# Patient Record
Sex: MALE | Race: WHITE | Employment: OTHER | URBAN - NONMETROPOLITAN AREA
[De-identification: names, ages, dates, MRNs, and addresses within clinical notes are randomized per-mention and may not be internally consistent; named-entity substitution may affect disease eponyms.]

---

## 2017-01-12 RX ORDER — TADALAFIL 20 MG
20 TABLET ORAL PRN
Qty: 6 TABLET | Refills: 5 | Status: SHIPPED | OUTPATIENT
Start: 2017-01-12 | End: 2018-04-27

## 2017-01-12 RX ORDER — TADALAFIL 20 MG
20 TABLET ORAL PRN
Qty: 6 TABLET | Refills: 5 | OUTPATIENT
Start: 2017-01-12

## 2017-03-09 ENCOUNTER — OFFICE VISIT (OUTPATIENT)
Dept: FAMILY MEDICINE CLINIC | Age: 77
End: 2017-03-09

## 2017-03-09 VITALS
DIASTOLIC BLOOD PRESSURE: 60 MMHG | HEART RATE: 58 BPM | BODY MASS INDEX: 26.88 KG/M2 | HEIGHT: 71 IN | WEIGHT: 192 LBS | TEMPERATURE: 98.3 F | SYSTOLIC BLOOD PRESSURE: 138 MMHG | OXYGEN SATURATION: 96 %

## 2017-03-09 DIAGNOSIS — J20.9 ACUTE BRONCHITIS, UNSPECIFIED ORGANISM: Primary | ICD-10-CM

## 2017-03-09 DIAGNOSIS — Z72.0 TOBACCO ABUSE: ICD-10-CM

## 2017-03-09 PROCEDURE — G8484 FLU IMMUNIZE NO ADMIN: HCPCS | Performed by: FAMILY MEDICINE

## 2017-03-09 PROCEDURE — 1123F ACP DISCUSS/DSCN MKR DOCD: CPT | Performed by: FAMILY MEDICINE

## 2017-03-09 PROCEDURE — 4040F PNEUMOC VAC/ADMIN/RCVD: CPT | Performed by: FAMILY MEDICINE

## 2017-03-09 PROCEDURE — G8427 DOCREV CUR MEDS BY ELIG CLIN: HCPCS | Performed by: FAMILY MEDICINE

## 2017-03-09 PROCEDURE — 99214 OFFICE O/P EST MOD 30 MIN: CPT | Performed by: FAMILY MEDICINE

## 2017-03-09 PROCEDURE — G8420 CALC BMI NORM PARAMETERS: HCPCS | Performed by: FAMILY MEDICINE

## 2017-03-09 PROCEDURE — 4004F PT TOBACCO SCREEN RCVD TLK: CPT | Performed by: FAMILY MEDICINE

## 2017-03-09 RX ORDER — DOXYCYCLINE HYCLATE 100 MG
100 TABLET ORAL 2 TIMES DAILY
Qty: 20 TABLET | Refills: 0 | Status: SHIPPED | OUTPATIENT
Start: 2017-03-09 | End: 2017-03-19

## 2017-03-09 RX ORDER — BENZONATATE 100 MG/1
100 CAPSULE ORAL 3 TIMES DAILY PRN
Qty: 20 CAPSULE | Refills: 0 | Status: SHIPPED | OUTPATIENT
Start: 2017-03-09 | End: 2017-03-16

## 2017-03-09 ASSESSMENT — ENCOUNTER SYMPTOMS
RHINORRHEA: 1
SHORTNESS OF BREATH: 0
SORE THROAT: 0
COUGH: 1
HEMOPTYSIS: 0

## 2017-04-13 ENCOUNTER — OFFICE VISIT (OUTPATIENT)
Dept: FAMILY MEDICINE CLINIC | Age: 77
End: 2017-04-13

## 2017-04-13 VITALS
WEIGHT: 190.6 LBS | DIASTOLIC BLOOD PRESSURE: 76 MMHG | BODY MASS INDEX: 26.68 KG/M2 | HEIGHT: 71 IN | OXYGEN SATURATION: 93 % | SYSTOLIC BLOOD PRESSURE: 136 MMHG | HEART RATE: 58 BPM

## 2017-04-13 DIAGNOSIS — N52.9 ERECTILE DYSFUNCTION, UNSPECIFIED ERECTILE DYSFUNCTION TYPE: ICD-10-CM

## 2017-04-13 DIAGNOSIS — Z86.79 S/P AAA REPAIR: ICD-10-CM

## 2017-04-13 DIAGNOSIS — Z85.51 HISTORY OF BLADDER CANCER: ICD-10-CM

## 2017-04-13 DIAGNOSIS — I10 ESSENTIAL HYPERTENSION: ICD-10-CM

## 2017-04-13 DIAGNOSIS — Z98.890 S/P AAA REPAIR: ICD-10-CM

## 2017-04-13 DIAGNOSIS — E78.5 HYPERLIPIDEMIA, UNSPECIFIED HYPERLIPIDEMIA TYPE: ICD-10-CM

## 2017-04-13 DIAGNOSIS — I48.91 ATRIAL FIBRILLATION, UNSPECIFIED TYPE (HCC): Primary | ICD-10-CM

## 2017-04-13 PROCEDURE — G8420 CALC BMI NORM PARAMETERS: HCPCS | Performed by: FAMILY MEDICINE

## 2017-04-13 PROCEDURE — 4004F PT TOBACCO SCREEN RCVD TLK: CPT | Performed by: FAMILY MEDICINE

## 2017-04-13 PROCEDURE — 4040F PNEUMOC VAC/ADMIN/RCVD: CPT | Performed by: FAMILY MEDICINE

## 2017-04-13 PROCEDURE — 1123F ACP DISCUSS/DSCN MKR DOCD: CPT | Performed by: FAMILY MEDICINE

## 2017-04-13 PROCEDURE — 99213 OFFICE O/P EST LOW 20 MIN: CPT | Performed by: FAMILY MEDICINE

## 2017-04-13 PROCEDURE — G8427 DOCREV CUR MEDS BY ELIG CLIN: HCPCS | Performed by: FAMILY MEDICINE

## 2017-04-13 RX ORDER — PNEUMOCOCCAL 13-VALENT CONJUGATE VACCINE 2.2; 2.2; 2.2; 2.2; 2.2; 4.4; 2.2; 2.2; 2.2; 2.2; 2.2; 2.2; 2.2 UG/.5ML; UG/.5ML; UG/.5ML; UG/.5ML; UG/.5ML; UG/.5ML; UG/.5ML; UG/.5ML; UG/.5ML; UG/.5ML; UG/.5ML; UG/.5ML; UG/.5ML
INJECTION, SUSPENSION INTRAMUSCULAR
Refills: 0 | COMMUNITY
Start: 2017-01-17 | End: 2017-04-13

## 2017-04-17 DIAGNOSIS — I10 ESSENTIAL HYPERTENSION: ICD-10-CM

## 2017-04-17 DIAGNOSIS — E78.5 HYPERLIPIDEMIA, UNSPECIFIED HYPERLIPIDEMIA TYPE: ICD-10-CM

## 2017-04-17 DIAGNOSIS — D64.9 ANEMIA, UNSPECIFIED TYPE: Primary | ICD-10-CM

## 2017-04-17 DIAGNOSIS — I48.91 ATRIAL FIBRILLATION, UNSPECIFIED TYPE (HCC): ICD-10-CM

## 2017-04-17 LAB
ALBUMIN SERPL-MCNC: 4.1 G/DL (ref 3.9–4.9)
ALP BLD-CCNC: 55 U/L (ref 35–104)
ALT SERPL-CCNC: 17 U/L (ref 0–41)
ANION GAP SERPL CALCULATED.3IONS-SCNC: 11 MEQ/L (ref 7–13)
AST SERPL-CCNC: 23 U/L (ref 0–40)
BILIRUB SERPL-MCNC: 0.6 MG/DL (ref 0–1.2)
BUN BLDV-MCNC: 12 MG/DL (ref 8–23)
CALCIUM SERPL-MCNC: 8.7 MG/DL (ref 8.6–10.2)
CHLORIDE BLD-SCNC: 105 MEQ/L (ref 98–107)
CHOLESTEROL, TOTAL: 182 MG/DL (ref 0–199)
CO2: 26 MEQ/L (ref 22–29)
CREAT SERPL-MCNC: 0.93 MG/DL (ref 0.7–1.2)
FOLATE: 15.4 NG/ML (ref 7.3–26.1)
GFR AFRICAN AMERICAN: >60
GFR NON-AFRICAN AMERICAN: >60
GLOBULIN: 3.1 G/DL (ref 2.3–3.5)
GLUCOSE BLD-MCNC: 87 MG/DL (ref 74–109)
HCT VFR BLD CALC: 39.2 % (ref 42–52)
HDLC SERPL-MCNC: 46 MG/DL (ref 40–59)
HEMOGLOBIN: 12.9 G/DL (ref 14–18)
IRON SATURATION: 8 % (ref 11–46)
IRON: 29 UG/DL (ref 59–158)
LDL CHOLESTEROL CALCULATED: 114 MG/DL (ref 0–129)
MCH RBC QN AUTO: 27.3 PG (ref 27–31.3)
MCHC RBC AUTO-ENTMCNC: 32.9 % (ref 33–37)
MCV RBC AUTO: 82.8 FL (ref 80–100)
PDW BLD-RTO: 16.5 % (ref 11.5–14.5)
PLATELET # BLD: 178 K/UL (ref 130–400)
POTASSIUM SERPL-SCNC: 3.8 MEQ/L (ref 3.5–5.1)
RBC # BLD: 4.74 M/UL (ref 4.7–6.1)
SODIUM BLD-SCNC: 142 MEQ/L (ref 132–144)
TOTAL IRON BINDING CAPACITY: 361 UG/DL (ref 178–450)
TOTAL PROTEIN: 7.2 G/DL (ref 6.4–8.1)
TRIGL SERPL-MCNC: 111 MG/DL (ref 0–200)
TSH SERPL DL<=0.05 MIU/L-ACNC: 1.75 UIU/ML (ref 0.27–4.2)
VITAMIN B-12: 330 PG/ML (ref 211–946)
WBC # BLD: 6.9 K/UL (ref 4.8–10.8)

## 2017-04-18 DIAGNOSIS — D50.9 IRON DEFICIENCY ANEMIA, UNSPECIFIED IRON DEFICIENCY ANEMIA TYPE: Primary | ICD-10-CM

## 2017-04-19 ENCOUNTER — OFFICE VISIT (OUTPATIENT)
Dept: GASTROENTEROLOGY | Age: 77
End: 2017-04-19

## 2017-04-19 DIAGNOSIS — D50.9 IRON DEFICIENCY ANEMIA, UNSPECIFIED: Primary | ICD-10-CM

## 2017-04-19 PROCEDURE — 1123F ACP DISCUSS/DSCN MKR DOCD: CPT | Performed by: INTERNAL MEDICINE

## 2017-04-19 PROCEDURE — 99203 OFFICE O/P NEW LOW 30 MIN: CPT | Performed by: INTERNAL MEDICINE

## 2017-04-19 PROCEDURE — G8427 DOCREV CUR MEDS BY ELIG CLIN: HCPCS | Performed by: INTERNAL MEDICINE

## 2017-04-19 PROCEDURE — 4040F PNEUMOC VAC/ADMIN/RCVD: CPT | Performed by: INTERNAL MEDICINE

## 2017-04-19 PROCEDURE — 4004F PT TOBACCO SCREEN RCVD TLK: CPT | Performed by: INTERNAL MEDICINE

## 2017-04-19 PROCEDURE — G8420 CALC BMI NORM PARAMETERS: HCPCS | Performed by: INTERNAL MEDICINE

## 2017-04-21 VITALS
BODY MASS INDEX: 26.36 KG/M2 | SYSTOLIC BLOOD PRESSURE: 186 MMHG | DIASTOLIC BLOOD PRESSURE: 86 MMHG | WEIGHT: 189 LBS | HEART RATE: 50 BPM

## 2017-04-23 ENCOUNTER — ANESTHESIA EVENT (OUTPATIENT)
Dept: ENDOSCOPY | Age: 77
End: 2017-04-23
Payer: MEDICARE

## 2017-04-24 ENCOUNTER — HOSPITAL ENCOUNTER (OUTPATIENT)
Age: 77
Setting detail: OUTPATIENT SURGERY
Discharge: HOME | End: 2017-04-24
Attending: INTERNAL MEDICINE | Admitting: INTERNAL MEDICINE
Payer: MEDICARE

## 2017-04-24 ENCOUNTER — ANESTHESIA (OUTPATIENT)
Dept: ENDOSCOPY | Age: 77
End: 2017-04-24
Payer: MEDICARE

## 2017-04-24 VITALS
OXYGEN SATURATION: 95 % | WEIGHT: 189 LBS | RESPIRATION RATE: 18 BRPM | HEIGHT: 70 IN | SYSTOLIC BLOOD PRESSURE: 170 MMHG | TEMPERATURE: 97.9 F | DIASTOLIC BLOOD PRESSURE: 93 MMHG | BODY MASS INDEX: 27.06 KG/M2 | HEART RATE: 62 BPM

## 2017-04-24 VITALS
SYSTOLIC BLOOD PRESSURE: 102 MMHG | DIASTOLIC BLOOD PRESSURE: 61 MMHG | OXYGEN SATURATION: 99 % | RESPIRATION RATE: 13 BRPM

## 2017-04-24 PROCEDURE — 88342 IMHCHEM/IMCYTCHM 1ST ANTB: CPT

## 2017-04-24 PROCEDURE — 2580000003 HC RX 258: Performed by: STUDENT IN AN ORGANIZED HEALTH CARE EDUCATION/TRAINING PROGRAM

## 2017-04-24 PROCEDURE — 3609027000 HC COLONOSCOPY: Performed by: INTERNAL MEDICINE

## 2017-04-24 PROCEDURE — 3700000000 HC ANESTHESIA ATTENDED CARE: Performed by: INTERNAL MEDICINE

## 2017-04-24 PROCEDURE — 2500000003 HC RX 250 WO HCPCS: Performed by: NURSE ANESTHETIST, CERTIFIED REGISTERED

## 2017-04-24 PROCEDURE — 2580000003 HC RX 258: Performed by: NURSE ANESTHETIST, CERTIFIED REGISTERED

## 2017-04-24 PROCEDURE — 6360000002 HC RX W HCPCS: Performed by: NURSE ANESTHETIST, CERTIFIED REGISTERED

## 2017-04-24 PROCEDURE — 7100000011 HC PHASE II RECOVERY - ADDTL 15 MIN: Performed by: INTERNAL MEDICINE

## 2017-04-24 PROCEDURE — 3609017100 HC EGD: Performed by: INTERNAL MEDICINE

## 2017-04-24 PROCEDURE — 3700000001 HC ADD 15 MINUTES (ANESTHESIA): Performed by: INTERNAL MEDICINE

## 2017-04-24 PROCEDURE — 88305 TISSUE EXAM BY PATHOLOGIST: CPT

## 2017-04-24 PROCEDURE — 7100000010 HC PHASE II RECOVERY - FIRST 15 MIN: Performed by: INTERNAL MEDICINE

## 2017-04-24 RX ORDER — SODIUM CHLORIDE 9 MG/ML
INJECTION, SOLUTION INTRAVENOUS CONTINUOUS
Status: DISCONTINUED | OUTPATIENT
Start: 2017-04-24 | End: 2017-04-24 | Stop reason: HOSPADM

## 2017-04-24 RX ORDER — SODIUM CHLORIDE 9 MG/ML
INJECTION, SOLUTION INTRAVENOUS CONTINUOUS PRN
Status: DISCONTINUED | OUTPATIENT
Start: 2017-04-24 | End: 2017-04-24 | Stop reason: SDUPTHER

## 2017-04-24 RX ORDER — ONDANSETRON 2 MG/ML
4 INJECTION INTRAMUSCULAR; INTRAVENOUS
Status: DISCONTINUED | OUTPATIENT
Start: 2017-04-24 | End: 2017-04-24 | Stop reason: HOSPADM

## 2017-04-24 RX ORDER — LIDOCAINE HYDROCHLORIDE 20 MG/ML
INJECTION, SOLUTION EPIDURAL; INFILTRATION; INTRACAUDAL; PERINEURAL PRN
Status: DISCONTINUED | OUTPATIENT
Start: 2017-04-24 | End: 2017-04-24 | Stop reason: SDUPTHER

## 2017-04-24 RX ORDER — GLYCOPYRROLATE 0.2 MG/ML
INJECTION INTRAMUSCULAR; INTRAVENOUS PRN
Status: DISCONTINUED | OUTPATIENT
Start: 2017-04-24 | End: 2017-04-24 | Stop reason: SDUPTHER

## 2017-04-24 RX ORDER — PROPOFOL 10 MG/ML
INJECTION, EMULSION INTRAVENOUS PRN
Status: DISCONTINUED | OUTPATIENT
Start: 2017-04-24 | End: 2017-04-24 | Stop reason: SDUPTHER

## 2017-04-24 RX ADMIN — PROPOFOL 10 MG: 10 INJECTION, EMULSION INTRAVENOUS at 08:32

## 2017-04-24 RX ADMIN — PROPOFOL 10 MG: 10 INJECTION, EMULSION INTRAVENOUS at 08:21

## 2017-04-24 RX ADMIN — PROPOFOL 10 MG: 10 INJECTION, EMULSION INTRAVENOUS at 08:07

## 2017-04-24 RX ADMIN — PROPOFOL 10 MG: 10 INJECTION, EMULSION INTRAVENOUS at 08:25

## 2017-04-24 RX ADMIN — SODIUM CHLORIDE: 900 INJECTION, SOLUTION INTRAVENOUS at 07:58

## 2017-04-24 RX ADMIN — GLYCOPYRROLATE 0.4 MG: 0.2 INJECTION INTRAMUSCULAR; INTRAVENOUS at 08:03

## 2017-04-24 RX ADMIN — PROPOFOL 50 MG: 10 INJECTION, EMULSION INTRAVENOUS at 08:03

## 2017-04-24 RX ADMIN — PROPOFOL 10 MG: 10 INJECTION, EMULSION INTRAVENOUS at 08:15

## 2017-04-24 RX ADMIN — SODIUM CHLORIDE: 9 INJECTION, SOLUTION INTRAVENOUS at 07:39

## 2017-04-24 RX ADMIN — PROPOFOL 50 MG: 10 INJECTION, EMULSION INTRAVENOUS at 08:02

## 2017-04-24 RX ADMIN — PROPOFOL 10 MG: 10 INJECTION, EMULSION INTRAVENOUS at 08:09

## 2017-04-24 RX ADMIN — LIDOCAINE HYDROCHLORIDE 20 MG: 20 INJECTION, SOLUTION EPIDURAL; INFILTRATION; INTRACAUDAL; PERINEURAL at 08:02

## 2017-04-24 RX ADMIN — PROPOFOL 10 MG: 10 INJECTION, EMULSION INTRAVENOUS at 08:18

## 2017-04-24 RX ADMIN — PROPOFOL 10 MG: 10 INJECTION, EMULSION INTRAVENOUS at 08:11

## 2017-04-24 RX ADMIN — PROPOFOL 10 MG: 10 INJECTION, EMULSION INTRAVENOUS at 08:26

## 2017-04-24 RX ADMIN — PROPOFOL 30 MG: 10 INJECTION, EMULSION INTRAVENOUS at 08:05

## 2017-04-24 ASSESSMENT — PAIN - FUNCTIONAL ASSESSMENT: PAIN_FUNCTIONAL_ASSESSMENT: 0-10

## 2017-05-19 LAB
HCT VFR BLD CALC: 40.7 % (ref 42–52)
HEMOGLOBIN: 13 G/DL (ref 14–18)
MCH RBC QN AUTO: 26.9 PG (ref 27–31.3)
MCHC RBC AUTO-ENTMCNC: 31.8 % (ref 33–37)
MCV RBC AUTO: 84.6 FL (ref 80–100)
PDW BLD-RTO: 16.8 % (ref 11.5–14.5)
PLATELET # BLD: 148 K/UL (ref 130–400)
RBC # BLD: 4.81 M/UL (ref 4.7–6.1)
WBC # BLD: 7.4 K/UL (ref 4.8–10.8)

## 2017-05-23 ENCOUNTER — OFFICE VISIT (OUTPATIENT)
Dept: GASTROENTEROLOGY | Age: 77
End: 2017-05-23

## 2017-05-23 VITALS
WEIGHT: 187 LBS | DIASTOLIC BLOOD PRESSURE: 80 MMHG | SYSTOLIC BLOOD PRESSURE: 174 MMHG | HEART RATE: 55 BPM | BODY MASS INDEX: 26.83 KG/M2

## 2017-05-23 DIAGNOSIS — D50.9 IRON DEFICIENCY ANEMIA, UNSPECIFIED: Primary | ICD-10-CM

## 2017-05-23 PROCEDURE — G8420 CALC BMI NORM PARAMETERS: HCPCS | Performed by: INTERNAL MEDICINE

## 2017-05-23 PROCEDURE — 99212 OFFICE O/P EST SF 10 MIN: CPT | Performed by: INTERNAL MEDICINE

## 2017-05-23 PROCEDURE — 4040F PNEUMOC VAC/ADMIN/RCVD: CPT | Performed by: INTERNAL MEDICINE

## 2017-05-23 PROCEDURE — 4004F PT TOBACCO SCREEN RCVD TLK: CPT | Performed by: INTERNAL MEDICINE

## 2017-05-23 PROCEDURE — G8427 DOCREV CUR MEDS BY ELIG CLIN: HCPCS | Performed by: INTERNAL MEDICINE

## 2017-05-23 PROCEDURE — 1123F ACP DISCUSS/DSCN MKR DOCD: CPT | Performed by: INTERNAL MEDICINE

## 2017-05-24 RX ORDER — FERROUS SULFATE 325(65) MG
325 TABLET ORAL
Qty: 30 TABLET | Refills: 3
Start: 2017-05-24 | End: 2017-09-22 | Stop reason: ALTCHOICE

## 2017-07-21 DIAGNOSIS — D50.9 IRON DEFICIENCY ANEMIA, UNSPECIFIED: Primary | ICD-10-CM

## 2017-07-21 LAB
BASOPHILS ABSOLUTE: 0.1 K/UL (ref 0–0.2)
BASOPHILS RELATIVE PERCENT: 0.9 %
EOSINOPHILS ABSOLUTE: 0.2 K/UL (ref 0–0.7)
EOSINOPHILS RELATIVE PERCENT: 3 %
HCT VFR BLD CALC: 46.4 % (ref 42–52)
HEMOGLOBIN: 15.1 G/DL (ref 14–18)
LYMPHOCYTES ABSOLUTE: 1 K/UL (ref 1–4.8)
LYMPHOCYTES RELATIVE PERCENT: 14.1 %
MCH RBC QN AUTO: 29.5 PG (ref 27–31.3)
MCHC RBC AUTO-ENTMCNC: 32.6 % (ref 33–37)
MCV RBC AUTO: 90.4 FL (ref 80–100)
MONOCYTES ABSOLUTE: 0.8 K/UL (ref 0.2–0.8)
MONOCYTES RELATIVE PERCENT: 11.4 %
NEUTROPHILS ABSOLUTE: 5 K/UL (ref 1.4–6.5)
NEUTROPHILS RELATIVE PERCENT: 70.6 %
PDW BLD-RTO: 19.3 % (ref 11.5–14.5)
PLATELET # BLD: 164 K/UL (ref 130–400)
RBC # BLD: 5.13 M/UL (ref 4.7–6.1)
WBC # BLD: 7 K/UL (ref 4.8–10.8)

## 2017-07-25 ENCOUNTER — OFFICE VISIT (OUTPATIENT)
Dept: GASTROENTEROLOGY | Age: 77
End: 2017-07-25

## 2017-07-25 VITALS
BODY MASS INDEX: 26.98 KG/M2 | HEART RATE: 56 BPM | DIASTOLIC BLOOD PRESSURE: 80 MMHG | WEIGHT: 188 LBS | RESPIRATION RATE: 18 BRPM | SYSTOLIC BLOOD PRESSURE: 172 MMHG

## 2017-07-25 DIAGNOSIS — D50.9 IRON DEFICIENCY ANEMIA, UNSPECIFIED: Primary | ICD-10-CM

## 2017-07-25 PROCEDURE — 4040F PNEUMOC VAC/ADMIN/RCVD: CPT | Performed by: INTERNAL MEDICINE

## 2017-07-25 PROCEDURE — 4004F PT TOBACCO SCREEN RCVD TLK: CPT | Performed by: INTERNAL MEDICINE

## 2017-07-25 PROCEDURE — G8427 DOCREV CUR MEDS BY ELIG CLIN: HCPCS | Performed by: INTERNAL MEDICINE

## 2017-07-25 PROCEDURE — 99212 OFFICE O/P EST SF 10 MIN: CPT | Performed by: INTERNAL MEDICINE

## 2017-07-25 PROCEDURE — 1123F ACP DISCUSS/DSCN MKR DOCD: CPT | Performed by: INTERNAL MEDICINE

## 2017-07-25 PROCEDURE — G8419 CALC BMI OUT NRM PARAM NOF/U: HCPCS | Performed by: INTERNAL MEDICINE

## 2017-08-21 ENCOUNTER — NURSE ONLY (OUTPATIENT)
Dept: UROLOGY | Age: 77
End: 2017-08-21

## 2017-08-21 ENCOUNTER — TELEPHONE (OUTPATIENT)
Dept: UROLOGY | Age: 77
End: 2017-08-21

## 2017-08-21 DIAGNOSIS — Z85.51 HISTORY OF BLADDER CANCER: Primary | ICD-10-CM

## 2017-08-21 RX ORDER — ESCITALOPRAM OXALATE 10 MG/1
10 TABLET ORAL DAILY
Qty: 90 TABLET | Refills: 1 | Status: SHIPPED | OUTPATIENT
Start: 2017-08-21 | End: 2018-06-22 | Stop reason: SDUPTHER

## 2017-08-25 ENCOUNTER — PROCEDURE VISIT (OUTPATIENT)
Dept: UROLOGY | Age: 77
End: 2017-08-25

## 2017-08-25 VITALS
DIASTOLIC BLOOD PRESSURE: 86 MMHG | SYSTOLIC BLOOD PRESSURE: 146 MMHG | WEIGHT: 184 LBS | HEIGHT: 71 IN | HEART RATE: 56 BPM | BODY MASS INDEX: 25.76 KG/M2

## 2017-08-25 DIAGNOSIS — N40.0 BENIGN NON-NODULAR PROSTATIC HYPERPLASIA WITHOUT LOWER URINARY TRACT SYMPTOMS: ICD-10-CM

## 2017-08-25 DIAGNOSIS — Z85.51 HISTORY OF BLADDER CANCER: Primary | ICD-10-CM

## 2017-08-25 LAB
BILIRUBIN, POC: NORMAL
BLOOD URINE, POC: NORMAL
CLARITY, POC: CLEAR
COLOR, POC: YELLOW
GLUCOSE URINE, POC: NORMAL
KETONES, POC: NORMAL
LEUKOCYTE EST, POC: NORMAL
NITRITE, POC: NORMAL
PH, POC: 6
PROTEIN, POC: 100
SPECIFIC GRAVITY, POC: 1.02
UROBILINOGEN, POC: 0.2

## 2017-08-25 PROCEDURE — 99212 OFFICE O/P EST SF 10 MIN: CPT | Performed by: UROLOGY

## 2017-08-25 PROCEDURE — 81003 URINALYSIS AUTO W/O SCOPE: CPT | Performed by: UROLOGY

## 2017-08-25 PROCEDURE — 4040F PNEUMOC VAC/ADMIN/RCVD: CPT | Performed by: UROLOGY

## 2017-08-25 PROCEDURE — G8419 CALC BMI OUT NRM PARAM NOF/U: HCPCS | Performed by: UROLOGY

## 2017-08-25 PROCEDURE — 1123F ACP DISCUSS/DSCN MKR DOCD: CPT | Performed by: UROLOGY

## 2017-08-25 PROCEDURE — 52000 CYSTOURETHROSCOPY: CPT | Performed by: UROLOGY

## 2017-08-25 PROCEDURE — 4004F PT TOBACCO SCREEN RCVD TLK: CPT | Performed by: UROLOGY

## 2017-08-25 PROCEDURE — G8427 DOCREV CUR MEDS BY ELIG CLIN: HCPCS | Performed by: UROLOGY

## 2017-08-25 RX ORDER — DOXYCYCLINE HYCLATE 100 MG/1
100 CAPSULE ORAL ONCE
Qty: 1 CAPSULE | Refills: 0 | COMMUNITY
Start: 2017-08-25 | End: 2017-08-25

## 2017-08-25 ASSESSMENT — ENCOUNTER SYMPTOMS: SHORTNESS OF BREATH: 0

## 2017-09-22 ENCOUNTER — OFFICE VISIT (OUTPATIENT)
Dept: FAMILY MEDICINE CLINIC | Age: 77
End: 2017-09-22

## 2017-09-22 VITALS
HEART RATE: 58 BPM | HEIGHT: 70 IN | SYSTOLIC BLOOD PRESSURE: 132 MMHG | WEIGHT: 186 LBS | TEMPERATURE: 97.3 F | BODY MASS INDEX: 26.63 KG/M2 | DIASTOLIC BLOOD PRESSURE: 72 MMHG | OXYGEN SATURATION: 96 %

## 2017-09-22 DIAGNOSIS — M54.50 ACUTE RIGHT-SIDED LOW BACK PAIN WITHOUT SCIATICA: Primary | ICD-10-CM

## 2017-09-22 PROCEDURE — G8417 CALC BMI ABV UP PARAM F/U: HCPCS | Performed by: NURSE PRACTITIONER

## 2017-09-22 PROCEDURE — 99213 OFFICE O/P EST LOW 20 MIN: CPT | Performed by: NURSE PRACTITIONER

## 2017-09-22 PROCEDURE — 4004F PT TOBACCO SCREEN RCVD TLK: CPT | Performed by: NURSE PRACTITIONER

## 2017-09-22 PROCEDURE — 1123F ACP DISCUSS/DSCN MKR DOCD: CPT | Performed by: NURSE PRACTITIONER

## 2017-09-22 PROCEDURE — G8427 DOCREV CUR MEDS BY ELIG CLIN: HCPCS | Performed by: NURSE PRACTITIONER

## 2017-09-22 PROCEDURE — 4040F PNEUMOC VAC/ADMIN/RCVD: CPT | Performed by: NURSE PRACTITIONER

## 2017-09-22 RX ORDER — METHYLPREDNISOLONE 4 MG/1
TABLET ORAL
Qty: 21 TABLET | Refills: 0 | Status: SHIPPED | OUTPATIENT
Start: 2017-09-22 | End: 2017-09-28

## 2017-09-22 RX ORDER — TIZANIDINE HYDROCHLORIDE 2 MG/1
2 CAPSULE, GELATIN COATED ORAL 3 TIMES DAILY PRN
Qty: 15 CAPSULE | Refills: 0 | Status: SHIPPED | OUTPATIENT
Start: 2017-09-22 | End: 2017-10-19 | Stop reason: SDUPTHER

## 2017-09-22 ASSESSMENT — ENCOUNTER SYMPTOMS: BACK PAIN: 1

## 2017-10-02 ENCOUNTER — TELEPHONE (OUTPATIENT)
Dept: FAMILY MEDICINE CLINIC | Age: 77
End: 2017-10-02

## 2017-10-02 DIAGNOSIS — M54.41 RIGHT-SIDED LOW BACK PAIN WITH RIGHT-SIDED SCIATICA, UNSPECIFIED CHRONICITY: Primary | ICD-10-CM

## 2017-10-04 PROBLEM — M47.816 SPONDYLOSIS OF LUMBAR REGION WITHOUT MYELOPATHY OR RADICULOPATHY: Status: ACTIVE | Noted: 2017-10-04

## 2017-10-19 ENCOUNTER — OFFICE VISIT (OUTPATIENT)
Dept: FAMILY MEDICINE CLINIC | Age: 77
End: 2017-10-19

## 2017-10-19 VITALS
DIASTOLIC BLOOD PRESSURE: 80 MMHG | WEIGHT: 185 LBS | OXYGEN SATURATION: 96 % | HEIGHT: 70 IN | SYSTOLIC BLOOD PRESSURE: 130 MMHG | HEART RATE: 53 BPM | BODY MASS INDEX: 26.48 KG/M2

## 2017-10-19 DIAGNOSIS — Z98.890 S/P AAA REPAIR: ICD-10-CM

## 2017-10-19 DIAGNOSIS — D50.8 IRON DEFICIENCY ANEMIA SECONDARY TO INADEQUATE DIETARY IRON INTAKE: ICD-10-CM

## 2017-10-19 DIAGNOSIS — E78.5 HYPERLIPIDEMIA, UNSPECIFIED HYPERLIPIDEMIA TYPE: ICD-10-CM

## 2017-10-19 DIAGNOSIS — I10 ESSENTIAL HYPERTENSION: Primary | ICD-10-CM

## 2017-10-19 DIAGNOSIS — I48.91 ATRIAL FIBRILLATION, UNSPECIFIED TYPE (HCC): ICD-10-CM

## 2017-10-19 DIAGNOSIS — Z85.51 HISTORY OF BLADDER CANCER: ICD-10-CM

## 2017-10-19 DIAGNOSIS — Z86.79 S/P AAA REPAIR: ICD-10-CM

## 2017-10-19 PROCEDURE — 4004F PT TOBACCO SCREEN RCVD TLK: CPT | Performed by: FAMILY MEDICINE

## 2017-10-19 PROCEDURE — G8427 DOCREV CUR MEDS BY ELIG CLIN: HCPCS | Performed by: FAMILY MEDICINE

## 2017-10-19 PROCEDURE — 1123F ACP DISCUSS/DSCN MKR DOCD: CPT | Performed by: FAMILY MEDICINE

## 2017-10-19 PROCEDURE — G8484 FLU IMMUNIZE NO ADMIN: HCPCS | Performed by: FAMILY MEDICINE

## 2017-10-19 PROCEDURE — G8417 CALC BMI ABV UP PARAM F/U: HCPCS | Performed by: FAMILY MEDICINE

## 2017-10-19 PROCEDURE — 4040F PNEUMOC VAC/ADMIN/RCVD: CPT | Performed by: FAMILY MEDICINE

## 2017-10-19 PROCEDURE — 99213 OFFICE O/P EST LOW 20 MIN: CPT | Performed by: FAMILY MEDICINE

## 2017-10-19 ASSESSMENT — PATIENT HEALTH QUESTIONNAIRE - PHQ9
SUM OF ALL RESPONSES TO PHQ QUESTIONS 1-9: 0
2. FEELING DOWN, DEPRESSED OR HOPELESS: 0
SUM OF ALL RESPONSES TO PHQ9 QUESTIONS 1 & 2: 0
1. LITTLE INTEREST OR PLEASURE IN DOING THINGS: 0

## 2017-10-19 NOTE — PROGRESS NOTES
Chief Complaint   Patient presents with    Atrial Fibrillation     6 month check up, pt asking for cialis samples, none in office       HPI:  Luís Harmon. is a 65 y.o. male    11 month checkup    Follows with [facility] for A-fib for quite some time    He is on anti-arrhythmics   Dr. Lynne Ingram and Dr. Chris Rolon    He believes he is in NSR    No acute complaints today  He is on eliquis    Gets flu shot at [facility]    His HR always runs on the low side he states    Had colonoscopy earlier this year for anemia  Started on iron, Hgb better    Past Medical History:   Diagnosis Date    Atrial fibrillation ([address]     CAD (coronary artery disease)     Hematuria     High cholesterol     History of bladder cancer     Hyperlipidemia     Hypertension     S/P AAA repair      Past Surgical History:   Procedure Laterality Date    ABDOMINAL AORTIC ANEURYSM REPAIR      HERNIA REPAIR  1998    [place] COLON CA SCRN NOT  [address] IND N/A 4/24/2017    COLONOSCOPY performed by Mar Crump MD at . [facility] 61 ESOPHAGOGASTRODUODENOSCOPY TRANSORAL DIAGNOSTIC N/A 4/24/2017    EGD ESOPHAGOGASTRODUODENOSCOPY performed by Mar Crump MD at [facility]     History reviewed. No pertinent family history.   Social History     Social History    Marital status:      Spouse name: N/A    Number of children: N/A    Years of education: N/A     Social History Main Topics    Smoking status: Light Tobacco Smoker     Packs/day: 0.33     Types: Cigarettes    Smokeless tobacco: Never Used    Alcohol use Yes      Comment: occasionally    Drug use: No    Sexual activity: Not Asked     Other Topics Concern    None     Social History Narrative    None     Current Outpatient Prescriptions   Medication Sig Dispense Refill    tiZANidine (ZANAFLEX) 2 MG capsule Take 1 capsule by mouth 3 times daily 90 capsule 0    escitalopram (LEXAPRO) 10 MG tablet Take 1 tablet by mouth daily 90 tablet 1    CIALIS 20 MG tablet Take 1

## 2017-12-12 ENCOUNTER — HOSPITAL ENCOUNTER (OUTPATIENT)
Dept: ORTHOPEDIC SURGERY | Age: 77
Discharge: HOME | End: 2017-12-12
Payer: MEDICARE

## 2017-12-12 DIAGNOSIS — M25.561 ARTHRALGIA OF RIGHT LOWER LEG: ICD-10-CM

## 2017-12-12 PROCEDURE — 73564 X-RAY EXAM KNEE 4 OR MORE: CPT

## 2018-01-18 LAB
ANION GAP SERPL CALCULATED.3IONS-SCNC: 11 MEQ/L (ref 7–13)
BUN BLDV-MCNC: 19 MG/DL (ref 8–23)
CALCIUM SERPL-MCNC: 9 MG/DL (ref 8.6–10.2)
CHLORIDE BLD-SCNC: 102 MEQ/L (ref 98–107)
CO2: 30 MEQ/L (ref 22–29)
CREAT SERPL-MCNC: 0.92 MG/DL (ref 0.7–1.2)
GFR AFRICAN AMERICAN: >60
GFR NON-AFRICAN AMERICAN: >60
GLUCOSE BLD-MCNC: 111 MG/DL (ref 74–109)
HCT VFR BLD CALC: 46.8 % (ref 42–52)
HEMOGLOBIN: 15.9 G/DL (ref 14–18)
MCH RBC QN AUTO: 32.8 PG (ref 27–31.3)
MCHC RBC AUTO-ENTMCNC: 34 % (ref 33–37)
MCV RBC AUTO: 96.5 FL (ref 80–100)
PDW BLD-RTO: 13.6 % (ref 11.5–14.5)
PLATELET # BLD: 168 K/UL (ref 130–400)
POTASSIUM SERPL-SCNC: 3.6 MEQ/L (ref 3.5–5.1)
RBC # BLD: 4.85 M/UL (ref 4.7–6.1)
SODIUM BLD-SCNC: 143 MEQ/L (ref 132–144)
WBC # BLD: 8.3 K/UL (ref 4.8–10.8)

## 2018-01-22 ENCOUNTER — TELEPHONE (OUTPATIENT)
Dept: FAMILY MEDICINE CLINIC | Age: 78
End: 2018-01-22

## 2018-02-05 LAB — POTASSIUM SERPL-SCNC: 4.6 MEQ/L (ref 3.5–5.1)

## 2018-03-19 LAB
ANION GAP SERPL CALCULATED.3IONS-SCNC: 14 MEQ/L (ref 7–13)
BUN BLDV-MCNC: 16 MG/DL (ref 8–23)
CALCIUM SERPL-MCNC: 8.5 MG/DL (ref 8.6–10.2)
CHLORIDE BLD-SCNC: 102 MEQ/L (ref 98–107)
CO2: 28 MEQ/L (ref 22–29)
CREAT SERPL-MCNC: 0.92 MG/DL (ref 0.7–1.2)
GFR AFRICAN AMERICAN: >60
GFR NON-AFRICAN AMERICAN: >60
GLUCOSE BLD-MCNC: 78 MG/DL (ref 74–109)
POTASSIUM SERPL-SCNC: 4.3 MEQ/L (ref 3.5–5.1)
SODIUM BLD-SCNC: 144 MEQ/L (ref 132–144)

## 2018-04-27 ENCOUNTER — OFFICE VISIT (OUTPATIENT)
Dept: FAMILY MEDICINE CLINIC | Age: 78
End: 2018-04-27
Payer: MEDICARE

## 2018-04-27 VITALS
DIASTOLIC BLOOD PRESSURE: 78 MMHG | BODY MASS INDEX: 27.11 KG/M2 | OXYGEN SATURATION: 98 % | HEIGHT: 70 IN | HEART RATE: 53 BPM | WEIGHT: 189.4 LBS | SYSTOLIC BLOOD PRESSURE: 110 MMHG

## 2018-04-27 DIAGNOSIS — Z85.51 HISTORY OF BLADDER CANCER: ICD-10-CM

## 2018-04-27 DIAGNOSIS — I48.91 ATRIAL FIBRILLATION, UNSPECIFIED TYPE (HCC): ICD-10-CM

## 2018-04-27 DIAGNOSIS — E78.5 HYPERLIPIDEMIA, UNSPECIFIED HYPERLIPIDEMIA TYPE: ICD-10-CM

## 2018-04-27 DIAGNOSIS — D50.8 IRON DEFICIENCY ANEMIA SECONDARY TO INADEQUATE DIETARY IRON INTAKE: Primary | ICD-10-CM

## 2018-04-27 DIAGNOSIS — Z98.890 S/P AAA REPAIR: ICD-10-CM

## 2018-04-27 DIAGNOSIS — I10 ESSENTIAL HYPERTENSION: ICD-10-CM

## 2018-04-27 DIAGNOSIS — Z86.79 S/P AAA REPAIR: ICD-10-CM

## 2018-04-27 DIAGNOSIS — M47.816 SPONDYLOSIS OF LUMBAR REGION WITHOUT MYELOPATHY OR RADICULOPATHY: ICD-10-CM

## 2018-04-27 PROCEDURE — 1123F ACP DISCUSS/DSCN MKR DOCD: CPT | Performed by: FAMILY MEDICINE

## 2018-04-27 PROCEDURE — 99213 OFFICE O/P EST LOW 20 MIN: CPT | Performed by: FAMILY MEDICINE

## 2018-04-27 PROCEDURE — G8417 CALC BMI ABV UP PARAM F/U: HCPCS | Performed by: FAMILY MEDICINE

## 2018-04-27 PROCEDURE — 4040F PNEUMOC VAC/ADMIN/RCVD: CPT | Performed by: FAMILY MEDICINE

## 2018-04-27 PROCEDURE — G8427 DOCREV CUR MEDS BY ELIG CLIN: HCPCS | Performed by: FAMILY MEDICINE

## 2018-04-27 PROCEDURE — 4004F PT TOBACCO SCREEN RCVD TLK: CPT | Performed by: FAMILY MEDICINE

## 2018-04-27 RX ORDER — CALCIUM CARBONATE 500(1250)
500 TABLET ORAL DAILY
COMMUNITY
End: 2019-07-25

## 2018-06-22 RX ORDER — ESCITALOPRAM OXALATE 10 MG/1
TABLET ORAL
Qty: 90 TABLET | Refills: 0 | Status: SHIPPED | OUTPATIENT
Start: 2018-06-22 | End: 2018-11-21 | Stop reason: SDUPTHER

## 2018-08-21 DIAGNOSIS — N40.0 BENIGN NON-NODULAR PROSTATIC HYPERPLASIA WITHOUT LOWER URINARY TRACT SYMPTOMS: ICD-10-CM

## 2018-08-21 DIAGNOSIS — Z85.51 HISTORY OF BLADDER CANCER: ICD-10-CM

## 2018-08-21 LAB — PROSTATE SPECIFIC ANTIGEN: 1.58 NG/ML (ref 0–6.22)

## 2018-08-24 ENCOUNTER — OFFICE VISIT (OUTPATIENT)
Dept: FAMILY MEDICINE CLINIC | Age: 78
End: 2018-08-24
Payer: MEDICARE

## 2018-08-24 VITALS
SYSTOLIC BLOOD PRESSURE: 120 MMHG | OXYGEN SATURATION: 97 % | DIASTOLIC BLOOD PRESSURE: 72 MMHG | WEIGHT: 188.2 LBS | TEMPERATURE: 98.2 F | HEIGHT: 70 IN | HEART RATE: 55 BPM | BODY MASS INDEX: 26.94 KG/M2

## 2018-08-24 DIAGNOSIS — R05.9 COUGH: ICD-10-CM

## 2018-08-24 DIAGNOSIS — J02.9 ACUTE PHARYNGITIS, UNSPECIFIED ETIOLOGY: Primary | ICD-10-CM

## 2018-08-24 DIAGNOSIS — F17.200 SMOKER: ICD-10-CM

## 2018-08-24 PROCEDURE — 99213 OFFICE O/P EST LOW 20 MIN: CPT | Performed by: FAMILY MEDICINE

## 2018-08-24 PROCEDURE — G8417 CALC BMI ABV UP PARAM F/U: HCPCS | Performed by: FAMILY MEDICINE

## 2018-08-24 PROCEDURE — 1123F ACP DISCUSS/DSCN MKR DOCD: CPT | Performed by: FAMILY MEDICINE

## 2018-08-24 PROCEDURE — 1101F PT FALLS ASSESS-DOCD LE1/YR: CPT | Performed by: FAMILY MEDICINE

## 2018-08-24 PROCEDURE — 4004F PT TOBACCO SCREEN RCVD TLK: CPT | Performed by: FAMILY MEDICINE

## 2018-08-24 PROCEDURE — G8427 DOCREV CUR MEDS BY ELIG CLIN: HCPCS | Performed by: FAMILY MEDICINE

## 2018-08-24 PROCEDURE — 4040F PNEUMOC VAC/ADMIN/RCVD: CPT | Performed by: FAMILY MEDICINE

## 2018-08-24 RX ORDER — BENAZEPRIL HYDROCHLORIDE 40 MG/1
TABLET, FILM COATED ORAL
Refills: 11 | COMMUNITY
Start: 2018-08-09 | End: 2019-01-28 | Stop reason: SDUPTHER

## 2018-08-24 RX ORDER — DOXYCYCLINE HYCLATE 100 MG
100 TABLET ORAL 2 TIMES DAILY
Qty: 20 TABLET | Refills: 0 | Status: SHIPPED | OUTPATIENT
Start: 2018-08-24 | End: 2018-09-03

## 2018-08-28 ENCOUNTER — PROCEDURE VISIT (OUTPATIENT)
Dept: UROLOGY | Age: 78
End: 2018-08-28
Payer: MEDICARE

## 2018-08-28 VITALS
HEIGHT: 70 IN | SYSTOLIC BLOOD PRESSURE: 140 MMHG | HEART RATE: 57 BPM | BODY MASS INDEX: 26.48 KG/M2 | WEIGHT: 185 LBS | DIASTOLIC BLOOD PRESSURE: 80 MMHG

## 2018-08-28 DIAGNOSIS — Z85.51 HISTORY OF BLADDER CANCER: ICD-10-CM

## 2018-08-28 DIAGNOSIS — R31.29 MICROSCOPIC HEMATURIA: Primary | ICD-10-CM

## 2018-08-28 LAB
BILIRUBIN, POC: ABNORMAL
BLOOD URINE, POC: ABNORMAL
CLARITY, POC: CLEAR
COLOR, POC: YELLOW
GLUCOSE URINE, POC: ABNORMAL
KETONES, POC: ABNORMAL
LEUKOCYTE EST, POC: ABNORMAL
NITRITE, POC: ABNORMAL
PH, POC: 6
PROTEIN, POC: ABNORMAL
SPECIFIC GRAVITY, POC: 1.01
UROBILINOGEN, POC: 0.2

## 2018-08-28 PROCEDURE — 99212 OFFICE O/P EST SF 10 MIN: CPT | Performed by: UROLOGY

## 2018-08-28 PROCEDURE — 81003 URINALYSIS AUTO W/O SCOPE: CPT | Performed by: UROLOGY

## 2018-08-28 PROCEDURE — 1101F PT FALLS ASSESS-DOCD LE1/YR: CPT | Performed by: UROLOGY

## 2018-08-28 PROCEDURE — G8427 DOCREV CUR MEDS BY ELIG CLIN: HCPCS | Performed by: UROLOGY

## 2018-08-28 PROCEDURE — 52000 CYSTOURETHROSCOPY: CPT | Performed by: UROLOGY

## 2018-08-28 PROCEDURE — 1123F ACP DISCUSS/DSCN MKR DOCD: CPT | Performed by: UROLOGY

## 2018-08-28 PROCEDURE — 4040F PNEUMOC VAC/ADMIN/RCVD: CPT | Performed by: UROLOGY

## 2018-08-28 PROCEDURE — G8417 CALC BMI ABV UP PARAM F/U: HCPCS | Performed by: UROLOGY

## 2018-08-28 PROCEDURE — 4004F PT TOBACCO SCREEN RCVD TLK: CPT | Performed by: UROLOGY

## 2018-08-28 RX ORDER — DOXYCYCLINE HYCLATE 100 MG/1
100 CAPSULE ORAL ONCE
Qty: 1 CAPSULE | Refills: 0 | COMMUNITY
Start: 2018-08-28 | End: 2018-08-28

## 2018-08-28 ASSESSMENT — ENCOUNTER SYMPTOMS: SHORTNESS OF BREATH: 0

## 2018-08-28 NOTE — PROGRESS NOTES
Subjective:      Patient ID: Ki Power Sr. is a 77 y.o. male. HPI  This is a 78 y.o. 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 Since last being seen for a negative cystoscopy on 8/25/17, he has no hematuria or dysuria or pain. He has no interval medical or surgical problems. He wants to proceed with cystoscopy today. I reviewed the interval PSA . Past Medical History:   Diagnosis Date    [address] [address]     CAD (coronary artery disease)     Hematuria     High cholesterol     History of bladder cancer     Hyperlipidemia     Hypertension     S/P AAA repair      Past Surgical History:   Procedure Laterality Date    ABDOMINAL AORTIC ANEURYSM REPAIR      HERNIA REPAIR  1998    [place] COLON CA SCRN NOT  [address] IND N/A 4/24/2017    COLONOSCOPY performed by Catalina River MD at [facility] 61 ESOPHAGOGASTRODUODENOSCOPY TRANSORAL DIAGNOSTIC N/A 4/24/2017    EGD ESOPHAGOGASTRODUODENOSCOPY performed by Catalina River MD at [facility] 32 Marital status:      Spouse name: N/A    Number of children: N/A    Years of education: N/A     Social History Main Topics    Smoking status: Light Tobacco Smoker     Packs/day: 0.33     Types: Cigarettes    Smokeless tobacco: Never Used    Alcohol use Yes      Comment: occasionally    Drug use: No    Sexual activity: Not Asked     Other Topics Concern    None     Social History Narrative    None     History reviewed. No pertinent family history.   Current Outpatient Prescriptions   Medication Sig Dispense Refill    doxycycline hyclate (VIBRAMYCIN) 100 MG capsule Take 1 capsule by mouth once for 1 dose 1 capsule 0    benazepril (LOTENSIN) 40 MG tablet TAKE ONE TABLET BY MOUTH EVERY DAY  11    doxycycline hyclate (VIBRA-TABS) 100 MG tablet Take 1 tablet by mouth 2 times daily for 10 days 20 tablet 0    lidocaine viscous (XYLOCAINE) 2 % solution Take 15 mLs by mouth every 3 hours as needed for Irritation 100 mL 1    escitalopram (LEXAPRO) 10 MG tablet TAKE ONE TABLET BY MOUTH DAILY 90 tablet 0    POTASSIUM CHLORIDE PO Take by mouth      calcium carbonate (OSCAL) 500 MG TABS tablet Take 500 mg by mouth daily      metoprolol succinate ER (TOPROL-XL) 25 MG XL tablet       amLODIPine (NORVASC) 10 MG tablet       ELIQUIS 5 MG TABS tablet       flecainide (TAMBOCOR) 100 MG tablet Take 2 pills daily      rosuvastatin (CRESTOR) 10 MG tablet Take 10 mg by mouth daily. No current facility-administered medications for this visit. Avelox [moxifloxacin hcl in nacl]; Mobic [meloxicam]; and Z-eugene [azithromycin]  reviewed      Review of Systems   Constitutional: Negative for fever and unexpected weight change. Respiratory: Negative for shortness of breath. Cardiovascular: Negative for chest pain. Genitourinary: Negative for difficulty urinating, dysuria, enuresis and flank pain. Objective:   Physical Exam   Constitutional: He appears well-developed and well-nourished. Abdominal: Soft. He exhibits no distension. Genitourinary: Penis normal.   Neurological: He is alert. Psychiatric: He has a normal mood and affect.      8/28/2018 10:10 AM - Eugenie Sharma CMA ([facility])     Component Results     Component Collected Lab   Color, UA 08/28/2018 10:09 AM Unknown   yellow    Clarity, UA 08/28/2018 10:09 AM Unknown   clear    Glucose, UA POC 08/28/2018 10:09 AM Unknown   neg    Bilirubin, UA 08/28/2018 10:09 AM Unknown   neg    Ketones, UA 08/28/2018 10:09 AM Unknown   neg    Spec Grav, UA 08/28/2018 10:09 AM Unknown   1.015    Blood, UA POC 08/28/2018 10:09 AM Unknown   neg    pH, UA 08/28/2018 10:09 AM Unknown   6.0    Protein, UA POC 08/28/2018 10:09 AM Unknown   small    Urobilinogen, UA 08/28/2018 10:09 AM Unknown   0.2    Leukocytes, UA 08/28/2018 10:09 AM Unknown   neg Nitrite, UA 08/28/2018 10:09 AM Unknown   neg        Cystoscopy Procedure Note    Pre-operative Diagnosis: H/O TaG1 TCC of bladder     Post-operative Diagnosis: Same plus benign findings     Surgeon: Ole Evans      Assistants: Rajani Recio     Anesthesia: Local anesthesia topical 2% lidocaine gel     Procedure Details   The risks, benefits, complications, treatment options, and expected outcomes were discussed with the patient. The patient concurred with the proposed plan, giving informed consent.     Cystoscopy was performed today under local anesthesia, using sterile technique. The patient was placed in the supine position, prepped and draped in the usual sterile fashion. A flexible cystoscope was used to inspect the entire bladder including retroflexion.      Findings:  Anterior urethra: normal  Prostatic Urethra:normal mucosa with bi-lobar hypertrophy of prostate  Bladder: Normal mucosa without tumors, stones, clots or FB  Ureteral orifice(s) were normal . Ureteral orifice(s) were in the normal location. Specimens: None                 Complications:  None; patient tolerated the procedure well. Disposition: To home. Condition: stable    PSA   Date Value Ref Range Status   08/21/2018 1.58 0.00 - 6.22 ng/mL Final   08/21/2017 2.07 0.00 - 6.22 ng/mL Final   02/11/2016 1.54 0.00 - 6.22 ng/mL Final   08/13/2015 2.43 0.00 - 6.22 ng/mL Final   10/11/2012 1.30 0.00 - 4.00 ng/mL Final     Comment:     When the Total PSA is between 3.00 and 10.00 ng/mL, consider requesting a  Free PSA to aid in diagnosis. Free PSA is measured, as necessary, when Directed PSA is requested. Diagnostic Psa   Date Value Ref Range Status   08/06/2014 1.65 0.00 - 6.22 ng/mL Final   04/22/2013 1.3 0.0 - 4.0 ng/mL Final       Assessment:       This is a 77 y.o. 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

## 2018-10-26 RX ORDER — FLECAINIDE ACETATE 100 MG/1
100 TABLET ORAL 2 TIMES DAILY
Qty: 180 TABLET | Refills: 1 | Status: SHIPPED | OUTPATIENT
Start: 2018-10-26 | End: 2020-02-13

## 2018-10-30 ENCOUNTER — TELEPHONE (OUTPATIENT)
Dept: FAMILY MEDICINE CLINIC | Age: 78
End: 2018-10-30

## 2018-10-30 RX ORDER — SULFAMETHOXAZOLE AND TRIMETHOPRIM 800; 160 MG/1; MG/1
1 TABLET ORAL 2 TIMES DAILY
Qty: 20 TABLET | Refills: 0 | Status: SHIPPED | OUTPATIENT
Start: 2018-10-30 | End: 2018-11-09

## 2018-10-30 NOTE — TELEPHONE ENCOUNTER
Orders Placed This Encounter   Medications    sulfamethoxazole-trimethoprim (BACTRIM DS) 800-160 MG per tablet     Sig: Take 1 tablet by mouth 2 times daily for 10 days     Dispense:  20 tablet     Refill:  0       The above med(s) were e-scripted to the patient's pharmacy.    Please advise patient  Harman Jaffe MD

## 2018-10-30 NOTE — TELEPHONE ENCOUNTER
[phone]    Pt has appt tomorrow morning and would like to know if there is anything he can do tonight for what pt believes is a uti.

## 2018-10-31 ENCOUNTER — OFFICE VISIT (OUTPATIENT)
Dept: FAMILY MEDICINE CLINIC | Age: 78
End: 2018-10-31
Payer: MEDICARE

## 2018-10-31 VITALS
TEMPERATURE: 98.2 F | WEIGHT: 181 LBS | DIASTOLIC BLOOD PRESSURE: 58 MMHG | HEART RATE: 58 BPM | SYSTOLIC BLOOD PRESSURE: 100 MMHG | HEIGHT: 70 IN | OXYGEN SATURATION: 98 % | BODY MASS INDEX: 25.91 KG/M2

## 2018-10-31 DIAGNOSIS — R31.29 OTHER MICROSCOPIC HEMATURIA: ICD-10-CM

## 2018-10-31 DIAGNOSIS — I95.89 OTHER SPECIFIED HYPOTENSION: ICD-10-CM

## 2018-10-31 DIAGNOSIS — R30.0 DYSURIA: ICD-10-CM

## 2018-10-31 DIAGNOSIS — R30.0 DYSURIA: Primary | ICD-10-CM

## 2018-10-31 LAB
BILIRUBIN, POC: NORMAL
BLOOD URINE, POC: NORMAL
CLARITY, POC: NORMAL
COLOR, POC: YELLOW
GLUCOSE URINE, POC: NORMAL
KETONES, POC: NORMAL
LEUKOCYTE EST, POC: 15
NITRITE, POC: NORMAL
PH, POC: 6
PROTEIN, POC: NORMAL
SPECIFIC GRAVITY, POC: 1.03
UROBILINOGEN, POC: NORMAL

## 2018-10-31 PROCEDURE — 1101F PT FALLS ASSESS-DOCD LE1/YR: CPT | Performed by: FAMILY MEDICINE

## 2018-10-31 PROCEDURE — 81002 URINALYSIS NONAUTO W/O SCOPE: CPT | Performed by: FAMILY MEDICINE

## 2018-10-31 PROCEDURE — 99214 OFFICE O/P EST MOD 30 MIN: CPT | Performed by: FAMILY MEDICINE

## 2018-10-31 PROCEDURE — G8417 CALC BMI ABV UP PARAM F/U: HCPCS | Performed by: FAMILY MEDICINE

## 2018-10-31 PROCEDURE — 4040F PNEUMOC VAC/ADMIN/RCVD: CPT | Performed by: FAMILY MEDICINE

## 2018-10-31 PROCEDURE — 4004F PT TOBACCO SCREEN RCVD TLK: CPT | Performed by: FAMILY MEDICINE

## 2018-10-31 PROCEDURE — 1123F ACP DISCUSS/DSCN MKR DOCD: CPT | Performed by: FAMILY MEDICINE

## 2018-10-31 PROCEDURE — G8427 DOCREV CUR MEDS BY ELIG CLIN: HCPCS | Performed by: FAMILY MEDICINE

## 2018-10-31 PROCEDURE — G8482 FLU IMMUNIZE ORDER/ADMIN: HCPCS | Performed by: FAMILY MEDICINE

## 2018-10-31 ASSESSMENT — ENCOUNTER SYMPTOMS
EYE DISCHARGE: 0
ABDOMINAL DISTENTION: 0
SHORTNESS OF BREATH: 0
NAUSEA: 0
ABDOMINAL PAIN: 0
COLOR CHANGE: 0
TROUBLE SWALLOWING: 0
COUGH: 0
CONSTIPATION: 0
DIARRHEA: 0
VOICE CHANGE: 0

## 2018-10-31 ASSESSMENT — PATIENT HEALTH QUESTIONNAIRE - PHQ9
SUM OF ALL RESPONSES TO PHQ QUESTIONS 1-9: 0
SUM OF ALL RESPONSES TO PHQ QUESTIONS 1-9: 0
SUM OF ALL RESPONSES TO PHQ9 QUESTIONS 1 & 2: 0
2. FEELING DOWN, DEPRESSED OR HOPELESS: 0
1. LITTLE INTEREST OR PLEASURE IN DOING THINGS: 0

## 2018-10-31 NOTE — PROGRESS NOTES
Instructions    Blood pressure is a measurement of the force of the blood against the walls of the blood vessels during and after each beat of the heart. Low blood pressure is also called hypotension. It means that your blood pressure is much lower than normal. Some people, especially young, slim women, may have slightly low blood pressure without symptoms. But in many people, low blood pressure can cause symptoms such as feeling dizzy or lightheaded. When your blood pressure is too low, your heart, brain, and other organs do not get enough blood. Low blood pressure can be caused by many things, including heart problems and some medicines. Diabetes that is not under control can cause your blood pressure to drop. And so can a severe allergic reaction or infection. Another cause is dehydration, which is when your body loses too much fluid. Treatment for low blood pressure depends on the cause. Follow-up care is a key part of your treatment and safety. Be sure to make and go to all appointments, and call your doctor if you are having problems. It's also a good idea to know your test results and keep a list of the medicines you take. How can you care for yourself at home? · Drink plenty of fluids, enough so that your urine is light yellow or clear like water. If you have kidney, heart, or liver disease and have to limit fluids, talk with your doctor before you increase the amount of fluids you drink. · Be safe with medicines. Call your doctor if you think you are having a problem with your medicine. You will get more details on the specific medicines your doctor prescribes. · Stand up or get out of bed very slowly to allow your body to adjust.  · Get plenty of rest.  · Do not smoke. Smoking increases your risk of heart attack. If you need help quitting, talk to your doctor about stop-smoking programs and medicines. These can increase your chances of quitting for good.   · Limit alcohol to 2 drinks a day for men

## 2018-10-31 NOTE — PATIENT INSTRUCTIONS
He is aware to come back in 10 days for repeat urinalysis appointment to review for the resolution of microscopic hematuria. If not resolved may need to be referred to urology in order to evaluate for the possibility of recurrent polyps of the bladder. Patient Education        Low Blood Pressure: Care Instructions  Your Care Instructions    Blood pressure is a measurement of the force of the blood against the walls of the blood vessels during and after each beat of the heart. Low blood pressure is also called hypotension. It means that your blood pressure is much lower than normal. Some people, especially young, slim women, may have slightly low blood pressure without symptoms. But in many people, low blood pressure can cause symptoms such as feeling dizzy or lightheaded. When your blood pressure is too low, your heart, brain, and other organs do not get enough blood. Low blood pressure can be caused by many things, including heart problems and some medicines. Diabetes that is not under control can cause your blood pressure to drop. And so can a severe allergic reaction or infection. Another cause is dehydration, which is when your body loses too much fluid. Treatment for low blood pressure depends on the cause. Follow-up care is a key part of your treatment and safety. Be sure to make and go to all appointments, and call your doctor if you are having problems. It's also a good idea to know your test results and keep a list of the medicines you take. How can you care for yourself at home? · Drink plenty of fluids, enough so that your urine is light yellow or clear like water. If you have kidney, heart, or liver disease and have to limit fluids, talk with your doctor before you increase the amount of fluids you drink. · Be safe with medicines. Call your doctor if you think you are having a problem with your medicine. You will get more details on the specific medicines your doctor prescribes.   · Stand up or

## 2018-11-02 LAB — URINE CULTURE, ROUTINE: NORMAL

## 2018-11-07 ENCOUNTER — OFFICE VISIT (OUTPATIENT)
Dept: FAMILY MEDICINE CLINIC | Age: 78
End: 2018-11-07
Payer: MEDICARE

## 2018-11-07 VITALS
SYSTOLIC BLOOD PRESSURE: 110 MMHG | TEMPERATURE: 97.7 F | HEIGHT: 70 IN | DIASTOLIC BLOOD PRESSURE: 58 MMHG | HEART RATE: 59 BPM | OXYGEN SATURATION: 98 % | BODY MASS INDEX: 27.2 KG/M2 | WEIGHT: 190 LBS

## 2018-11-07 DIAGNOSIS — R31.29 OTHER MICROSCOPIC HEMATURIA: ICD-10-CM

## 2018-11-07 DIAGNOSIS — R30.0 DYSURIA: Primary | ICD-10-CM

## 2018-11-07 LAB
BASOPHILS ABSOLUTE: 0.1 K/UL (ref 0–0.2)
BASOPHILS RELATIVE PERCENT: 0.8 %
BILIRUBIN, POC: NORMAL
BLOOD URINE, POC: NORMAL
CLARITY, POC: CLEAR
COLOR, POC: NORMAL
EOSINOPHILS ABSOLUTE: 0.2 K/UL (ref 0–0.7)
EOSINOPHILS RELATIVE PERCENT: 3.4 %
GLUCOSE URINE, POC: NORMAL
HCT VFR BLD CALC: 46.1 % (ref 42–52)
HEMOGLOBIN: 15.6 G/DL (ref 14–18)
INR BLD: 1.1
KETONES, POC: NORMAL
LEUKOCYTE EST, POC: NORMAL
LYMPHOCYTES ABSOLUTE: 0.9 K/UL (ref 1–4.8)
LYMPHOCYTES RELATIVE PERCENT: 12.5 %
MCH RBC QN AUTO: 32.4 PG (ref 27–31.3)
MCHC RBC AUTO-ENTMCNC: 33.8 % (ref 33–37)
MCV RBC AUTO: 95.8 FL (ref 80–100)
MONOCYTES ABSOLUTE: 0.8 K/UL (ref 0.2–0.8)
MONOCYTES RELATIVE PERCENT: 12 %
NEUTROPHILS ABSOLUTE: 4.9 K/UL (ref 1.4–6.5)
NEUTROPHILS RELATIVE PERCENT: 71.3 %
NITRITE, POC: NORMAL
PDW BLD-RTO: 13.8 % (ref 11.5–14.5)
PH, POC: 6.5
PLATELET # BLD: 193 K/UL (ref 130–400)
PROTEIN, POC: NORMAL
PROTHROMBIN TIME: 11.3 SEC (ref 9.6–12.3)
RBC # BLD: 4.81 M/UL (ref 4.7–6.1)
SPECIFIC GRAVITY, POC: 1.03
UROBILINOGEN, POC: 0.2
WBC # BLD: 6.9 K/UL (ref 4.8–10.8)

## 2018-11-07 PROCEDURE — 1101F PT FALLS ASSESS-DOCD LE1/YR: CPT | Performed by: FAMILY MEDICINE

## 2018-11-07 PROCEDURE — 1123F ACP DISCUSS/DSCN MKR DOCD: CPT | Performed by: FAMILY MEDICINE

## 2018-11-07 PROCEDURE — G8482 FLU IMMUNIZE ORDER/ADMIN: HCPCS | Performed by: FAMILY MEDICINE

## 2018-11-07 PROCEDURE — 4004F PT TOBACCO SCREEN RCVD TLK: CPT | Performed by: FAMILY MEDICINE

## 2018-11-07 PROCEDURE — G8417 CALC BMI ABV UP PARAM F/U: HCPCS | Performed by: FAMILY MEDICINE

## 2018-11-07 PROCEDURE — 4040F PNEUMOC VAC/ADMIN/RCVD: CPT | Performed by: FAMILY MEDICINE

## 2018-11-07 PROCEDURE — G8427 DOCREV CUR MEDS BY ELIG CLIN: HCPCS | Performed by: FAMILY MEDICINE

## 2018-11-07 PROCEDURE — 99213 OFFICE O/P EST LOW 20 MIN: CPT | Performed by: FAMILY MEDICINE

## 2018-11-07 PROCEDURE — 81002 URINALYSIS NONAUTO W/O SCOPE: CPT | Performed by: FAMILY MEDICINE

## 2018-11-07 ASSESSMENT — ENCOUNTER SYMPTOMS
NAUSEA: 0
VOICE CHANGE: 0
TROUBLE SWALLOWING: 0
CONSTIPATION: 0
DIARRHEA: 0
COLOR CHANGE: 0
COUGH: 0
ABDOMINAL PAIN: 0
EYE DISCHARGE: 0
ABDOMINAL DISTENTION: 0
SHORTNESS OF BREATH: 0

## 2018-11-14 ENCOUNTER — OFFICE VISIT (OUTPATIENT)
Dept: UROLOGY | Age: 78
End: 2018-11-14
Payer: MEDICARE

## 2018-11-14 VITALS
SYSTOLIC BLOOD PRESSURE: 134 MMHG | WEIGHT: 185 LBS | HEART RATE: 58 BPM | HEIGHT: 71 IN | DIASTOLIC BLOOD PRESSURE: 72 MMHG | BODY MASS INDEX: 25.9 KG/M2

## 2018-11-14 DIAGNOSIS — R31.29 MICROSCOPIC HEMATURIA: Primary | ICD-10-CM

## 2018-11-14 LAB
BILIRUBIN, POC: NORMAL
BLOOD URINE, POC: NORMAL
CLARITY, POC: CLEAR
COLOR, POC: YELLOW
GLUCOSE URINE, POC: NORMAL
KETONES, POC: NORMAL
LEUKOCYTE EST, POC: NORMAL
NITRITE, POC: NORMAL
PH, POC: 5
PROTEIN, POC: 100
SPECIFIC GRAVITY, POC: >1.03
UROBILINOGEN, POC: 0.2

## 2018-11-14 PROCEDURE — 4040F PNEUMOC VAC/ADMIN/RCVD: CPT | Performed by: UROLOGY

## 2018-11-14 PROCEDURE — 99213 OFFICE O/P EST LOW 20 MIN: CPT | Performed by: UROLOGY

## 2018-11-14 PROCEDURE — 1123F ACP DISCUSS/DSCN MKR DOCD: CPT | Performed by: UROLOGY

## 2018-11-14 PROCEDURE — G8482 FLU IMMUNIZE ORDER/ADMIN: HCPCS | Performed by: UROLOGY

## 2018-11-14 PROCEDURE — 81003 URINALYSIS AUTO W/O SCOPE: CPT | Performed by: UROLOGY

## 2018-11-14 PROCEDURE — G8417 CALC BMI ABV UP PARAM F/U: HCPCS | Performed by: UROLOGY

## 2018-11-14 PROCEDURE — 1101F PT FALLS ASSESS-DOCD LE1/YR: CPT | Performed by: UROLOGY

## 2018-11-14 PROCEDURE — G8427 DOCREV CUR MEDS BY ELIG CLIN: HCPCS | Performed by: UROLOGY

## 2018-11-14 PROCEDURE — 4004F PT TOBACCO SCREEN RCVD TLK: CPT | Performed by: UROLOGY

## 2018-11-14 ASSESSMENT — ENCOUNTER SYMPTOMS
ABDOMINAL DISTENTION: 0
ABDOMINAL PAIN: 0

## 2018-11-14 NOTE — PROGRESS NOTES
Subjective:      Patient ID: Abby Hatch Sr. is a 77 y.o. male. HPI  This is a 78 y.o. 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 He was seen for symptoms of a possible UTI with some dysuria and frequency. He was treated with Bactrim and the symptoms resolved. He was then seen and a repeat U/A showed microhematuria. He has no abd or flank pain. He has no F/C or current bothersome voiding complaints. Past Medical History:   Diagnosis Date    Atrial fibrillation ([address]     CAD (coronary artery disease)     Hematuria     High cholesterol     History of bladder cancer     Hyperlipidemia     Hypertension     S/P AAA repair      Past Surgical History:   Procedure Laterality Date    ABDOMINAL AORTIC ANEURYSM REPAIR      HERNIA REPAIR  1998    [place] COLON CA SCRN NOT  [address]  IND N/A 4/24/2017    COLONOSCOPY performed by Juan M Lockett MD at [facility] 61 ESOPHAGOGASTRODUODENOSCOPY TRANSORAL DIAGNOSTIC N/A 4/24/2017    EGD ESOPHAGOGASTRODUODENOSCOPY performed by Juan M Lockett MD at [facility] 32 Marital status:      Spouse name: N/A    Number of children: N/A    Years of education: N/A     Social History Main Topics    Smoking status: Light Tobacco Smoker     Packs/day: 0.33     Types: Cigarettes    Smokeless tobacco: Never Used    Alcohol use Yes      Comment: occasionally    Drug use: No    Sexual activity: Not Asked     Other Topics Concern    None     Social History Narrative    None     History reviewed. No pertinent family history.   Current Outpatient Prescriptions   Medication Sig Dispense Refill    apixaban (ELIQUIS) 5 MG TABS tablet Take 1 tablet by mouth 2 times daily 28 tablet 0    flecainide (TAMBOCOR) 100 MG tablet Take 1 tablet by mouth 2 times daily Take 2 pills daily 180 tablet 1    benazepril (LOTENSIN) 40 MG tablet TAKE ONE TABLET BY MOUTH EVERY DAY  11    escitalopram (LEXAPRO) 10 MG tablet TAKE ONE TABLET BY MOUTH DAILY 90 tablet 0    POTASSIUM CHLORIDE PO Take by mouth      calcium carbonate (OSCAL) 500 MG TABS tablet Take 500 mg by mouth daily      metoprolol succinate ER (TOPROL-XL) 25 MG XL tablet       amLODIPine (NORVASC) 10 MG tablet       rosuvastatin (CRESTOR) 10 MG tablet Take 10 mg by mouth daily. No current facility-administered medications for this visit. Avelox [moxifloxacin hcl in nacl]; Mobic [meloxicam]; and Z-eugene [azithromycin]  reviewed      Review of Systems   Constitutional: Negative for fever. Gastrointestinal: Negative for abdominal distention and abdominal pain. Genitourinary: Negative for dysuria and flank pain. Objective:   Physical Exam   Constitutional: He appears well-developed and well-nourished. Abdominal: Soft. He exhibits no distension. There is no tenderness. Neurological: He is alert.      11/14/2018  3:11 PM - Merced Gibson CMA ([facility])     Component Results     Component Collected Lab   Color, UA 11/14/2018  3:10 PM Unknown   yellow    Clarity, UA 11/14/2018  3:10 PM Unknown   clear    Glucose, UA POC 11/14/2018  3:10 PM Unknown   neg    Bilirubin, UA 11/14/2018  3:10 PM Unknown   neg    Ketones, UA 11/14/2018  3:10 PM Unknown   trace    Spec Grav, UA 11/14/2018  3:10 PM Unknown   >1.030    Blood, UA POC 11/14/2018  3:10 PM Unknown   trace    pH, UA 11/14/2018  3:10 PM Unknown   5.0    Protein, UA POC 11/14/2018  3:10 PM Unknown   100    Urobilinogen, UA 11/14/2018  3:10 PM Unknown   0.2    Leukocytes, UA 11/14/2018  3:10 PM Unknown   neg    Nitrite, UA 11/14/2018  3:10 PM Unknown   neg      11/2/2018  7:55 AM - JeremyEzekiel noel Incoming Lab Results From Soft     Component Results     Component Collected Lab   Urine Culture, Routine 10/31/2018 12:53 PM [address] [place] [facility]   No

## 2018-11-19 ENCOUNTER — HOSPITAL ENCOUNTER (OUTPATIENT)
Dept: CT IMAGING | Age: 78
Discharge: HOME | End: 2018-11-21
Payer: MEDICARE

## 2018-11-19 VITALS — SYSTOLIC BLOOD PRESSURE: 141 MMHG | DIASTOLIC BLOOD PRESSURE: 81 MMHG | RESPIRATION RATE: 16 BRPM | HEART RATE: 52 BPM

## 2018-11-19 DIAGNOSIS — R31.29 MICROSCOPIC HEMATURIA: ICD-10-CM

## 2018-11-19 PROCEDURE — 2580000003 HC RX 258: Performed by: UROLOGY

## 2018-11-19 PROCEDURE — 6360000004 HC RX CONTRAST MEDICATION: Performed by: UROLOGY

## 2018-11-19 PROCEDURE — 74178 CT ABD&PLV WO CNTR FLWD CNTR: CPT

## 2018-11-19 RX ORDER — SODIUM CHLORIDE 0.9 % (FLUSH) 0.9 %
10 SYRINGE (ML) INJECTION
Status: COMPLETED | OUTPATIENT
Start: 2018-11-19 | End: 2018-11-19

## 2018-11-19 RX ADMIN — Medication 10 ML: at 14:27

## 2018-11-19 RX ADMIN — IOPAMIDOL 100 ML: 755 INJECTION, SOLUTION INTRAVENOUS at 14:27

## 2018-11-21 RX ORDER — ESCITALOPRAM OXALATE 10 MG/1
TABLET ORAL
Qty: 90 TABLET | Refills: 0 | Status: SHIPPED | OUTPATIENT
Start: 2018-11-21 | End: 2019-04-30 | Stop reason: SDUPTHER

## 2018-12-03 ENCOUNTER — OFFICE VISIT (OUTPATIENT)
Dept: UROLOGY | Age: 78
End: 2018-12-03
Payer: MEDICARE

## 2018-12-03 VITALS
HEIGHT: 70 IN | WEIGHT: 185 LBS | HEART RATE: 57 BPM | SYSTOLIC BLOOD PRESSURE: 136 MMHG | DIASTOLIC BLOOD PRESSURE: 70 MMHG | BODY MASS INDEX: 26.48 KG/M2

## 2018-12-03 DIAGNOSIS — R31.29 MICROHEMATURIA: ICD-10-CM

## 2018-12-03 DIAGNOSIS — N40.1 BPH WITH OBSTRUCTION/LOWER URINARY TRACT SYMPTOMS: Primary | ICD-10-CM

## 2018-12-03 DIAGNOSIS — N13.8 BPH WITH OBSTRUCTION/LOWER URINARY TRACT SYMPTOMS: Primary | ICD-10-CM

## 2018-12-03 PROCEDURE — 51741 ELECTRO-UROFLOWMETRY FIRST: CPT | Performed by: UROLOGY

## 2018-12-03 PROCEDURE — G8482 FLU IMMUNIZE ORDER/ADMIN: HCPCS | Performed by: UROLOGY

## 2018-12-03 PROCEDURE — G8427 DOCREV CUR MEDS BY ELIG CLIN: HCPCS | Performed by: UROLOGY

## 2018-12-03 PROCEDURE — 1123F ACP DISCUSS/DSCN MKR DOCD: CPT | Performed by: UROLOGY

## 2018-12-03 PROCEDURE — 1101F PT FALLS ASSESS-DOCD LE1/YR: CPT | Performed by: UROLOGY

## 2018-12-03 PROCEDURE — 51798 US URINE CAPACITY MEASURE: CPT | Performed by: UROLOGY

## 2018-12-03 PROCEDURE — G8417 CALC BMI ABV UP PARAM F/U: HCPCS | Performed by: UROLOGY

## 2018-12-03 PROCEDURE — 4004F PT TOBACCO SCREEN RCVD TLK: CPT | Performed by: UROLOGY

## 2018-12-03 PROCEDURE — 99213 OFFICE O/P EST LOW 20 MIN: CPT | Performed by: UROLOGY

## 2018-12-03 PROCEDURE — 4040F PNEUMOC VAC/ADMIN/RCVD: CPT | Performed by: UROLOGY

## 2018-12-03 ASSESSMENT — ENCOUNTER SYMPTOMS
ABDOMINAL DISTENTION: 0
ABDOMINAL PAIN: 0

## 2018-12-03 NOTE — PROGRESS NOTES
Subjective:      Patient ID: Abby Hatch Sr. is a 77 y.o. male. HPI  This is a 78 y.o. 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 He was seen recently by his PCP for symptoms of a possible UTI with some dysuria and frequency. He was treated with Bactrim and the symptoms resolved. Since last seen on 11/14/18, he has no further symptoms. He has no pain or gross hematuria or dysuria. I reviewed the interval CT report and he will discuss with Dr Diana Thompson the Rt common Iliac art finding. He has no current bothersome voiding complaints. Past Medical History:   Diagnosis Date    Atrial fibrillation ([address]     CAD (coronary artery disease)     Hematuria     High cholesterol     History of bladder cancer     Hyperlipidemia     Hypertension     S/P AAA repair     Spondylosis of lumbar region without myelopathy or radiculopathy      Past Surgical History:   Procedure Laterality Date    ABDOMINAL AORTIC ANEURYSM REPAIR      HERNIA REPAIR  1998    [place] COLON CA SCRN NOT  [address]  IND N/A 4/24/2017    COLONOSCOPY performed by Juan M Lockett MD at . [facility] 61 ESOPHAGOGASTRODUODENOSCOPY TRANSORAL DIAGNOSTIC N/A 4/24/2017    EGD ESOPHAGOGASTRODUODENOSCOPY performed by Juan M Lockett MD at [facility] 32 Marital status:      Spouse name: N/A    Number of children: N/A    Years of education: N/A     Social History Main Topics    Smoking status: Light Tobacco Smoker     Packs/day: 0.33     Types: Cigarettes    Smokeless tobacco: Never Used    Alcohol use Yes      Comment: occasionally    Drug use: No    Sexual activity: Not Asked     Other Topics Concern    None     Social History Narrative    None     History reviewed. No pertinent family history.   Current Outpatient Prescriptions   Medication Sig Dispense Refill    escitalopram (LEXAPRO) 10 MG tablet TAKE ONE TABLET BY MOUTH EVERY DAY 90 tablet 0    apixaban (ELIQUIS) 5 MG TABS tablet Take 1 tablet by mouth 2 times daily 28 tablet 0    flecainide (TAMBOCOR) 100 MG tablet Take 1 tablet by mouth 2 times daily Take 2 pills daily 180 tablet 1    benazepril (LOTENSIN) 40 MG tablet TAKE ONE TABLET BY MOUTH EVERY DAY  11    POTASSIUM CHLORIDE PO Take by mouth      calcium carbonate (OSCAL) 500 MG TABS tablet Take 500 mg by mouth daily      metoprolol succinate ER (TOPROL-XL) 25 MG XL tablet       amLODIPine (NORVASC) 10 MG tablet       rosuvastatin (CRESTOR) 10 MG tablet Take 10 mg by mouth daily. No current facility-administered medications for this visit. Avelox [moxifloxacin hcl in nacl]; Mobic [meloxicam]; and Z-eugene [azithromycin]  reviewed      Review of Systems   Constitutional: Negative for fever. Gastrointestinal: Negative for abdominal distention and abdominal pain. Genitourinary: Negative for dysuria, enuresis and flank pain. Objective:   Physical Exam   Constitutional: He appears well-developed and well-nourished. Abdominal: Soft. He exhibits no distension. There is no tenderness. Neurological: He is alert. Psychiatric: He has a normal mood and affect. Priority Sent On From To Message Type    11/19/2018  5:56 PM Jeremy, po Incoming Radiant Results From Steve Elise MD Results   Radiation Dose Estimates     No radiation information found for this patient   Narrative   EXAMINATION: CT ABDOMEN/PELVIS WITH IV CONTRAST/CT UROGRAM STUDY       CLINICAL HISTORY: MICROSCOPIC HEMATURIA AND DYSURIA X2 WEEKS, HISTORY OF ENDOVASCULAR STENT REPAIR OF AN INFRARENAL ABDOMINAL AORTA ANEURYSM       COMPARISONS: 6/4/2015       TECHNIQUE: CT urogram is obtained following IV injection of 100 mL of Isovue-370.  Axial and MPR CT images of the abdomen and pelvis were obtained.       FINDINGS: There are normal size kidneys without nephrolithiasis or hydronephrosis. There is satisfactory bilateral renal excretion of the IV contrast agent. There is cortical scarring involving the lower pole of each kidney and this is a new findings    since the 6/4/2015 CT abdomen. No definite evidence of a mass or cyst in either kidney. There is no ureteral stone and there is no hydroureteronephrosis. The entire right ureter is visualized and appears unremarkable. The distal pelvic portion of the    left ureter is not well visualized. There is an enlarged prostate gland with prostatic calcifications and this is causing extrinsic \"mass effect\" on the floor of the urinary bladder. However, the urinary bladder is not distended and there is no bladder    stone or mass visualized. Remaining visualized portions of the urinary tract unremarkable.       The patient has undergone an endovascular stent repair of an infrarenal abdominal aorta aneurysm since the 6/4/2015 CT abdomen. The infrarenal abdominal aorta with the endovascular stent in place has a maximum diameter of 5 cm. There is no evidence of an    \"endoleak\" or retroperitoneal hematoma. 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       There are small hepatic cysts which appear unchanged since the prior CT abdomen and there is no hepatomegaly. The gallbladder, spleen, pancreas and adrenal glands appear normal. No retroperitoneal adenopathy. Nondilated large and small bowel    unremarkable. No CT evidence of appendicitis, diverticulitis or colitis. No bowel obstruction. There is no abdominal mass, \"free fluid\", abscess or pneumoperitoneum in the abdomen.           Impression   1. NORMAL SIZE KIDNEYS WITHOUT NEPHROLITHIASIS OR HYDRONEPHROSIS. 2. CORTICAL SCARRING IN LOWER POLE OF [facility] KIDNEY IS NEW FINDINGS SINCE 6/4/2015 CT ABDOMEN. 3. NO CT EVIDENCE OF A RENAL MASS OR CYSTS.  NO EVIDENCE OF

## 2018-12-31 ENCOUNTER — OFFICE VISIT (OUTPATIENT)
Dept: FAMILY MEDICINE CLINIC | Age: 78
End: 2018-12-31
Payer: MEDICARE

## 2018-12-31 VITALS
OXYGEN SATURATION: 97 % | WEIGHT: 192.4 LBS | TEMPERATURE: 98.4 F | HEIGHT: 70 IN | HEART RATE: 72 BPM | DIASTOLIC BLOOD PRESSURE: 72 MMHG | SYSTOLIC BLOOD PRESSURE: 122 MMHG | BODY MASS INDEX: 27.54 KG/M2

## 2018-12-31 DIAGNOSIS — F17.200 CURRENT EVERY DAY SMOKER: ICD-10-CM

## 2018-12-31 DIAGNOSIS — J40 BRONCHITIS: Primary | ICD-10-CM

## 2018-12-31 PROCEDURE — 1101F PT FALLS ASSESS-DOCD LE1/YR: CPT | Performed by: NURSE PRACTITIONER

## 2018-12-31 PROCEDURE — 4004F PT TOBACCO SCREEN RCVD TLK: CPT | Performed by: NURSE PRACTITIONER

## 2018-12-31 PROCEDURE — G8482 FLU IMMUNIZE ORDER/ADMIN: HCPCS | Performed by: NURSE PRACTITIONER

## 2018-12-31 PROCEDURE — 99213 OFFICE O/P EST LOW 20 MIN: CPT | Performed by: NURSE PRACTITIONER

## 2018-12-31 PROCEDURE — G8417 CALC BMI ABV UP PARAM F/U: HCPCS | Performed by: NURSE PRACTITIONER

## 2018-12-31 PROCEDURE — G8427 DOCREV CUR MEDS BY ELIG CLIN: HCPCS | Performed by: NURSE PRACTITIONER

## 2018-12-31 PROCEDURE — 4040F PNEUMOC VAC/ADMIN/RCVD: CPT | Performed by: NURSE PRACTITIONER

## 2018-12-31 PROCEDURE — 1123F ACP DISCUSS/DSCN MKR DOCD: CPT | Performed by: NURSE PRACTITIONER

## 2018-12-31 RX ORDER — METHYLPREDNISOLONE 4 MG/1
TABLET ORAL
Qty: 1 KIT | Refills: 0 | Status: SHIPPED | OUTPATIENT
Start: 2018-12-31 | End: 2019-05-19 | Stop reason: SDUPTHER

## 2018-12-31 RX ORDER — DOXYCYCLINE HYCLATE 100 MG
100 TABLET ORAL 2 TIMES DAILY
Qty: 14 TABLET | Refills: 0 | Status: SHIPPED | OUTPATIENT
Start: 2018-12-31 | End: 2019-05-19 | Stop reason: SDUPTHER

## 2018-12-31 NOTE — PROGRESS NOTES
Subjective  Bharti Kwok Sr., 77 y.o. male presents today with:  Chief Complaint   Patient presents with    Congestion     Pt presents today c/o nasal and chest congestion x a few weeks.  Cough     Pt also c/o cough x a few weeks. Has tried OTC Mucinex with no relief. Cough   This is a new problem. Episode onset: a few weeks ago. The problem has been unchanged. The problem occurs hourly (worse at night). The cough is productive of sputum. Associated symptoms include nasal congestion, postnasal drip, rhinorrhea and a sore throat. Pertinent negatives include no chills, ear congestion, ear pain, fever, headaches, hemoptysis, rash, shortness of breath, sweats or wheezing. Risk factors for lung disease include smoking/tobacco exposure. He has tried cool air (Mucinex Dm) for the symptoms. The treatment provided mild relief. His past medical history is significant for bronchitis and pneumonia. There is no history of asthma, COPD or emphysema. Review of Systems   Constitutional: Negative for chills, diaphoresis and fever. HENT: Positive for congestion, postnasal drip, rhinorrhea and sore throat. Negative for ear pain, sinus pain and sinus pressure. Respiratory: Positive for cough and chest tightness. Negative for hemoptysis, shortness of breath and wheezing. Skin: Negative for rash. Neurological: Negative for headaches. Objective    Vitals:    12/31/18 1126   BP: 122/72   Pulse: 72   Temp: 98.4 °F (36.9 °C)   TempSrc: Temporal   SpO2: 97%   Weight: 192 lb 6.4 oz (87.3 kg)   Height: 5' 10\" (1.778 m)       Physical Exam   Constitutional: He appears well-developed and well-nourished. No distress. HENT:   Head: Normocephalic and atraumatic. Right Ear: Tympanic membrane and external ear normal.   Left Ear: Tympanic membrane and external ear normal.   Mouth/Throat: Oropharynx is clear and moist. No oropharyngeal exudate. Eyes: Pupils are equal, round, and reactive to light.  Right eye

## 2019-01-11 ENCOUNTER — TELEPHONE (OUTPATIENT)
Dept: FAMILY MEDICINE CLINIC | Age: 79
End: 2019-01-11

## 2019-01-11 DIAGNOSIS — I10 ESSENTIAL HYPERTENSION: Primary | ICD-10-CM

## 2019-01-11 DIAGNOSIS — I48.91 ATRIAL FIBRILLATION, UNSPECIFIED TYPE (HCC): ICD-10-CM

## 2019-01-11 DIAGNOSIS — E78.5 HYPERLIPIDEMIA, UNSPECIFIED HYPERLIPIDEMIA TYPE: ICD-10-CM

## 2019-01-14 DIAGNOSIS — I10 ESSENTIAL HYPERTENSION: ICD-10-CM

## 2019-01-14 DIAGNOSIS — I48.91 ATRIAL FIBRILLATION, UNSPECIFIED TYPE (HCC): ICD-10-CM

## 2019-01-14 DIAGNOSIS — E78.5 HYPERLIPIDEMIA, UNSPECIFIED HYPERLIPIDEMIA TYPE: ICD-10-CM

## 2019-01-14 LAB
ALBUMIN SERPL-MCNC: 3.7 G/DL (ref 3.9–4.9)
ALP BLD-CCNC: 56 U/L (ref 35–104)
ALT SERPL-CCNC: 11 U/L (ref 0–41)
ANION GAP SERPL CALCULATED.3IONS-SCNC: 12 MEQ/L (ref 7–13)
AST SERPL-CCNC: 12 U/L (ref 0–40)
BILIRUB SERPL-MCNC: 1 MG/DL (ref 0–1.2)
BUN BLDV-MCNC: 15 MG/DL (ref 8–23)
CALCIUM SERPL-MCNC: 9.2 MG/DL (ref 8.6–10.2)
CHLORIDE BLD-SCNC: 100 MEQ/L (ref 98–107)
CHOLESTEROL, TOTAL: 206 MG/DL (ref 0–199)
CO2: 27 MEQ/L (ref 22–29)
CREAT SERPL-MCNC: 1.07 MG/DL (ref 0.7–1.2)
GFR AFRICAN AMERICAN: >60
GFR NON-AFRICAN AMERICAN: >60
GLOBULIN: 4 G/DL (ref 2.3–3.5)
GLUCOSE BLD-MCNC: 84 MG/DL (ref 74–109)
HCT VFR BLD CALC: 44.2 % (ref 42–52)
HDLC SERPL-MCNC: 40 MG/DL (ref 40–59)
HEMOGLOBIN: 15.3 G/DL (ref 14–18)
LDL CHOLESTEROL CALCULATED: 144 MG/DL (ref 0–129)
MCH RBC QN AUTO: 33.1 PG (ref 27–31.3)
MCHC RBC AUTO-ENTMCNC: 34.6 % (ref 33–37)
MCV RBC AUTO: 95.7 FL (ref 80–100)
PDW BLD-RTO: 13.9 % (ref 11.5–14.5)
PLATELET # BLD: 193 K/UL (ref 130–400)
POTASSIUM SERPL-SCNC: 4.8 MEQ/L (ref 3.5–5.1)
RBC # BLD: 4.62 M/UL (ref 4.7–6.1)
SODIUM BLD-SCNC: 139 MEQ/L (ref 132–144)
TOTAL PROTEIN: 7.7 G/DL (ref 6.4–8.1)
TRIGL SERPL-MCNC: 112 MG/DL (ref 0–200)
WBC # BLD: 9 K/UL (ref 4.8–10.8)

## 2019-01-24 ENCOUNTER — OFFICE VISIT (OUTPATIENT)
Dept: FAMILY MEDICINE CLINIC | Age: 79
End: 2019-01-24
Payer: MEDICARE

## 2019-01-24 VITALS
HEIGHT: 70 IN | WEIGHT: 181 LBS | HEART RATE: 55 BPM | DIASTOLIC BLOOD PRESSURE: 60 MMHG | OXYGEN SATURATION: 97 % | SYSTOLIC BLOOD PRESSURE: 110 MMHG | BODY MASS INDEX: 25.91 KG/M2

## 2019-01-24 DIAGNOSIS — Z85.51 HISTORY OF BLADDER CANCER: ICD-10-CM

## 2019-01-24 DIAGNOSIS — D50.8 IRON DEFICIENCY ANEMIA SECONDARY TO INADEQUATE DIETARY IRON INTAKE: ICD-10-CM

## 2019-01-24 DIAGNOSIS — I48.91 ATRIAL FIBRILLATION, UNSPECIFIED TYPE (HCC): Primary | ICD-10-CM

## 2019-01-24 DIAGNOSIS — E78.00 HIGH CHOLESTEROL: ICD-10-CM

## 2019-01-24 DIAGNOSIS — Z98.890 S/P AAA REPAIR: ICD-10-CM

## 2019-01-24 DIAGNOSIS — I10 ESSENTIAL HYPERTENSION: ICD-10-CM

## 2019-01-24 DIAGNOSIS — E78.5 HYPERLIPIDEMIA, UNSPECIFIED HYPERLIPIDEMIA TYPE: ICD-10-CM

## 2019-01-24 DIAGNOSIS — M47.816 SPONDYLOSIS OF LUMBAR REGION WITHOUT MYELOPATHY OR RADICULOPATHY: ICD-10-CM

## 2019-01-24 DIAGNOSIS — Z86.79 S/P AAA REPAIR: ICD-10-CM

## 2019-01-24 PROCEDURE — 4040F PNEUMOC VAC/ADMIN/RCVD: CPT | Performed by: FAMILY MEDICINE

## 2019-01-24 PROCEDURE — G8482 FLU IMMUNIZE ORDER/ADMIN: HCPCS | Performed by: FAMILY MEDICINE

## 2019-01-24 PROCEDURE — 1123F ACP DISCUSS/DSCN MKR DOCD: CPT | Performed by: FAMILY MEDICINE

## 2019-01-24 PROCEDURE — 99213 OFFICE O/P EST LOW 20 MIN: CPT | Performed by: FAMILY MEDICINE

## 2019-01-24 PROCEDURE — G8417 CALC BMI ABV UP PARAM F/U: HCPCS | Performed by: FAMILY MEDICINE

## 2019-01-24 PROCEDURE — 4004F PT TOBACCO SCREEN RCVD TLK: CPT | Performed by: FAMILY MEDICINE

## 2019-01-24 PROCEDURE — 1101F PT FALLS ASSESS-DOCD LE1/YR: CPT | Performed by: FAMILY MEDICINE

## 2019-01-24 PROCEDURE — G8427 DOCREV CUR MEDS BY ELIG CLIN: HCPCS | Performed by: FAMILY MEDICINE

## 2019-01-28 RX ORDER — METOPROLOL SUCCINATE 25 MG/1
25 TABLET, EXTENDED RELEASE ORAL DAILY
Qty: 30 TABLET | Refills: 5 | Status: SHIPPED | OUTPATIENT
Start: 2019-01-28 | End: 2022-08-03

## 2019-01-28 RX ORDER — BENAZEPRIL HYDROCHLORIDE 40 MG/1
TABLET, FILM COATED ORAL
Qty: 30 TABLET | Refills: 11 | Status: SHIPPED | OUTPATIENT
Start: 2019-01-28 | End: 2019-11-01 | Stop reason: CLARIF

## 2019-01-28 RX ORDER — ROSUVASTATIN CALCIUM 10 MG/1
10 TABLET, COATED ORAL DAILY
Qty: 30 TABLET | Refills: 5 | Status: SHIPPED | OUTPATIENT
Start: 2019-01-28 | End: 2019-11-25 | Stop reason: SDUPTHER

## 2019-04-30 RX ORDER — ESCITALOPRAM OXALATE 10 MG/1
TABLET ORAL
Qty: 90 TABLET | Refills: 0 | Status: SHIPPED | OUTPATIENT
Start: 2019-04-30 | End: 2019-09-09 | Stop reason: SDUPTHER

## 2019-05-19 ENCOUNTER — OFFICE VISIT (OUTPATIENT)
Dept: FAMILY MEDICINE CLINIC | Age: 79
End: 2019-05-19
Payer: MEDICARE

## 2019-05-19 VITALS
WEIGHT: 187 LBS | OXYGEN SATURATION: 95 % | DIASTOLIC BLOOD PRESSURE: 62 MMHG | SYSTOLIC BLOOD PRESSURE: 138 MMHG | HEART RATE: 58 BPM | TEMPERATURE: 98.6 F | BODY MASS INDEX: 26.83 KG/M2

## 2019-05-19 DIAGNOSIS — J40 BRONCHITIS: Primary | ICD-10-CM

## 2019-05-19 DIAGNOSIS — R06.2 DIFFUSE WHEEZING: ICD-10-CM

## 2019-05-19 PROCEDURE — G8427 DOCREV CUR MEDS BY ELIG CLIN: HCPCS | Performed by: NURSE PRACTITIONER

## 2019-05-19 PROCEDURE — G8417 CALC BMI ABV UP PARAM F/U: HCPCS | Performed by: NURSE PRACTITIONER

## 2019-05-19 PROCEDURE — 1123F ACP DISCUSS/DSCN MKR DOCD: CPT | Performed by: NURSE PRACTITIONER

## 2019-05-19 PROCEDURE — 4004F PT TOBACCO SCREEN RCVD TLK: CPT | Performed by: NURSE PRACTITIONER

## 2019-05-19 PROCEDURE — 99213 OFFICE O/P EST LOW 20 MIN: CPT | Performed by: NURSE PRACTITIONER

## 2019-05-19 PROCEDURE — 4040F PNEUMOC VAC/ADMIN/RCVD: CPT | Performed by: NURSE PRACTITIONER

## 2019-05-19 RX ORDER — DOXYCYCLINE HYCLATE 100 MG
100 TABLET ORAL 2 TIMES DAILY
Qty: 14 TABLET | Refills: 0 | Status: SHIPPED | OUTPATIENT
Start: 2019-05-19 | End: 2019-05-26

## 2019-05-19 RX ORDER — METHYLPREDNISOLONE 4 MG/1
TABLET ORAL
Qty: 1 KIT | Refills: 0 | Status: SHIPPED | OUTPATIENT
Start: 2019-05-19 | End: 2019-05-25

## 2019-05-19 ASSESSMENT — PATIENT HEALTH QUESTIONNAIRE - PHQ9
SUM OF ALL RESPONSES TO PHQ QUESTIONS 1-9: 0
1. LITTLE INTEREST OR PLEASURE IN DOING THINGS: 0
SUM OF ALL RESPONSES TO PHQ9 QUESTIONS 1 & 2: 0
SUM OF ALL RESPONSES TO PHQ QUESTIONS 1-9: 0
2. FEELING DOWN, DEPRESSED OR HOPELESS: 0

## 2019-05-19 ASSESSMENT — ENCOUNTER SYMPTOMS
RHINORRHEA: 1
COUGH: 1
WHEEZING: 1
SHORTNESS OF BREATH: 1
SINUS PRESSURE: 1
SINUS PAIN: 0
CHEST TIGHTNESS: 1
VOMITING: 0
ABDOMINAL PAIN: 0
NAUSEA: 0

## 2019-05-19 NOTE — PATIENT INSTRUCTIONS
Antibiotic Instructions:   Complete the full course of antibiotics as ordered. To prevent antibiotic resistances please take medication as ordered and for the full duration even if you start to feel better. Take each dose with a small snack or meal to lessen potential GI upset. Consider intake of yogurt or probiotic during antibiotic use and for a few days after to help reduce the risk of developing a secondary infection. Take the yogurt or probiotic at least 2 hours after taking the antibiotic. Avoid alcohol while taking antibiotics. Oral Steroid Instructions: Take each dose with a small snack or meal to lessen potential GI upset. Follow dosing instructions provided with prescription. Common side effects include difficulty sleeping and irritability. Take full course as ordered.

## 2019-05-19 NOTE — PROGRESS NOTES
Subjective  Orie Laughter ., 77 y.o. male presents today with:  Chief Complaint   Patient presents with    Cough     x a couple weeks, mucinex and nyquil with no relief       HPI   Patient is here for a cough. It's been going on for a few weeks. It is occasionally productive of milky, thick sputum. When he breathes he feels rumbling. Taking Mucinex DM Max Strength and Nyquil, which helps alleviate symptoms. Wife has the same symptoms. Patient is a current smoker (1/3-1/2 ppd, for at least 60 years)     Review of Systems   Constitutional: Positive for diaphoresis and fatigue. Negative for chills and fever. HENT: Positive for congestion, rhinorrhea and sinus pressure. Negative for sinus pain and sneezing. Respiratory: Positive for cough, chest tightness, shortness of breath and wheezing. Gastrointestinal: Negative for abdominal pain, nausea and vomiting. Objective    Vitals:    05/19/19 1352   BP: 138/62   Pulse: 58   Temp: 98.6 °F (37 °C)   SpO2: 95%   Weight: 187 lb (84.8 kg)       Physical Exam   Constitutional: He appears well-developed and well-nourished. No distress. HENT:   Head: Normocephalic and atraumatic. Right Ear: Tympanic membrane and external ear normal.   Left Ear: Tympanic membrane and external ear normal.   Mouth/Throat: Oropharynx is clear and moist. No oropharyngeal exudate. Eyes: Pupils are equal, round, and reactive to light. Right eye exhibits no discharge. Left eye exhibits no discharge. Neck: No tracheal deviation present. Cardiovascular: Normal rate and regular rhythm. Exam reveals no gallop and no friction rub. No murmur heard. Pulmonary/Chest: Effort normal. No stridor. No respiratory distress. He has wheezes (diffuse expiratory adventitious breath sounds). He has rhonchi. He has rales. He exhibits no tenderness. Lymphadenopathy:     He has no cervical adenopathy. Neurological: He is alert. Skin: Skin is warm. No rash noted. He is not diaphoretic. No erythema. No pallor. Vitals reviewed. POC Testing Today:No results found for this visit on 05/19/19. Assessment & Plan    Diagnosis Orders   1. Bronchitis  doxycycline hyclate (VIBRA-TABS) 100 MG tablet    methylPREDNISolone (MEDROL DOSEPACK) 4 MG tablet    XR CHEST STANDARD (2 VW)   2. Diffuse wheezing  XR CHEST STANDARD (2 VW)       Orders Placed This Encounter   Procedures    XR CHEST STANDARD (2 VW)     Standing Status:   Future     Standing Expiration Date:   5/19/2020     Order Specific Question:   Reason for exam:     Answer:   wheezing, current smoker, multiple episodes bronchitis (at least 4) in the past few years     Will follow up with CXR results. Patient has been having breathing issues/wheezing/SOB every 4-5 months for the past few years with similar symptoms. Evaluating for chronic lung changes, and once follow up with results and determine next course of action. Patient verbalized understanding. Antibiotic Instructions:   Complete the full course of antibiotics as ordered. To prevent antibiotic resistances please take medication as ordered and for the full duration even if you start to feel better. Take each dose with a small snack or meal to lessen potential GI upset. Consider intake of yogurt or probiotic during antibiotic use and for a few days after to help reduce the risk of developing a secondary infection. Take the yogurt or probiotic at least 2 hours after taking the antibiotic. Avoid alcohol while taking antibiotics. Oral Steroid Instructions: Take each dose with a small snack or meal to lessen potential GI upset. Follow dosing instructions provided with prescription. Common side effects include difficulty sleeping and irritability. Take full course as ordered. Return if symptoms worsen or fail to improve, for follow up with your PCP.     Side effects and adverse effects of any medication prescribed today, as well as treatment plan/rationale, follow-up

## 2019-05-21 ENCOUNTER — TELEPHONE (OUTPATIENT)
Dept: FAMILY MEDICINE CLINIC | Age: 79
End: 2019-05-21

## 2019-05-21 NOTE — TELEPHONE ENCOUNTER
Pt's spouse called to say that pt's pneumonia was not improving/getting worse and was asking if she should take the pt to ER. After speaking with Dr. Adrián Forbes, pt's spouse was advised that if symptoms were worsening, than yes, go to ER.

## 2019-05-28 ENCOUNTER — OFFICE VISIT (OUTPATIENT)
Dept: FAMILY MEDICINE CLINIC | Age: 79
End: 2019-05-28
Payer: MEDICARE

## 2019-05-28 VITALS
SYSTOLIC BLOOD PRESSURE: 120 MMHG | DIASTOLIC BLOOD PRESSURE: 76 MMHG | BODY MASS INDEX: 26.28 KG/M2 | HEART RATE: 60 BPM | HEIGHT: 70 IN | OXYGEN SATURATION: 95 % | WEIGHT: 183.6 LBS

## 2019-05-28 DIAGNOSIS — J18.9 PNEUMONIA OF RIGHT MIDDLE LOBE DUE TO INFECTIOUS ORGANISM: Primary | ICD-10-CM

## 2019-05-28 DIAGNOSIS — R93.89 ABNORMAL CHEST X-RAY: Primary | ICD-10-CM

## 2019-05-28 PROCEDURE — 1123F ACP DISCUSS/DSCN MKR DOCD: CPT | Performed by: FAMILY MEDICINE

## 2019-05-28 PROCEDURE — 4040F PNEUMOC VAC/ADMIN/RCVD: CPT | Performed by: FAMILY MEDICINE

## 2019-05-28 PROCEDURE — 99213 OFFICE O/P EST LOW 20 MIN: CPT | Performed by: FAMILY MEDICINE

## 2019-05-28 PROCEDURE — 4004F PT TOBACCO SCREEN RCVD TLK: CPT | Performed by: FAMILY MEDICINE

## 2019-05-28 PROCEDURE — G8427 DOCREV CUR MEDS BY ELIG CLIN: HCPCS | Performed by: FAMILY MEDICINE

## 2019-05-28 PROCEDURE — G8417 CALC BMI ABV UP PARAM F/U: HCPCS | Performed by: FAMILY MEDICINE

## 2019-06-04 ENCOUNTER — HOSPITAL ENCOUNTER (OUTPATIENT)
Dept: CT IMAGING | Age: 79
Discharge: HOME | End: 2019-06-06
Payer: MEDICARE

## 2019-06-04 VITALS
HEIGHT: 70 IN | SYSTOLIC BLOOD PRESSURE: 158 MMHG | DIASTOLIC BLOOD PRESSURE: 77 MMHG | BODY MASS INDEX: 26.2 KG/M2 | RESPIRATION RATE: 16 BRPM | HEART RATE: 54 BPM | WEIGHT: 183 LBS

## 2019-06-04 DIAGNOSIS — R93.89 ABNORMAL CHEST X-RAY: ICD-10-CM

## 2019-06-04 DIAGNOSIS — J18.9 PERSISTENT PNEUMONIA: Primary | ICD-10-CM

## 2019-06-04 PROCEDURE — 6360000004 HC RX CONTRAST MEDICATION: Performed by: FAMILY MEDICINE

## 2019-06-04 PROCEDURE — 71260 CT THORAX DX C+: CPT

## 2019-06-04 RX ADMIN — IOPAMIDOL 75 ML: 755 INJECTION, SOLUTION INTRAVENOUS at 10:40

## 2019-06-05 DIAGNOSIS — J18.9 PERSISTENT PNEUMONIA: Primary | ICD-10-CM

## 2019-06-05 LAB
GFR AFRICAN AMERICAN: >60
GFR NON-AFRICAN AMERICAN: >60
PERFORMED ON: NORMAL
POC CREATININE: 1 MG/DL (ref 0.8–1.3)
POC SAMPLE TYPE: NORMAL

## 2019-06-05 RX ORDER — CEFDINIR 300 MG/1
300 CAPSULE ORAL 2 TIMES DAILY
Qty: 20 CAPSULE | Refills: 0 | Status: SHIPPED | OUTPATIENT
Start: 2019-06-05 | End: 2019-06-15

## 2019-07-25 ENCOUNTER — OFFICE VISIT (OUTPATIENT)
Dept: FAMILY MEDICINE CLINIC | Age: 79
End: 2019-07-25
Payer: MEDICARE

## 2019-07-25 VITALS
WEIGHT: 185 LBS | HEART RATE: 53 BPM | BODY MASS INDEX: 26.48 KG/M2 | OXYGEN SATURATION: 96 % | SYSTOLIC BLOOD PRESSURE: 130 MMHG | DIASTOLIC BLOOD PRESSURE: 70 MMHG | HEIGHT: 70 IN

## 2019-07-25 DIAGNOSIS — Z85.51 HISTORY OF BLADDER CANCER: ICD-10-CM

## 2019-07-25 DIAGNOSIS — M47.816 SPONDYLOSIS OF LUMBAR REGION WITHOUT MYELOPATHY OR RADICULOPATHY: ICD-10-CM

## 2019-07-25 DIAGNOSIS — E78.5 HYPERLIPIDEMIA, UNSPECIFIED HYPERLIPIDEMIA TYPE: ICD-10-CM

## 2019-07-25 DIAGNOSIS — D50.8 IRON DEFICIENCY ANEMIA SECONDARY TO INADEQUATE DIETARY IRON INTAKE: ICD-10-CM

## 2019-07-25 DIAGNOSIS — I48.91 ATRIAL FIBRILLATION, UNSPECIFIED TYPE (HCC): Primary | ICD-10-CM

## 2019-07-25 DIAGNOSIS — I10 ESSENTIAL HYPERTENSION: ICD-10-CM

## 2019-07-25 DIAGNOSIS — Z98.890 S/P AAA REPAIR: ICD-10-CM

## 2019-07-25 DIAGNOSIS — Z86.79 S/P AAA REPAIR: ICD-10-CM

## 2019-07-25 PROCEDURE — 1123F ACP DISCUSS/DSCN MKR DOCD: CPT | Performed by: FAMILY MEDICINE

## 2019-07-25 PROCEDURE — G8417 CALC BMI ABV UP PARAM F/U: HCPCS | Performed by: FAMILY MEDICINE

## 2019-07-25 PROCEDURE — 99213 OFFICE O/P EST LOW 20 MIN: CPT | Performed by: FAMILY MEDICINE

## 2019-07-25 PROCEDURE — G8427 DOCREV CUR MEDS BY ELIG CLIN: HCPCS | Performed by: FAMILY MEDICINE

## 2019-07-25 PROCEDURE — 4004F PT TOBACCO SCREEN RCVD TLK: CPT | Performed by: FAMILY MEDICINE

## 2019-07-25 PROCEDURE — 4040F PNEUMOC VAC/ADMIN/RCVD: CPT | Performed by: FAMILY MEDICINE

## 2019-07-25 NOTE — PROGRESS NOTES
Chief Complaint   Patient presents with    6 Month Follow-Up    Check-Up       HPI:  Evelina Biggs Sr. is a [de-identified] y.o. male    11 month checkup    Follows with [facility] for A-fib for quite some time    Generally feeling ok    Had bout of pneumonia  Persistent atelectasis vs post-obstructive pneumonia on CT  Was referred to pulm    He is on anti-arrhythmics   Dr. Jaydon Sánchez and Dr. Naif Amado    He believes he is in NSR    No acute complaints today  He is on eliquis      His HR always runs on the low side he states    Had colonoscopy last year for anemia  Started on iron, Hgb better    Past Medical History:   Diagnosis Date    Atrial fibrillation ([address]     CAD (coronary artery disease)     Hematuria     High cholesterol     History of bladder cancer     Hyperlipidemia     Hypertension     S/P AAA repair     Spondylosis of lumbar region without myelopathy or radiculopathy      Past Surgical History:   Procedure Laterality Date    ABDOMINAL AORTIC ANEURYSM REPAIR      HERNIA REPAIR  1998    WA COLON CA SCRN NOT  [address] IND N/A 4/24/2017    COLONOSCOPY performed by Adam Rios MD at [facility] 61 ESOPHAGOGASTRODUODENOSCOPY TRANSORAL DIAGNOSTIC N/A 4/24/2017    EGD ESOPHAGOGASTRODUODENOSCOPY performed by Adam Rios MD at [facility]     No family history on file.   Social History     Socioeconomic History    Marital status:      Spouse name: None    Number of children: None    Years of education: None    Highest education level: None   Occupational History    None   Social Needs    Financial resource strain: None    Food insecurity:     Worry: None     Inability: None    Transportation needs:     Medical: None     Non-medical: None   Tobacco Use    Smoking status: Light Tobacco Smoker     Packs/day: 0.33     Years: 30.00     Pack years: 9.90     Types: Cigarettes    Smokeless tobacco: Never Used   Substance and Sexual Activity    Alcohol use: Yes     Comment: occasionally samples when we have them    He will see pulm    He will go through the AWV visit with the NP    Luis Watkins MD

## 2019-08-15 ENCOUNTER — OFFICE VISIT (OUTPATIENT)
Dept: PULMONOLOGY | Age: 79
End: 2019-08-15
Payer: MEDICARE

## 2019-08-15 VITALS
SYSTOLIC BLOOD PRESSURE: 148 MMHG | BODY MASS INDEX: 26.17 KG/M2 | DIASTOLIC BLOOD PRESSURE: 72 MMHG | WEIGHT: 182.8 LBS | TEMPERATURE: 97.2 F | OXYGEN SATURATION: 97 % | HEART RATE: 62 BPM | RESPIRATION RATE: 16 BRPM | HEIGHT: 70 IN

## 2019-08-15 DIAGNOSIS — J41.0 SIMPLE CHRONIC BRONCHITIS (HCC): Primary | ICD-10-CM

## 2019-08-15 DIAGNOSIS — J98.19 RIGHT MIDDLE LOBE SYNDROME: ICD-10-CM

## 2019-08-15 PROCEDURE — 1123F ACP DISCUSS/DSCN MKR DOCD: CPT | Performed by: INTERNAL MEDICINE

## 2019-08-15 PROCEDURE — 99204 OFFICE O/P NEW MOD 45 MIN: CPT | Performed by: INTERNAL MEDICINE

## 2019-08-15 PROCEDURE — G8427 DOCREV CUR MEDS BY ELIG CLIN: HCPCS | Performed by: INTERNAL MEDICINE

## 2019-08-15 PROCEDURE — G8417 CALC BMI ABV UP PARAM F/U: HCPCS | Performed by: INTERNAL MEDICINE

## 2019-08-15 PROCEDURE — G8926 SPIRO NO PERF OR DOC: HCPCS | Performed by: INTERNAL MEDICINE

## 2019-08-15 PROCEDURE — 4040F PNEUMOC VAC/ADMIN/RCVD: CPT | Performed by: INTERNAL MEDICINE

## 2019-08-15 PROCEDURE — 3023F SPIROM DOC REV: CPT | Performed by: INTERNAL MEDICINE

## 2019-08-15 PROCEDURE — 4004F PT TOBACCO SCREEN RCVD TLK: CPT | Performed by: INTERNAL MEDICINE

## 2019-08-15 ASSESSMENT — ENCOUNTER SYMPTOMS
NAUSEA: 0
WHEEZING: 1
SHORTNESS OF BREATH: 1
SORE THROAT: 0
ABDOMINAL PAIN: 0
SINUS PRESSURE: 0
DIARRHEA: 0
CHEST TIGHTNESS: 0
VOMITING: 0
RHINORRHEA: 0
COUGH: 1

## 2019-08-26 ENCOUNTER — HOSPITAL ENCOUNTER (OUTPATIENT)
Dept: PULMONOLOGY | Age: 79
Discharge: HOME | End: 2019-08-26
Payer: MEDICARE

## 2019-08-26 ENCOUNTER — HOSPITAL ENCOUNTER (OUTPATIENT)
Dept: GENERAL RADIOLOGY | Age: 79
Discharge: HOME | End: 2019-08-28
Payer: MEDICARE

## 2019-08-26 DIAGNOSIS — J41.0 SIMPLE CHRONIC BRONCHITIS (HCC): ICD-10-CM

## 2019-08-26 DIAGNOSIS — Z85.51 HISTORY OF BLADDER CANCER: ICD-10-CM

## 2019-08-26 DIAGNOSIS — J98.19 RIGHT MIDDLE LOBE SYNDROME: ICD-10-CM

## 2019-08-26 LAB — PROSTATE SPECIFIC ANTIGEN: 1.59 NG/ML (ref 0–6.22)

## 2019-08-26 PROCEDURE — 94729 DIFFUSING CAPACITY: CPT | Performed by: INTERNAL MEDICINE

## 2019-08-26 PROCEDURE — 71046 X-RAY EXAM CHEST 2 VIEWS: CPT

## 2019-08-26 PROCEDURE — 6360000002 HC RX W HCPCS: Performed by: INTERNAL MEDICINE

## 2019-08-26 PROCEDURE — 94060 EVALUATION OF WHEEZING: CPT | Performed by: INTERNAL MEDICINE

## 2019-08-26 PROCEDURE — 94729 DIFFUSING CAPACITY: CPT

## 2019-08-26 PROCEDURE — 94726 PLETHYSMOGRAPHY LUNG VOLUMES: CPT | Performed by: INTERNAL MEDICINE

## 2019-08-26 PROCEDURE — 94726 PLETHYSMOGRAPHY LUNG VOLUMES: CPT

## 2019-08-26 PROCEDURE — 94060 EVALUATION OF WHEEZING: CPT

## 2019-08-26 RX ORDER — ALBUTEROL SULFATE 2.5 MG/3ML
2.5 SOLUTION RESPIRATORY (INHALATION) ONCE
Status: COMPLETED | OUTPATIENT
Start: 2019-08-26 | End: 2019-08-26

## 2019-08-26 RX ADMIN — ALBUTEROL SULFATE 2.5 MG: 2.5 SOLUTION RESPIRATORY (INHALATION) at 14:39

## 2019-08-28 ENCOUNTER — PROCEDURE VISIT (OUTPATIENT)
Dept: UROLOGY | Age: 79
End: 2019-08-28
Payer: MEDICARE

## 2019-08-28 VITALS
WEIGHT: 184 LBS | HEART RATE: 56 BPM | HEIGHT: 71 IN | SYSTOLIC BLOOD PRESSURE: 138 MMHG | DIASTOLIC BLOOD PRESSURE: 84 MMHG | BODY MASS INDEX: 25.76 KG/M2

## 2019-08-28 DIAGNOSIS — N13.8 BPH WITH OBSTRUCTION/LOWER URINARY TRACT SYMPTOMS: ICD-10-CM

## 2019-08-28 DIAGNOSIS — N40.1 BPH WITH OBSTRUCTION/LOWER URINARY TRACT SYMPTOMS: ICD-10-CM

## 2019-08-28 DIAGNOSIS — C67.9 MALIGNANT NEOPLASM OF URINARY BLADDER, UNSPECIFIED SITE (HCC): Primary | ICD-10-CM

## 2019-08-28 LAB
BILIRUBIN, POC: ABNORMAL
BLOOD URINE, POC: ABNORMAL
CLARITY, POC: CLEAR
COLOR, POC: YELLOW
GLUCOSE URINE, POC: ABNORMAL
KETONES, POC: ABNORMAL
LEUKOCYTE EST, POC: ABNORMAL
NITRITE, POC: ABNORMAL
PH, POC: 7
PROTEIN, POC: 100
SPECIFIC GRAVITY, POC: 1.02
UROBILINOGEN, POC: 0.2

## 2019-08-28 PROCEDURE — 1123F ACP DISCUSS/DSCN MKR DOCD: CPT | Performed by: UROLOGY

## 2019-08-28 PROCEDURE — 4004F PT TOBACCO SCREEN RCVD TLK: CPT | Performed by: UROLOGY

## 2019-08-28 PROCEDURE — G8427 DOCREV CUR MEDS BY ELIG CLIN: HCPCS | Performed by: UROLOGY

## 2019-08-28 PROCEDURE — 4040F PNEUMOC VAC/ADMIN/RCVD: CPT | Performed by: UROLOGY

## 2019-08-28 PROCEDURE — 52000 CYSTOURETHROSCOPY: CPT | Performed by: UROLOGY

## 2019-08-28 PROCEDURE — G8417 CALC BMI ABV UP PARAM F/U: HCPCS | Performed by: UROLOGY

## 2019-08-28 PROCEDURE — 81003 URINALYSIS AUTO W/O SCOPE: CPT | Performed by: UROLOGY

## 2019-08-28 PROCEDURE — 99213 OFFICE O/P EST LOW 20 MIN: CPT | Performed by: UROLOGY

## 2019-08-28 RX ORDER — TAMSULOSIN HYDROCHLORIDE 0.4 MG/1
0.4 CAPSULE ORAL DAILY
Qty: 90 CAPSULE | Refills: 3 | Status: SHIPPED | OUTPATIENT
Start: 2019-08-28 | End: 2020-02-13

## 2019-08-28 RX ORDER — DOXYCYCLINE HYCLATE 100 MG/1
100 CAPSULE ORAL ONCE
Qty: 1 CAPSULE | Refills: 0 | COMMUNITY
Start: 2019-08-28 | End: 2019-08-28

## 2019-08-28 ASSESSMENT — ENCOUNTER SYMPTOMS: ABDOMINAL PAIN: 0

## 2019-09-04 LAB
ALBUMIN SERPL-MCNC: 4 G/DL (ref 3.5–4.6)
ALP BLD-CCNC: 57 U/L (ref 35–104)
ALT SERPL-CCNC: 10 U/L (ref 0–41)
ANION GAP SERPL CALCULATED.3IONS-SCNC: 9 MEQ/L (ref 9–15)
AST SERPL-CCNC: 15 U/L (ref 0–40)
BILIRUB SERPL-MCNC: 0.8 MG/DL (ref 0.2–0.7)
BILIRUBIN DIRECT: <0.2 MG/DL (ref 0–0.4)
BILIRUBIN, INDIRECT: ABNORMAL MG/DL (ref 0–0.6)
BUN BLDV-MCNC: 15 MG/DL (ref 8–23)
CALCIUM SERPL-MCNC: 8.6 MG/DL (ref 8.5–9.9)
CHLORIDE BLD-SCNC: 101 MEQ/L (ref 95–107)
CHOLESTEROL, TOTAL: 149 MG/DL (ref 0–199)
CO2: 29 MEQ/L (ref 20–31)
CREAT SERPL-MCNC: 1.07 MG/DL (ref 0.7–1.2)
GFR AFRICAN AMERICAN: >60
GFR NON-AFRICAN AMERICAN: >60
GLUCOSE BLD-MCNC: 94 MG/DL (ref 70–99)
HDLC SERPL-MCNC: 45 MG/DL (ref 40–59)
LDL CHOLESTEROL CALCULATED: 87 MG/DL (ref 0–129)
POTASSIUM SERPL-SCNC: 4.2 MEQ/L (ref 3.4–4.9)
SODIUM BLD-SCNC: 139 MEQ/L (ref 135–144)
TOTAL PROTEIN: 7.6 G/DL (ref 6.3–8)
TRIGL SERPL-MCNC: 85 MG/DL (ref 0–150)

## 2019-09-10 RX ORDER — ESCITALOPRAM OXALATE 10 MG/1
TABLET ORAL
Qty: 90 TABLET | Refills: 0 | Status: SHIPPED | OUTPATIENT
Start: 2019-09-10 | End: 2020-01-24

## 2019-09-24 RX ORDER — BENAZEPRIL HYDROCHLORIDE 20 MG/1
40 TABLET ORAL DAILY
Qty: 60 TABLET | Refills: 3 | Status: SHIPPED | OUTPATIENT
Start: 2019-09-24 | End: 2020-03-09

## 2019-10-01 ENCOUNTER — OFFICE VISIT (OUTPATIENT)
Dept: PULMONOLOGY | Age: 79
End: 2019-10-01
Payer: MEDICARE

## 2019-10-01 VITALS
RESPIRATION RATE: 16 BRPM | OXYGEN SATURATION: 96 % | TEMPERATURE: 97.5 F | BODY MASS INDEX: 26.97 KG/M2 | WEIGHT: 188.4 LBS | SYSTOLIC BLOOD PRESSURE: 138 MMHG | HEART RATE: 56 BPM | DIASTOLIC BLOOD PRESSURE: 72 MMHG | HEIGHT: 70 IN

## 2019-10-01 DIAGNOSIS — J98.19 RIGHT MIDDLE LOBE SYNDROME: Primary | ICD-10-CM

## 2019-10-01 DIAGNOSIS — J41.0 SIMPLE CHRONIC BRONCHITIS (HCC): ICD-10-CM

## 2019-10-01 PROCEDURE — G8417 CALC BMI ABV UP PARAM F/U: HCPCS | Performed by: INTERNAL MEDICINE

## 2019-10-01 PROCEDURE — 1123F ACP DISCUSS/DSCN MKR DOCD: CPT | Performed by: INTERNAL MEDICINE

## 2019-10-01 PROCEDURE — 3023F SPIROM DOC REV: CPT | Performed by: INTERNAL MEDICINE

## 2019-10-01 PROCEDURE — 99214 OFFICE O/P EST MOD 30 MIN: CPT | Performed by: INTERNAL MEDICINE

## 2019-10-01 PROCEDURE — G8427 DOCREV CUR MEDS BY ELIG CLIN: HCPCS | Performed by: INTERNAL MEDICINE

## 2019-10-01 PROCEDURE — 4004F PT TOBACCO SCREEN RCVD TLK: CPT | Performed by: INTERNAL MEDICINE

## 2019-10-01 PROCEDURE — G8484 FLU IMMUNIZE NO ADMIN: HCPCS | Performed by: INTERNAL MEDICINE

## 2019-10-01 PROCEDURE — G8926 SPIRO NO PERF OR DOC: HCPCS | Performed by: INTERNAL MEDICINE

## 2019-10-01 PROCEDURE — 4040F PNEUMOC VAC/ADMIN/RCVD: CPT | Performed by: INTERNAL MEDICINE

## 2019-10-01 RX ORDER — SILDENAFIL 100 MG/1
TABLET, FILM COATED ORAL
Refills: 1 | COMMUNITY
Start: 2019-09-03 | End: 2021-08-12

## 2019-10-01 RX ORDER — PHENOL 1.4 %
1 AEROSOL, SPRAY (ML) MUCOUS MEMBRANE DAILY
COMMUNITY
End: 2020-02-13

## 2019-10-01 RX ORDER — RIVAROXABAN 20 MG/1
TABLET, FILM COATED ORAL
Refills: 3 | COMMUNITY
Start: 2019-09-03 | End: 2020-08-08 | Stop reason: SDUPTHER

## 2019-10-01 RX ORDER — FLUTICASONE FUROATE AND VILANTEROL 100; 25 UG/1; UG/1
1 POWDER RESPIRATORY (INHALATION) DAILY
Qty: 1 EACH | Refills: 5 | Status: SHIPPED | OUTPATIENT
Start: 2019-10-01 | End: 2021-08-12

## 2019-10-01 RX ORDER — ALBUTEROL SULFATE 90 UG/1
2 AEROSOL, METERED RESPIRATORY (INHALATION) EVERY 6 HOURS PRN
Qty: 1 INHALER | Refills: 5 | Status: SHIPPED | OUTPATIENT
Start: 2019-10-01 | End: 2019-11-01 | Stop reason: ALTCHOICE

## 2019-10-01 ASSESSMENT — ENCOUNTER SYMPTOMS
COUGH: 1
SINUS PRESSURE: 0
SORE THROAT: 0
CHEST TIGHTNESS: 0
DIARRHEA: 0
NAUSEA: 0
SHORTNESS OF BREATH: 1
VOMITING: 0
ABDOMINAL PAIN: 0
RHINORRHEA: 0
WHEEZING: 0

## 2019-11-01 ENCOUNTER — OFFICE VISIT (OUTPATIENT)
Dept: FAMILY MEDICINE CLINIC | Age: 79
End: 2019-11-01
Payer: MEDICARE

## 2019-11-01 VITALS
BODY MASS INDEX: 27.06 KG/M2 | SYSTOLIC BLOOD PRESSURE: 118 MMHG | OXYGEN SATURATION: 96 % | HEART RATE: 58 BPM | HEIGHT: 70 IN | DIASTOLIC BLOOD PRESSURE: 56 MMHG | WEIGHT: 189 LBS | TEMPERATURE: 98.4 F

## 2019-11-01 DIAGNOSIS — J06.9 URI WITH COUGH AND CONGESTION: Primary | ICD-10-CM

## 2019-11-01 PROCEDURE — 4040F PNEUMOC VAC/ADMIN/RCVD: CPT | Performed by: NURSE PRACTITIONER

## 2019-11-01 PROCEDURE — 99213 OFFICE O/P EST LOW 20 MIN: CPT | Performed by: NURSE PRACTITIONER

## 2019-11-01 PROCEDURE — G8484 FLU IMMUNIZE NO ADMIN: HCPCS | Performed by: NURSE PRACTITIONER

## 2019-11-01 PROCEDURE — G8427 DOCREV CUR MEDS BY ELIG CLIN: HCPCS | Performed by: NURSE PRACTITIONER

## 2019-11-01 PROCEDURE — 1123F ACP DISCUSS/DSCN MKR DOCD: CPT | Performed by: NURSE PRACTITIONER

## 2019-11-01 PROCEDURE — G8417 CALC BMI ABV UP PARAM F/U: HCPCS | Performed by: NURSE PRACTITIONER

## 2019-11-01 PROCEDURE — 4004F PT TOBACCO SCREEN RCVD TLK: CPT | Performed by: NURSE PRACTITIONER

## 2019-11-01 RX ORDER — PREDNISONE 20 MG/1
40 TABLET ORAL DAILY
Qty: 10 TABLET | Refills: 0 | Status: SHIPPED | OUTPATIENT
Start: 2019-11-01 | End: 2019-11-06

## 2019-11-01 RX ORDER — CEFDINIR 300 MG/1
300 CAPSULE ORAL 2 TIMES DAILY
Qty: 20 CAPSULE | Refills: 0 | Status: SHIPPED | OUTPATIENT
Start: 2019-11-01 | End: 2019-11-11

## 2019-11-01 ASSESSMENT — ENCOUNTER SYMPTOMS
EYE REDNESS: 0
EYE DISCHARGE: 0
SORE THROAT: 0
ABDOMINAL PAIN: 0
SINUS PRESSURE: 0
NAUSEA: 0
SINUS PAIN: 0
COUGH: 1
EYE ITCHING: 0
SHORTNESS OF BREATH: 1
DIARRHEA: 0

## 2019-11-25 RX ORDER — ROSUVASTATIN CALCIUM 10 MG/1
TABLET, COATED ORAL
Qty: 30 TABLET | Refills: 0 | Status: SHIPPED | OUTPATIENT
Start: 2019-11-25 | End: 2020-01-07

## 2019-11-27 ENCOUNTER — OFFICE VISIT (OUTPATIENT)
Dept: FAMILY MEDICINE CLINIC | Age: 79
End: 2019-11-27
Payer: MEDICARE

## 2019-11-27 VITALS
HEIGHT: 70 IN | WEIGHT: 191.2 LBS | SYSTOLIC BLOOD PRESSURE: 122 MMHG | DIASTOLIC BLOOD PRESSURE: 60 MMHG | OXYGEN SATURATION: 97 % | TEMPERATURE: 98 F | RESPIRATION RATE: 14 BRPM | HEART RATE: 62 BPM | BODY MASS INDEX: 27.37 KG/M2

## 2019-11-27 DIAGNOSIS — R25.2 LEG CRAMPS: Primary | ICD-10-CM

## 2019-11-27 DIAGNOSIS — R25.2 LEG CRAMPS: ICD-10-CM

## 2019-11-27 LAB
ALBUMIN SERPL-MCNC: 4 G/DL (ref 3.5–4.6)
ALP BLD-CCNC: 56 U/L (ref 35–104)
ALT SERPL-CCNC: 15 U/L (ref 0–41)
ANION GAP SERPL CALCULATED.3IONS-SCNC: 11 MEQ/L (ref 9–15)
AST SERPL-CCNC: 16 U/L (ref 0–40)
BILIRUB SERPL-MCNC: 0.3 MG/DL (ref 0.2–0.7)
BUN BLDV-MCNC: 20 MG/DL (ref 8–23)
C-REACTIVE PROTEIN: 4.5 MG/L (ref 0–5)
CALCIUM SERPL-MCNC: 8.9 MG/DL (ref 8.5–9.9)
CHLORIDE BLD-SCNC: 107 MEQ/L (ref 95–107)
CO2: 25 MEQ/L (ref 20–31)
CREAT SERPL-MCNC: 1.11 MG/DL (ref 0.7–1.2)
GFR AFRICAN AMERICAN: >60
GFR NON-AFRICAN AMERICAN: >60
GLOBULIN: 3.5 G/DL (ref 2.3–3.5)
GLUCOSE BLD-MCNC: 97 MG/DL (ref 70–99)
LACTIC ACID: 1.4 MMOL/L (ref 0.5–2.2)
MAGNESIUM: 2.2 MG/DL (ref 1.7–2.4)
POTASSIUM SERPL-SCNC: 4.1 MEQ/L (ref 3.4–4.9)
SODIUM BLD-SCNC: 143 MEQ/L (ref 135–144)
TOTAL PROTEIN: 7.5 G/DL (ref 6.3–8)

## 2019-11-27 PROCEDURE — G8484 FLU IMMUNIZE NO ADMIN: HCPCS | Performed by: NURSE PRACTITIONER

## 2019-11-27 PROCEDURE — G8417 CALC BMI ABV UP PARAM F/U: HCPCS | Performed by: NURSE PRACTITIONER

## 2019-11-27 PROCEDURE — G8427 DOCREV CUR MEDS BY ELIG CLIN: HCPCS | Performed by: NURSE PRACTITIONER

## 2019-11-27 PROCEDURE — 99213 OFFICE O/P EST LOW 20 MIN: CPT | Performed by: NURSE PRACTITIONER

## 2019-11-27 PROCEDURE — 4004F PT TOBACCO SCREEN RCVD TLK: CPT | Performed by: NURSE PRACTITIONER

## 2019-11-27 PROCEDURE — 1123F ACP DISCUSS/DSCN MKR DOCD: CPT | Performed by: NURSE PRACTITIONER

## 2019-11-27 PROCEDURE — 4040F PNEUMOC VAC/ADMIN/RCVD: CPT | Performed by: NURSE PRACTITIONER

## 2019-11-27 RX ORDER — FLUTICASONE FUROATE AND VILANTEROL TRIFENATATE 100; 25 UG/1; UG/1
POWDER RESPIRATORY (INHALATION)
Refills: 5 | COMMUNITY
Start: 2019-11-18 | End: 2020-02-18 | Stop reason: SDUPTHER

## 2019-11-27 ASSESSMENT — ENCOUNTER SYMPTOMS
CONSTIPATION: 0
COUGH: 0
SHORTNESS OF BREATH: 0
DIARRHEA: 0

## 2019-12-24 ENCOUNTER — HOSPITAL ENCOUNTER (EMERGENCY)
Age: 79
Discharge: HOME | End: 2019-12-24
Payer: MEDICARE

## 2019-12-24 ENCOUNTER — APPOINTMENT (OUTPATIENT)
Dept: CT IMAGING | Age: 79
End: 2019-12-24
Payer: MEDICARE

## 2019-12-24 ENCOUNTER — APPOINTMENT (OUTPATIENT)
Dept: GENERAL RADIOLOGY | Age: 79
End: 2019-12-24
Payer: MEDICARE

## 2019-12-24 VITALS
SYSTOLIC BLOOD PRESSURE: 146 MMHG | TEMPERATURE: 98.8 F | OXYGEN SATURATION: 96 % | DIASTOLIC BLOOD PRESSURE: 70 MMHG | WEIGHT: 185 LBS | HEART RATE: 60 BPM | HEIGHT: 71 IN | RESPIRATION RATE: 18 BRPM | BODY MASS INDEX: 25.9 KG/M2

## 2019-12-24 DIAGNOSIS — J18.9 PNEUMONIA DUE TO ORGANISM: Primary | ICD-10-CM

## 2019-12-24 LAB
ALBUMIN SERPL-MCNC: 4.2 G/DL (ref 3.5–4.6)
ALP BLD-CCNC: 50 U/L (ref 35–104)
ALT SERPL-CCNC: 18 U/L (ref 0–41)
ANION GAP SERPL CALCULATED.3IONS-SCNC: 15 MEQ/L (ref 9–15)
AST SERPL-CCNC: 19 U/L (ref 0–40)
BACTERIA: NEGATIVE /HPF
BASOPHILS ABSOLUTE: 0 K/UL (ref 0–0.2)
BASOPHILS RELATIVE PERCENT: 0.3 %
BILIRUB SERPL-MCNC: 0.7 MG/DL (ref 0.2–0.7)
BILIRUBIN URINE: NEGATIVE
BLOOD, URINE: ABNORMAL
BUN BLDV-MCNC: 18 MG/DL (ref 8–23)
CALCIUM SERPL-MCNC: 8.6 MG/DL (ref 8.5–9.9)
CHLORIDE BLD-SCNC: 98 MEQ/L (ref 95–107)
CLARITY: CLEAR
CO2: 24 MEQ/L (ref 20–31)
COLOR: YELLOW
CREAT SERPL-MCNC: 0.91 MG/DL (ref 0.7–1.2)
EOSINOPHILS ABSOLUTE: 0 K/UL (ref 0–0.7)
EOSINOPHILS RELATIVE PERCENT: 0.1 %
EPITHELIAL CELLS, UA: ABNORMAL /HPF (ref 0–5)
GFR AFRICAN AMERICAN: >60
GFR NON-AFRICAN AMERICAN: >60
GLOBULIN: 3.5 G/DL (ref 2.3–3.5)
GLUCOSE BLD-MCNC: 106 MG/DL (ref 70–99)
GLUCOSE URINE: NEGATIVE MG/DL
HCT VFR BLD CALC: 32.3 % (ref 42–52)
HEMOGLOBIN: 10.4 G/DL (ref 14–18)
HYALINE CASTS: ABNORMAL /HPF (ref 0–5)
INFLUENZA A BY PCR: NEGATIVE
INFLUENZA B BY PCR: NEGATIVE
KETONES, URINE: NEGATIVE MG/DL
LACTIC ACID: 1.8 MMOL/L (ref 0.5–2.2)
LEUKOCYTE ESTERASE, URINE: NEGATIVE
LYMPHOCYTES ABSOLUTE: 0.5 K/UL (ref 1–4.8)
LYMPHOCYTES RELATIVE PERCENT: 6.5 %
MCH RBC QN AUTO: 25.4 PG (ref 27–31.3)
MCHC RBC AUTO-ENTMCNC: 32.3 % (ref 33–37)
MCV RBC AUTO: 78.5 FL (ref 80–100)
MONOCYTES ABSOLUTE: 0.8 K/UL (ref 0.2–0.8)
MONOCYTES RELATIVE PERCENT: 10 %
NEUTROPHILS ABSOLUTE: 7 K/UL (ref 1.4–6.5)
NEUTROPHILS RELATIVE PERCENT: 83.1 %
NITRITE, URINE: NEGATIVE
PDW BLD-RTO: 16.8 % (ref 11.5–14.5)
PH UA: 7 (ref 5–9)
PLATELET # BLD: 183 K/UL (ref 130–400)
POTASSIUM SERPL-SCNC: 3.5 MEQ/L (ref 3.4–4.9)
PRO-BNP: 2074 PG/ML
PROTEIN UA: 100 MG/DL
RBC # BLD: 4.11 M/UL (ref 4.7–6.1)
RBC UA: ABNORMAL /HPF (ref 0–5)
SODIUM BLD-SCNC: 137 MEQ/L (ref 135–144)
SPECIFIC GRAVITY UA: 1.01 (ref 1–1.03)
TOTAL CK: 28 U/L (ref 0–190)
TOTAL PROTEIN: 7.7 G/DL (ref 6.3–8)
TROPONIN: <0.01 NG/ML (ref 0–0.01)
URINE REFLEX TO CULTURE: YES
UROBILINOGEN, URINE: 0.2 E.U./DL
WBC # BLD: 8.4 K/UL (ref 4.8–10.8)
WBC UA: ABNORMAL /HPF (ref 0–5)

## 2019-12-24 PROCEDURE — 87149 DNA/RNA DIRECT PROBE: CPT

## 2019-12-24 PROCEDURE — 6370000000 HC RX 637 (ALT 250 FOR IP): Performed by: PHYSICIAN ASSISTANT

## 2019-12-24 PROCEDURE — 84484 ASSAY OF TROPONIN QUANT: CPT

## 2019-12-24 PROCEDURE — 36415 COLL VENOUS BLD VENIPUNCTURE: CPT

## 2019-12-24 PROCEDURE — 87502 INFLUENZA DNA AMP PROBE: CPT

## 2019-12-24 PROCEDURE — 2580000003 HC RX 258: Performed by: PHYSICIAN ASSISTANT

## 2019-12-24 PROCEDURE — 99284 EMERGENCY DEPT VISIT MOD MDM: CPT

## 2019-12-24 PROCEDURE — 93010 ELECTROCARDIOGRAM REPORT: CPT | Performed by: INTERNAL MEDICINE

## 2019-12-24 PROCEDURE — 87077 CULTURE AEROBIC IDENTIFY: CPT

## 2019-12-24 PROCEDURE — 83880 ASSAY OF NATRIURETIC PEPTIDE: CPT

## 2019-12-24 PROCEDURE — 71045 X-RAY EXAM CHEST 1 VIEW: CPT

## 2019-12-24 PROCEDURE — 87040 BLOOD CULTURE FOR BACTERIA: CPT

## 2019-12-24 PROCEDURE — 71250 CT THORAX DX C-: CPT

## 2019-12-24 PROCEDURE — 81001 URINALYSIS AUTO W/SCOPE: CPT

## 2019-12-24 PROCEDURE — 87086 URINE CULTURE/COLONY COUNT: CPT

## 2019-12-24 PROCEDURE — 93005 ELECTROCARDIOGRAM TRACING: CPT | Performed by: PHYSICIAN ASSISTANT

## 2019-12-24 PROCEDURE — 85025 COMPLETE CBC W/AUTO DIFF WBC: CPT

## 2019-12-24 PROCEDURE — 83605 ASSAY OF LACTIC ACID: CPT

## 2019-12-24 PROCEDURE — 80053 COMPREHEN METABOLIC PANEL: CPT

## 2019-12-24 PROCEDURE — 82550 ASSAY OF CK (CPK): CPT

## 2019-12-24 RX ORDER — ACETAMINOPHEN 500 MG
1000 TABLET ORAL ONCE
Status: COMPLETED | OUTPATIENT
Start: 2019-12-24 | End: 2019-12-24

## 2019-12-24 RX ORDER — SULFAMETHOXAZOLE AND TRIMETHOPRIM 800; 160 MG/1; MG/1
1 TABLET ORAL ONCE
Status: COMPLETED | OUTPATIENT
Start: 2019-12-24 | End: 2019-12-24

## 2019-12-24 RX ORDER — SODIUM CHLORIDE 9 MG/ML
INJECTION, SOLUTION INTRAVENOUS CONTINUOUS
Status: DISCONTINUED | OUTPATIENT
Start: 2019-12-24 | End: 2019-12-24 | Stop reason: HOSPADM

## 2019-12-24 RX ORDER — BENZONATATE 100 MG/1
100 CAPSULE ORAL 3 TIMES DAILY PRN
Qty: 20 CAPSULE | Refills: 0 | Status: SHIPPED | OUTPATIENT
Start: 2019-12-24 | End: 2019-12-26

## 2019-12-24 RX ORDER — SULFAMETHOXAZOLE AND TRIMETHOPRIM 800; 160 MG/1; MG/1
1 TABLET ORAL 2 TIMES DAILY
Qty: 14 TABLET | Refills: 0 | Status: SHIPPED | OUTPATIENT
Start: 2019-12-24 | End: 2019-12-31

## 2019-12-24 RX ADMIN — SODIUM CHLORIDE: 9 INJECTION, SOLUTION INTRAVENOUS at 09:25

## 2019-12-24 RX ADMIN — SULFAMETHOXAZOLE AND TRIMETHOPRIM 1 TABLET: 800; 160 TABLET ORAL at 13:59

## 2019-12-24 RX ADMIN — ACETAMINOPHEN 1000 MG: 500 TABLET ORAL at 09:25

## 2019-12-24 ASSESSMENT — ENCOUNTER SYMPTOMS
NAUSEA: 0
RHINORRHEA: 0
CONSTIPATION: 0
ABDOMINAL PAIN: 0
COLOR CHANGE: 0
SHORTNESS OF BREATH: 1
ABDOMINAL DISTENTION: 0
COUGH: 1
VOMITING: 0
WHEEZING: 1
DIARRHEA: 0
SORE THROAT: 0
EYE DISCHARGE: 0

## 2019-12-24 ASSESSMENT — PAIN SCALES - GENERAL: PAINLEVEL_OUTOF10: 0

## 2019-12-25 LAB — URINE CULTURE, ROUTINE: NORMAL

## 2019-12-26 ENCOUNTER — OFFICE VISIT (OUTPATIENT)
Dept: FAMILY MEDICINE CLINIC | Age: 79
End: 2019-12-26
Payer: MEDICARE

## 2019-12-26 VITALS
WEIGHT: 181 LBS | HEIGHT: 70 IN | HEART RATE: 59 BPM | BODY MASS INDEX: 25.91 KG/M2 | SYSTOLIC BLOOD PRESSURE: 118 MMHG | DIASTOLIC BLOOD PRESSURE: 56 MMHG | OXYGEN SATURATION: 96 %

## 2019-12-26 DIAGNOSIS — J41.0 SIMPLE CHRONIC BRONCHITIS (HCC): ICD-10-CM

## 2019-12-26 DIAGNOSIS — J98.11 ATELECTASIS: ICD-10-CM

## 2019-12-26 DIAGNOSIS — J06.9 URI WITH COUGH AND CONGESTION: Primary | ICD-10-CM

## 2019-12-26 LAB
CULTURE, BLOOD 2: ABNORMAL
ORGANISM: ABNORMAL
ORGANISM: ABNORMAL

## 2019-12-26 PROCEDURE — 4004F PT TOBACCO SCREEN RCVD TLK: CPT | Performed by: FAMILY MEDICINE

## 2019-12-26 PROCEDURE — G8417 CALC BMI ABV UP PARAM F/U: HCPCS | Performed by: FAMILY MEDICINE

## 2019-12-26 PROCEDURE — 99213 OFFICE O/P EST LOW 20 MIN: CPT | Performed by: FAMILY MEDICINE

## 2019-12-26 PROCEDURE — G8926 SPIRO NO PERF OR DOC: HCPCS | Performed by: FAMILY MEDICINE

## 2019-12-26 PROCEDURE — G8484 FLU IMMUNIZE NO ADMIN: HCPCS | Performed by: FAMILY MEDICINE

## 2019-12-26 PROCEDURE — G8427 DOCREV CUR MEDS BY ELIG CLIN: HCPCS | Performed by: FAMILY MEDICINE

## 2019-12-26 PROCEDURE — 1123F ACP DISCUSS/DSCN MKR DOCD: CPT | Performed by: FAMILY MEDICINE

## 2019-12-26 PROCEDURE — 4040F PNEUMOC VAC/ADMIN/RCVD: CPT | Performed by: FAMILY MEDICINE

## 2019-12-26 PROCEDURE — 3023F SPIROM DOC REV: CPT | Performed by: FAMILY MEDICINE

## 2019-12-27 LAB
EKG ATRIAL RATE: 76 BPM
EKG P AXIS: 31 DEGREES
EKG P-R INTERVAL: 166 MS
EKG Q-T INTERVAL: 378 MS
EKG QRS DURATION: 114 MS
EKG QTC CALCULATION (BAZETT): 425 MS
EKG R AXIS: 44 DEGREES
EKG T AXIS: -58 DEGREES
EKG VENTRICULAR RATE: 76 BPM

## 2019-12-29 LAB — BLOOD CULTURE, ROUTINE: NORMAL

## 2020-01-07 RX ORDER — ROSUVASTATIN CALCIUM 10 MG/1
TABLET, COATED ORAL
Qty: 30 TABLET | Refills: 0 | Status: SHIPPED | OUTPATIENT
Start: 2020-01-07

## 2020-01-22 ENCOUNTER — OFFICE VISIT (OUTPATIENT)
Dept: PULMONOLOGY | Age: 80
End: 2020-01-22
Payer: MEDICARE

## 2020-01-22 VITALS
HEART RATE: 52 BPM | HEIGHT: 70 IN | SYSTOLIC BLOOD PRESSURE: 120 MMHG | RESPIRATION RATE: 16 BRPM | WEIGHT: 183 LBS | OXYGEN SATURATION: 98 % | BODY MASS INDEX: 26.2 KG/M2 | TEMPERATURE: 97.1 F | DIASTOLIC BLOOD PRESSURE: 60 MMHG

## 2020-01-22 PROCEDURE — G8926 SPIRO NO PERF OR DOC: HCPCS | Performed by: INTERNAL MEDICINE

## 2020-01-22 PROCEDURE — 1123F ACP DISCUSS/DSCN MKR DOCD: CPT | Performed by: INTERNAL MEDICINE

## 2020-01-22 PROCEDURE — 4004F PT TOBACCO SCREEN RCVD TLK: CPT | Performed by: INTERNAL MEDICINE

## 2020-01-22 PROCEDURE — G8427 DOCREV CUR MEDS BY ELIG CLIN: HCPCS | Performed by: INTERNAL MEDICINE

## 2020-01-22 PROCEDURE — 99214 OFFICE O/P EST MOD 30 MIN: CPT | Performed by: INTERNAL MEDICINE

## 2020-01-22 PROCEDURE — G8484 FLU IMMUNIZE NO ADMIN: HCPCS | Performed by: INTERNAL MEDICINE

## 2020-01-22 PROCEDURE — 3023F SPIROM DOC REV: CPT | Performed by: INTERNAL MEDICINE

## 2020-01-22 PROCEDURE — 4040F PNEUMOC VAC/ADMIN/RCVD: CPT | Performed by: INTERNAL MEDICINE

## 2020-01-22 PROCEDURE — G8417 CALC BMI ABV UP PARAM F/U: HCPCS | Performed by: INTERNAL MEDICINE

## 2020-01-22 ASSESSMENT — ENCOUNTER SYMPTOMS
WHEEZING: 0
SHORTNESS OF BREATH: 0
ABDOMINAL PAIN: 0
DIARRHEA: 0
CHEST TIGHTNESS: 0
RHINORRHEA: 0
VOMITING: 0
SORE THROAT: 0
SINUS PRESSURE: 0
NAUSEA: 0
COUGH: 0

## 2020-01-22 NOTE — PROGRESS NOTES
Occupational History    Not on file   Social Needs    Financial resource strain: Not on file    Food insecurity:     Worry: Not on file     Inability: Not on file    Transportation needs:     Medical: Not on file     Non-medical: Not on file   Tobacco Use    Smoking status: Light Tobacco Smoker     Packs/day: 0.33     Years: 30.00     Pack years: 9.90     Types: Cigarettes     Start date: 10/1/1979    Smokeless tobacco: Never Used   Substance and Sexual Activity    Alcohol use: Yes     Comment: occasionally    Drug use: No    Sexual activity: Not on file   Lifestyle    Physical activity:     Days per week: Not on file     Minutes per session: Not on file    Stress: Not on file   Relationships    Social connections:     Talks on phone: Not on file     Gets together: Not on file     Attends Sikhism service: Not on file     Active member of club or organization: Not on file     Attends meetings of clubs or organizations: Not on file     Relationship status: Not on file    Intimate partner violence:     Fear of current or ex partner: Not on file     Emotionally abused: Not on file     Physically abused: Not on file     Forced sexual activity: Not on file   Other Topics Concern    Not on file   Social History Narrative    Not on file         Review of Systems   Constitutional: Negative for chills, diaphoresis, fatigue and fever. HENT: Negative for congestion, postnasal drip, rhinorrhea, sinus pressure, sneezing and sore throat. Eyes: Negative for visual disturbance. Respiratory: Negative for cough, chest tightness, shortness of breath and wheezing. Cardiovascular: Negative for chest pain, palpitations and leg swelling. Gastrointestinal: Negative for abdominal pain, diarrhea, nausea and vomiting. Genitourinary: Negative for difficulty urinating and hematuria. Musculoskeletal: Negative for arthralgias, joint swelling and myalgias. Skin: Negative for rash.    Allergic/Immunologic: Negative for environmental allergies. Neurological: Negative for dizziness, tremors, weakness and headaches. Psychiatric/Behavioral: Negative for behavioral problems and sleep disturbance. Objective:     Vitals:    01/22/20 1416   BP: 120/60   Site: Right Upper Arm   Position: Sitting   Cuff Size: Medium Adult   Pulse: 52   Resp: 16   Temp: 97.1 °F (36.2 °C)   TempSrc: Tympanic   SpO2: 98%   Weight: 183 lb (83 kg)   Height: 5' 10\" (1.778 m)         Physical Exam  Constitutional:       Appearance: He is well-developed. HENT:      Head: Normocephalic and atraumatic. Eyes:      Pupils: Pupils are equal, round, and reactive to light. Neck:      Musculoskeletal: Normal range of motion and neck supple. Thyroid: No thyromegaly. Vascular: No JVD. Trachea: No tracheal deviation. Cardiovascular:      Rate and Rhythm: Normal rate and regular rhythm. Heart sounds: No murmur. No gallop. Pulmonary:      Effort: Pulmonary effort is normal.      Breath sounds: Normal breath sounds. No wheezing or rales. Chest:      Chest wall: No tenderness. Abdominal:      General: There is no distension. Musculoskeletal: Normal range of motion. Skin:     General: Skin is warm and dry. Findings: No rash. Neurological:      Mental Status: He is alert and oriented to person, place, and time. Deep Tendon Reflexes: Reflexes are normal and symmetric. Assessment:      Diagnosis Orders   1. Chronic obstructive pulmonary disease, unspecified COPD type ([facility] [address]       Patient was again advised to continue regular use of maintenance treatment for COPD, exercise regularly, report any worsening or exacerbations to me otherwise I will see him for follow-up in 4 months      Plan:     No orders of the defined types were placed in this encounter. No orders of the defined types were placed in this encounter. Return in about 4 months (around 5/22/2020) for re-evaluation.        Marleni Davidson

## 2020-01-24 RX ORDER — ESCITALOPRAM OXALATE 10 MG/1
TABLET ORAL
Qty: 90 TABLET | Refills: 0 | Status: SHIPPED | OUTPATIENT
Start: 2020-01-24 | End: 2020-01-27 | Stop reason: SDUPTHER

## 2020-01-27 ENCOUNTER — OFFICE VISIT (OUTPATIENT)
Dept: FAMILY MEDICINE CLINIC | Age: 80
End: 2020-01-27
Payer: MEDICARE

## 2020-01-27 VITALS
SYSTOLIC BLOOD PRESSURE: 110 MMHG | BODY MASS INDEX: 26.94 KG/M2 | HEIGHT: 70 IN | DIASTOLIC BLOOD PRESSURE: 58 MMHG | WEIGHT: 188.2 LBS | HEART RATE: 62 BPM | OXYGEN SATURATION: 93 %

## 2020-01-27 PROBLEM — C67.9 MALIGNANT NEOPLASM OF URINARY BLADDER (HCC): Status: ACTIVE | Noted: 2020-01-27

## 2020-01-27 PROCEDURE — 4004F PT TOBACCO SCREEN RCVD TLK: CPT | Performed by: FAMILY MEDICINE

## 2020-01-27 PROCEDURE — 3023F SPIROM DOC REV: CPT | Performed by: FAMILY MEDICINE

## 2020-01-27 PROCEDURE — 1123F ACP DISCUSS/DSCN MKR DOCD: CPT | Performed by: FAMILY MEDICINE

## 2020-01-27 PROCEDURE — 99214 OFFICE O/P EST MOD 30 MIN: CPT | Performed by: FAMILY MEDICINE

## 2020-01-27 PROCEDURE — 4040F PNEUMOC VAC/ADMIN/RCVD: CPT | Performed by: FAMILY MEDICINE

## 2020-01-27 PROCEDURE — G8484 FLU IMMUNIZE NO ADMIN: HCPCS | Performed by: FAMILY MEDICINE

## 2020-01-27 PROCEDURE — G8427 DOCREV CUR MEDS BY ELIG CLIN: HCPCS | Performed by: FAMILY MEDICINE

## 2020-01-27 PROCEDURE — G8926 SPIRO NO PERF OR DOC: HCPCS | Performed by: FAMILY MEDICINE

## 2020-01-27 PROCEDURE — G8417 CALC BMI ABV UP PARAM F/U: HCPCS | Performed by: FAMILY MEDICINE

## 2020-01-27 RX ORDER — ESCITALOPRAM OXALATE 10 MG/1
TABLET ORAL
Qty: 90 TABLET | Refills: 3 | Status: SHIPPED | OUTPATIENT
Start: 2020-01-27 | End: 2021-03-01

## 2020-01-27 ASSESSMENT — PATIENT HEALTH QUESTIONNAIRE - PHQ9
2. FEELING DOWN, DEPRESSED OR HOPELESS: 1
SUM OF ALL RESPONSES TO PHQ9 QUESTIONS 1 & 2: 1
1. LITTLE INTEREST OR PLEASURE IN DOING THINGS: 0
SUM OF ALL RESPONSES TO PHQ QUESTIONS 1-9: 1
SUM OF ALL RESPONSES TO PHQ QUESTIONS 1-9: 1

## 2020-01-27 NOTE — PROGRESS NOTES
None   Tobacco Use    Smoking status: Light Tobacco Smoker     Packs/day: 0.33     Years: 30.00     Pack years: 9.90     Types: Cigarettes     Start date: 10/1/1979    Smokeless tobacco: Never Used   Substance and Sexual Activity    Alcohol use: Yes     Comment: occasionally    Drug use: No    Sexual activity: None   Lifestyle    Physical activity:     Days per week: None     Minutes per session: None    Stress: None   Relationships    Social connections:     Talks on phone: None     Gets together: None     Attends Yarsani service: None     Active member of club or organization: None     Attends meetings of clubs or organizations: None     Relationship status: None    Intimate partner violence:     Fear of current or ex partner: None     Emotionally abused: None     Physically abused: None     Forced sexual activity: None   Other Topics Concern    None   Social History Narrative    None     Current Outpatient Medications   Medication Sig Dispense Refill    escitalopram (LEXAPRO) 10 MG tablet TAKE ONE TABLET BY MOUTH EVERY DAY 90 tablet 3    rosuvastatin (CRESTOR) 10 MG tablet TAKE ONE TABLET BY MOUTH DAILY  30 tablet 0    BREO ELLIPTA 100-25 MCG/INH AEPB inhaler INHALE 1 PUFF INTO THE LUNGS DAILY  5    XARELTO 20 MG TABS tablet TAKE ONE TABLET BY MOUTH DAILY  3    benazepril (LOTENSIN) 20 MG tablet Take 2 tablets by mouth daily 60 tablet 3    metoprolol succinate (TOPROL XL) 25 MG extended release tablet Take 1 tablet by mouth daily 30 tablet 5    flecainide (TAMBOCOR) 100 MG tablet Take 1 tablet by mouth 2 times daily Take 2 pills daily 180 tablet 1    amLODIPine (NORVASC) 10 MG tablet       sildenafil (VIAGRA) 100 MG tablet TAKE 1 TABLET BY MOUTH DAILY  1 HOUR BEFORE  NEEDED  1    calcium carbonate 600 MG TABS tablet Take 1 tablet by mouth daily      fluticasone-vilanterol (BREO ELLIPTA) 100-25 MCG/INH AEPB inhaler Inhale 1 puff into the lungs daily 1 each 5    tamsulosin (FLOMAX) 0.4 MG capsule Take 1 capsule by mouth daily (Patient not taking: Reported on 1/27/2020) 90 capsule 3    POTASSIUM CHLORIDE PO Take by mouth       No current facility-administered medications for this visit. Allergies   Allergen Reactions    Avelox [Moxifloxacin Hcl In Nacl]      thrush    Mobic [Meloxicam] Other (See Comments)    Moxifloxacin     Z-Sean [Azithromycin] Other (See Comments)     Thrush       Review of Systems:   General ROS: negative for - chills, fatigue, fever, malaise, weight gain or weight loss  Respiratory ROS: no cough, shortness of breath, or wheezing  Cardiovascular ROS: no chest pain or dyspnea on exertion  Gastrointestinal ROS: no abdominal pain, change in bowel habits, or black or bloody stools  Genito-Urinary ROS:history bladder cancer  Musculoskeletal ROS: negative for - gait disturbance, joint pain or joint stiffness  Neurological ROS: negative for - behavioral changes, memory loss, numbness/tingling, tremors or weakness    In general patient otherwise reports feeling well. Physical Exam:  BP (!) 110/58 (Site: Left Upper Arm)   Pulse 62   Ht 5' 10\" (1.778 m)   Wt 188 lb 3.2 oz (85.4 kg)   SpO2 93%   BMI 27.00 kg/m²     Gen: Well, NAD, Alert, Oriented x 3   HEENT: EOMI, eyes clear, MMM  Skin: without rash or jaundice  Neck: no significant lymphadenopathy or thyromegaly  Lungs: CTA B w/out Rales/Wheezes/Rhonchi, Good respiratory effort   Heart: RRR, S1S2, w/out M/R/G, non-displaced PMI   Ext: No C/C/E Bilaterally. Neuro: Neurovascularly intact w/ Sensory/Motor intact UE/LE Bilaterally.     Lab Results   Component Value Date    WBC 8.4 12/24/2019    HGB 10.4 (L) 12/24/2019    HCT 32.3 (L) 12/24/2019     12/24/2019    CHOL 149 09/04/2019    TRIG 85 09/04/2019    HDL 45 09/04/2019    ALT 18 12/24/2019    AST 19 12/24/2019     12/24/2019    K 3.5 12/24/2019    CL 98 12/24/2019    CREATININE 0.91 12/24/2019    BUN 18 12/24/2019    CO2 24 12/24/2019    TSH 1.750

## 2020-01-27 NOTE — PROGRESS NOTES
10/1/1979    Smokeless tobacco: Never Used   Substance and Sexual Activity    Alcohol use: Yes     Comment: occasionally    Drug use: No    Sexual activity: None   Lifestyle    Physical activity:     Days per week: None     Minutes per session: None    Stress: None   Relationships    Social connections:     Talks on phone: None     Gets together: None     Attends Synagogue service: None     Active member of club or organization: None     Attends meetings of clubs or organizations: None     Relationship status: None    Intimate partner violence:     Fear of current or ex partner: None     Emotionally abused: None     Physically abused: None     Forced sexual activity: None   Other Topics Concern    None   Social History Narrative    None     Current Outpatient Medications   Medication Sig Dispense Refill    escitalopram (LEXAPRO) 10 MG tablet TAKE ONE TABLET BY MOUTH EVERY DAY 90 tablet 3    rosuvastatin (CRESTOR) 10 MG tablet TAKE ONE TABLET BY MOUTH DAILY  30 tablet 0    BREO ELLIPTA 100-25 MCG/INH AEPB inhaler INHALE 1 PUFF INTO THE LUNGS DAILY  5    XARELTO 20 MG TABS tablet TAKE ONE TABLET BY MOUTH DAILY  3    benazepril (LOTENSIN) 20 MG tablet Take 2 tablets by mouth daily 60 tablet 3    metoprolol succinate (TOPROL XL) 25 MG extended release tablet Take 1 tablet by mouth daily 30 tablet 5    flecainide (TAMBOCOR) 100 MG tablet Take 1 tablet by mouth 2 times daily Take 2 pills daily 180 tablet 1    amLODIPine (NORVASC) 10 MG tablet       sildenafil (VIAGRA) 100 MG tablet TAKE 1 TABLET BY MOUTH DAILY  1 HOUR BEFORE  NEEDED  1    calcium carbonate 600 MG TABS tablet Take 1 tablet by mouth daily      fluticasone-vilanterol (BREO ELLIPTA) 100-25 MCG/INH AEPB inhaler Inhale 1 puff into the lungs daily 1 each 5    tamsulosin (FLOMAX) 0.4 MG capsule Take 1 capsule by mouth daily (Patient not taking: Reported on 1/27/2020) 90 capsule 3    POTASSIUM CHLORIDE PO Take by mouth       No current facility-administered medications for this visit. Allergies   Allergen Reactions    Avelox [Moxifloxacin Hcl In Nacl]      thrush    Mobic [Meloxicam] Other (See Comments)    Moxifloxacin     Z-Sean [Azithromycin] Other (See Comments)     Thrush       Review of Systems:   General ROS: negative for - chills, fatigue, fever, malaise, weight gain or weight loss  Respiratory ROS: no cough, shortness of breath, or wheezing  Cardiovascular ROS: no chest pain or dyspnea on exertion  Gastrointestinal ROS: no abdominal pain, change in bowel habits, or black or bloody stools  Genito-Urinary ROS:history bladder cancer  Musculoskeletal ROS: negative for - gait disturbance, joint pain or joint stiffness  Neurological ROS: negative for - behavioral changes, memory loss, numbness/tingling, tremors or weakness    In general patient otherwise reports feeling well. Physical Exam:  BP (!) 110/58 (Site: Left Upper Arm)   Pulse 62   Ht 5' 10\" (1.778 m)   Wt 188 lb 3.2 oz (85.4 kg)   SpO2 93%   BMI 27.00 kg/m²     Gen: Well, NAD, Alert, Oriented x 3   HEENT: EOMI, eyes clear, MMM  Skin: without rash or jaundice  Neck: no significant lymphadenopathy or thyromegaly  Lungs: CTA B w/out Rales/Wheezes/Rhonchi, Good respiratory effort   Heart: RRR, S1S2, w/out M/R/G, non-displaced PMI   Ext: No C/C/E Bilaterally. Neuro: Neurovascularly intact w/ Sensory/Motor intact UE/LE Bilaterally. Lab Results   Component Value Date    WBC 8.4 12/24/2019    HGB 10.4 (L) 12/24/2019    HCT 32.3 (L) 12/24/2019     12/24/2019    CHOL 149 09/04/2019    TRIG 85 09/04/2019    HDL 45 09/04/2019    ALT 18 12/24/2019    AST 19 12/24/2019     12/24/2019    K 3.5 12/24/2019    CL 98 12/24/2019    CREATININE 0.91 12/24/2019    BUN 18 12/24/2019    CO2 24 12/24/2019    TSH 1.750 04/17/2017    PSA 1.59 08/26/2019    INR 1.1 11/07/2018    GLUF 89 10/11/2012    LABA1C 5.7 01/04/2014         A&P   Diagnosis Orders   1. Leg cramps     2. Claudication ([facility] [address]  US JULIANNE ANGEL REST AND POST TREAD COMPLETE   3. Simple chronic bronchitis ([address]     4. Atrial fibrillation, unspecified type ([address]     5. Essential hypertension     6. S/P AAA repair     7. Spondylosis of lumbar region without myelopathy or radiculopathy     8. Iron deficiency anemia secondary to inadequate dietary iron intake     9. History of bladder cancer     10. High cholesterol     11. Malignant neoplasm of urinary bladder, unspecified site ([facility] [address]     12. Current every day smoker     13.  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

## 2020-01-29 ENCOUNTER — HOSPITAL ENCOUNTER (OUTPATIENT)
Dept: ULTRASOUND IMAGING | Age: 80
Discharge: HOME | End: 2020-01-31
Payer: MEDICARE

## 2020-01-29 PROCEDURE — 93924 LWR XTR VASC STDY BILAT: CPT

## 2020-01-30 NOTE — PROCEDURES
[address]                      [address], [address] [facility]                                VASCULAR REPORT    PATIENT NAME: Joaquin Cleaning                    :        1940  MED REC NO:   [id number]                            ROOM:  ACCOUNT NO:   [de-identified]                           ADMIT DATE: 2020  PROVIDER:     Elba Alvares MD      DATE OF PROCEDURE:  2020    INDICATION:  Intermittent claudication. PVRs at rest demonstrate triphasic pulsation bilaterally except for  distal right dorsalis pedis region where it is biphasic. This is a  borderline finding. Resting JULIANNE right lower extremity 0.97, posterior  tibia 0.69, posterior tibia suggesting at least mild disease. Left ankle brachial index at the level of posterior tibia 0.86, 0.83 at  dorsalis pedis considered mild disease at rest.    POST EXERCISE:  Post exercise shows a significant fall in JULIANNE on the  left lower extremity from resting 0.86 to 0.69 suggesting moderate  disease of the left lower extremity. Level of obstruction is difficult  to assess, would seem likely popliteal or below. Right lower extremity shows slight increase in JULIANNE with exercise, arguing against significant stenotic  disease of right lower extremity. FINAL IMPRESSION:  1. Minimal vascular disease, right lower extremity. 2.  Moderate disease of left lower extremity probably popliteal or below  based on significant decrease in JULIANNE with exercise. Clinical  correlation is advised.         Gagan Rosado MD    D: 2020 17:33:30       T: 2020 22:31:14     [email or web address]  Job#: [id number]     Doc#: [id number]    CC:

## 2020-02-04 ENCOUNTER — TELEPHONE (OUTPATIENT)
Dept: FAMILY MEDICINE CLINIC | Age: 80
End: 2020-02-04

## 2020-02-10 ENCOUNTER — ANESTHESIA EVENT (OUTPATIENT)
Dept: OPERATING ROOM | Age: 80
DRG: 253 | End: 2020-02-10
Payer: MEDICARE

## 2020-02-10 NOTE — ANESTHESIA PRE PROCEDURE
Department of Anesthesiology  Preprocedure Note       Name:  Khushbu Bryant.   Age:  [de-identified] y.o.  :  1940                                          MRN:  [id number]         Date:  2/10/2020      Surgeon: Roly Perez):  Sheree Muñoz MD    Procedure: LEFT LOWER EXTREMITY REVASCULARIZATION  *LMA*  (PAT STAT ON ADMIT) (Left Leg Upper)    Medications prior to admission:   Prior to Admission medications    Medication Sig Start Date End Date Taking? Authorizing Provider   escitalopram (LEXAPRO) 10 MG tablet TAKE ONE TABLET BY MOUTH EVERY DAY 20   Tc Cardoso MD   rosuvastatin (CRESTOR) 10 MG tablet TAKE ONE TABLET BY MOUTH DAILY  20   Tc Cardoso MD   BREO ELLIPTA 100-25 MCG/INH AEPB inhaler INHALE 1 PUFF INTO THE LUNGS DAILY 19   Historical Provider, MD   XARELTO 20 MG TABS tablet TAKE ONE TABLET BY MOUTH DAILY 9/3/19   Historical Provider, MD   sildenafil (VIAGRA) 100 MG tablet TAKE 1 TABLET BY MOUTH DAILY  1 HOUR BEFORE  NEEDED 9/3/19   Historical Provider, MD   calcium carbonate 600 MG TABS tablet Take 1 tablet by mouth daily    Historical Provider, MD   fluticasone-vilanterol (BREO ELLIPTA) 100-25 MCG/INH AEPB inhaler Inhale 1 puff into the lungs daily 10/1/19 11/1/19  Judah Murphy MD   benazepril (LOTENSIN) 20 MG tablet Take 2 tablets by mouth daily 19   Tc Cardoso MD   tamsulosin [facility]) 0.4 MG capsule Take 1 capsule by mouth daily  Patient not taking: Reported on 2020   Dashawn Burgess MD   metoprolol succinate (TOPROL XL) 25 MG extended release tablet Take 1 tablet by mouth daily 19   Tc Cardoso MD   flecainide (TAMBOCOR) 100 MG tablet Take 1 tablet by mouth 2 times daily Take 2 pills daily 10/26/18   Tc Cardoso MD   POTASSIUM CHLORIDE PO Take by mouth    Historical Provider, MD   amLODIPine (NORVASC) 10 MG tablet  16   Historical Provider, MD       Current medications:    No current facility-administered medications for this encounter.       Current C67.9       Past Medical History:        Diagnosis Date    Atrial fibrillation ([facility] [address]     CAD (coronary artery disease)     Hematuria     High cholesterol     History of bladder cancer     Hyperlipidemia     Hypertension     Malignant neoplasm of urinary bladder ([facility] [address] 1/27/2020    S/P AAA repair     Spondylosis of lumbar region without myelopathy or radiculopathy        Past Surgical History:        Procedure Laterality Date    ABDOMINAL AORTIC ANEURYSM REPAIR      HERNIA REPAIR  1998    AR COLON CA SCRN NOT  [address] IND N/A 4/24/2017    COLONOSCOPY performed by Kala Alaniz MD at [facility] ESOPHAGOGASTRODUODENOSCOPY TRANSORAL DIAGNOSTIC N/A 4/24/2017    EGD ESOPHAGOGASTRODUODENOSCOPY performed by Kala Alaniz MD at [facility]       Social History:    Social History     Tobacco Use    Smoking status: Light Tobacco Smoker     Packs/day: 0.33     Years: 30.00     Pack years: 9.90     Types: Cigarettes     Start date: 10/1/1979    Smokeless tobacco: Never Used   Substance Use Topics    Alcohol use: Yes     Comment: occasionally                                Ready to quit: Not Answered  Counseling given: Not Answered      Vital Signs (Current): There were no vitals filed for this visit.                                            BP Readings from Last 3 Encounters:   01/27/20 (!) 110/58   01/22/20 120/60   12/26/19 (!) 118/56       NPO Status:                                                                                 BMI:   Wt Readings from Last 3 Encounters:   01/27/20 188 lb 3.2 oz (85.4 kg)   01/22/20 183 lb (83 kg)   12/26/19 181 lb (82.1 kg)     There is no height or weight on file to calculate BMI.    CBC:   Lab Results   Component Value Date    WBC 8.4 12/24/2019    RBC 4.11 12/24/2019    RBC 4.78 11/01/2011    HGB 10.4 12/24/2019    HCT 32.3 12/24/2019    MCV 78.5 12/24/2019    RDW 16.8 12/24/2019     12/24/2019       CMP:   Lab Results   Component

## 2020-02-11 ENCOUNTER — ANESTHESIA (OUTPATIENT)
Dept: OPERATING ROOM | Age: 80
DRG: 253 | End: 2020-02-11
Payer: MEDICARE

## 2020-02-11 ENCOUNTER — HOSPITAL ENCOUNTER (INPATIENT)
Age: 80
LOS: 1 days | Discharge: HOME | DRG: 253 | End: 2020-02-12
Attending: THORACIC SURGERY (CARDIOTHORACIC VASCULAR SURGERY) | Admitting: THORACIC SURGERY (CARDIOTHORACIC VASCULAR SURGERY)
Payer: MEDICARE

## 2020-02-11 ENCOUNTER — APPOINTMENT (OUTPATIENT)
Dept: GENERAL RADIOLOGY | Age: 80
DRG: 253 | End: 2020-02-11
Attending: THORACIC SURGERY (CARDIOTHORACIC VASCULAR SURGERY)
Payer: MEDICARE

## 2020-02-11 VITALS — TEMPERATURE: 96.3 F | OXYGEN SATURATION: 97 %

## 2020-02-11 PROBLEM — I73.9 PAD (PERIPHERAL ARTERY DISEASE) (HCC): Status: ACTIVE | Noted: 2020-02-11

## 2020-02-11 LAB
ABO/RH: NORMAL
ALBUMIN SERPL-MCNC: 4.2 G/DL (ref 3.5–4.6)
ALP BLD-CCNC: 48 U/L (ref 35–104)
ALT SERPL-CCNC: 12 U/L (ref 0–41)
ANION GAP SERPL CALCULATED.3IONS-SCNC: 11 MEQ/L (ref 9–15)
ANTIBODY SCREEN: NORMAL
AST SERPL-CCNC: 15 U/L (ref 0–40)
BILIRUB SERPL-MCNC: 0.7 MG/DL (ref 0.2–0.7)
BLOOD BANK DISPENSE STATUS: NORMAL
BLOOD BANK DISPENSE STATUS: NORMAL
BLOOD BANK PRODUCT CODE: NORMAL
BLOOD BANK PRODUCT CODE: NORMAL
BPU ID: NORMAL
BPU ID: NORMAL
BUN BLDV-MCNC: 20 MG/DL (ref 8–23)
CALCIUM SERPL-MCNC: 8.9 MG/DL (ref 8.5–9.9)
CHLORIDE BLD-SCNC: 100 MEQ/L (ref 95–107)
CO2: 25 MEQ/L (ref 20–31)
CREAT SERPL-MCNC: 1.1 MG/DL (ref 0.7–1.2)
DESCRIPTION BLOOD BANK: NORMAL
DESCRIPTION BLOOD BANK: NORMAL
GFR AFRICAN AMERICAN: >60
GFR NON-AFRICAN AMERICAN: >60
GLOBULIN: 3.1 G/DL (ref 2.3–3.5)
GLUCOSE BLD-MCNC: 95 MG/DL (ref 70–99)
HCT VFR BLD CALC: 24.7 % (ref 42–52)
HCT VFR BLD CALC: 26.4 % (ref 42–52)
HEMOGLOBIN: 7.3 G/DL (ref 14–18)
HEMOGLOBIN: 8 G/DL (ref 14–18)
MCH RBC QN AUTO: 20.9 PG (ref 27–31.3)
MCHC RBC AUTO-ENTMCNC: 29.7 % (ref 33–37)
MCV RBC AUTO: 70.2 FL (ref 80–100)
PDW BLD-RTO: 19.3 % (ref 11.5–14.5)
PLATELET # BLD: 203 K/UL (ref 130–400)
POTASSIUM SERPL-SCNC: 3.7 MEQ/L (ref 3.4–4.9)
RBC # BLD: 3.52 M/UL (ref 4.7–6.1)
SODIUM BLD-SCNC: 136 MEQ/L (ref 135–144)
TOTAL PROTEIN: 7.3 G/DL (ref 6.3–8)
WBC # BLD: 7.1 K/UL (ref 4.8–10.8)

## 2020-02-11 PROCEDURE — 76000 FLUOROSCOPY <1 HR PHYS/QHP: CPT

## 2020-02-11 PROCEDURE — 80053 COMPREHEN METABOLIC PANEL: CPT

## 2020-02-11 PROCEDURE — 6360000002 HC RX W HCPCS: Performed by: THORACIC SURGERY (CARDIOTHORACIC VASCULAR SURGERY)

## 2020-02-11 PROCEDURE — C1876 STENT, NON-COA/NON-COV W/DEL: HCPCS | Performed by: THORACIC SURGERY (CARDIOTHORACIC VASCULAR SURGERY)

## 2020-02-11 PROCEDURE — 6370000000 HC RX 637 (ALT 250 FOR IP): Performed by: PHYSICIAN ASSISTANT

## 2020-02-11 PROCEDURE — 2709999900 HC NON-CHARGEABLE SUPPLY: Performed by: THORACIC SURGERY (CARDIOTHORACIC VASCULAR SURGERY)

## 2020-02-11 PROCEDURE — 047N3ZZ DILATION OF LEFT POPLITEAL ARTERY, PERCUTANEOUS APPROACH: ICD-10-PCS | Performed by: THORACIC SURGERY (CARDIOTHORACIC VASCULAR SURGERY)

## 2020-02-11 PROCEDURE — 3700000001 HC ADD 15 MINUTES (ANESTHESIA): Performed by: THORACIC SURGERY (CARDIOTHORACIC VASCULAR SURGERY)

## 2020-02-11 PROCEDURE — 3700000000 HC ANESTHESIA ATTENDED CARE: Performed by: THORACIC SURGERY (CARDIOTHORACIC VASCULAR SURGERY)

## 2020-02-11 PROCEDURE — 6360000004 HC RX CONTRAST MEDICATION: Performed by: THORACIC SURGERY (CARDIOTHORACIC VASCULAR SURGERY)

## 2020-02-11 PROCEDURE — 6360000002 HC RX W HCPCS: Performed by: NURSE ANESTHETIST, CERTIFIED REGISTERED

## 2020-02-11 PROCEDURE — C1887 CATHETER, GUIDING: HCPCS | Performed by: THORACIC SURGERY (CARDIOTHORACIC VASCULAR SURGERY)

## 2020-02-11 PROCEDURE — C1725 CATH, TRANSLUMIN NON-LASER: HCPCS | Performed by: THORACIC SURGERY (CARDIOTHORACIC VASCULAR SURGERY)

## 2020-02-11 PROCEDURE — 2580000003 HC RX 258: Performed by: ANESTHESIOLOGY

## 2020-02-11 PROCEDURE — 6370000000 HC RX 637 (ALT 250 FOR IP): Performed by: INTERNAL MEDICINE

## 2020-02-11 PROCEDURE — 047U3DZ DILATION OF LEFT PERONEAL ARTERY WITH INTRALUMINAL DEVICE, PERCUTANEOUS APPROACH: ICD-10-PCS | Performed by: THORACIC SURGERY (CARDIOTHORACIC VASCULAR SURGERY)

## 2020-02-11 PROCEDURE — 85014 HEMATOCRIT: CPT

## 2020-02-11 PROCEDURE — 2580000003 HC RX 258: Performed by: INTERNAL MEDICINE

## 2020-02-11 PROCEDURE — 2000000000 HC ICU R&B

## 2020-02-11 PROCEDURE — 3600000005 HC SURGERY LEVEL 5 BASE: Performed by: THORACIC SURGERY (CARDIOTHORACIC VASCULAR SURGERY)

## 2020-02-11 PROCEDURE — 86850 RBC ANTIBODY SCREEN: CPT

## 2020-02-11 PROCEDURE — 86901 BLOOD TYPING SEROLOGIC RH(D): CPT

## 2020-02-11 PROCEDURE — B41G1ZZ FLUOROSCOPY OF LEFT LOWER EXTREMITY ARTERIES USING LOW OSMOLAR CONTRAST: ICD-10-PCS | Performed by: THORACIC SURGERY (CARDIOTHORACIC VASCULAR SURGERY)

## 2020-02-11 PROCEDURE — 85027 COMPLETE CBC AUTOMATED: CPT

## 2020-02-11 PROCEDURE — 047L3DZ DILATION OF LEFT FEMORAL ARTERY WITH INTRALUMINAL DEVICE, PERCUTANEOUS APPROACH: ICD-10-PCS | Performed by: THORACIC SURGERY (CARDIOTHORACIC VASCULAR SURGERY)

## 2020-02-11 PROCEDURE — 2580000003 HC RX 258: Performed by: THORACIC SURGERY (CARDIOTHORACIC VASCULAR SURGERY)

## 2020-02-11 PROCEDURE — 86923 COMPATIBILITY TEST ELECTRIC: CPT

## 2020-02-11 PROCEDURE — 6370000000 HC RX 637 (ALT 250 FOR IP): Performed by: THORACIC SURGERY (CARDIOTHORACIC VASCULAR SURGERY)

## 2020-02-11 PROCEDURE — 2500000003 HC RX 250 WO HCPCS: Performed by: NURSE ANESTHETIST, CERTIFIED REGISTERED

## 2020-02-11 PROCEDURE — 86900 BLOOD TYPING SEROLOGIC ABO: CPT

## 2020-02-11 PROCEDURE — 85018 HEMOGLOBIN: CPT

## 2020-02-11 PROCEDURE — 94640 AIRWAY INHALATION TREATMENT: CPT

## 2020-02-11 PROCEDURE — 6370000000 HC RX 637 (ALT 250 FOR IP)

## 2020-02-11 PROCEDURE — 2580000003 HC RX 258: Performed by: NURSE PRACTITIONER

## 2020-02-11 PROCEDURE — 3600000015 HC SURGERY LEVEL 5 ADDTL 15MIN: Performed by: THORACIC SURGERY (CARDIOTHORACIC VASCULAR SURGERY)

## 2020-02-11 PROCEDURE — C1894 INTRO/SHEATH, NON-LASER: HCPCS | Performed by: THORACIC SURGERY (CARDIOTHORACIC VASCULAR SURGERY)

## 2020-02-11 PROCEDURE — 6360000002 HC RX W HCPCS: Performed by: INTERNAL MEDICINE

## 2020-02-11 PROCEDURE — C1769 GUIDE WIRE: HCPCS | Performed by: THORACIC SURGERY (CARDIOTHORACIC VASCULAR SURGERY)

## 2020-02-11 PROCEDURE — P9016 RBC LEUKOCYTES REDUCED: HCPCS

## 2020-02-11 PROCEDURE — 2580000003 HC RX 258: Performed by: NURSE ANESTHETIST, CERTIFIED REGISTERED

## 2020-02-11 DEVICE — SELF-EXPANDING STENT SYSTEM
Type: IMPLANTABLE DEVICE | Site: LEG | Status: FUNCTIONAL
Brand: INNOVA™ VASCULAR

## 2020-02-11 DEVICE — IMPLANTABLE DEVICE: Type: IMPLANTABLE DEVICE | Site: LEG | Status: FUNCTIONAL

## 2020-02-11 RX ORDER — MEPERIDINE HYDROCHLORIDE 25 MG/ML
12.5 INJECTION INTRAMUSCULAR; INTRAVENOUS; SUBCUTANEOUS EVERY 5 MIN PRN
Status: DISCONTINUED | OUTPATIENT
Start: 2020-02-11 | End: 2020-02-11 | Stop reason: HOSPADM

## 2020-02-11 RX ORDER — HYDROCODONE BITARTRATE AND ACETAMINOPHEN 5; 325 MG/1; MG/1
2 TABLET ORAL PRN
Status: DISCONTINUED | OUTPATIENT
Start: 2020-02-11 | End: 2020-02-11 | Stop reason: HOSPADM

## 2020-02-11 RX ORDER — TRAMADOL HYDROCHLORIDE 50 MG/1
50 TABLET ORAL EVERY 6 HOURS PRN
Status: DISCONTINUED | OUTPATIENT
Start: 2020-02-11 | End: 2020-02-12 | Stop reason: HOSPADM

## 2020-02-11 RX ORDER — ONDANSETRON 2 MG/ML
4 INJECTION INTRAMUSCULAR; INTRAVENOUS
Status: DISCONTINUED | OUTPATIENT
Start: 2020-02-11 | End: 2020-02-11 | Stop reason: HOSPADM

## 2020-02-11 RX ORDER — CALCIUM CARBONATE 500(1250)
1 TABLET ORAL DAILY
Status: DISCONTINUED | OUTPATIENT
Start: 2020-02-11 | End: 2020-02-12 | Stop reason: HOSPADM

## 2020-02-11 RX ORDER — TAMSULOSIN HYDROCHLORIDE 0.4 MG/1
0.4 CAPSULE ORAL DAILY
Status: DISCONTINUED | OUTPATIENT
Start: 2020-02-11 | End: 2020-02-12 | Stop reason: HOSPADM

## 2020-02-11 RX ORDER — ACETAMINOPHEN 325 MG/1
650 TABLET ORAL EVERY 4 HOURS PRN
Status: DISCONTINUED | OUTPATIENT
Start: 2020-02-11 | End: 2020-02-12 | Stop reason: HOSPADM

## 2020-02-11 RX ORDER — METOPROLOL SUCCINATE 25 MG/1
25 TABLET, EXTENDED RELEASE ORAL DAILY
Status: DISCONTINUED | OUTPATIENT
Start: 2020-02-11 | End: 2020-02-12 | Stop reason: HOSPADM

## 2020-02-11 RX ORDER — FLECAINIDE ACETATE 50 MG/1
100 TABLET ORAL 2 TIMES DAILY
Status: DISCONTINUED | OUTPATIENT
Start: 2020-02-11 | End: 2020-02-12 | Stop reason: HOSPADM

## 2020-02-11 RX ORDER — SODIUM CHLORIDE 9 MG/ML
INJECTION, SOLUTION INTRAVENOUS CONTINUOUS PRN
Status: DISCONTINUED | OUTPATIENT
Start: 2020-02-11 | End: 2020-02-11 | Stop reason: SDUPTHER

## 2020-02-11 RX ORDER — HYDROCODONE BITARTRATE AND ACETAMINOPHEN 5; 325 MG/1; MG/1
1 TABLET ORAL PRN
Status: DISCONTINUED | OUTPATIENT
Start: 2020-02-11 | End: 2020-02-11 | Stop reason: HOSPADM

## 2020-02-11 RX ORDER — CEFAZOLIN SODIUM 2 G/50ML
2 SOLUTION INTRAVENOUS
Status: COMPLETED | OUTPATIENT
Start: 2020-02-11 | End: 2020-02-11

## 2020-02-11 RX ORDER — SODIUM CHLORIDE, SODIUM LACTATE, POTASSIUM CHLORIDE, CALCIUM CHLORIDE 600; 310; 30; 20 MG/100ML; MG/100ML; MG/100ML; MG/100ML
INJECTION, SOLUTION INTRAVENOUS CONTINUOUS
Status: DISCONTINUED | OUTPATIENT
Start: 2020-02-11 | End: 2020-02-11

## 2020-02-11 RX ORDER — POTASSIUM CHLORIDE 20 MEQ/1
20 TABLET, EXTENDED RELEASE ORAL ONCE
Status: COMPLETED | OUTPATIENT
Start: 2020-02-11 | End: 2020-02-11

## 2020-02-11 RX ORDER — SODIUM CHLORIDE 0.9 % (FLUSH) 0.9 %
10 SYRINGE (ML) INJECTION PRN
Status: DISCONTINUED | OUTPATIENT
Start: 2020-02-11 | End: 2020-02-11 | Stop reason: HOSPADM

## 2020-02-11 RX ORDER — ESCITALOPRAM OXALATE 10 MG/1
10 TABLET ORAL DAILY
Status: DISCONTINUED | OUTPATIENT
Start: 2020-02-11 | End: 2020-02-12 | Stop reason: HOSPADM

## 2020-02-11 RX ORDER — ROSUVASTATIN CALCIUM 5 MG/1
10 TABLET, COATED ORAL NIGHTLY
Status: DISCONTINUED | OUTPATIENT
Start: 2020-02-11 | End: 2020-02-12 | Stop reason: HOSPADM

## 2020-02-11 RX ORDER — LIDOCAINE HYDROCHLORIDE 10 MG/ML
1 INJECTION, SOLUTION EPIDURAL; INFILTRATION; INTRACAUDAL; PERINEURAL
Status: DISCONTINUED | OUTPATIENT
Start: 2020-02-11 | End: 2020-02-11 | Stop reason: HOSPADM

## 2020-02-11 RX ORDER — PROPOFOL 10 MG/ML
INJECTION, EMULSION INTRAVENOUS PRN
Status: DISCONTINUED | OUTPATIENT
Start: 2020-02-11 | End: 2020-02-11 | Stop reason: SDUPTHER

## 2020-02-11 RX ORDER — 0.9 % SODIUM CHLORIDE 0.9 %
500 INTRAVENOUS SOLUTION INTRAVENOUS ONCE
Status: DISCONTINUED | OUTPATIENT
Start: 2020-02-11 | End: 2020-02-12 | Stop reason: HOSPADM

## 2020-02-11 RX ORDER — FENTANYL CITRATE 50 UG/ML
50 INJECTION, SOLUTION INTRAMUSCULAR; INTRAVENOUS EVERY 10 MIN PRN
Status: DISCONTINUED | OUTPATIENT
Start: 2020-02-11 | End: 2020-02-11 | Stop reason: HOSPADM

## 2020-02-11 RX ORDER — 0.9 % SODIUM CHLORIDE 0.9 %
20 INTRAVENOUS SOLUTION INTRAVENOUS ONCE
Status: COMPLETED | OUTPATIENT
Start: 2020-02-11 | End: 2020-02-11

## 2020-02-11 RX ORDER — ONDANSETRON 2 MG/ML
INJECTION INTRAMUSCULAR; INTRAVENOUS PRN
Status: DISCONTINUED | OUTPATIENT
Start: 2020-02-11 | End: 2020-02-11 | Stop reason: SDUPTHER

## 2020-02-11 RX ORDER — METOCLOPRAMIDE HYDROCHLORIDE 5 MG/ML
10 INJECTION INTRAMUSCULAR; INTRAVENOUS
Status: DISCONTINUED | OUTPATIENT
Start: 2020-02-11 | End: 2020-02-11 | Stop reason: HOSPADM

## 2020-02-11 RX ORDER — LISINOPRIL 5 MG/1
5 TABLET ORAL DAILY
Status: DISCONTINUED | OUTPATIENT
Start: 2020-02-11 | End: 2020-02-12 | Stop reason: HOSPADM

## 2020-02-11 RX ORDER — BUDESONIDE AND FORMOTEROL FUMARATE DIHYDRATE 80; 4.5 UG/1; UG/1
2 AEROSOL RESPIRATORY (INHALATION) 2 TIMES DAILY
Status: DISCONTINUED | OUTPATIENT
Start: 2020-02-11 | End: 2020-02-11 | Stop reason: SDUPTHER

## 2020-02-11 RX ORDER — DIPHENHYDRAMINE HYDROCHLORIDE 50 MG/ML
12.5 INJECTION INTRAMUSCULAR; INTRAVENOUS
Status: DISCONTINUED | OUTPATIENT
Start: 2020-02-11 | End: 2020-02-11 | Stop reason: HOSPADM

## 2020-02-11 RX ORDER — SODIUM CHLORIDE 9 MG/ML
INJECTION, SOLUTION INTRAVENOUS
Status: DISPENSED
Start: 2020-02-11 | End: 2020-02-11

## 2020-02-11 RX ORDER — FENTANYL CITRATE 50 UG/ML
INJECTION, SOLUTION INTRAMUSCULAR; INTRAVENOUS PRN
Status: DISCONTINUED | OUTPATIENT
Start: 2020-02-11 | End: 2020-02-11 | Stop reason: SDUPTHER

## 2020-02-11 RX ORDER — SODIUM CHLORIDE, SODIUM LACTATE, POTASSIUM CHLORIDE, CALCIUM CHLORIDE 600; 310; 30; 20 MG/100ML; MG/100ML; MG/100ML; MG/100ML
INJECTION, SOLUTION INTRAVENOUS CONTINUOUS
Status: DISPENSED | OUTPATIENT
Start: 2020-02-11 | End: 2020-02-11

## 2020-02-11 RX ORDER — LIDOCAINE HYDROCHLORIDE 20 MG/ML
INJECTION, SOLUTION INTRAVENOUS PRN
Status: DISCONTINUED | OUTPATIENT
Start: 2020-02-11 | End: 2020-02-11 | Stop reason: SDUPTHER

## 2020-02-11 RX ORDER — SODIUM CHLORIDE 0.9 % (FLUSH) 0.9 %
10 SYRINGE (ML) INJECTION EVERY 12 HOURS SCHEDULED
Status: DISCONTINUED | OUTPATIENT
Start: 2020-02-11 | End: 2020-02-11 | Stop reason: HOSPADM

## 2020-02-11 RX ORDER — GLYCOPYRROLATE 1 MG/5 ML
SYRINGE (ML) INTRAVENOUS PRN
Status: DISCONTINUED | OUTPATIENT
Start: 2020-02-11 | End: 2020-02-11 | Stop reason: SDUPTHER

## 2020-02-11 RX ORDER — SODIUM CHLORIDE 0.9 % (FLUSH) 0.9 %
10 SYRINGE (ML) INJECTION EVERY 12 HOURS SCHEDULED
Status: DISCONTINUED | OUTPATIENT
Start: 2020-02-11 | End: 2020-02-12 | Stop reason: HOSPADM

## 2020-02-11 RX ORDER — SODIUM CHLORIDE 0.9 % (FLUSH) 0.9 %
10 SYRINGE (ML) INJECTION PRN
Status: DISCONTINUED | OUTPATIENT
Start: 2020-02-11 | End: 2020-02-12 | Stop reason: HOSPADM

## 2020-02-11 RX ORDER — MAGNESIUM HYDROXIDE 1200 MG/15ML
LIQUID ORAL CONTINUOUS PRN
Status: COMPLETED | OUTPATIENT
Start: 2020-02-11 | End: 2020-02-11

## 2020-02-11 RX ORDER — AMLODIPINE BESYLATE 10 MG/1
10 TABLET ORAL DAILY
Status: DISCONTINUED | OUTPATIENT
Start: 2020-02-11 | End: 2020-02-12 | Stop reason: HOSPADM

## 2020-02-11 RX ORDER — BUDESONIDE AND FORMOTEROL FUMARATE DIHYDRATE 80; 4.5 UG/1; UG/1
2 AEROSOL RESPIRATORY (INHALATION) 2 TIMES DAILY
Status: DISCONTINUED | OUTPATIENT
Start: 2020-02-11 | End: 2020-02-12 | Stop reason: HOSPADM

## 2020-02-11 RX ORDER — ONDANSETRON 2 MG/ML
4 INJECTION INTRAMUSCULAR; INTRAVENOUS EVERY 6 HOURS PRN
Status: DISCONTINUED | OUTPATIENT
Start: 2020-02-11 | End: 2020-02-12 | Stop reason: HOSPADM

## 2020-02-11 RX ADMIN — LIDOCAINE HYDROCHLORIDE 60 MG: 20 INJECTION, SOLUTION INTRAVENOUS at 08:22

## 2020-02-11 RX ADMIN — ESCITALOPRAM OXALATE 10 MG: 10 TABLET ORAL at 16:36

## 2020-02-11 RX ADMIN — POTASSIUM CHLORIDE 20 MEQ: 20 TABLET, EXTENDED RELEASE ORAL at 16:35

## 2020-02-11 RX ADMIN — Medication 0.2 MG: at 08:28

## 2020-02-11 RX ADMIN — SODIUM CHLORIDE, POTASSIUM CHLORIDE, SODIUM LACTATE AND CALCIUM CHLORIDE: 600; 310; 30; 20 INJECTION, SOLUTION INTRAVENOUS at 06:40

## 2020-02-11 RX ADMIN — ROSUVASTATIN CALCIUM 10 MG: 5 TABLET, COATED ORAL at 22:20

## 2020-02-11 RX ADMIN — CALCIUM 500 MG: 500 TABLET ORAL at 16:35

## 2020-02-11 RX ADMIN — FLECAINIDE ACETATE 100 MG: 50 TABLET ORAL at 22:20

## 2020-02-11 RX ADMIN — HYDROMORPHONE HYDROCHLORIDE 1 MG: 1 INJECTION, SOLUTION INTRAMUSCULAR; INTRAVENOUS; SUBCUTANEOUS at 13:54

## 2020-02-11 RX ADMIN — PHENYLEPHRINE HYDROCHLORIDE 25 MCG/MIN: 10 INJECTION INTRAVENOUS at 08:28

## 2020-02-11 RX ADMIN — Medication 10 ML: at 22:26

## 2020-02-11 RX ADMIN — PROPOFOL 150 MG: 10 INJECTION, EMULSION INTRAVENOUS at 08:22

## 2020-02-11 RX ADMIN — AMLODIPINE BESYLATE 10 MG: 10 TABLET ORAL at 16:35

## 2020-02-11 RX ADMIN — Medication 0.2 MG: at 10:39

## 2020-02-11 RX ADMIN — Medication 10 ML: at 12:01

## 2020-02-11 RX ADMIN — SODIUM CHLORIDE, POTASSIUM CHLORIDE, SODIUM LACTATE AND CALCIUM CHLORIDE: 600; 310; 30; 20 INJECTION, SOLUTION INTRAVENOUS at 12:01

## 2020-02-11 RX ADMIN — CEFAZOLIN SODIUM 2 G: 2 SOLUTION INTRAVENOUS at 08:26

## 2020-02-11 RX ADMIN — SODIUM CHLORIDE, POTASSIUM CHLORIDE, SODIUM LACTATE AND CALCIUM CHLORIDE: 600; 310; 30; 20 INJECTION, SOLUTION INTRAVENOUS at 06:38

## 2020-02-11 RX ADMIN — ONDANSETRON 4 MG: 2 INJECTION INTRAMUSCULAR; INTRAVENOUS at 10:56

## 2020-02-11 RX ADMIN — IRON SUCROSE 200 MG: 20 INJECTION, SOLUTION INTRAVENOUS at 16:25

## 2020-02-11 RX ADMIN — Medication 10 ML: at 09:00

## 2020-02-11 RX ADMIN — TAMSULOSIN HYDROCHLORIDE 0.4 MG: 0.4 CAPSULE ORAL at 16:35

## 2020-02-11 RX ADMIN — BUDESONIDE AND FORMOTEROL FUMARATE DIHYDRATE 2 PUFF: 80; 4.5 AEROSOL RESPIRATORY (INHALATION) at 21:07

## 2020-02-11 RX ADMIN — SODIUM CHLORIDE 20 ML: 9 INJECTION, SOLUTION INTRAVENOUS at 08:00

## 2020-02-11 RX ADMIN — FENTANYL CITRATE 25 MCG: 50 INJECTION, SOLUTION INTRAMUSCULAR; INTRAVENOUS at 09:00

## 2020-02-11 RX ADMIN — SODIUM CHLORIDE: 9 INJECTION, SOLUTION INTRAVENOUS at 08:34

## 2020-02-11 RX ADMIN — SODIUM CHLORIDE, POTASSIUM CHLORIDE, SODIUM LACTATE AND CALCIUM CHLORIDE: 600; 310; 30; 20 INJECTION, SOLUTION INTRAVENOUS at 11:12

## 2020-02-11 ASSESSMENT — PAIN - FUNCTIONAL ASSESSMENT
PAIN_FUNCTIONAL_ASSESSMENT: PREVENTS OR INTERFERES SOME ACTIVE ACTIVITIES AND ADLS

## 2020-02-11 ASSESSMENT — PAIN DESCRIPTION - PROGRESSION
CLINICAL_PROGRESSION: RESOLVED
CLINICAL_PROGRESSION: NOT CHANGED
CLINICAL_PROGRESSION: RESOLVED
CLINICAL_PROGRESSION: RESOLVED
CLINICAL_PROGRESSION: NOT CHANGED
CLINICAL_PROGRESSION: NOT CHANGED
CLINICAL_PROGRESSION: RESOLVED

## 2020-02-11 ASSESSMENT — PULMONARY FUNCTION TESTS
PIF_VALUE: 15
PIF_VALUE: 14
PIF_VALUE: 14
PIF_VALUE: 3
PIF_VALUE: 14
PIF_VALUE: 3
PIF_VALUE: 14
PIF_VALUE: 14
PIF_VALUE: 15
PIF_VALUE: 14
PIF_VALUE: 15
PIF_VALUE: 14
PIF_VALUE: 3
PIF_VALUE: 6
PIF_VALUE: 14
PIF_VALUE: 15
PIF_VALUE: 3
PIF_VALUE: 15
PIF_VALUE: 12
PIF_VALUE: 15
PIF_VALUE: 3
PIF_VALUE: 15
PIF_VALUE: 15
PIF_VALUE: 14
PIF_VALUE: 3
PIF_VALUE: 3
PIF_VALUE: 14
PIF_VALUE: 14
PIF_VALUE: 15
PIF_VALUE: 13
PIF_VALUE: 14
PIF_VALUE: 15
PIF_VALUE: 14
PIF_VALUE: 15
PIF_VALUE: 14
PIF_VALUE: 14
PIF_VALUE: 15
PIF_VALUE: 2
PIF_VALUE: 3
PIF_VALUE: 14
PIF_VALUE: 14
PIF_VALUE: 15
PIF_VALUE: 15
PIF_VALUE: 14
PIF_VALUE: 3
PIF_VALUE: 3
PIF_VALUE: 14
PIF_VALUE: 15
PIF_VALUE: 14
PIF_VALUE: 15
PIF_VALUE: 14
PIF_VALUE: 3
PIF_VALUE: 14
PIF_VALUE: 14
PIF_VALUE: 3
PIF_VALUE: 15
PIF_VALUE: 14
PIF_VALUE: 12
PIF_VALUE: 3
PIF_VALUE: 14
PIF_VALUE: 1
PIF_VALUE: 15
PIF_VALUE: 15
PIF_VALUE: 2
PIF_VALUE: 15
PIF_VALUE: 14
PIF_VALUE: 1
PIF_VALUE: 15
PIF_VALUE: 3
PIF_VALUE: 1
PIF_VALUE: 15
PIF_VALUE: 7
PIF_VALUE: 14
PIF_VALUE: 15
PIF_VALUE: 15
PIF_VALUE: 14
PIF_VALUE: 2
PIF_VALUE: 3
PIF_VALUE: 15
PIF_VALUE: 3
PIF_VALUE: 15
PIF_VALUE: 14
PIF_VALUE: 15
PIF_VALUE: 3
PIF_VALUE: 14
PIF_VALUE: 15
PIF_VALUE: 3
PIF_VALUE: 14
PIF_VALUE: 3
PIF_VALUE: 3
PIF_VALUE: 15
PIF_VALUE: 2
PIF_VALUE: 15
PIF_VALUE: 15
PIF_VALUE: 3
PIF_VALUE: 14
PIF_VALUE: 2
PIF_VALUE: 14
PIF_VALUE: 15
PIF_VALUE: 14
PIF_VALUE: 14
PIF_VALUE: 2
PIF_VALUE: 3
PIF_VALUE: 3
PIF_VALUE: 22
PIF_VALUE: 1
PIF_VALUE: 15
PIF_VALUE: 14
PIF_VALUE: 14
PIF_VALUE: 3
PIF_VALUE: 15
PIF_VALUE: 14
PIF_VALUE: 15
PIF_VALUE: 2
PIF_VALUE: 4
PIF_VALUE: 15
PIF_VALUE: 5
PIF_VALUE: 15
PIF_VALUE: 14
PIF_VALUE: 15
PIF_VALUE: 14
PIF_VALUE: 15
PIF_VALUE: 2
PIF_VALUE: 14
PIF_VALUE: 1
PIF_VALUE: 14
PIF_VALUE: 1
PIF_VALUE: 3
PIF_VALUE: 14
PIF_VALUE: 3
PIF_VALUE: 14
PIF_VALUE: 14
PIF_VALUE: 15
PIF_VALUE: 15
PIF_VALUE: 1
PIF_VALUE: 14
PIF_VALUE: 6
PIF_VALUE: 14
PIF_VALUE: 3
PIF_VALUE: 15
PIF_VALUE: 3
PIF_VALUE: 0
PIF_VALUE: 14
PIF_VALUE: 14
PIF_VALUE: 15
PIF_VALUE: 14
PIF_VALUE: 3
PIF_VALUE: 3
PIF_VALUE: 15
PIF_VALUE: 15
PIF_VALUE: 3
PIF_VALUE: 14
PIF_VALUE: 15
PIF_VALUE: 15
PIF_VALUE: 14
PIF_VALUE: 15
PIF_VALUE: 3
PIF_VALUE: 14
PIF_VALUE: 15
PIF_VALUE: 14
PIF_VALUE: 14
PIF_VALUE: 15

## 2020-02-11 ASSESSMENT — PAIN DESCRIPTION - LOCATION
LOCATION: BACK;SHOULDER

## 2020-02-11 ASSESSMENT — PAIN DESCRIPTION - PAIN TYPE
TYPE: ACUTE PAIN

## 2020-02-11 ASSESSMENT — PAIN SCALES - GENERAL
PAINLEVEL_OUTOF10: 6
PAINLEVEL_OUTOF10: 0
PAINLEVEL_OUTOF10: 0
PAINLEVEL_OUTOF10: 7
PAINLEVEL_OUTOF10: 0
PAINLEVEL_OUTOF10: 7
PAINLEVEL_OUTOF10: 0
PAINLEVEL_OUTOF10: 0
PAINLEVEL_OUTOF10: 7

## 2020-02-11 ASSESSMENT — PAIN DESCRIPTION - ONSET
ONSET: ON-GOING

## 2020-02-11 ASSESSMENT — PAIN DESCRIPTION - ORIENTATION
ORIENTATION: RIGHT;LOWER
ORIENTATION: RIGHT;LOWER
ORIENTATION: RIGHT

## 2020-02-11 ASSESSMENT — PAIN DESCRIPTION - DESCRIPTORS
DESCRIPTORS: SORE;DISCOMFORT

## 2020-02-11 ASSESSMENT — PAIN DESCRIPTION - FREQUENCY
FREQUENCY: INTERMITTENT

## 2020-02-11 NOTE — PROGRESS NOTES
I explained to the patient's wife that 2 units PCs were given to the patient before surgery and I was unaware of this. I explained that 2 units were ordered by the anesthesiologist but he did not give the order to transfuse. I apologized for the confusion and I still will request a hematology consult since there is an unexplained drop from 12/19 to 02/20.

## 2020-02-11 NOTE — ONCOLOGY
Hematology/Oncology Consult  Encounter Date: 2020 2:24 PM    Mr. Rachel Reid. is a [de-identified] y.o. male  : 1940  MRN: [id number]  Acct Number: [de-identified]  Requesting Provider: Davion Car MD    Reason for request: anemia      CONSULTANT: Annamarie Hamman    HPI: The patient was admitted for left leg claudication symptoms and required re-vascularization per Dr Tatianna Menendez. Hgb 15 to 7 since 2019. Received blood transfusion. Patient Active Problem List   Diagnosis    Hypertension    Hyperlipidemia    History of bladder cancer    High cholesterol    Hematuria    Atrial fibrillation (HCC)    S/P AAA repair    Spondylosis of lumbar region without myelopathy or radiculopathy    Iron deficiency anemia secondary to inadequate dietary iron intake    Fitting and adjustment of orthopedic device    Simple chronic bronchitis ([address]    Malignant neoplasm of urinary bladder ([address]    PAD (peripheral artery disease) ([address]     Past Medical History:   Diagnosis Date    Atrial fibrillation ([address]     CAD (coronary artery disease)     Hematuria     High cholesterol     History of bladder cancer     Hyperlipidemia     Hypertension     Malignant neoplasm of urinary bladder ([address] 2020    S/P AAA repair     Spondylosis of lumbar region without myelopathy or radiculopathy      @[facility]@  No family history on file.   Social History     Socioeconomic History    Marital status:      Spouse name: Not on file    Number of children: Not on file    Years of education: Not on file    Highest education level: Not on file   Occupational History    Not on file   Social Needs    Financial resource strain: Not on file    Food insecurity:     Worry: Not on file     Inability: Not on file    Transportation needs:     Medical: Not on file     Non-medical: Not on file   Tobacco Use    Smoking status: Light Tobacco Smoker     Packs/day: 0.33     Years: 30.00     Pack years: 9.90     Types: Cigarettes - 9.9 mg/dL    Total Protein 7.3 6.3 - 8.0 g/dL    Alb 4.2 3.5 - 4.6 g/dL    Total Bilirubin 0.7 0.2 - 0.7 mg/dL    Alkaline Phosphatase 48 35 - 104 U/L    ALT 12 0 - 41 U/L    AST 15 0 - 40 U/L    Globulin 3.1 2.3 - 3.5 g/dL   TYPE AND SCREEN    Collection Time: 02/11/20  6:44 AM   Result Value Ref Range    ABO/Rh B POS     Antibody Screen NEG    PREPARE RBC (CROSSMATCH), 2 Units    Collection Time: 02/11/20  6:44 AM   Result Value Ref Range    Product Code Blood Bank H1552N52     Description Blood Bank Red Blood Cells, Leuko-reduced     Unit Number [id number]     Dispense Status Blood Bank issued     Product Code Blood Bank [id number]     Description Blood Bank Red Blood Cells, Leuko-reduced     Unit Number [id number]     Dispense Status Blood Bank issued    CBC    Collection Time: 02/11/20  6:52 AM   Result Value Ref Range    WBC 7.1 4.8 - 10.8 K/uL    RBC 3.52 (L) 4.70 - 6.10 M/uL    Hemoglobin 7.3 (L) 14.0 - 18.0 g/dL    Hematocrit 24.7 (L) 42.0 - 52.0 %    MCV 70.2 (L) 80.0 - 100.0 fL    MCH 20.9 (L) 27.0 - 31.3 pg    MCHC 29.7 (L) 33.0 - 37.0 %    RDW 19.3 (H) 11.5 - 14.5 %    Platelets 739 234 - 822 K/uL     Recent Labs     02/11/20  0643   GLUCOSE 95      RADIOLOGY RESULTS:  No results found. ASSESSMENT AND PLAN:  Patient has moderate microcytic anemia new since early 2019. Iron studies previously suggested iron deficiency anemia. I suspect his anemia contributed to claudication symptoms. Anemia probably progressed from DOAC use and he states occasional bright red blood in stool. I agree with blood transfusion and I added Venofer x 2 doses. Ok to continue Xarelto if indicated. Follow in my office for more IV iron. Eventually refer to GI.     Electronically signed by Eliazar Arriaza DO on 2/11/2020 at 2:24 PM

## 2020-02-11 NOTE — ANESTHESIA POSTPROCEDURE EVALUATION
Department of Anesthesiology  Postprocedure Note    Patient: Dipti Sanchez MRN: [id number]  YOB: 1940  Date of evaluation: 2/11/2020  Time:  11:42 AM     Procedure Summary     Date:  02/11/20 Room / Location:  [address] [place] [address]    Anesthesia Start:  6097 Anesthesia Stop:      Procedure:  LEFT LOWER EXTREMITY REVASCULARIZATION (Left Leg Upper) Diagnosis:  (OCCLUSIVE DISEASE)    Surgeon:  Evangeline Klinefelter, MD Responsible Provider:  Ani Magallon MD    Anesthesia Type:  general ASA Status:  3          Anesthesia Type: general    Shay Phase I:      Shay Phase II:      Last vitals: Reviewed and per EMR flowsheets.        Anesthesia Post Evaluation    Patient location during evaluation: ICU  Patient participation: complete - patient participated  Level of consciousness: awake and awake and alert  Pain score: 0  Airway patency: patent  Nausea & Vomiting: no nausea and no vomiting  Complications: no  Cardiovascular status: blood pressure returned to baseline and hemodynamically stable  Respiratory status: acceptable, face mask and spontaneous ventilation  Hydration status: euvolemic

## 2020-02-11 NOTE — H&P
[address]                      [address], [address] [facility]                              HISTORY AND PHYSICAL    PATIENT NAME: Karly Jorge                 :        1940  MED REC NO:   [id number]                            ROOM:  ACCOUNT NO:   [de-identified]                           ADMIT DATE: 2020  PROVIDER:     Fran Putnam MD    DATE OF SURGERY:  2020    REASON FOR SURGERY:  Left lower extremity incapacitating claudication in  an [de-identified]year-old gentleman. HISTORY OF PRESENT ILLNESS:  He was seen in the office just yesterday  complaining of not more than 50 yards of ambulatory difficulty plus  significant issues with stair climbing. The patient has said that this  has been going on for a couple of months or more, and ultimately saw Dr. Olena Villalobos who referred the patient to me and now will undergo this  procedure. In the office yesterday, we performed a left lower extremity  arterial duplex, and there were significant disease findings showing a  drop off of flow from the left common femoral artery into the monophasic  waveforms seen in the proximal SFA; and then more distally in the  junction between the left mid to distal SFA, there is a 1-cm segment of  soft plaque and a peak systolic velocity signaling more than 75%  stenosis. Distal to this, there is monophasic waveform seen in a  continuous fashion to the popliteal trunk, and then at best one-vessel  runoff in the left calf through the proximal posterior tibial artery. There is no flow seen in the proximal anterior tibial or left peroneal  artery. This patient is status post endovascular aneurysm surgery  repair performed by me in 2015, and this shows stability with no  evidence of endoleak. PAST MEDICAL HISTORY:  In addition to this, the past medical history  features hypertension, bladder polyps. PAST SURGICAL HISTORY:  Includes hernia repair.     REVIEW OF SYSTEMS:  A

## 2020-02-11 NOTE — CONSULTS
Consult Note    Reason for Consult:  Management of A-fib, HTN, HLD, CAD, and PAD    Requesting Physician:  Yessy Brennan MD    HISTORY OF PRESENT ILLNESS:    The patient is a [de-identified] y.o. male who is s/p left lower extremity revascularization per Dr. Lorri De Oliveira. Past Medical History:   Diagnosis Date    [facility]rial [facility] ([facility] [address]     CAD (coronary artery disease)     Hematuria     High cholesterol     History of bladder cancer     Hyperlipidemia     Hypertension     Malignant neoplasm of urinary bladder ([facility] [address] 1/27/2020    S/P AAA repair     Spondylosis of lumbar region without myelopathy or radiculopathy        Past Surgical History:   Procedure Laterality Date    ABDOMINAL AORTIC ANEURYSM REPAIR      HERNIA REPAIR  1998    NC COLON CA SCRN NOT  [address] IND N/A 4/24/2017    COLONOSCOPY performed by Kin Miller MD at [facility] ESOPHAGOGASTRODUODENOSCOPY TRANSORAL DIAGNOSTIC N/A 4/24/2017    EGD ESOPHAGOGASTRODUODENOSCOPY performed by Kin Miller MD at [facility]       Prior to Admission medications    Medication Sig Start Date End Date Taking?  Authorizing Provider   escitalopram (LEXAPRO) 10 MG tablet TAKE ONE TABLET BY MOUTH EVERY DAY 1/27/20  Yes Shana Nicholas MD   rosuvastatin (CRESTOR) 10 MG tablet TAKE ONE TABLET BY MOUTH DAILY  1/7/20  Yes Shana Nicholas MD   BREO ELLIPTA 100-25 MCG/INH AEPB inhaler INHALE 1 PUFF INTO THE LUNGS DAILY 11/18/19  Yes Historical Provider, MD   XARELTO 20 MG TABS tablet TAKE ONE TABLET BY MOUTH DAILY 9/3/19  Yes Historical Provider, MD   benazepril (LOTENSIN) 20 MG tablet Take 2 tablets by mouth daily 9/24/19  Yes Shana Nicholas MD   metoprolol succinate (TOPROL XL) 25 MG extended release tablet Take 1 tablet by mouth daily 1/28/19  Yes Shana Nicholas MD   flecainide (TAMBOCOR) 100 MG tablet Take 1 tablet by mouth 2 times daily Take 2 pills daily 10/26/18  Yes Shana Nicholas MD   POTASSIUM CHLORIDE PO Take by mouth   Yes Historical Provider, MD amLODIPine (NORVASC) 10 MG tablet  1/5/16  Yes Historical Provider, MD   sildenafil (VIAGRA) 100 MG tablet TAKE 1 TABLET BY MOUTH DAILY  1 HOUR BEFORE  NEEDED 9/3/19   Historical Provider, MD   calcium carbonate 600 MG TABS tablet Take 1 tablet by mouth daily    Historical Provider, MD   fluticasone-vilanterol (BREO ELLIPTA) 100-25 MCG/INH AEPB inhaler Inhale 1 puff into the lungs daily 10/1/19 11/1/19  Pramod Frausto MD   tamsulosin [facility]) 0.4 MG capsule Take 1 capsule by mouth daily  Patient not taking: Reported on 1/27/2020 8/28/19   Chong Kingston MD       Scheduled Meds:   sodium chloride  500 mL Intravenous Once    sodium chloride flush  10 mL Intravenous 2 times per day     Continuous Infusions:   sodium chloride      lactated ringers 125 mL/hr at 02/11/20 1201     PRN Meds:sodium chloride flush, acetaminophen, traMADol, ondansetron    Allergies   Allergen Reactions    Avelox [Moxifloxacin Hcl In Nacl]      thrush    Mobic [Meloxicam] Other (See Comments)    Moxifloxacin     Z-Sean [Azithromycin] Other (See Comments)     Thrush       Social History     Socioeconomic History    Marital status:      Spouse name: Not on file    Number of children: Not on file    Years of education: Not on file    Highest education level: Not on file   Occupational History    Not on file   Social Needs    Financial resource strain: Not on file    Food insecurity:     Worry: Not on file     Inability: Not on file    Transportation needs:     Medical: Not on file     Non-medical: Not on file   Tobacco Use    Smoking status: Light Tobacco Smoker     Packs/day: 0.33     Years: 30.00     Pack years: 9.90     Types: Cigarettes     Start date: 10/1/1979    Smokeless tobacco: Never Used   Substance and Sexual Activity    Alcohol use: Yes     Comment: occasionally    Drug use: No    Sexual activity: Not on file   Lifestyle    Physical activity:     Days per week: Not on file     Minutes per session: Not on file    Stress: Not on file   Relationships    Social connections:     Talks on phone: Not on file     Gets together: Not on file     Attends Sabianist service: Not on file     Active member of club or organization: Not on file     Attends meetings of clubs or organizations: Not on file     Relationship status: Not on file    Intimate partner violence:     Fear of current or ex partner: Not on file     Emotionally abused: Not on file     Physically abused: Not on file     Forced sexual activity: Not on file   Other Topics Concern    Not on file   Social History Narrative    Not on file       No family history on file.     Review Of Systems:   CONSTITUTIONAL:  negative for  fevers, chills and sweats  HEENT:  negative for  hearing loss, tinnitus and ear drainage  RESPIRATORY:  negative for  dry cough, cough with sputum and dyspnea  CARDIOVASCULAR:  negative for  chest pain, dyspnea, palpitations  GASTROINTESTINAL:  negative for nausea, vomiting and change in bowel habits  MUSCULOSKELETAL:  negative for  myalgias, arthralgias and pain  NEUROLOGICAL:  negative for headaches, dizziness and seizures  BEHAVIOR/PSYCH:  negative for poor appetite, increased appetite and decreased sleep    Physical Exam:  Vitals:    02/11/20 0613 02/11/20 1145   BP: (!) 153/69    Pulse: 64 56   Resp: 16 16   Temp: 98.6 °F (37 °C) 98 °F (36.7 °C)   TempSrc: Temporal Temporal   SpO2: 97% 95%   Weight: 188 lb (85.3 kg)    Height: 5' 10\" (1.778 m)        CONSTITUTIONAL:  awake, alert, cooperative, no apparent distress, and appears stated age  NECK:  Supple, symmetrical, trachea midline, no adenopathy, thyroid symmetric, not enlarged and no tenderness, skin normal  BACK:  Symmetric, no curvature, spinous processes are non-tender on palpation, paraspinous muscles are non-tender on palpation, no costal vertebral tenderness  LUNGS:  No increased work of breathing, good air exchange, clear to auscultation bilaterally, no crackles or

## 2020-02-12 VITALS
RESPIRATION RATE: 16 BRPM | OXYGEN SATURATION: 93 % | BODY MASS INDEX: 26.92 KG/M2 | SYSTOLIC BLOOD PRESSURE: 139 MMHG | HEIGHT: 70 IN | HEART RATE: 64 BPM | DIASTOLIC BLOOD PRESSURE: 56 MMHG | WEIGHT: 188 LBS | TEMPERATURE: 98.3 F

## 2020-02-12 LAB
ANION GAP SERPL CALCULATED.3IONS-SCNC: 9 MEQ/L (ref 9–15)
BUN BLDV-MCNC: 16 MG/DL (ref 8–23)
CALCIUM SERPL-MCNC: 8.3 MG/DL (ref 8.5–9.9)
CHLORIDE BLD-SCNC: 105 MEQ/L (ref 95–107)
CHOLESTEROL, TOTAL: 83 MG/DL (ref 0–199)
CK MB: 1.2 NG/ML (ref 0–6.7)
CO2: 25 MEQ/L (ref 20–31)
CREAT SERPL-MCNC: 1.27 MG/DL (ref 0.7–1.2)
CREATINE KINASE-MB INDEX: 5 % (ref 0–3.5)
EKG ATRIAL RATE: 62 BPM
EKG P AXIS: 84 DEGREES
EKG P-R INTERVAL: 248 MS
EKG Q-T INTERVAL: 430 MS
EKG QRS DURATION: 124 MS
EKG QTC CALCULATION (BAZETT): 436 MS
EKG R AXIS: 17 DEGREES
EKG T AXIS: -54 DEGREES
EKG VENTRICULAR RATE: 62 BPM
FERRITIN: 42.7 NG/ML (ref 30–400)
FOLATE: 14.9 NG/ML (ref 7.3–26.1)
GFR AFRICAN AMERICAN: >60
GFR NON-AFRICAN AMERICAN: 54.5
GLUCOSE BLD-MCNC: 105 MG/DL (ref 70–99)
HDLC SERPL-MCNC: 32 MG/DL (ref 40–59)
IRON SATURATION: 47 % (ref 11–46)
IRON: 160 UG/DL (ref 59–158)
LDL CHOLESTEROL CALCULATED: 33 MG/DL (ref 0–129)
MAGNESIUM: 2 MG/DL (ref 1.7–2.4)
POTASSIUM SERPL-SCNC: 4.1 MEQ/L (ref 3.4–4.9)
SODIUM BLD-SCNC: 139 MEQ/L (ref 135–144)
TOTAL CK: 24 U/L (ref 0–190)
TOTAL IRON BINDING CAPACITY: 337 UG/DL (ref 178–450)
TRIGL SERPL-MCNC: 91 MG/DL (ref 0–150)
TROPONIN: <0.01 NG/ML (ref 0–0.01)
VITAMIN B-12: 264 PG/ML (ref 232–1245)

## 2020-02-12 PROCEDURE — 82746 ASSAY OF FOLIC ACID SERUM: CPT

## 2020-02-12 PROCEDURE — 84484 ASSAY OF TROPONIN QUANT: CPT

## 2020-02-12 PROCEDURE — 2700000000 HC OXYGEN THERAPY PER DAY

## 2020-02-12 PROCEDURE — 83550 IRON BINDING TEST: CPT

## 2020-02-12 PROCEDURE — 80061 LIPID PANEL: CPT

## 2020-02-12 PROCEDURE — 83540 ASSAY OF IRON: CPT

## 2020-02-12 PROCEDURE — 82607 VITAMIN B-12: CPT

## 2020-02-12 PROCEDURE — 94640 AIRWAY INHALATION TREATMENT: CPT

## 2020-02-12 PROCEDURE — 2580000003 HC RX 258: Performed by: THORACIC SURGERY (CARDIOTHORACIC VASCULAR SURGERY)

## 2020-02-12 PROCEDURE — 82728 ASSAY OF FERRITIN: CPT

## 2020-02-12 PROCEDURE — 82550 ASSAY OF CK (CPK): CPT

## 2020-02-12 PROCEDURE — 80048 BASIC METABOLIC PNL TOTAL CA: CPT

## 2020-02-12 PROCEDURE — 6370000000 HC RX 637 (ALT 250 FOR IP): Performed by: INTERNAL MEDICINE

## 2020-02-12 PROCEDURE — 6370000000 HC RX 637 (ALT 250 FOR IP): Performed by: PHYSICIAN ASSISTANT

## 2020-02-12 PROCEDURE — 82553 CREATINE MB FRACTION: CPT

## 2020-02-12 PROCEDURE — 83735 ASSAY OF MAGNESIUM: CPT

## 2020-02-12 RX ADMIN — FLECAINIDE ACETATE 100 MG: 50 TABLET ORAL at 09:25

## 2020-02-12 RX ADMIN — METOPROLOL SUCCINATE 25 MG: 25 TABLET, EXTENDED RELEASE ORAL at 09:26

## 2020-02-12 RX ADMIN — CALCIUM 500 MG: 500 TABLET ORAL at 09:26

## 2020-02-12 RX ADMIN — AMLODIPINE BESYLATE 10 MG: 10 TABLET ORAL at 09:26

## 2020-02-12 RX ADMIN — BUDESONIDE AND FORMOTEROL FUMARATE DIHYDRATE 2 PUFF: 80; 4.5 AEROSOL RESPIRATORY (INHALATION) at 04:24

## 2020-02-12 RX ADMIN — LISINOPRIL 5 MG: 5 TABLET ORAL at 09:26

## 2020-02-12 RX ADMIN — ESCITALOPRAM OXALATE 10 MG: 10 TABLET ORAL at 09:26

## 2020-02-12 RX ADMIN — Medication 10 ML: at 09:29

## 2020-02-12 RX ADMIN — TAMSULOSIN HYDROCHLORIDE 0.4 MG: 0.4 CAPSULE ORAL at 09:27

## 2020-02-12 ASSESSMENT — PAIN SCALES - GENERAL
PAINLEVEL_OUTOF10: 0
PAINLEVEL_OUTOF10: 0

## 2020-02-12 NOTE — CARE COORDINATION
[facility] EMERGENCY [facility] Case Management Initial Discharge Assessment    Met with Patient to discuss discharge plan. PCP: Kyler Pace MD                                Date of Last Visit: 01/27/2020    If no PCP, list provided? N/A    Discharge Planning    Living Arrangements: independently at home    Who do you live with? WIFE    Who helps you with your care:  self    If lives at home:     Do you have any barriers navigating in your home? no    Patient can perform ADL? Yes    Current Services (outpatient and in home) :  None    Dialysis: No    Is transportation available to get to your appointments? Yes    DME Equipment:  yes - Has cane, does not use    Respiratory equipment: None    Respiratory provider:  no     Pharmacy:  yes - [address] with Medication Assistance Program?  No      Patient agreeable to [facility]? No    Patient agreeable to SNF/Rehab? No    Other discharge needs identified? N/A    Freedom of choice list provided with basic dialogue that supports the patient's individualized plan of care/goals and shares the quality data associated with the providers. N/A    Does Patient Have a High-Risk for Readmission Diagnosis (CHF, PN, MI, COPD)? No    The plan for Transition of Care is related to the following treatment goals: Return home    Initial Discharge Plan? (Note: please see concurrent daily documentation for any updates after initial note). Patient had multiple complaints during conversation. Focused on \"being woken up very early\" and asking if he was going to be seen by the doctor soon or wait around all day for him to make a \"determination\".  Denies needs @ discharge    The Patient and/or patient representative: Patient was provided with choice of any post-acute providers for care and equipment and agrees with discharge plan  N/A    Electronically signed by Claude Conner RN on 2/12/2020 at 12:29 PM

## 2020-02-12 NOTE — OP NOTE
the distal  left SFA popliteal area across Bjorn's canal with a Innova stent which  was 6 mm wide, 120 mm in length, and then just proximal to this in  tandem, the second stent was placed again 6 x 120 Innova stent. This  was our completion point of the procedure. We performed for touch-up  purposes an IntraStent angioplasty within the distal stent, 6 x 150  balloon for 1 minute at 7 atmospheres of pressure, and then on our exit  in the very proximal left SFA above the first stent, we angioplastied  with a 6 x 150 balloon for again 60 seconds at 7 atmospheres of  pressure. It was an excellent result radiographically. We removed our  sheath from the femoral artery. We hemostased the puncture site with a  5-0 Prolene suture, and then we irrigated the wounds, secured hemostasis  and then closed in multiple layers. The sponge, instrument, and needle  counts were correct x2. The estimated blood loss was minimal.  The  patient was transferred directly from [address] to bed 8 of CV ICU in a stable  condition and with an excellent palpable pulse to the left posterior  tibial area of the ankle. We met in the consultation area with his wife  and son, and we informed them of the surgery and the results, and we  answered their questions concerning low hemoglobin, which they were  concerned about in the preoperative setting.         Kamini Balbuena MD    D: 02/11/2020 12:36:46       T: 02/11/2020 12:41:46     [id number]  Job#: [id number]     Doc#: [id number]    CC:

## 2020-02-12 NOTE — FLOWSHEET NOTE
0710-Hand off of care at bedside Pt placed up in recliner by this RN and night shift RN  0800-Pt sleeping on and off in recliner. Denies pain when asked  0930-AM medications given and pt still denies pain   1030-Pt walked the entire unit as Dr. Rik Puente was on the unit and no complaints of leg pain  1045-Dr. Rik Puente gave pt his discharge instructions with information written at the bottom and a script for pain medication.  Pt also away that his office will call with time for his appt on 2/24/20  1115-Peripheral IV's discontinued  1120- Discharge instructions given   1125-Pt discharge to main lobby via wheelchair

## 2020-02-12 NOTE — PROGRESS NOTES
VascularProgress Note    POD #  1    Patient is Hammad Maxwell. .    Subjective: \" My leg feels better. \"    Objective: Hgb is 8.0 last pm.    Pulses: Easily palpable Left PT    Incisions: Clean dry and intact. No hematoma or bleeding noted. Assessment: Hematology consult appreciated and will follow recommendations      Plan: Rx for pain med on discharge. Discharge today.     Electronically signed by Elmer Lucas MD on 2/12/2020 at 10:40 AM

## 2020-02-13 ENCOUNTER — CARE COORDINATION (OUTPATIENT)
Dept: CASE MANAGEMENT | Age: 80
End: 2020-02-13

## 2020-02-13 PROCEDURE — 1111F DSCHRG MED/CURRENT MED MERGE: CPT | Performed by: FAMILY MEDICINE

## 2020-02-13 RX ORDER — FLECAINIDE ACETATE 100 MG/1
200 TABLET ORAL DAILY
Status: ON HOLD | COMMUNITY
End: 2020-05-26

## 2020-02-18 ENCOUNTER — OFFICE VISIT (OUTPATIENT)
Dept: FAMILY MEDICINE CLINIC | Age: 80
End: 2020-02-18
Payer: MEDICARE

## 2020-02-18 VITALS
TEMPERATURE: 98.7 F | OXYGEN SATURATION: 98 % | DIASTOLIC BLOOD PRESSURE: 60 MMHG | SYSTOLIC BLOOD PRESSURE: 128 MMHG | BODY MASS INDEX: 28.37 KG/M2 | HEIGHT: 70 IN | WEIGHT: 198.2 LBS | HEART RATE: 65 BPM

## 2020-02-18 PROCEDURE — 1111F DSCHRG MED/CURRENT MED MERGE: CPT | Performed by: NURSE PRACTITIONER

## 2020-02-18 PROCEDURE — 99496 TRANSJ CARE MGMT HIGH F2F 7D: CPT | Performed by: NURSE PRACTITIONER

## 2020-02-18 ASSESSMENT — ENCOUNTER SYMPTOMS
COLOR CHANGE: 0
SHORTNESS OF BREATH: 0
COUGH: 0

## 2020-02-18 NOTE — PROGRESS NOTES
Post-Discharge Transitional Care Management Services or Hospital Follow Up      [address] 1939 YOB: 1940    Date of Office Visit:  2/18/2020  Date of Hospital Admission: 2/11/20  Date of Hospital Discharge: 2/12/20  Readmission Risk Score(high >=14%. Medium >=10%):Readmission Risk Score: 18      Care management risk score Rising risk (score 2-5) and Complex Care (Scores >=6): 2     Non face to face  following discharge, date last encounter closed (first attempt may have been earlier): 2/13/2020  2:51 PM 2/13/2020  2:51 PM    Call initiated 2 business days of discharge: Yes     Patient Active Problem List   Diagnosis    Hypertension    Hyperlipidemia    History of bladder cancer    High cholesterol    Hematuria    Atrial fibrillation ([address]    S/P AAA repair    Spondylosis of lumbar region without myelopathy or radiculopathy    Iron deficiency anemia secondary to inadequate dietary iron intake    Fitting and adjustment of orthopedic device    Simple chronic bronchitis (HCC)    Malignant neoplasm of urinary bladder ([address]    PAD (peripheral artery disease) (HCC)       Allergies   Allergen Reactions    Avelox [Moxifloxacin Hcl In Nacl]      thrush    Mobic [Meloxicam] Other (See Comments)    Moxifloxacin     Z-Sean [Azithromycin] Other (See Comments)     Thrush       Medications listed as ordered at the time of discharge from hospital   [facility], [address] Home Medication Instructions PILLO:    Printed on:02/18/20 7495   Medication Information                      amLODIPine (NORVASC) 10 MG tablet  Take 10 mg by mouth daily              benazepril (LOTENSIN) 20 MG tablet  Take 2 tablets by mouth daily             escitalopram (LEXAPRO) 10 MG tablet  TAKE ONE TABLET BY MOUTH EVERY DAY             flecainide (TAMBOCOR) 100 MG tablet  Take 200 mg by mouth daily Take two tablets (200 mg) daily.              fluticasone-vilanterol (BREO ELLIPTA) 100-25 MCG/INH AEPB inhaler  Inhale 1 Spoke with Dr. Ly Lee. Pt has had some anemia. Has appts with Dr. Jeff Barger and Ly Lee coming up. Review of Systems   Constitutional: Positive for fatigue. Respiratory: Negative for cough and shortness of breath. Cardiovascular: Positive for leg swelling. Negative for chest pain. Skin: Negative for color change. Vitals:    02/18/20 1539   BP: 128/60   Pulse: 65   Temp: 98.7 °F (37.1 °C)   SpO2: 98%   Weight: 198 lb 3.2 oz (89.9 kg)   Height: 5' 10\" (1.778 m)     Body mass index is 28.44 kg/m². Wt Readings from Last 3 Encounters:   02/18/20 198 lb 3.2 oz (89.9 kg)   02/11/20 188 lb (85.3 kg)   01/27/20 188 lb 3.2 oz (85.4 kg)     BP Readings from Last 3 Encounters:   02/18/20 128/60   02/12/20 (!) 139/56   01/27/20 (!) 110/58       Physical Exam  Vitals signs and nursing note reviewed. Constitutional:       Appearance: Normal appearance. He is normal weight. HENT:      Head: Normocephalic. Mouth/Throat:      Mouth: Mucous membranes are moist.   Eyes:      Extraocular Movements: Extraocular movements intact. Conjunctiva/sclera: Conjunctivae normal.      Pupils: Pupils are equal, round, and reactive to light. Cardiovascular:      Rate and Rhythm: Normal rate and regular rhythm. Pulses: Normal pulses. Heart sounds: Normal heart sounds. Pulmonary:      Effort: Pulmonary effort is normal.      Breath sounds: Normal breath sounds. Skin:     General: Skin is warm. Neurological:      General: No focal deficit present. Mental Status: He is alert and oriented to person, place, and time. Mental status is at baseline. Psychiatric:         Mood and Affect: Mood normal.         Behavior: Behavior normal.         Thought Content: Thought content normal.         Judgment: Judgment normal.         Assessment/Plan:  1. Claudication ([address]    - NH DISCHARGE MEDS RECONCILED W/ CURRENT OUTPATIENT MED LIST    2.  PAD (peripheral artery disease) (HCC)      3. Claudication of

## 2020-02-21 ENCOUNTER — CARE COORDINATION (OUTPATIENT)
Dept: CASE MANAGEMENT | Age: 80
End: 2020-02-21

## 2020-02-21 NOTE — CARE COORDINATION
[facility] Transitions Follow Up Call    2020    Patient: Justin Lai  Patient : 1940   MRN: [id number]  Reason for Admission: -2020 [address] IP LLE Revasc  Discharge Date: 20 RARS: Readmission Risk Score: 18    Spoke with: Ramírez Ying. Pt reports no acute pain concerns. Reports LLE swelling/edema and skin is shiny towards foot & toes. Advised to elevate when at rest and to continue to use ice packs for comfort, v/u. Had post-op and reports incision site healing well. No drainage or redness. Wearing 48 hr Holter and to see cardiology . Had carotid US. Shares he felt warmer and less fatigued a few days after getting IV iron. Plans to discuss possibility of future infusions w/ oncology at next wk appt. Pt feels he has slow cap refill and nails seem pale. Denies increased exertional SOB or dizziness. Reports improved appetite and drinking fluids. Reviewed iron deficit anemia. Answered questions. Care Transitions Subsequent and Final Call    Subsequent and Final Calls  Do you have any ongoing symptoms?:  Yes  Patient-reported symptoms:  Other  Have your medications changed?:  No  Do you have any questions related to your medications?:  No  Do you currently have any active services?:  No  Do you have any needs or concerns that I can assist you with?:  No  Identified Barriers:  Lack of Education  Care Transitions Interventions                          Other Interventions:             Follow Up  Future Appointments   Date Time Provider Pasquale Batres   3/19/2020  3:15 PM Kris Tan MD [address]   2020  2:00 PM Marlena Epperson MD [address]   2020 10:00 AM Krista Sahni MD [address] [place] [address]

## 2020-02-21 NOTE — CARE COORDINATION
[facility] Transitions Follow Up Call    2020    Patient: Paul Rucker  Patient : 1940   MRN: [id number]  Reason for Admission: LLE Revasc  Discharge Date: 20 RARS: Readmission Risk Score: 18    CTN left VM for subsequent transitions follow up.     Follow Up  Future Appointments   Date Time Provider Pasquale Batres   3/19/2020  3:15 PM Aldo Roberts MD [facility] EMERGENCY [facility] [place]   2020  2:00 PM Mio Lal MD [address]   2020 10:00 AM Lynda Cervantes MD [address] [place] [address], [address] [place] [address]

## 2020-03-09 RX ORDER — BENAZEPRIL HYDROCHLORIDE 20 MG/1
TABLET ORAL
Qty: 60 TABLET | Refills: 0 | Status: SHIPPED | OUTPATIENT
Start: 2020-03-09 | End: 2020-04-03

## 2020-03-13 LAB
T3 TOTAL: 0.87 NG/ML (ref 0.8–2)
T4 FREE: 1.21 NG/DL (ref 0.84–1.68)
TSH SERPL DL<=0.05 MIU/L-ACNC: 1.2 UIU/ML (ref 0.44–3.86)

## 2020-04-03 RX ORDER — BENAZEPRIL HYDROCHLORIDE 20 MG/1
TABLET ORAL
Qty: 60 TABLET | Refills: 0 | Status: SHIPPED | OUTPATIENT
Start: 2020-04-03 | End: 2020-05-17

## 2020-05-01 ENCOUNTER — VIRTUAL VISIT (OUTPATIENT)
Dept: GASTROENTEROLOGY | Age: 80
End: 2020-05-01
Payer: MEDICARE

## 2020-05-01 DIAGNOSIS — D50.0 IRON DEFICIENCY ANEMIA DUE TO CHRONIC BLOOD LOSS: ICD-10-CM

## 2020-05-01 PROCEDURE — 99443 PR PHYS/QHP TELEPHONE EVALUATION 21-30 MIN: CPT | Performed by: SPECIALIST

## 2020-05-01 ASSESSMENT — ENCOUNTER SYMPTOMS
VOMITING: 0
SHORTNESS OF BREATH: 1
GASTROINTESTINAL NEGATIVE: 1
RECTAL PAIN: 0
ANAL BLEEDING: 0
EYES NEGATIVE: 1
ABDOMINAL PAIN: 0
BLOOD IN STOOL: 0
DIARRHEA: 0
NAUSEA: 0
CONSTIPATION: 0
ABDOMINAL DISTENTION: 0

## 2020-05-01 NOTE — PROGRESS NOTES
Gastroenterology Clinic Visit    Corina Rodriguez  [id number]  Chief Complaint   Patient presents with   [facility]     Referred for colonoscopy due to low hemoglobin. Denies blood in stool or black stools. Has had multiple iron infusions and has had low hemoglobin since december. HPI: [de-identified] y.o. male presents to the clinic with history of anemia. This was a telephone encounter and patient was at home and I was in our office. Patient was informed that this is a billable service. He was diagnosed to have anemia and has been getting iron infusion. Patient reports no abdominal pain, no nausea no vomiting. No change in bowel habits. He has been on Xarelto because of atrial fibrillation. He does not take any aspirin or NSAIDs. Patient has a history of peripheral vascular disease and had recent vascular surgery. He also has a history of bladder cancer and has been getting follow-up cystoscopy which has been negative. Patient also reports no history of any hematuria. No history of any dysphagia no history of any weight loss. Gets short of breath on moderate to severe exertion. No prior history of any ulcer disease. Duration of phone call was 22 minutes    Review of Systems   Constitutional: Negative. HENT: Negative. Eyes: Negative. Respiratory: Positive for shortness of breath. Gastrointestinal: Negative. Negative for abdominal distention, abdominal pain, anal bleeding, blood in stool, constipation, diarrhea, nausea, rectal pain and vomiting. Genitourinary: Negative. Musculoskeletal: Negative. Neurological: Negative. Psychiatric/Behavioral: Negative.          Past Medical History:   Diagnosis Date    Atrial fibrillation ([address]     CAD (coronary artery disease)     Hematuria     High cholesterol     History of bladder cancer     Hyperlipidemia     Hypertension     Malignant neoplasm of urinary bladder ([address] 1/27/2020    S/P AAA repair     Spondylosis of lumbar region

## 2020-05-03 LAB
REASON FOR REJECTION: NORMAL
REJECTED TEST: NORMAL

## 2020-05-04 ENCOUNTER — TELEPHONE (OUTPATIENT)
Dept: GASTROENTEROLOGY | Age: 80
End: 2020-05-04

## 2020-05-05 LAB — HEMOCCULT STL QL: ABNORMAL

## 2020-05-11 RX ORDER — SODIUM, POTASSIUM,MAG SULFATES 17.5-3.13G
SOLUTION, RECONSTITUTED, ORAL ORAL
Qty: 1 BOTTLE | Refills: 0 | Status: SHIPPED | OUTPATIENT
Start: 2020-05-11 | End: 2020-06-15 | Stop reason: ALTCHOICE

## 2020-05-12 ENCOUNTER — TELEPHONE (OUTPATIENT)
Dept: GASTROENTEROLOGY | Age: 80
End: 2020-05-12

## 2020-05-18 RX ORDER — BENAZEPRIL HYDROCHLORIDE 20 MG/1
TABLET ORAL
Qty: 60 TABLET | Refills: 3 | Status: SHIPPED | OUTPATIENT
Start: 2020-05-18 | End: 2020-08-11 | Stop reason: SDUPTHER

## 2020-05-19 ENCOUNTER — NURSE ONLY (OUTPATIENT)
Dept: PRIMARY CARE CLINIC | Age: 80
End: 2020-05-19

## 2020-05-19 DIAGNOSIS — Z01.818 ENCOUNTER FOR PREADMISSION TESTING: ICD-10-CM

## 2020-05-21 LAB
SARS-COV-2: NOT DETECTED
SOURCE: NORMAL

## 2020-05-26 ENCOUNTER — ANESTHESIA EVENT (OUTPATIENT)
Dept: ENDOSCOPY | Age: 80
End: 2020-05-26
Payer: MEDICARE

## 2020-05-26 ENCOUNTER — ANCILLARY PROCEDURE (OUTPATIENT)
Dept: ENDOSCOPY | Age: 80
End: 2020-05-26
Attending: SPECIALIST
Payer: MEDICARE

## 2020-05-26 ENCOUNTER — HOSPITAL ENCOUNTER (OUTPATIENT)
Age: 80
Setting detail: OUTPATIENT SURGERY
Discharge: HOME | End: 2020-05-26
Attending: SPECIALIST | Admitting: SPECIALIST
Payer: MEDICARE

## 2020-05-26 ENCOUNTER — ANESTHESIA (OUTPATIENT)
Dept: ENDOSCOPY | Age: 80
End: 2020-05-26
Payer: MEDICARE

## 2020-05-26 VITALS
RESPIRATION RATE: 16 BRPM | WEIGHT: 190 LBS | OXYGEN SATURATION: 95 % | HEART RATE: 109 BPM | SYSTOLIC BLOOD PRESSURE: 153 MMHG | HEIGHT: 69 IN | TEMPERATURE: 97.9 F | BODY MASS INDEX: 28.14 KG/M2 | DIASTOLIC BLOOD PRESSURE: 90 MMHG

## 2020-05-26 VITALS
OXYGEN SATURATION: 100 % | RESPIRATION RATE: 15 BRPM | DIASTOLIC BLOOD PRESSURE: 57 MMHG | SYSTOLIC BLOOD PRESSURE: 103 MMHG

## 2020-05-26 PROCEDURE — 3700000000 HC ANESTHESIA ATTENDED CARE: Performed by: SPECIALIST

## 2020-05-26 PROCEDURE — 7100000010 HC PHASE II RECOVERY - FIRST 15 MIN: Performed by: SPECIALIST

## 2020-05-26 PROCEDURE — 3700000001 HC ADD 15 MINUTES (ANESTHESIA): Performed by: SPECIALIST

## 2020-05-26 PROCEDURE — 88342 IMHCHEM/IMCYTCHM 1ST ANTB: CPT

## 2020-05-26 PROCEDURE — 2500000003 HC RX 250 WO HCPCS: Performed by: NURSE ANESTHETIST, CERTIFIED REGISTERED

## 2020-05-26 PROCEDURE — 7100000011 HC PHASE II RECOVERY - ADDTL 15 MIN: Performed by: SPECIALIST

## 2020-05-26 PROCEDURE — 3609027000 HC COLONOSCOPY: Performed by: SPECIALIST

## 2020-05-26 PROCEDURE — 2709999900 HC NON-CHARGEABLE SUPPLY: Performed by: SPECIALIST

## 2020-05-26 PROCEDURE — 2580000003 HC RX 258

## 2020-05-26 PROCEDURE — 88305 TISSUE EXAM BY PATHOLOGIST: CPT

## 2020-05-26 PROCEDURE — 2580000003 HC RX 258: Performed by: SPECIALIST

## 2020-05-26 PROCEDURE — 43239 EGD BIOPSY SINGLE/MULTIPLE: CPT | Performed by: SPECIALIST

## 2020-05-26 PROCEDURE — 45385 COLONOSCOPY W/LESION REMOVAL: CPT | Performed by: SPECIALIST

## 2020-05-26 PROCEDURE — 3609017100 HC EGD: Performed by: SPECIALIST

## 2020-05-26 PROCEDURE — 45380 COLONOSCOPY AND BIOPSY: CPT | Performed by: SPECIALIST

## 2020-05-26 PROCEDURE — 6360000002 HC RX W HCPCS: Performed by: NURSE ANESTHETIST, CERTIFIED REGISTERED

## 2020-05-26 RX ORDER — SODIUM CHLORIDE 9 MG/ML
INJECTION, SOLUTION INTRAVENOUS
Status: COMPLETED
Start: 2020-05-26 | End: 2020-05-26

## 2020-05-26 RX ORDER — SODIUM CHLORIDE 9 MG/ML
INJECTION, SOLUTION INTRAVENOUS CONTINUOUS
Status: DISCONTINUED | OUTPATIENT
Start: 2020-05-26 | End: 2020-05-26 | Stop reason: HOSPADM

## 2020-05-26 RX ORDER — PROPOFOL 10 MG/ML
INJECTION, EMULSION INTRAVENOUS PRN
Status: DISCONTINUED | OUTPATIENT
Start: 2020-05-26 | End: 2020-05-26 | Stop reason: SDUPTHER

## 2020-05-26 RX ORDER — LIDOCAINE HYDROCHLORIDE 20 MG/ML
INJECTION, SOLUTION INFILTRATION; PERINEURAL PRN
Status: DISCONTINUED | OUTPATIENT
Start: 2020-05-26 | End: 2020-05-26 | Stop reason: SDUPTHER

## 2020-05-26 RX ORDER — MAGNESIUM HYDROXIDE 1200 MG/15ML
LIQUID ORAL PRN
Status: DISCONTINUED | OUTPATIENT
Start: 2020-05-26 | End: 2020-05-26 | Stop reason: ALTCHOICE

## 2020-05-26 RX ADMIN — PROPOFOL 20 MG: 10 INJECTION, EMULSION INTRAVENOUS at 10:59

## 2020-05-26 RX ADMIN — PROPOFOL 50 MG: 10 INJECTION, EMULSION INTRAVENOUS at 10:48

## 2020-05-26 RX ADMIN — LIDOCAINE HYDROCHLORIDE 40 MG: 20 INJECTION, SOLUTION INFILTRATION; PERINEURAL at 10:44

## 2020-05-26 RX ADMIN — PROPOFOL 20 MG: 10 INJECTION, EMULSION INTRAVENOUS at 11:03

## 2020-05-26 RX ADMIN — PROPOFOL 20 MG: 10 INJECTION, EMULSION INTRAVENOUS at 10:45

## 2020-05-26 RX ADMIN — PROPOFOL 20 MG: 10 INJECTION, EMULSION INTRAVENOUS at 11:01

## 2020-05-26 RX ADMIN — PROPOFOL 80 MG: 10 INJECTION, EMULSION INTRAVENOUS at 10:44

## 2020-05-26 RX ADMIN — PROPOFOL 20 MG: 10 INJECTION, EMULSION INTRAVENOUS at 10:49

## 2020-05-26 RX ADMIN — PROPOFOL 20 MG: 10 INJECTION, EMULSION INTRAVENOUS at 10:54

## 2020-05-26 RX ADMIN — PROPOFOL 20 MG: 10 INJECTION, EMULSION INTRAVENOUS at 10:52

## 2020-05-26 RX ADMIN — PROPOFOL 30 MG: 10 INJECTION, EMULSION INTRAVENOUS at 10:47

## 2020-05-26 RX ADMIN — PROPOFOL 20 MG: 10 INJECTION, EMULSION INTRAVENOUS at 10:46

## 2020-05-26 RX ADMIN — PROPOFOL 20 MG: 10 INJECTION, EMULSION INTRAVENOUS at 10:57

## 2020-05-26 RX ADMIN — PROPOFOL 20 MG: 10 INJECTION, EMULSION INTRAVENOUS at 11:07

## 2020-05-26 RX ADMIN — PROPOFOL 20 MG: 10 INJECTION, EMULSION INTRAVENOUS at 10:50

## 2020-05-26 RX ADMIN — PROPOFOL 20 MG: 10 INJECTION, EMULSION INTRAVENOUS at 11:15

## 2020-05-26 RX ADMIN — SODIUM CHLORIDE: 9 INJECTION, SOLUTION INTRAVENOUS at 10:17

## 2020-05-26 RX ADMIN — PROPOFOL 20 MG: 10 INJECTION, EMULSION INTRAVENOUS at 11:13

## 2020-05-26 RX ADMIN — PROPOFOL 20 MG: 10 INJECTION, EMULSION INTRAVENOUS at 11:09

## 2020-05-26 RX ADMIN — PROPOFOL 20 MG: 10 INJECTION, EMULSION INTRAVENOUS at 11:18

## 2020-05-26 RX ADMIN — PROPOFOL 20 MG: 10 INJECTION, EMULSION INTRAVENOUS at 11:05

## 2020-05-26 RX ADMIN — PROPOFOL 20 MG: 10 INJECTION, EMULSION INTRAVENOUS at 10:55

## 2020-05-26 RX ADMIN — PROPOFOL 20 MG: 10 INJECTION, EMULSION INTRAVENOUS at 11:11

## 2020-05-26 NOTE — ANESTHESIA PRE PROCEDURE
Department of Anesthesiology  Preprocedure Note       Name:  Mahala Duverney.   Age:  [de-identified] y.o.  :  1940                                          MRN:  [id number]         Date:  2020      Surgeon: Michael Mariscal):  Bettina Caputo MD    Procedure: Procedure(s):  EGD DIAGNOSTIC ONLY  COLONOSCOPY DIAGNOSTIC    Medications prior to admission:   Prior to Admission medications    Medication Sig Start Date End Date Taking? Authorizing Provider   Na Sulfate-K Sulfate-Mg Sulf 17.5-3.13-1.6 GM/177ML SOLN As directed 20  Yes Bettina Caputo MD   fluticasone-vilanterol (BREO ELLIPTA) 100-25 MCG/INH AEPB inhaler Inhale 1 puff into the lungs daily 10/1/19  Yes Eliecer Nieves MD   benazepril (LOTENSIN) 20 MG tablet TAKE TWO TABLETS BY MOUTH EVERY DAY 20   Palak Chau MD   escitalopram (LEXAPRO) 10 MG tablet TAKE ONE TABLET BY MOUTH EVERY DAY 20   Palak Chau MD   rosuvastatin (CRESTOR) 10 MG tablet TAKE ONE TABLET BY MOUTH DAILY  20   Palak Chau MD   XARELTO 20 MG TABS tablet TAKE ONE TABLET BY MOUTH DAILY 9/3/19   Historical Provider, MD   sildenafil (VIAGRA) 100 MG tablet TAKE 1 TABLET BY MOUTH DAILY  1 HOUR BEFORE  NEEDED 9/3/19   Historical Provider, MD   metoprolol succinate (TOPROL XL) 25 MG extended release tablet Take 1 tablet by mouth daily 19   Palak Chau MD   amLODIPine (NORVASC) 10 MG tablet Take 10 mg by mouth daily  16   Historical Provider, MD       Current medications:    Current Facility-Administered Medications   Medication Dose Route Frequency Provider Last Rate Last Dose    0.9 % sodium chloride infusion   Intravenous Continuous Bettina Caputo  mL/hr at 20 1017         Allergies:     Allergies   Allergen Reactions    Avelox [Moxifloxacin Hcl In Nacl]      thrush    Mobic [Meloxicam] Other (See Comments)    Moxifloxacin     Z-Sean [Azithromycin] Other (See Comments)     Thrush       Problem List:    Patient Active Problem List   Diagnosis Code    Temp: 36.6 °C (97.9 °F)   TempSrc: Temporal   SpO2: 97%   Weight: 190 lb (86.2 kg)   Height: 5' 9\" (1.753 m)                                              BP Readings from Last 3 Encounters:   05/26/20 (!) 151/100   02/18/20 128/60   02/12/20 (!) 139/56       NPO Status: Time of last liquid consumption: 0500                        Time of last solid consumption: 1600                        Date of last liquid consumption: 05/26/20                        Date of last solid food consumption: 05/23/20    BMI:   Wt Readings from Last 3 Encounters:   05/26/20 190 lb (86.2 kg)   02/18/20 198 lb 3.2 oz (89.9 kg)   02/11/20 188 lb (85.3 kg)     Body mass index is 28.06 kg/m². CBC:   Lab Results   Component Value Date    WBC 7.1 02/11/2020    RBC 3.52 02/11/2020    RBC 4.78 11/01/2011    HGB 8.0 02/11/2020    HCT 26.4 02/11/2020    MCV 70.2 02/11/2020    RDW 19.3 02/11/2020     02/11/2020       CMP:   Lab Results   Component Value Date     02/12/2020    K 4.1 02/12/2020     02/12/2020    CO2 25 02/12/2020    BUN 16 02/12/2020    CREATININE 1.27 02/12/2020    GFRAA >60.0 02/12/2020    LABGLOM 54.5 02/12/2020    GLUCOSE 105 02/12/2020    GLUCOSE 92 08/22/2018    PROT 7.3 02/11/2020    CALCIUM 8.3 02/12/2020    BILITOT 0.7 02/11/2020    ALKPHOS 48 02/11/2020    AST 15 02/11/2020    ALT 12 02/11/2020       POC Tests: No results for input(s): POCGLU, POCNA, POCK, POCCL, POCBUN, POCHEMO, POCHCT in the last 72 hours.     Coags:   Lab Results   Component Value Date    PROTIME 11.3 11/07/2018    INR 1.1 11/07/2018    APTT 32.2 03/02/2015       HCG (If Applicable): No results found for: PREGTESTUR, PREGSERUM, HCG, HCGQUANT     ABGs:   Lab Results   Component Value Date    PHART 7.463 07/16/2015    PO2ART 63 07/16/2015    NNL7SYP 39 07/16/2015    DCQ5MHY 27.8 07/16/2015    BEART 4 07/16/2015    F9BIXRHB 93 07/16/2015        Type & Screen (If Applicable):  No results found for: LABABO, LABRH    Drug/Infectious

## 2020-05-26 NOTE — ANESTHESIA POSTPROCEDURE EVALUATION
Department of Anesthesiology  Postprocedure Note    Patient: Susan Dillon MRN: [id number]  YOB: 1940  Date of evaluation: 5/26/2020  Time:  11:23 AM     Procedure Summary     Date:  05/26/20 Room / Location:  [facility] OR 01 / [facility]    Anesthesia Start:  6965 Anesthesia Stop:      Procedures:       EGD DIAGNOSTIC ONLY (N/A )      COLONOSCOPY DIAGNOSTIC (N/A ) Diagnosis:  (21088/03318 - Gastrointestinal hemorrhage)    Surgeon:  Camila Simpson MD Responsible Provider:  ANNETTA Burgos - STACEY    Anesthesia Type:  MAC ASA Status:  3          Anesthesia Type: MAC    Shay Phase I: Shay Score: 10    Shay Phase II:      Last vitals: Reviewed and per EMR flowsheets.        Anesthesia Post Evaluation    Patient location during evaluation: PACU  Patient participation: complete - patient participated  Level of consciousness: awake and alert  Pain score: 1  Airway patency: patent  Nausea & Vomiting: no nausea and no vomiting  Complications: no  Cardiovascular status: hemodynamically stable  Respiratory status: acceptable and nasal cannula  Hydration status: euvolemic  Comments: Report to RN, normal sinus rhythm

## 2020-06-15 ENCOUNTER — OFFICE VISIT (OUTPATIENT)
Dept: GASTROENTEROLOGY | Age: 80
End: 2020-06-15
Payer: MEDICARE

## 2020-06-15 VITALS
HEIGHT: 70 IN | BODY MASS INDEX: 26.48 KG/M2 | SYSTOLIC BLOOD PRESSURE: 130 MMHG | OXYGEN SATURATION: 97 % | DIASTOLIC BLOOD PRESSURE: 70 MMHG | HEART RATE: 107 BPM | WEIGHT: 185 LBS | RESPIRATION RATE: 22 BRPM

## 2020-06-15 PROCEDURE — 4004F PT TOBACCO SCREEN RCVD TLK: CPT | Performed by: SPECIALIST

## 2020-06-15 PROCEDURE — 1123F ACP DISCUSS/DSCN MKR DOCD: CPT | Performed by: SPECIALIST

## 2020-06-15 PROCEDURE — G8417 CALC BMI ABV UP PARAM F/U: HCPCS | Performed by: SPECIALIST

## 2020-06-15 PROCEDURE — G8427 DOCREV CUR MEDS BY ELIG CLIN: HCPCS | Performed by: SPECIALIST

## 2020-06-15 PROCEDURE — 99213 OFFICE O/P EST LOW 20 MIN: CPT | Performed by: SPECIALIST

## 2020-06-15 PROCEDURE — 4040F PNEUMOC VAC/ADMIN/RCVD: CPT | Performed by: SPECIALIST

## 2020-06-15 RX ORDER — PANTOPRAZOLE SODIUM 40 MG/1
40 TABLET, DELAYED RELEASE ORAL
Qty: 28 TABLET | Refills: 0 | Status: SHIPPED | OUTPATIENT
Start: 2020-06-15 | End: 2020-07-27 | Stop reason: ALTCHOICE

## 2020-06-15 RX ORDER — METRONIDAZOLE 500 MG/1
500 TABLET ORAL 2 TIMES DAILY
Qty: 28 TABLET | Refills: 0 | Status: SHIPPED | OUTPATIENT
Start: 2020-06-15 | End: 2020-06-29

## 2020-06-15 RX ORDER — AMOXICILLIN 500 MG/1
1000 CAPSULE ORAL 2 TIMES DAILY
Qty: 56 CAPSULE | Refills: 0 | Status: SHIPPED | OUTPATIENT
Start: 2020-06-15 | End: 2020-06-29

## 2020-06-15 ASSESSMENT — ENCOUNTER SYMPTOMS
BLOOD IN STOOL: 0
ANAL BLEEDING: 0
ABDOMINAL DISTENTION: 0
VOMITING: 0
NAUSEA: 0
EYES NEGATIVE: 1
DIARRHEA: 0
ABDOMINAL PAIN: 0
SHORTNESS OF BREATH: 1
CONSTIPATION: 0
RECTAL PAIN: 0
GASTROINTESTINAL NEGATIVE: 1

## 2020-06-15 NOTE — PROGRESS NOTES
Comments: normal   Neurological:      Mental Status: He is alert. Laboratory, Pathology, Radiology reviewed in detail with relevantimportant investigations summarized below:    No results for input(s): WBC, HGB, HCT, MCV, PLT in the last 720 hours. Lab Results   Component Value Date    ALT 12 02/11/2020    AST 15 02/11/2020    ALKPHOS 48 02/11/2020    BILITOT 0.7 02/11/2020     No results found. Endoscopic investigations:     Assessment and Plan:  Ligia Virgen Sr. [de-identified] y.o. male for follow up. Iron deficiency anemia and occult GI blood loss. 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 pylori gastritis. Colonoscopy showed benign colon polyps and hemorrhoid, will order capsule endoscopy and start anti-H. pylori therapy, return after 4 weeks. Diagnosis Orders   1. Iron deficiency anemia, unspecified iron deficiency anemia type  Endoscopy, GI with capsule    Endoscopy, GI with capsule   2. Helicobacter pylori gastritis     3. Gastrointestinal hemorrhage, unspecified gastrointestinal hemorrhage type  Endoscopy, GI with capsule       Return in about 8 weeks (around 8/10/2020). Ahmet Avery MD   StaffGastroenterologist  [facility]    Please note this report has been partially produced using speech recognitionsoftware  and may cause contain errors related to that system including grammar, punctuation and spelling as well as words andphrases that may seem inappropriate. If there are questions or concerns please feel free to contact me to clarify.

## 2020-06-18 ENCOUNTER — APPOINTMENT (OUTPATIENT)
Dept: ENDOSCOPY | Age: 80
End: 2020-06-18
Payer: MEDICARE

## 2020-06-18 PROCEDURE — 3609019000 HC EGD CAPSULE ENDOSCOPY

## 2020-06-18 NOTE — PROGRESS NOTES
Pt. Returned. States he had no problems with test today. Live viewed showed capsule in the colon. Instructions explained to patient and told to follow up with Dr. Bhupinder Nicole for results.   Small bowel study complete

## 2020-06-29 ENCOUNTER — TELEPHONE (OUTPATIENT)
Dept: GASTROENTEROLOGY | Age: 80
End: 2020-06-29

## 2020-07-27 ENCOUNTER — OFFICE VISIT (OUTPATIENT)
Dept: FAMILY MEDICINE CLINIC | Age: 80
End: 2020-07-27
Payer: MEDICARE

## 2020-07-27 VITALS
DIASTOLIC BLOOD PRESSURE: 80 MMHG | TEMPERATURE: 97 F | OXYGEN SATURATION: 97 % | BODY MASS INDEX: 26.96 KG/M2 | WEIGHT: 182 LBS | HEIGHT: 69 IN | HEART RATE: 72 BPM | SYSTOLIC BLOOD PRESSURE: 122 MMHG

## 2020-07-27 PROCEDURE — G8926 SPIRO NO PERF OR DOC: HCPCS | Performed by: FAMILY MEDICINE

## 2020-07-27 PROCEDURE — G8427 DOCREV CUR MEDS BY ELIG CLIN: HCPCS | Performed by: FAMILY MEDICINE

## 2020-07-27 PROCEDURE — 3023F SPIROM DOC REV: CPT | Performed by: FAMILY MEDICINE

## 2020-07-27 PROCEDURE — 3288F FALL RISK ASSESSMENT DOCD: CPT | Performed by: FAMILY MEDICINE

## 2020-07-27 PROCEDURE — 1123F ACP DISCUSS/DSCN MKR DOCD: CPT | Performed by: FAMILY MEDICINE

## 2020-07-27 PROCEDURE — 4040F PNEUMOC VAC/ADMIN/RCVD: CPT | Performed by: FAMILY MEDICINE

## 2020-07-27 PROCEDURE — 99213 OFFICE O/P EST LOW 20 MIN: CPT | Performed by: FAMILY MEDICINE

## 2020-07-27 PROCEDURE — 4004F PT TOBACCO SCREEN RCVD TLK: CPT | Performed by: FAMILY MEDICINE

## 2020-07-27 PROCEDURE — G8417 CALC BMI ABV UP PARAM F/U: HCPCS | Performed by: FAMILY MEDICINE

## 2020-07-27 RX ORDER — METOPROLOL SUCCINATE 50 MG/1
50 TABLET, EXTENDED RELEASE ORAL DAILY
Status: ON HOLD | COMMUNITY
End: 2022-07-31

## 2020-07-27 NOTE — PROGRESS NOTES
Chief Complaint   Patient presents with    6 Month Follow-Up     AFIB       HPI:  Shobha Hill Sr. is a 79 y.o. male    11 month checkup    Follows with [facility] for [facility] for quite some time    Follows with GI, pulm, cardio    He is on anti-arrhythmics   Dr. Radha Cheng and Dr. Keturah Robledo  He is on eliquis    Had surgery on left leg by Dr. Manju Byers    Had EGD/colonoscopy and capsule endoscopy - unremarkable    Also had iron infusions with heme  hgb was 12.9 with last check per pt    Past Medical History:   Diagnosis Date    Atrial [facility] ([facility]     CAD (coronary artery disease)     Hematuria     High cholesterol     History of bladder cancer     Hyperlipidemia     Hypertension     Malignant neoplasm of urinary bladder ([facility] 1/27/2020    S/P AAA repair     Spondylosis of lumbar region without myelopathy or radiculopathy      Past Surgical History:   Procedure Laterality Date    ABDOMINAL AORTIC ANEURYSM REPAIR      COLONOSCOPY N/A 5/26/2020    COLONOSCOPY DIAGNOSTIC performed by Samuel Hall [facility] at [facility] 20 Left 2/11/2020    LEFT LOWER EXTREMITY REVASCULARIZATION performed by Eamon Gruber MD at [address] [address] [address] [place] [address] N/A 4/24/2017    COLONOSCOPY performed by Siomara Muse MD at . [facility] 61 ESOPHAGOGASTRODUODENOSCOPY TRANSORAL DIAGNOSTIC N/A 4/24/2017    EGD ESOPHAGOGASTRODUODENOSCOPY performed by Siomara Muse MD at [place] [address] 5/26/2020    EGD DIAGNOSTIC ONLY performed by Samuel Hall MD at [facility]     No family history on file.   Social History     Socioeconomic History    Marital status:      Spouse name: None    Number of children: None    Years of education: None    Highest education level: None   Occupational History    None   Social Needs    Financial resource strain: None    Food insecurity     Worry: None     Inability: None    [Moxifloxacin Hcl In Nacl]      thrush    Mobic [Meloxicam] Other (See Comments)    Moxifloxacin     Z-Sean [Azithromycin] Other (See Comments)     Thrush       Review of Systems:   General ROS: negative for - chills, fatigue, fever, malaise, weight gain or weight loss  Respiratory ROS: no cough, shortness of breath, or wheezing  Cardiovascular ROS: no chest pain or dyspnea on exertion  Gastrointestinal ROS: no abdominal pain, change in bowel habits, or black or bloody stools  Genito-Urinary ROS:history bladder cancer  Musculoskeletal ROS: negative for - gait disturbance, joint pain or joint stiffness  Neurological ROS: negative for - behavioral changes, memory loss, numbness/tingling, tremors or weakness    In general patient otherwise reports feeling well. Physical Exam:  /80 (Site: Left Upper Arm, Position: Sitting, Cuff Size: Medium Adult)   Pulse 72   Temp 97 °F (36.1 °C) (Infrared)   Ht 5' 9\" (1.753 m)   Wt 182 lb (82.6 kg)   SpO2 97%   BMI 26.88 kg/m²     Gen: Well, NAD, Alert, Oriented x 3   HEENT: EOMI, eyes clear, MMM  Skin: without rash or jaundice  Neck: no significant lymphadenopathy or thyromegaly  Lungs: CTA B w/out Rales/Wheezes/Rhonchi, Good respiratory effort   Heart: RRR, S1S2, w/out M/R/G, non-displaced PMI   Ext: No C/C/E Bilaterally. Neuro: Neurovascularly intact w/ Sensory/Motor intact UE/LE Bilaterally. Lab Results   Component Value Date    WBC 7.1 02/11/2020    HGB 8.0 (L) 02/11/2020    HCT 26.4 (L) 02/11/2020     02/11/2020    CHOL 83 02/12/2020    TRIG 91 02/12/2020    HDL 32 (L) 02/12/2020    ALT 12 02/11/2020    AST 15 02/11/2020     02/12/2020    K 4.1 02/12/2020     02/12/2020    CREATININE 1.27 (H) 02/12/2020    BUN 16 02/12/2020    CO2 25 02/12/2020    TSH 1.200 03/13/2020    PSA 1.59 08/26/2019    INR 1.1 11/07/2018    GLUF 89 10/11/2012    LABA1C 5.7 01/04/2014         A&P   Diagnosis Orders   1. Claudication ([facility]     2.  PAD (peripheral artery disease) ([facility]     3. Claudication of lower extremity with history of revascularization ([facility]     4. Iron deficiency anemia, unspecified iron deficiency anemia type     5. Leg cramps     6. Simple chronic bronchitis ([facility]     7. Atrial fibrillation, unspecified type ([facility]     8. Essential hypertension     9.  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

## 2020-08-08 NOTE — TELEPHONE ENCOUNTER
Spoke with pts spouse to let her know we do not have any samples. Asked if we could send in a short supply to local pharmacy and then [facility]. I don't see anywhere that Dr. Jaciel Garibay filled this before?

## 2020-08-10 RX ORDER — RIVAROXABAN 20 MG/1
TABLET, FILM COATED ORAL
Qty: 30 TABLET | Refills: 0 | Status: SHIPPED | OUTPATIENT
Start: 2020-08-10 | End: 2020-08-10 | Stop reason: SDUPTHER

## 2020-08-10 RX ORDER — RIVAROXABAN 20 MG/1
TABLET, FILM COATED ORAL
Qty: 90 TABLET | Refills: 1 | Status: SHIPPED | OUTPATIENT
Start: 2020-08-10 | End: 2020-08-10 | Stop reason: SDUPTHER

## 2020-08-10 RX ORDER — RIVAROXABAN 20 MG/1
TABLET, FILM COATED ORAL
Qty: 30 TABLET | Refills: 0 | Status: SHIPPED | OUTPATIENT
Start: 2020-08-10 | End: 2020-09-02

## 2020-08-10 NOTE — TELEPHONE ENCOUNTER
Pt's wife called, let them know short order and mail away was sent in.  Short order suppose to be to Face [facility], cancelled at [facility] and e-scibed to [facility]

## 2020-08-11 RX ORDER — BENAZEPRIL HYDROCHLORIDE 20 MG/1
TABLET ORAL
Qty: 180 TABLET | Refills: 1 | Status: SHIPPED | OUTPATIENT
Start: 2020-08-11 | End: 2020-12-22 | Stop reason: DRUGHIGH

## 2020-08-11 NOTE — TELEPHONE ENCOUNTER
Patient is requesting 90 day supply. Patient states he only has 2 pills left (1 days worth)    Lov: 7/27/2020  Pharmacy confirmed.

## 2020-09-01 ENCOUNTER — PROCEDURE VISIT (OUTPATIENT)
Dept: UROLOGY | Age: 80
End: 2020-09-01
Payer: MEDICARE

## 2020-09-01 VITALS
BODY MASS INDEX: 27.25 KG/M2 | DIASTOLIC BLOOD PRESSURE: 82 MMHG | HEART RATE: 101 BPM | WEIGHT: 184 LBS | SYSTOLIC BLOOD PRESSURE: 136 MMHG | HEIGHT: 69 IN

## 2020-09-01 LAB
BILIRUBIN, POC: ABNORMAL
BLOOD URINE, POC: ABNORMAL
CLARITY, POC: CLEAR
COLOR, POC: YELLOW
GLUCOSE URINE, POC: ABNORMAL
KETONES, POC: ABNORMAL
LEUKOCYTE EST, POC: ABNORMAL
NITRITE, POC: ABNORMAL
PH, POC: 6
PROTEIN, POC: 100
SPECIFIC GRAVITY, POC: 1.02
UROBILINOGEN, POC: 1

## 2020-09-01 PROCEDURE — 81003 URINALYSIS AUTO W/O SCOPE: CPT | Performed by: UROLOGY

## 2020-09-01 PROCEDURE — 52000 CYSTOURETHROSCOPY: CPT | Performed by: UROLOGY

## 2020-09-01 RX ORDER — DOXYCYCLINE HYCLATE 100 MG/1
100 CAPSULE ORAL ONCE
Qty: 1 CAPSULE | Refills: 0 | COMMUNITY
Start: 2020-09-01 | End: 2020-09-01

## 2020-09-01 ASSESSMENT — ENCOUNTER SYMPTOMS
ABDOMINAL DISTENTION: 0
ABDOMINAL PAIN: 0
SHORTNESS OF BREATH: 0

## 2020-09-01 NOTE — PROGRESS NOTES
Subjective:      Patient ID: Richar Galvan is a 79 y.o. male. HPI  This is a 79 y.o. 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 Since last being seen on 8/28/19, he had a lower ext stent placed and is getting iron infusion for anemia. He has no interval gross hematuria or dysuria and continues to smoke ciggs. I again encouraged cessation. He wants to proceed with cystoscopy today. He elected against PSA testing last year.        Past Medical History:   Diagnosis Date    Atrial fibrillation ([facility]     CAD (coronary artery disease)     Hematuria     High cholesterol     History of bladder cancer     Hyperlipidemia     Hypertension     Malignant neoplasm of urinary bladder ([facility] 1/27/2020    S/P AAA repair     Spondylosis of lumbar region without myelopathy or radiculopathy      Past Surgical History:   Procedure Laterality Date    ABDOMINAL AORTIC ANEURYSM REPAIR      COLONOSCOPY N/A 5/26/2020    COLONOSCOPY DIAGNOSTIC performed by Claudell Demark, MD at [facility] 20 Left 2/11/2020    LEFT LOWER EXTREMITY REVASCULARIZATION performed by Emmett Banegas MD at [address] [facility] SCRN NOT  [address] [place] [address] IND N/A 4/24/2017    COLONOSCOPY performed by Darius Cartagena MD at [facility] ESOPHAGOGASTRODUODENOSCOPY TRANSORAL DIAGNOSTIC N/A 4/24/2017    EGD ESOPHAGOGASTRODUODENOSCOPY performed by Darius Cartagena MD at [place] [address] 5/26/2020    EGD DIAGNOSTIC ONLY performed by Claudell Demark, MD at [facility] Marital status:      Spouse name: None    Number of children: None    Years of education: None    Highest education level: None   Occupational History    None   Social Needs    Financial resource strain: None  Food insecurity     Worry: None     Inability: None    Transportation needs     Medical: None     Non-medical: None   Tobacco Use    Smoking status: Light Tobacco Smoker     Packs/day: 0.33     Years: 30.00     Pack years: 9.90     Types: Cigarettes     Start date: 10/1/1979    Smokeless tobacco: Never Used   Substance and Sexual Activity    Alcohol use: Yes     Comment: occasionally    Drug use: No    Sexual activity: None   Lifestyle    Physical activity     Days per week: None     Minutes per session: None    Stress: None   Relationships    Social connections     Talks on phone: None     Gets together: None     Attends Restorationist service: None     Active member of club or organization: None     Attends meetings of clubs or organizations: None     Relationship status: None    Intimate partner violence     Fear of current or ex partner: None     Emotionally abused: None     Physically abused: None     Forced sexual activity: None   Other Topics Concern    None   Social History Narrative    None     History reviewed. No pertinent family history.   Current Outpatient Medications   Medication Sig Dispense Refill    benazepril (LOTENSIN) 20 MG tablet TAKE TWO TABLETS BY MOUTH EVERY DAY (Patient taking differently: Take 75 mg by mouth daily TAKE TWO TABLETS BY MOUTH EVERY DAY) 180 tablet 1    XARELTO 20 MG TABS tablet TAKE ONE TABLET BY MOUTH DAILY 30 tablet 0    metoprolol succinate (TOPROL XL) 50 MG extended release tablet Take 50 mg by mouth daily      escitalopram (LEXAPRO) 10 MG tablet TAKE ONE TABLET BY MOUTH EVERY DAY 90 tablet 3    rosuvastatin (CRESTOR) 10 MG tablet TAKE ONE TABLET BY MOUTH DAILY  30 tablet 0    sildenafil (VIAGRA) 100 MG tablet TAKE 1 TABLET BY MOUTH DAILY  1 HOUR BEFORE  NEEDED  1    fluticasone-vilanterol (BREO ELLIPTA) 100-25 MCG/INH AEPB inhaler Inhale 1 puff into the lungs daily 1 each 5    metoprolol succinate (TOPROL XL) 25 MG extended release tablet Take 1 tablet by mouth daily 30 tablet 5    amLODIPine (NORVASC) 10 MG tablet Take 5 mg by mouth daily        No current facility-administered medications for this visit. Avelox [moxifloxacin hcl in nacl]; Mobic [meloxicam]; Moxifloxacin; and Z-eugene [azithromycin]  reviewed    Review of Systems   Constitutional: Negative for unexpected weight change. Respiratory: Negative for shortness of breath. Cardiovascular: Negative for chest pain. Gastrointestinal: Negative for abdominal distention and abdominal pain. Genitourinary: Negative for dysuria, frequency and hematuria. Objective:   Physical Exam  Abdominal:      General: There is no distension. Genitourinary:     Penis: Normal.    Neurological:      Mental Status: He is alert. Psychiatric:         Mood and Affect: Mood normal.         Behavior: Behavior normal.           Cystoscopy Procedure Note    Pre-operative Diagnosis: H/O TaG1 UC of bladder     Post-operative Diagnosis: Same plus benign findings     Surgeon: Jeyson Epps      Assistants: INDY Alarcon MA     Anesthesia: Local anesthesia topical 2% lidocaine gel     Procedure Details   The risks, benefits, complications, treatment options, and expected outcomes were discussed with the patient. The patient concurred with the proposed plan, giving informed consent.     Cystoscopy was performed today under local anesthesia, using sterile technique. The patient was placed in the supine position, prepped and draped in the usual sterile fashion. A flexible cystoscope was used to inspect the entire bladder including retroflexion.      Findings:  Anterior urethra: normal  Prostatic Urethra:normal mucosa with bi-lobar hypertrophy of prostate  Bladder: Normal mucosa without tumors, stones, clots or FB  Ureteral orifice(s) were normal . Ureteral orifice(s) were in the normal location.     Specimens: None     Complications:  None; patient tolerated the procedure well.     Disposition:  To home.     Condition: stable  Assessment: This is a 79 y.o. 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 I reassured him of these findings and recommend cystoscopy in 1 yr. Plan:      1.  F/U 1 yr for office cystoscopy, local        Bianca Ro MD

## 2020-09-03 RX ORDER — RIVAROXABAN 20 MG/1
TABLET, FILM COATED ORAL
Qty: 30 TABLET | Refills: 5 | Status: SHIPPED | OUTPATIENT
Start: 2020-09-03 | End: 2020-12-22 | Stop reason: SDUPTHER

## 2020-09-09 ENCOUNTER — VIRTUAL VISIT (OUTPATIENT)
Dept: GASTROENTEROLOGY | Age: 80
End: 2020-09-09
Payer: MEDICARE

## 2020-09-09 PROCEDURE — 99442 PR PHYS/QHP TELEPHONE EVALUATION 11-20 MIN: CPT | Performed by: SPECIALIST

## 2020-09-09 ASSESSMENT — ENCOUNTER SYMPTOMS
CONSTIPATION: 0
ABDOMINAL DISTENTION: 0
BLOOD IN STOOL: 0
RECTAL PAIN: 0
ABDOMINAL PAIN: 0
DIARRHEA: 0
NAUSEA: 0
VOMITING: 0
GASTROINTESTINAL NEGATIVE: 1
ANAL BLEEDING: 0
RESPIRATORY NEGATIVE: 1
EYES NEGATIVE: 1

## 2020-09-09 NOTE — PROGRESS NOTES
Gastroenterology Clinic Follow up Visit    Jeffrey Amin.  [id number]  Chief Complaint   Patient presents with    Follow-up       HPI and A/P at last visit summarized below: This was a telephone encounter. Patient states that he is feeling fine, he states that he had a hemoglobin done through Dr. Walter Vargas office and that was around 15, no abdominal pain having normal bowel movement no nausea no vomiting  Patient finished the antibiotic therapy for H. pylori gastritis and he tolerated the treatment well. This was a telephone encounter. Duration of phone call was 15 minutes  Review of Systems   Constitutional: Negative. HENT: Negative. Eyes: Negative. Respiratory: Negative. Gastrointestinal: Negative. Negative for abdominal distention, abdominal pain, anal bleeding, blood in stool, constipation, diarrhea, nausea, rectal pain and vomiting. Genitourinary: Negative. Musculoskeletal: Negative. Neurological: Negative. Psychiatric/Behavioral: Negative. Past medical history, past surgical history, medication list, social and familyhistory reviewed    There were no vitals taken for this visit. Physical Exam    Laboratory, Pathology, Radiology reviewed in detail with relevantimportant investigations summarized below:    No results for input(s): WBC, HGB, HCT, MCV, PLT in the last 720 hours. Lab Results   Component Value Date    ALT 12 02/11/2020    AST 15 02/11/2020    ALKPHOS 48 02/11/2020    BILITOT 0.7 02/11/2020     No results found. Endoscopic investigations:     Assessment and Plan:  Ingrid Singh Sr. 79 y.o. male for follow up. GI bleeding , history of gastric AVM. History of anemia status post transfusion and iron infusion. Also recent hemoglobin was around 14 and has no signs of any active GI bleeding. Patient to follow-up with the hematologist, will see PRN. Diagnosis Orders   1.  Gastrointestinal hemorrhage, unspecified gastrointestinal hemorrhage type No follow-ups on file. Ligia Love MD   StaffGastroenterologist  [facility]    Please note this report has been partially produced using speech recognitionsoftware  and may cause contain errors related to that system including grammar, punctuation and spelling as well as words andphrases that may seem inappropriate. If there are questions or concerns please feel free to contact me to clarify.

## 2020-12-08 ENCOUNTER — TELEPHONE (OUTPATIENT)
Dept: FAMILY MEDICINE CLINIC | Age: 80
End: 2020-12-08

## 2020-12-22 ENCOUNTER — OFFICE VISIT (OUTPATIENT)
Dept: FAMILY MEDICINE CLINIC | Age: 80
End: 2020-12-22
Payer: MEDICARE

## 2020-12-22 VITALS
DIASTOLIC BLOOD PRESSURE: 82 MMHG | SYSTOLIC BLOOD PRESSURE: 134 MMHG | TEMPERATURE: 97.2 F | OXYGEN SATURATION: 97 % | HEART RATE: 105 BPM | HEIGHT: 69 IN | BODY MASS INDEX: 27.37 KG/M2 | WEIGHT: 184.8 LBS

## 2020-12-22 PROCEDURE — 1123F ACP DISCUSS/DSCN MKR DOCD: CPT | Performed by: FAMILY MEDICINE

## 2020-12-22 PROCEDURE — G8484 FLU IMMUNIZE NO ADMIN: HCPCS | Performed by: FAMILY MEDICINE

## 2020-12-22 PROCEDURE — 4040F PNEUMOC VAC/ADMIN/RCVD: CPT | Performed by: FAMILY MEDICINE

## 2020-12-22 PROCEDURE — G0438 PPPS, INITIAL VISIT: HCPCS | Performed by: FAMILY MEDICINE

## 2020-12-22 RX ORDER — BENAZEPRIL HYDROCHLORIDE 20 MG/1
TABLET ORAL
Qty: 180 TABLET | Refills: 1 | Status: SHIPPED | COMMUNITY
Start: 2020-12-22 | End: 2021-05-06

## 2020-12-22 RX ORDER — RIVAROXABAN 20 MG/1
TABLET, FILM COATED ORAL
Qty: 7 TABLET | Refills: 0 | COMMUNITY
Start: 2020-12-22 | End: 2022-02-08

## 2020-12-22 ASSESSMENT — LIFESTYLE VARIABLES
HOW OFTEN DO YOU HAVE A DRINK CONTAINING ALCOHOL: 3
HOW OFTEN DO YOU HAVE SIX OR MORE DRINKS ON ONE OCCASION: 0
AUDIT-C TOTAL SCORE: 3
HOW MANY STANDARD DRINKS CONTAINING ALCOHOL DO YOU HAVE ON A TYPICAL DAY: 0

## 2020-12-22 ASSESSMENT — PATIENT HEALTH QUESTIONNAIRE - PHQ9
SUM OF ALL RESPONSES TO PHQ9 QUESTIONS 1 & 2: 0
SUM OF ALL RESPONSES TO PHQ QUESTIONS 1-9: 0
2. FEELING DOWN, DEPRESSED OR HOPELESS: 0
1. LITTLE INTEREST OR PLEASURE IN DOING THINGS: 0

## 2020-12-22 NOTE — PROGRESS NOTES
Atrial fibrillation ([facility]     CAD (coronary artery disease)     Hematuria     High cholesterol     History of bladder cancer     Hyperlipidemia     Hypertension     Malignant neoplasm of urinary bladder ([facility] 1/27/2020    S/P AAA repair     Spondylosis of lumbar region without myelopathy or radiculopathy        Past Surgical History:   Procedure Laterality Date    ABDOMINAL AORTIC ANEURYSM REPAIR      COLONOSCOPY N/A 5/26/2020    COLONOSCOPY DIAGNOSTIC performed by Juma Bowens MD at [facility] Left 2/11/2020    LEFT LOWER EXTREMITY REVASCULARIZATION performed by Stephanie Mooney MD at [address] [place] [facility]CRN NOT  [address] IND N/A 4/24/2017    COLONOSCOPY performed by Bettie Nicole MD at . [address] ESOPHAGOGASTRODUODENOSCOPY TRANSORAL DIAGNOSTIC N/A 4/24/2017    EGD ESOPHAGOGASTRODUODENOSCOPY performed by Bettie Nicole MD at [place] [address] 5/26/2020    EGD DIAGNOSTIC ONLY performed by Juma Bowens MD at [facility]       History reviewed. No pertinent family history. CareTeam (Including outside providers/suppliers regularly involved in providing care):   Patient Care Team:  Cooper Talamantes MD as PCP - General (Family Medicine)  Cooper Talamantes MD as PCP - [facility]  Tate Pittman MD as Consulting Physician (Cardiology)  Carlos Steve MD (Urology)    Wt Readings from Last 3 Encounters:   12/22/20 184 lb 12.8 oz (83.8 kg)   09/01/20 184 lb (83.5 kg)   07/27/20 182 lb (82.6 kg)     Vitals:    12/22/20 1524   BP: 134/82   Site: Left Upper Arm   Pulse: 105   Temp: 97.2 °F (36.2 °C)   SpO2: 97%   Weight: 184 lb 12.8 oz (83.8 kg)   Height: 5' 9\" (1.753 m)     Body mass index is 27.29 kg/m². Based upon direct observation of the patient, evaluation of cognition reveals recent and remote memory intact.     Physical Exam:  /82 (Site: Left Upper Arm) Pulse 105   Temp 97.2 °F (36.2 °C)   Ht 5' 9\" (1.753 m)   Wt 184 lb 12.8 oz (83.8 kg)   SpO2 97%   BMI 27.29 kg/m²     Gen: Well, NAD, Alert, Oriented x 3   HEENT: EOMI, eyes clear, MMM  Skin: likely SK base left neck  Neck: no significant lymphadenopathy or thyromegaly  Lungs: CTA B w/out Rales/Wheezes/Rhonchi, Good respiratory effort   Heart: RRR, S1S2, w/out M/R/G, non-displaced PMI   Ext: No C/C/E Bilaterally. Neuro: Neurovascularly intact w/ Sensory/Motor intact UE/LE Bilaterally. Patient's complete Health Risk Assessment and screening values have been reviewed and are found in Flowsheets. The following problems were reviewed today and where indicated follow up appointments were made and/or referrals ordered. Positive Risk Factor Screenings with Interventions:         Substance History:  Social History     Tobacco History     Smoking Status  Light Tobacco Smoker Smoking Start Date  10/1/1979 Smoking Frequency  0.33 packs/day for 30 years (9.9 pk yrs) Smoking Tobacco Type  Cigarettes    Smokeless Tobacco Use  Never Used          Alcohol History     Alcohol Use Status  Yes Comment  occasionally          Drug Use     Drug Use Status  No          Sexual Activity     Sexually Active  Not Asked               Alcohol Screening: Audit-C Score: 3    A score of 8 or more is associated with harmful or hazardous drinking. A score of 13 or more in women, and 15 or more in men, is likely to indicate alcohol dependence.   Substance Abuse Interventions:  · Encouraged to quit smoking     Health Habits/Nutrition:  Health Habits/Nutrition  Do you exercise for at least 20 minutes 2-3 times per week?: (!) No  Have you lost any weight without trying in the past 3 months?: No  Do you eat fewer than 2 meals per day?: No  Have you seen a dentist within the past year?: Yes  Body mass index: (!) 27.29  Health Habits/Nutrition Interventions:  · Inadequate physical activity:  educational materials provided to promote increased physical activity    Hearing/Vision:  No exam data present  Hearing/Vision  Do you or your family notice any trouble with your hearing?: (!) Yes  Do you have difficulty driving, watching TV, or doing any of your daily activities because of your eyesight?: No  Have you had an eye exam within the past year?: Yes  Hearing/Vision Interventions:  · suggest hearing aids      Personalized Preventive Plan   Current Health Maintenance Status  Immunization History   Administered Date(s) Administered    Influenza Vaccine, unspecified formulation 09/25/2012, 01/20/2014, 12/10/2015, 12/12/2016    Influenza Virus Vaccine 09/29/2019    Influenza, High Dose (Fluzone 65 yrs and older) 12/29/2017, 10/16/2018    Influenza, Triv, inactivated, subunit, adjuvanted, IM (Fluad 65 yrs and older) 09/28/2019    Pneumococcal Conjugate 13-valent ([id number]) 01/13/2017    Pneumococcal Polysaccharide ([id number]) 04/27/2008, 01/17/2017, 08/14/2018    Zoster Recombinant (Shingrix) 02/19/2019, 07/17/2019        Health Maintenance   Topic Date Due    DTaP/Tdap/Td vaccine (1 - Tdap) 07/16/1959    Annual Wellness Visit (AWV)  05/29/2019    Lipid screen  02/12/2021    Potassium monitoring  02/12/2021    Creatinine monitoring  02/12/2021    Colon cancer screen colonoscopy  05/26/2023    Flu vaccine  Completed    Shingles Vaccine  Completed    Pneumococcal 65+ years Vaccine  Completed    Hepatitis A vaccine  Aged Out    Hepatitis B vaccine  Aged Out    Hib vaccine  Aged Out    Meningococcal (ACWY) vaccine  Aged Out     Recommendations for [employer] Due: see orders and patient instructions/AVS.  . Recommended screening schedule for the next 5-10 years is provided to the patient in written form: see Patient Blu Luu was seen today for medicare awv.     Diagnoses and all orders for this visit:    Routine general medical examination at a health care facility    Claudication [facility])    PAD (peripheral artery disease) ([facility]  -     XARELTO 20 MG TABS tablet; TAKE 1 TABLET BY MOUTH EVERY DAY    Lot [id number]  Exo 9/2022    Iron deficiency anemia, unspecified iron deficiency anemia type    Atrial fibrillation, unspecified type ([facility]    Essential hypertension    S/P AAA repair    Spondylosis of lumbar region without myelopathy or radiculopathy    Malignant neoplasm of urinary bladder, unspecified site (HCC)    Depression, unspecified depression type             Chronic conditions are stable  Continue current regimen  Follow up with appropriate specialists and here routinely for ongoing monitoring of chronic conditions          Jason Yeh MD

## 2020-12-22 NOTE — PATIENT INSTRUCTIONS
Personalized Preventive Plan for Gloria Palma Sr. - 12/22/2020  Medicare offers a range of preventive health benefits. Some of the tests and screenings are paid in full while other may be subject to a deductible, co-insurance, and/or copay. Some of these benefits include a comprehensive review of your medical history including lifestyle, illnesses that may run in your family, and various assessments and screenings as appropriate. After reviewing your medical record and screening and assessments performed today your provider may have ordered immunizations, labs, imaging, and/or referrals for you. A list of these orders (if applicable) as well as your Preventive Care list are included within your After Visit Summary for your review. Other Preventive Recommendations:    · A preventive eye exam performed by an eye specialist is recommended every 1-2 years to screen for glaucoma; cataracts, macular degeneration, and other eye disorders. · A preventive dental visit is recommended every 6 months. · Try to get at least 150 minutes of exercise per week or 10,000 steps per day on a pedometer . · Order or download the FREE \"Exercise & Physical Activity: Your Everyday Guide\" from [employer]. Call 7-138.846.8666 or search [employer] online. · You need 8123-5025 mg of calcium and 1985-9212 IU of vitamin D per day. It is possible to meet your calcium requirement with diet alone, but a vitamin D supplement is usually necessary to meet this goal.  · When exposed to the sun, use a sunscreen that protects against both UVA and UVB radiation with an SPF of 30 or greater. Reapply every 2 to 3 hours or after sweating, drying off with a towel, or swimming. · Always wear a seat belt when traveling in a car. Always wear a helmet when riding a bicycle or motorcycle.

## 2021-01-27 ENCOUNTER — PROCEDURE VISIT (OUTPATIENT)
Dept: FAMILY MEDICINE CLINIC | Age: 81
End: 2021-01-27
Payer: MEDICARE

## 2021-01-27 DIAGNOSIS — C67.9 MALIGNANT NEOPLASM OF URINARY BLADDER, UNSPECIFIED SITE (HCC): ICD-10-CM

## 2021-01-27 DIAGNOSIS — L82.0 SEBORRHEIC KERATOSES, INFLAMED: Primary | ICD-10-CM

## 2021-01-27 DIAGNOSIS — I73.9 CLAUDICATION (HCC): ICD-10-CM

## 2021-01-27 DIAGNOSIS — L82.0 SEBORRHEIC KERATOSES, INFLAMED: ICD-10-CM

## 2021-01-27 DIAGNOSIS — J41.0 SIMPLE CHRONIC BRONCHITIS (HCC): ICD-10-CM

## 2021-01-27 DIAGNOSIS — I48.91 ATRIAL FIBRILLATION, UNSPECIFIED TYPE (HCC): ICD-10-CM

## 2021-01-27 PROCEDURE — 4004F PT TOBACCO SCREEN RCVD TLK: CPT | Performed by: FAMILY MEDICINE

## 2021-01-27 PROCEDURE — 4040F PNEUMOC VAC/ADMIN/RCVD: CPT | Performed by: FAMILY MEDICINE

## 2021-01-27 PROCEDURE — G8417 CALC BMI ABV UP PARAM F/U: HCPCS | Performed by: FAMILY MEDICINE

## 2021-01-27 PROCEDURE — G8427 DOCREV CUR MEDS BY ELIG CLIN: HCPCS | Performed by: FAMILY MEDICINE

## 2021-01-27 PROCEDURE — 11307 SHAVE SKIN LESION 1.1-2.0 CM: CPT | Performed by: FAMILY MEDICINE

## 2021-01-27 PROCEDURE — G8484 FLU IMMUNIZE NO ADMIN: HCPCS | Performed by: FAMILY MEDICINE

## 2021-01-27 PROCEDURE — 99213 OFFICE O/P EST LOW 20 MIN: CPT | Performed by: FAMILY MEDICINE

## 2021-01-27 PROCEDURE — G8926 SPIRO NO PERF OR DOC: HCPCS | Performed by: FAMILY MEDICINE

## 2021-01-27 PROCEDURE — 3023F SPIROM DOC REV: CPT | Performed by: FAMILY MEDICINE

## 2021-01-27 PROCEDURE — 1123F ACP DISCUSS/DSCN MKR DOCD: CPT | Performed by: FAMILY MEDICINE

## 2021-01-27 RX ORDER — LIDOCAINE HYDROCHLORIDE AND EPINEPHRINE BITARTRATE 20; .01 MG/ML; MG/ML
1 INJECTION, SOLUTION SUBCUTANEOUS ONCE
Status: COMPLETED | OUTPATIENT
Start: 2021-01-27 | End: 2021-01-27

## 2021-01-27 RX ADMIN — LIDOCAINE HYDROCHLORIDE AND EPINEPHRINE BITARTRATE 1 ML: 20; .01 INJECTION, SOLUTION SUBCUTANEOUS at 15:37

## 2021-01-27 NOTE — PROGRESS NOTES
Chief Complaint   Patient presents with    Procedure       HPI:  Ozzie Correa is a 79 y.o. male    Mainly here for procedure    Inflamed SK on neck    Needs handicap placard    Wants otc med recommendation for GERD    Follows with [facility] for A-fib for quite some time    Follows with GI, pulm, cardio    He is on anti-arrhythmics   Dr. Magalis Wood and Dr. Rome Underwood  He is on eliquis              Past Medical History:   Diagnosis Date    Atrial fibrillation [facility])     CAD (coronary artery disease)     Hematuria     High cholesterol     History of bladder cancer     Hyperlipidemia     Hypertension     Malignant neoplasm of urinary bladder ([address] 1/27/2020    S/P AAA repair     Spondylosis of lumbar region without myelopathy or radiculopathy      Past Surgical History:   Procedure Laterality Date    ABDOMINAL AORTIC ANEURYSM REPAIR      COLONOSCOPY N/A 5/26/2020    COLONOSCOPY DIAGNOSTIC performed by Caron Marroquin MD at [facility] 20 Left 2/11/2020    LEFT LOWER EXTREMITY REVASCULARIZATION performed by Libby Sandifer, MD at [address] [place] [address] N/A 4/24/2017    COLONOSCOPY performed by Kemar Espinosa MD at [facility] 61 ESOPHAGOGASTRODUODENOSCOPY TRANSORAL DIAGNOSTIC N/A 4/24/2017    EGD ESOPHAGOGASTRODUODENOSCOPY performed by Kemar Espinosa MD at [place] [address] 5/26/2020    EGD DIAGNOSTIC ONLY performed by Caron Marroquin MD at [facility]     History reviewed. No pertinent family history.   Social History     Socioeconomic History    Marital status:      Spouse name: None    Number of children: None    Years of education: None    Highest education level: None   Occupational History    None   Social Needs    Financial resource strain: None    Food insecurity     Worry: None     Inability: None    Transportation needs     Medical: None     Non-medical: None Tobacco Use    Smoking status: Light Tobacco Smoker     Packs/day: 0.33     Years: 30.00     Pack years: 9.90     Types: Cigarettes     Start date: 10/1/1979    Smokeless tobacco: Never Used   Substance and Sexual Activity    Alcohol use: Yes     Comment: occasionally    Drug use: No    Sexual activity: None   Lifestyle    Physical activity     Days per week: None     Minutes per session: None    Stress: None   Relationships    Social connections     Talks on phone: None     Gets together: None     Attends Buddhist service: None     Active member of club or organization: None     Attends meetings of clubs or organizations: None     Relationship status: None    Intimate partner violence     Fear of current or ex partner: None     Emotionally abused: None     Physically abused: None     Forced sexual activity: None   Other Topics Concern    None   Social History Narrative    None     Current Outpatient Medications   Medication Sig Dispense Refill    Handicap Placard MISC by Does not apply route Exp 5 years 1 each 0    XARELTO 20 MG TABS tablet TAKE 1 TABLET BY MOUTH EVERY DAY    Lot [id number]  Exo 9/2022 7 tablet 0    benazepril (LOTENSIN) 20 MG tablet TAKE TWO TABLETS BY MOUTH EVERY  tablet 1    Fluticasone furoate-vilanterol (BREO ELLIPTA) 200-25 MCG/INH AEPB inhaler Inhale 1 puff into the lungs daily Lot [id number]  Exp 3/21 2 each 0    metoprolol succinate (TOPROL XL) 50 MG extended release tablet Take 50 mg by mouth daily      escitalopram (LEXAPRO) 10 MG tablet TAKE ONE TABLET BY MOUTH EVERY DAY 90 tablet 3    rosuvastatin (CRESTOR) 10 MG tablet TAKE ONE TABLET BY MOUTH DAILY  30 tablet 0    sildenafil (VIAGRA) 100 MG tablet TAKE 1 TABLET BY MOUTH DAILY  1 HOUR BEFORE  NEEDED  1    fluticasone-vilanterol (BREO ELLIPTA) 100-25 MCG/INH AEPB inhaler Inhale 1 puff into the lungs daily 1 each 5    metoprolol succinate (TOPROL XL) 25 MG extended release tablet Take 1 tablet by mouth daily (Patient taking differently: Take 75 mg by mouth daily ) 30 tablet 5    amLODIPine (NORVASC) 10 MG tablet Take 5 mg by mouth daily        Current Facility-Administered Medications   Medication Dose Route Frequency Provider Last Rate Last Admin    lidocaine-EPINEPHrine 2 percent-1:500694 injection 1 mL  1 mL Intradermal Once Lowell Potter MD         Allergies   Allergen Reactions    Avelox [Moxifloxacin Hcl In Nacl]      thrush    Mobic [Meloxicam] Other (See Comments)    Moxifloxacin     Z-Sean [Azithromycin] Other (See Comments)     Thrush       Review of Systems:   General ROS: negative for - chills, fatigue, fever, malaise, weight gain or weight loss  Respiratory ROS: no cough, shortness of breath, or wheezing  Cardiovascular ROS: no chest pain or dyspnea on exertion  Gastrointestinal ROS: no abdominal pain, change in bowel habits, or black or bloody stools  Genito-Urinary ROS:history bladder cancer  Musculoskeletal ROS: negative for - gait disturbance, joint pain or joint stiffness  Neurological ROS: negative for - behavioral changes, memory loss, numbness/tingling, tremors or weakness    In general patient otherwise reports feeling well. Physical Exam:  There were no vitals taken for this visit.     Gen: Well, NAD, Alert, Oriented x 3   HEENT: EOMI, eyes clear, MMM  Skin: inflamed 1.5 cm SK left base neck anteriorly  Neck: no significant lymphadenopathy or thyromegaly  Lungs: CTA B w/out Rales/Wheezes/Rhonchi, Good respiratory effort   Heart: RRR, S1S2, w/out M/R/G, non-displaced PMI     Lab Results   Component Value Date    WBC 7.1 02/11/2020    HGB 8.0 (L) 02/11/2020    HCT 26.4 (L) 02/11/2020     02/11/2020    CHOL 83 02/12/2020    TRIG 91 02/12/2020    HDL 32 (L) 02/12/2020    ALT 12 02/11/2020    AST 15 02/11/2020     02/12/2020    K 4.1 02/12/2020     02/12/2020    CREATININE 1.27 (H) 02/12/2020    BUN 16 02/12/2020    CO2 25 02/12/2020    TSH 1.200 03/13/2020    PSA 1.59 08/26/2019    INR 1.1 11/07/2018    GLUF 89 10/11/2012    LABA1C 5.7 01/04/2014         A&P   Diagnosis Orders   1. Seborrheic keratoses, inflamed  63631 - MS SHAV SKIN LES 11-20MM REMAINDR BODY    Surgical Pathology    lidocaine-EPINEPHrine 2 percent-1:769709 injection 1 mL   2. Simple chronic bronchitis ([address]     3. Malignant neoplasm of urinary bladder, unspecified site (HCC)     4. Claudication ([address]  Handicap Placard MISC   5. 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 male who presents for minor skin surgery. The patient understands all risks, benefits, indications, potential complications, and alternatives, and freely consents for the procedure. The patient also understands the option of performing no surgery, the risk for scarring, and the technique of the procedure. ANESTHESIA:  Local.    TECHNIQUE:  After informed consent was obtained, and after the skin was prepped and draped, 2% lidocaine with epinephrine for anesthetic was injected around and underneath the site. shave excision was performed. A dressing was applied and wound care instructions were provided. Darío tolerated the procedure well and without complications. The patient will be alert for any signs of cutaneous infection and will follow up as instructed.

## 2021-02-28 DIAGNOSIS — F32.A DEPRESSION, UNSPECIFIED DEPRESSION TYPE: ICD-10-CM

## 2021-03-01 RX ORDER — ESCITALOPRAM OXALATE 10 MG/1
TABLET ORAL
Qty: 90 TABLET | Refills: 2 | Status: SHIPPED | OUTPATIENT
Start: 2021-03-01 | End: 2022-05-02

## 2021-03-04 RX ORDER — BENAZEPRIL HYDROCHLORIDE 20 MG/1
TABLET ORAL
Qty: 180 TABLET | Refills: 1 | OUTPATIENT
Start: 2021-03-04

## 2021-03-04 NOTE — TELEPHONE ENCOUNTER
Pt calling to request refill.   States he has one day left of script    Lov: 12/22/2020  Pharmacy confirmed with pts - [email or web address]

## 2021-04-08 RX ORDER — BENAZEPRIL HYDROCHLORIDE 20 MG/1
TABLET ORAL
Qty: 60 TABLET | Refills: 1 | OUTPATIENT
Start: 2021-04-08

## 2021-04-12 LAB
ALBUMIN SERPL-MCNC: 4.1 G/DL (ref 3.5–4.6)
ALP BLD-CCNC: 60 U/L (ref 35–104)
ALT SERPL-CCNC: 19 U/L (ref 0–41)
ANION GAP SERPL CALCULATED.3IONS-SCNC: 10 MEQ/L (ref 9–15)
AST SERPL-CCNC: 23 U/L (ref 0–40)
BILIRUB SERPL-MCNC: 1.3 MG/DL (ref 0.2–0.7)
BILIRUBIN DIRECT: <0.2 MG/DL (ref 0–0.4)
BILIRUBIN, INDIRECT: ABNORMAL MG/DL (ref 0–0.6)
BUN BLDV-MCNC: 17 MG/DL (ref 8–23)
CALCIUM SERPL-MCNC: 9.2 MG/DL (ref 8.5–9.9)
CHLORIDE BLD-SCNC: 105 MEQ/L (ref 95–107)
CHOLESTEROL, TOTAL: 146 MG/DL (ref 0–199)
CO2: 27 MEQ/L (ref 20–31)
CREAT SERPL-MCNC: 1.14 MG/DL (ref 0.7–1.2)
GFR AFRICAN AMERICAN: >60
GFR NON-AFRICAN AMERICAN: >60
GLUCOSE BLD-MCNC: 83 MG/DL (ref 70–99)
HCT VFR BLD CALC: 49.9 % (ref 42–52)
HDLC SERPL-MCNC: 49 MG/DL (ref 40–59)
HEMOGLOBIN: 16.8 G/DL (ref 14–18)
LDL CHOLESTEROL CALCULATED: 80 MG/DL (ref 0–129)
MCH RBC QN AUTO: 32.2 PG (ref 27–31.3)
MCHC RBC AUTO-ENTMCNC: 33.6 % (ref 33–37)
MCV RBC AUTO: 96 FL (ref 80–100)
PDW BLD-RTO: 13.9 % (ref 11.5–14.5)
PLATELET # BLD: 138 K/UL (ref 130–400)
POTASSIUM SERPL-SCNC: 4 MEQ/L (ref 3.4–4.9)
RBC # BLD: 5.2 M/UL (ref 4.7–6.1)
SODIUM BLD-SCNC: 142 MEQ/L (ref 135–144)
TOTAL PROTEIN: 7.5 G/DL (ref 6.3–8)
TRIGL SERPL-MCNC: 86 MG/DL (ref 0–150)
WBC # BLD: 6.3 K/UL (ref 4.8–10.8)

## 2021-05-06 RX ORDER — BENAZEPRIL HYDROCHLORIDE 20 MG/1
TABLET ORAL
Qty: 60 TABLET | Refills: 1 | Status: SHIPPED | OUTPATIENT
Start: 2021-05-06 | End: 2021-05-13 | Stop reason: SDUPTHER

## 2021-05-10 RX ORDER — BENAZEPRIL HYDROCHLORIDE 20 MG/1
TABLET ORAL
Qty: 60 TABLET | Refills: 1 | OUTPATIENT
Start: 2021-05-10

## 2021-05-13 RX ORDER — BENAZEPRIL HYDROCHLORIDE 20 MG/1
TABLET ORAL
Qty: 180 TABLET | Refills: 1 | Status: SHIPPED | OUTPATIENT
Start: 2021-05-13 | End: 2021-10-18

## 2021-05-13 NOTE — TELEPHONE ENCOUNTER
Pt calling back script from 5/6 was supposed to be for 90 I have it set up now the script failed in Transmission needs to be resent

## 2021-08-12 ENCOUNTER — OFFICE VISIT (OUTPATIENT)
Dept: FAMILY MEDICINE CLINIC | Age: 81
End: 2021-08-12
Payer: MEDICARE

## 2021-08-12 VITALS
HEART RATE: 95 BPM | DIASTOLIC BLOOD PRESSURE: 70 MMHG | BODY MASS INDEX: 27.23 KG/M2 | TEMPERATURE: 97.7 F | OXYGEN SATURATION: 100 % | WEIGHT: 184.4 LBS | SYSTOLIC BLOOD PRESSURE: 140 MMHG

## 2021-08-12 DIAGNOSIS — L08.9 SKIN INFECTION: ICD-10-CM

## 2021-08-12 DIAGNOSIS — S61.011A LACERATION OF RIGHT THUMB WITHOUT DAMAGE TO NAIL, FOREIGN BODY PRESENCE UNSPECIFIED, INITIAL ENCOUNTER: Primary | ICD-10-CM

## 2021-08-12 PROCEDURE — 4004F PT TOBACCO SCREEN RCVD TLK: CPT | Performed by: NURSE PRACTITIONER

## 2021-08-12 PROCEDURE — G8417 CALC BMI ABV UP PARAM F/U: HCPCS | Performed by: NURSE PRACTITIONER

## 2021-08-12 PROCEDURE — 4040F PNEUMOC VAC/ADMIN/RCVD: CPT | Performed by: NURSE PRACTITIONER

## 2021-08-12 PROCEDURE — 99213 OFFICE O/P EST LOW 20 MIN: CPT | Performed by: NURSE PRACTITIONER

## 2021-08-12 PROCEDURE — G8427 DOCREV CUR MEDS BY ELIG CLIN: HCPCS | Performed by: NURSE PRACTITIONER

## 2021-08-12 PROCEDURE — 1123F ACP DISCUSS/DSCN MKR DOCD: CPT | Performed by: NURSE PRACTITIONER

## 2021-08-12 RX ORDER — AMOXICILLIN AND CLAVULANATE POTASSIUM 875; 125 MG/1; MG/1
1 TABLET, FILM COATED ORAL 2 TIMES DAILY
Qty: 14 TABLET | Refills: 0 | Status: SHIPPED | OUTPATIENT
Start: 2021-08-12 | End: 2021-08-19

## 2021-08-12 SDOH — ECONOMIC STABILITY: FOOD INSECURITY: WITHIN THE PAST 12 MONTHS, YOU WORRIED THAT YOUR FOOD WOULD RUN OUT BEFORE YOU GOT MONEY TO BUY MORE.: NEVER TRUE

## 2021-08-12 SDOH — ECONOMIC STABILITY: FOOD INSECURITY: WITHIN THE PAST 12 MONTHS, THE FOOD YOU BOUGHT JUST DIDN'T LAST AND YOU DIDN'T HAVE MONEY TO GET MORE.: NEVER TRUE

## 2021-08-12 ASSESSMENT — PATIENT HEALTH QUESTIONNAIRE - PHQ9
SUM OF ALL RESPONSES TO PHQ QUESTIONS 1-9: 0
SUM OF ALL RESPONSES TO PHQ9 QUESTIONS 1 & 2: 0
2. FEELING DOWN, DEPRESSED OR HOPELESS: 0
1. LITTLE INTEREST OR PLEASURE IN DOING THINGS: 0

## 2021-08-12 ASSESSMENT — ENCOUNTER SYMPTOMS
NAUSEA: 0
DIARRHEA: 0

## 2021-08-12 ASSESSMENT — SOCIAL DETERMINANTS OF HEALTH (SDOH): HOW HARD IS IT FOR YOU TO PAY FOR THE VERY BASICS LIKE FOOD, HOUSING, MEDICAL CARE, AND HEATING?: NOT VERY HARD

## 2021-08-12 NOTE — PROGRESS NOTES
Subjective  Breana Childers ., 79 y.o. male presents today with:  Chief Complaint   Patient presents with    Laceration     Rt thumb is swollen and tingeling,  cut on the fish 9days ago. HPI   Presents to [facility] for hand swelling  Affected area is the right thumb   Injury initially occurred when damaged thumb on side of a fish. Describes this area of fish as \"razor-like\"  Has been cleaning fish recently   Came to office today d/t thumb swelling at site of initial injury   Denies erythema or pain to this site  Denies drainage from area   Denies fever or chills  Denies joint swelling or discomfort in thumb, hand or wrist   Denies weakness, numbness or tingling to right hand               Past Medical History:   Diagnosis Date    At[facility]     CAD (coronary artery disease)     Hematuria     High cholesterol     History of bladder cancer     Hyperlipidemia     Hypertension     Malignant neoplasm of urinary bladder ([facility] 1/27/2020    S/P AAA repair     Spondylosis of lumbar region without myelopathy or radiculopathy       Past Surgical History:   Procedure Laterality Date    ABDOMINAL AORTIC ANEURYSM REPAIR      COLONOSCOPY N/A 5/26/2020    COLONOSCOPY DIAGNOSTIC performed by Nicole Amaya MD at [facility] 20 Left 2/11/2020    LEFT LOWER EXTREMITY REVASCULARIZATION performed by Velma Herring MD at [address] [place] [address] N/A 4/24/2017    COLONOSCOPY performed by Jenny Cross MD at [facility] 61 ESOPHAGOGASTRODUODENOSCOPY TRANSORAL DIAGNOSTIC N/A 4/24/2017    EGD ESOPHAGOGASTRODUODENOSCOPY performed by Jenny Cross MD at [address] [place] [address] 5/26/2020    EGD DIAGNOSTIC ONLY performed by Nicole Amaya MD at [facility]     No family history on file.     Review of Systems   Constitutional: Negative for activity change, appetite change, chills, diaphoresis, fatigue and fever. Gastrointestinal: Negative for diarrhea and nausea. Musculoskeletal: Negative for arthralgias, myalgias, neck pain and neck stiffness. Skin: Positive for wound. Neurological: Negative for dizziness, weakness, light-headedness and headaches. PMH, Surgical Hx, Family Hx, and Social Hx reviewed and updated. Health Maintenance reviewed. Objective  Vitals:    08/12/21 1646 08/12/21 1654   BP: (!) 140/70 (!) 140/70   Site: Left Upper Arm Left Upper Arm   Position: Sitting Sitting   Cuff Size: Medium Adult Medium Adult   Pulse: 95    Temp: 97.7 °F (36.5 °C)    TempSrc: Tympanic    SpO2: 100%    Weight: 184 lb 6.4 oz (83.6 kg)      BP Readings from Last 3 Encounters:   08/12/21 (!) 140/70   12/22/20 134/82   09/01/20 136/82     Wt Readings from Last 3 Encounters:   08/12/21 184 lb 6.4 oz (83.6 kg)   12/22/20 184 lb 12.8 oz (83.8 kg)   09/01/20 184 lb (83.5 kg)           Physical Exam  Vitals reviewed. Constitutional:       Appearance: Normal appearance. Eyes:      Conjunctiva/sclera: Conjunctivae normal.   Cardiovascular:      Rate and Rhythm: Normal rate. Pulmonary:      Effort: Pulmonary effort is normal.   Musculoskeletal:      Cervical back: Normal range of motion. Skin:     General: Skin is warm. Capillary Refill: Capillary refill takes less than 2 seconds. Findings: Wound present. Comments: Site of laceration marked. Dry skin present over the site of wound. Flesh colored, fluctuant area of swelling present. No erythema, warmth or active drainage. No tenderness with palpation. Neurological:      General: No focal deficit present. Mental Status: He is alert and oriented to person, place, and time. Psychiatric:         Mood and Affect: Mood normal.               Assessment & Plan    Diagnosis Orders   1.  Laceration of right thumb without damage to nail, foreign body presence unspecified, initial encounter  amoxicillin-clavulanate (AUGMENTIN) 875-125 MG per tablet   2. Skin infection  amoxicillin-clavulanate (AUGMENTIN) 875-125 MG per tablet     No orders of the defined types were placed in this encounter. Orders Placed This Encounter   Medications    amoxicillin-clavulanate (AUGMENTIN) 875-125 MG per tablet     Sig: Take 1 tablet by mouth 2 times daily for 7 days     Dispense:  14 tablet     Refill:  0     If symptoms worsen or fail to improve, follow up with Dr. Indu Dunbar or Dr. Dalila Rutherford to evaluate site    Reviewed with the patient: current clinical status & medications. Side effects, adverse effects of themedication prescribed today, as well as treatment plan/rationale and result expectations have been discussed with the patient who expressed understanding. Close follow up to evaluate treatment results and for coordination of care. I have reviewed the patient's medical history in detail and updated the computerized patient record.           ANNETTA Lindsay NP

## 2021-09-03 ENCOUNTER — PROCEDURE VISIT (OUTPATIENT)
Dept: UROLOGY | Age: 81
End: 2021-09-03
Payer: MEDICARE

## 2021-09-03 VITALS
HEIGHT: 70 IN | DIASTOLIC BLOOD PRESSURE: 80 MMHG | WEIGHT: 185 LBS | SYSTOLIC BLOOD PRESSURE: 132 MMHG | HEART RATE: 83 BPM | BODY MASS INDEX: 26.48 KG/M2

## 2021-09-03 DIAGNOSIS — C67.9 MALIGNANT NEOPLASM OF URINARY BLADDER, UNSPECIFIED SITE (HCC): Primary | ICD-10-CM

## 2021-09-03 LAB
BILIRUBIN, POC: ABNORMAL
BLOOD URINE, POC: ABNORMAL
CLARITY, POC: CLEAR
COLOR, POC: YELLOW
GLUCOSE URINE, POC: ABNORMAL
KETONES, POC: ABNORMAL
LEUKOCYTE EST, POC: ABNORMAL
NITRITE, POC: ABNORMAL
PH, POC: 6
PROTEIN, POC: ABNORMAL
SPECIFIC GRAVITY, POC: 1.02
UROBILINOGEN, POC: 0.2

## 2021-09-03 PROCEDURE — 52000 CYSTOURETHROSCOPY: CPT | Performed by: UROLOGY

## 2021-09-03 PROCEDURE — 81003 URINALYSIS AUTO W/O SCOPE: CPT | Performed by: UROLOGY

## 2021-09-03 RX ORDER — DOXYCYCLINE HYCLATE 100 MG/1
100 CAPSULE ORAL ONCE
Qty: 1 CAPSULE | Refills: 0 | COMMUNITY
Start: 2021-09-03 | End: 2021-09-03

## 2021-09-03 ASSESSMENT — ENCOUNTER SYMPTOMS: ABDOMINAL PAIN: 0

## 2021-09-03 NOTE — PROGRESS NOTES
Subjective:      Patient ID: Sultana Oden. is a 79 y.o. male. HPI  This is a 81 y.o. 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 Since last being seen on 9/1/20, he has no hematuria or dysuria or pain. He has no new LUTs. He has no new medical or surgical problems. He continues to smoke ciggs. I again encouraged cessation. He wants to proceed with cystoscopy today.  He elected against PSA testing in past.     Past Medical History:   Diagnosis Date    [facility]     CAD (coronary artery disease)     Hematuria     High cholesterol     History of bladder cancer     Hyperlipidemia     Hypertension     Malignant neoplasm of urinary bladder ([facility] 1/27/2020    S/P AAA repair     Spondylosis of lumbar region without myelopathy or radiculopathy      Past Surgical History:   Procedure Laterality Date    ABDOMINAL AORTIC ANEURYSM REPAIR      COLONOSCOPY N/A 5/26/2020    COLONOSCOPY DIAGNOSTIC performed by Jordyn Dumont MD at [facility] 20 Left 2/11/2020    LEFT LOWER EXTREMITY REVASCULARIZATION performed by Zahra Purvis MD at [facility] S[facility] NOT  [address] [place] [address] N/A 4/24/2017    COLONOSCOPY performed by Kamille Grant MD at [facility] 61 ESOPHAGOGASTRODUODENOSCOPY TRANSORAL DIAGNOSTIC N/A 4/24/2017    EGD ESOPHAGOGASTRODUODENOSCOPY performed by Kamille Grant MD at [place] [address] 5/26/2020    EGD DIAGNOSTIC ONLY performed by Jordyn Dumont MD at [facility] Marital status:      Spouse name: None    Number of children: None    Years of education: None    Highest education level: None   Occupational History    None   Tobacco Use    Smoking status: Light Tobacco mouth daily      rosuvastatin (CRESTOR) 10 MG tablet TAKE ONE TABLET BY MOUTH DAILY  30 tablet 0    metoprolol succinate (TOPROL XL) 25 MG extended release tablet Take 1 tablet by mouth daily (Patient taking differently: Take 75 mg by mouth daily ) 30 tablet 5    amLODIPine (NORVASC) 10 MG tablet Take 5 mg by mouth daily        No current facility-administered medications for this visit. Avelox [moxifloxacin hcl in nacl], Mobic [meloxicam], Moxifloxacin, and Z-eugene [azithromycin]  reviewed      Review of Systems   Constitutional: Negative for unexpected weight change. Gastrointestinal: Negative for abdominal pain. Genitourinary: Negative for difficulty urinating, dysuria, flank pain and hematuria. Objective:   Physical Exam  Constitutional:       Appearance: Normal appearance. Abdominal:      General: There is no distension. Neurological:      Mental Status: He is alert. Psychiatric:         Mood and Affect: Mood normal.       9/3/2021  9:59 AM - Kenneth Potst CMA ([facility])    Component Collected Lab   Color,  09/03/2021  9:58 AM Unknown   yellow    Clarity, UA 09/03/2021  9:58 AM Unknown   clear    Glucose, UA [facility] 09/03/2021  9:58 AM Unknown   neg    Bilirubin, UA 09/03/2021  9:58 AM Unknown   neg    Ketones, UA 09/03/2021  9:58 AM Unknown   neg    Spec Grav, UA 09/03/2021  9:58 AM Unknown   1.025    Blood, UA [facility] 09/03/2021  9:58 AM Unknown   small    pH, UA 09/03/2021  9:58 AM Unknown   6.0    Protein, UA [facility] 09/03/2021  9:58 AM Unknown   moderate    Urobilinogen, UA 09/03/2021  9:58 AM Unknown   0.2    Leukocytes, UA 09/03/2021  9:58 AM Unknown   neg    Nitrite, UA 09/03/2021  9:58 AM Unknown   neg    Lab and Collection      Cystoscopy Procedure Note    Pre-operative Diagnosis: H/O TaG1 UC of bladder     Post-operative Diagnosis: Same plus benign findings     Surgeon: Jordy Epps      Assistants: Rajani Recio     Anesthesia: Local anesthesia topical 2% lidocaine gel     Procedure [address] [place] [address]  The risks, benefits, complications, treatment options, and expected outcomes were discussed with the patient. The patient concurred with the proposed plan, giving informed consent.     Cystoscopy was performed today under local anesthesia, using sterile technique. The patient was placed in the supine position, prepped and draped in the usual sterile fashion. A flexible cystoscope was used to inspect the entire bladder including retroflexion.      Findings:  Anterior urethra: normal  Prostatic Urethra:normal mucosa with tri-lobar hypertrophy of prostate  Bladder: Normal mucosa without tumors, stones, clots or FB  Ureteral orifice(s) were normal . Ureteral orifice(s) were in the normal location.     Specimens: None     Complications:  None; patient tolerated the procedure well.     Disposition: To home.     Condition: stable    Assessment: This is a 81 y. o. 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 I reassured him of these findings and recommend cystoscopy in 1 yr. Plan:      1.  F/U 1 yr for office cystoscopy, local        Carmela Strange MD

## 2021-09-03 NOTE — PROGRESS NOTES
Time Out was called before Cystoscopy procedure. Patient is Derek Haines.,  is 1940. Patient has the following allergies: Allergies   Allergen Reactions    Avelox [Moxifloxacin Hcl In Nacl]      thrush    Mobic [Meloxicam] Other (See Comments)    Moxifloxacin     Z-Sean [Azithromycin] Other (See Comments)     Thrush   . Patient was given Doxycycline  antibiotic before the procedure.

## 2021-09-20 ENCOUNTER — HOSPITAL ENCOUNTER (EMERGENCY)
Age: 81
Discharge: HOME | End: 2021-09-20
Attending: STUDENT IN AN ORGANIZED HEALTH CARE EDUCATION/TRAINING PROGRAM
Payer: MEDICARE

## 2021-09-20 ENCOUNTER — APPOINTMENT (OUTPATIENT)
Dept: GENERAL RADIOLOGY | Age: 81
End: 2021-09-20
Payer: MEDICARE

## 2021-09-20 VITALS
TEMPERATURE: 98 F | HEIGHT: 70 IN | HEART RATE: 98 BPM | OXYGEN SATURATION: 94 % | BODY MASS INDEX: 26.48 KG/M2 | DIASTOLIC BLOOD PRESSURE: 98 MMHG | RESPIRATION RATE: 18 BRPM | SYSTOLIC BLOOD PRESSURE: 153 MMHG | WEIGHT: 185 LBS
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
## 2021-09-20 DIAGNOSIS — S61.412A LACERATION OF LEFT HAND, FOREIGN BODY PRESENCE UNSPECIFIED, INITIAL ENCOUNTER: Primary | ICD-10-CM

## 2021-09-20 PROCEDURE — 2500000003 HC RX 250 WO HCPCS: Performed by: STUDENT IN AN ORGANIZED HEALTH CARE EDUCATION/TRAINING PROGRAM

## 2021-09-20 PROCEDURE — 73130 X-RAY EXAM OF HAND: CPT

## 2021-09-20 PROCEDURE — 6360000002 HC RX W HCPCS: Performed by: STUDENT IN AN ORGANIZED HEALTH CARE EDUCATION/TRAINING PROGRAM

## 2021-09-20 PROCEDURE — 12002 RPR S/N/AX/GEN/TRNK2.6-7.5CM: CPT

## 2021-09-20 PROCEDURE — 90715 TDAP VACCINE 7 YRS/> IM: CPT | Performed by: STUDENT IN AN ORGANIZED HEALTH CARE EDUCATION/TRAINING PROGRAM

## 2021-09-20 PROCEDURE — 90471 IMMUNIZATION ADMIN: CPT | Performed by: STUDENT IN AN ORGANIZED HEALTH CARE EDUCATION/TRAINING PROGRAM

## 2021-09-20 PROCEDURE — 6370000000 HC RX 637 (ALT 250 FOR IP): Performed by: STUDENT IN AN ORGANIZED HEALTH CARE EDUCATION/TRAINING PROGRAM

## 2021-09-20 PROCEDURE — 99283 EMERGENCY DEPT VISIT LOW MDM: CPT

## 2021-09-20 PROCEDURE — 2580000003 HC RX 258: Performed by: STUDENT IN AN ORGANIZED HEALTH CARE EDUCATION/TRAINING PROGRAM

## 2021-09-20 RX ORDER — OXYCODONE HYDROCHLORIDE AND ACETAMINOPHEN 5; 325 MG/1; MG/1
1 TABLET ORAL EVERY 6 HOURS PRN
Qty: 10 TABLET | Refills: 0 | Status: SHIPPED | OUTPATIENT
Start: 2021-09-20 | End: 2021-09-23

## 2021-09-20 RX ORDER — MAGNESIUM HYDROXIDE 1200 MG/15ML
1000 LIQUID ORAL CONTINUOUS
Status: DISCONTINUED | OUTPATIENT
Start: 2021-09-20 | End: 2021-09-21 | Stop reason: HOSPADM

## 2021-09-20 RX ORDER — AMOXICILLIN AND CLAVULANATE POTASSIUM 875; 125 MG/1; MG/1
1 TABLET, FILM COATED ORAL ONCE
Status: COMPLETED | OUTPATIENT
Start: 2021-09-20 | End: 2021-09-20

## 2021-09-20 RX ORDER — AMOXICILLIN AND CLAVULANATE POTASSIUM 875; 125 MG/1; MG/1
1 TABLET, FILM COATED ORAL 2 TIMES DAILY
Qty: 20 TABLET | Refills: 0 | Status: SHIPPED | OUTPATIENT
Start: 2021-09-20 | End: 2021-09-30

## 2021-09-20 RX ORDER — HYDROCODONE BITARTRATE AND ACETAMINOPHEN 5; 325 MG/1; MG/1
1 TABLET ORAL ONCE
Status: COMPLETED | OUTPATIENT
Start: 2021-09-20 | End: 2021-09-20

## 2021-09-20 RX ORDER — LIDOCAINE HYDROCHLORIDE AND EPINEPHRINE 10; 10 MG/ML; UG/ML
20 INJECTION, SOLUTION INFILTRATION; PERINEURAL ONCE
Status: COMPLETED | OUTPATIENT
Start: 2021-09-20 | End: 2021-09-20

## 2021-09-20 RX ADMIN — HYDROCODONE BITARTRATE AND ACETAMINOPHEN 1 TABLET: 5; 325 TABLET ORAL at 19:45

## 2021-09-20 RX ADMIN — TETANUS TOXOID, REDUCED DIPHTHERIA TOXOID AND ACELLULAR PERTUSSIS VACCINE, ADSORBED 0.5 ML: 5; 2.5; 8; 8; 2.5 SUSPENSION INTRAMUSCULAR at 19:52

## 2021-09-20 RX ADMIN — SODIUM CHLORIDE 1000 ML: 900 IRRIGANT IRRIGATION at 19:45

## 2021-09-20 RX ADMIN — LIDOCAINE HYDROCHLORIDE AND EPINEPHRINE 20 ML: 10; 10 INJECTION, SOLUTION INFILTRATION; PERINEURAL at 20:17

## 2021-09-20 RX ADMIN — AMOXICILLIN AND CLAVULANATE POTASSIUM 1 TABLET: 875; 125 TABLET, FILM COATED ORAL at 19:45

## 2021-09-20 ASSESSMENT — PAIN SCALES - GENERAL
PAINLEVEL_OUTOF10: 4
PAINLEVEL_OUTOF10: 5
PAINLEVEL_OUTOF10: 5

## 2021-09-20 ASSESSMENT — ENCOUNTER SYMPTOMS
BACK PAIN: 0
SINUS PRESSURE: 0
DIARRHEA: 0
CHEST TIGHTNESS: 0
VOMITING: 0
COUGH: 0
SHORTNESS OF BREATH: 0
TROUBLE SWALLOWING: 0
ABDOMINAL PAIN: 0

## 2021-09-20 ASSESSMENT — PAIN DESCRIPTION - DESCRIPTORS: DESCRIPTORS: BURNING

## 2021-09-20 NOTE — ED TRIAGE NOTES
Pt to ER with c/o laceration to left hand, first 3 fingers pt cut on saw blade, pt states it feels like burning, pain 5/10, pt a&ox4, resp even and unlabored, pt on xaralto

## 2021-09-21 NOTE — ED NOTES
Patient discharged home without any questions or concerns. Lacerations wrapped.      Abena Krause RN  09/20/21 0510

## 2021-09-21 NOTE — ED PROVIDER NOTES
[facility] [facility] ED  eMERGENCY dEPARTMENT eNCOUnter      Pt Name: Neeraj Underwood. MRN: [id number]  [place] 1940  Date of evaluation: 9/20/2021  Provider: Genia Francois, [address] [place] [address]       Chief Complaint   Patient presents with    Laceration     left hand, cut on sawblade         HISTORY OF PRESENT ILLNESS   (Location/Symptom, Timing/Onset,Context/Setting, Quality, Duration, Modifying Factors, Severity)  Note limiting factors. Neeraj Underwood is a 79 y.o. male who presents to the emergency department with complaint that he hit his left index, left middle and left ring finger with a sawblade at home. Patient has sensation intact is able to extend his digits and flex against resistance. He is on Xarelto and is bleeding. Patient does not know when his last tetanus shot was. Sensation is intact on exam.    Patient describes the pain as burning in quality. Touching it makes it worse. The history is provided by the patient and the spouse. NursingNotes were reviewed. REVIEW OF SYSTEMS    (2-9 systems for level 4, 10 or more for level 5)     Review of Systems   Constitutional: Negative for activity change, appetite change, chills, fever and unexpected weight change. HENT: Negative for drooling, ear pain, nosebleeds, sinus pressure and trouble swallowing. Respiratory: Negative for cough, chest tightness and shortness of breath. Cardiovascular: Negative for chest pain and leg swelling. Gastrointestinal: Negative for abdominal pain, diarrhea and vomiting. Endocrine: Negative for polydipsia and polyphagia. Genitourinary: Negative for dysuria, flank pain and frequency. Musculoskeletal: Negative for back pain and myalgias. Skin: Positive for wound. Negative for pallor and rash. Neurological: Negative for syncope, weakness and headaches. Hematological: Does not bruise/bleed easily. All other systems reviewed and are negative.       Except as noted above the remainder of the review of systems was reviewed and negative.        PAST MEDICAL HISTORY     Past Medical History:   Diagnosis Date    [facility]     CAD (coronary artery disease)     Hematuria     High cholesterol     History of bladder cancer     Hyperlipidemia     Hypertension     Malignant ne[facility] of urinary bladder [facility] 1/27/2020    S/P AAA repair     Spondylosis of lumbar region without myelopathy or radiculopathy          SURGICALHISTORY       Past Surgical History:   Procedure Laterality Date    ABDOMINAL AORTIC ANEURYSM REPAIR      COLONOSCOPY N/A 5/26/2020    COLONOSCOPY DIAGNOSTIC performed by Alger Soulier, MD at  [facility] Left 2/11/2020    LEFT LOWER EXTREMITY REVASCULARIZATION performed by Hermilo Mohr MD at [facility]. [place] SCRN NOT  W [address] [place] [address] IND N/A 4/24/2017    [facility] ESOPHAGOGASTRODUODENOSCOPY TRANSORAL DIAGNOSTIC N/A 4/24/2017    EGD ESOPHAGOGASTRODUODENOSCOPY performed by Germaine Adkins MD at [place] [address] 5/26/2020    EGD DIAGNOSTIC ONLY performed by Alger Soulier, MD at [address] [place] [address]       Previous Medications    AMLODIPINE (NORVASC) 10 MG TABLET    Take 5 mg by mouth daily     BENAZEPRIL (LOTENSIN) 20 MG TABLET    TAKE 2 TABLETS BY MOUTH ONCE DAILY    ESCITALOPRAM (LEXAPRO) 10 MG TABLET    TAKE 1 TABLET BY MOUTH EVERY DAY    HANDICAP PLACARD MISC    by Does not apply route Exp 5 years    METOPROLOL SUCCINATE (TOPROL XL) 25 MG EXTENDED RELEASE TABLET    Take 1 tablet by mouth daily    METOPROLOL SUCCINATE (TOPROL XL) 50 MG EXTENDED RELEASE TABLET    Take 50 mg by mouth daily    RIVAROXABAN (XARELTO) 20 MG TABS TABLET    Take 1 tablet by mouth daily    ROSUVASTATIN (CRESTOR) 10 MG TABLET    TAKE ONE TABLET BY MOUTH DAILY     XARELTO 20 MG TABS TABLET level 5)     ED Triage Vitals [09/20/21 1912]   BP Temp Temp Source Pulse Resp SpO2 Height Weight   (!) 153/98 98 °F (36.7 °C) Temporal 98 18 94 % 5' 10\" (1.778 m) 185 lb (83.9 kg)       Physical Exam  Vitals and nursing note reviewed. Constitutional:       General: He is awake. Appearance: Normal appearance. He is well-developed and normal weight. He is not ill-appearing, toxic-appearing or diaphoretic. Comments: No photophobia. No phonophobia. HENT:      Head: Normocephalic and atraumatic. No Zavala's sign. Right Ear: External ear normal.      Left Ear: External ear normal.      Nose: Nose normal. No congestion or rhinorrhea. Mouth/Throat:      Mouth: Mucous membranes are moist.      Pharynx: Oropharynx is clear. No oropharyngeal exudate or posterior oropharyngeal erythema. Eyes:      General: No scleral icterus. Right eye: No foreign body or discharge. Left eye: No discharge. Extraocular Movements: Extraocular movements intact. Conjunctiva/sclera: Conjunctivae normal.      Left eye: No exudate. Pupils: Pupils are equal, round, and reactive to light. Neck:      Vascular: No JVD. Trachea: No tracheal deviation. Comments: No meningismus. Cardiovascular:      Rate and Rhythm: Normal rate and regular rhythm. Heart sounds: Normal heart sounds. Heart sounds not distant. No murmur heard. No friction rub. No gallop. Pulmonary:      Effort: Pulmonary effort is normal. No respiratory distress. Breath sounds: Normal breath sounds. No stridor. No wheezing, rhonchi or rales. Chest:      Chest wall: No tenderness. Abdominal:      General: Abdomen is flat. Bowel sounds are normal. There is no distension. Palpations: Abdomen is soft. There is no mass. Tenderness: There is no abdominal tenderness. There is no right CVA tenderness, left CVA tenderness, guarding or rebound. Hernia: No hernia is present.    Musculoskeletal: General: No swelling, tenderness, deformity or signs of injury. Normal range of motion. Hands:       Cervical back: Normal range of motion and neck supple. No rigidity. Lymphadenopathy:      Head:      Right side of head: No submental adenopathy. Left side of head: No submental adenopathy. Skin:     General: Skin is warm and dry. Capillary Refill: Capillary refill takes less than 2 seconds. Coloration: Skin is not jaundiced or pale. Findings: No bruising, erythema, lesion or rash. Neurological:      General: No focal deficit present. Mental Status: He is alert and oriented to person, place, and time. Mental status is at baseline. Cranial Nerves: No cranial nerve deficit. Sensory: No sensory deficit. Motor: No weakness. Coordination: Coordination normal.      Deep Tendon Reflexes: Reflexes are normal and symmetric. Psychiatric:         Mood and Affect: Mood normal.         Behavior: Behavior normal. Behavior is cooperative. Thought Content: Thought content normal.         Judgment: Judgment normal.         DIAGNOSTIC RESULTS     EKG: All EKG's are interpreted by the Emergency Department Physician who either signs or Co-signsthis chart in the absence of a cardiologist.        RADIOLOGY:   Deette Key such as CT, Ultrasound and MRI are read by the radiologist. Bro eRid radiographic images are visualized and preliminarily interpreted by the emergency physician with the below findings:    X-ray left hand: No fractures, questionable foreign object in the left index finger, no dislocation. Interpretation per the Radiologist below, if available at the time ofthis note:    XR HAND LEFT (MIN 3 VIEWS)    (Results Pending)         ED BEDSIDE ULTRASOUND:   Performed by ED Physician - none    LABS:  Labs Reviewed - No data to display    All other labs were within normal range or not returned as of this dictation.     EMERGENCY DEPARTMENT COURSE and DIFFERENTIAL DIAGNOSIS/MDM:   Vitals:    Vitals:    09/20/21 1912   BP: (!) 153/98   Pulse: 98   Resp: 18   Temp: 98 °F (36.7 °C)   TempSrc: Temporal   SpO2: 94%   Weight: 185 lb (83.9 kg)   Height: 5' 10\" (1.778 m)           MDM  Patient had deep tissue lacerations across his distal fingers. No bony involvement. Wound was irrigated out of the wound. Foreign body had shown up on x-ray. Patient given Augmentin. ER physician informed patient that it is possible the foreign body can still be present. The ER physician placed horizontal mattress sutures and 1 subcuticular suture (subcuticular suture was to the left ring finger). Good approximation. Patient was neurovascular intact. Patient is able to resist against the physician's finger both in flexion and extension. Good cap refill less than 2 seconds. Patient vital to return if worsening symptoms or signs of infection such as pus or red streaks or swelling. The patient was also informed if he has worsening bleeding he is to return to the emergency room. Patient's tetanus was updated. Patient started Augmentin with first dose given in the ER. CONSULTS:  None    PROCEDURES:  Unless otherwise noted below, none     Lac Repair    Date/Time: 9/20/2021 9:30 PM  Performed by: Emigdio Coleman DO  Authorized by: Emigdio Coleman DO     Consent:     Consent obtained:  Verbal    Consent given by:  Patient and spouse    Risks discussed:  Infection, pain, retained foreign body, need for additional repair, poor cosmetic result and poor wound healing    Alternatives discussed:  Referral  Anesthesia (see MAR for exact dosages):      Anesthesia method:  Nerve block    Block needle gauge:  25 G    Block anesthetic:  Lidocaine 1% WITH epi    Block injection procedure:  Anatomic landmarks identified    Block outcome:  Anesthesia achieved  Laceration details:     Location:  Finger    Finger location:  L ring finger (Left index finger, left middle finger, and left ring finger)    Length (cm):  7    Depth (mm):  15  Exploration:     Wound exploration: wound explored through full range of motion and entire depth of wound probed and visualized      Wound extent: foreign bodies/material (wood)      Contaminated: yes    Treatment:     Area cleansed with:  Betadine and saline    Amount of cleaning:  Extensive    Irrigation solution:  Sterile saline    Irrigation volume:  1000    Irrigation method:  Pressure wash    Visualized foreign bodies/material removed: yes    Skin repair:     Repair method:  Sutures    Suture size:  5-0    Suture material:  Nylon    Suture technique:  Horizontal mattress (Two horizontal mattress and one simple interrupted suture left index finger.; There are two horizontal mattress sutures left middle finger; there are two horizontal mattress and three simple abrupted and one pursestring subcuticular suture with 5-0 Vicryl )    Number of sutures:  11  Approximation:     Approximation:  Close  Post-procedure details:     Dressing:  Adhesive bandage    Patient tolerance of procedure: Tolerated well, no immediate complications        FINAL IMPRESSION      1. Laceration of left hand, foreign body presence unspecified, initial encounter    2. Contact with powered saw as cause of accidental injury          DISPOSITION/PLAN   DISPOSITION Decision To Discharge 09/20/2021 10:58:46 PM      PATIENT REFERRED TO:  Trista Pimentel MD  [address]  [phone]    Call in 1 day      Allyn Yates DO  [address]   [facility] [phone]    Call in 1 day        DISCHARGE MEDICATIONS:  New Prescriptions    AMOXICILLIN-CLAVULANATE (AUGMENTIN) 875-125 MG PER TABLET    Take 1 tablet by mouth 2 times daily for 10 days    OXYCODONE-ACETAMINOPHEN (PERCOCET) 5-325 MG PER TABLET    Take 1 tablet by mouth every 6 hours as needed for Pain for up to 3 days. WARNING:  May cause drowsiness.   May impair ability to operate vehicles or machinery. Do not use in combination with alcohol.           (Please note that portions of this note were completed with a voice recognition program.  Efforts were made to edit the dictations but occasionally words are mis-transcribed.)    Eduardo Castaneda DO (electronically signed)  Attending Emergency Physician          Eduardo Castaneda DO  09/20/21 [id number]

## 2021-09-22 ENCOUNTER — TELEPHONE (OUTPATIENT)
Dept: FAMILY MEDICINE CLINIC | Age: 81
End: 2021-09-22

## 2021-09-22 NOTE — TELEPHONE ENCOUNTER
----- Message from Radha Granados sent at 9/22/2021  1:57 PM EDT -----  Subject: Message to Provider    QUESTIONS  Information for Provider? pt received an email informing his to schedule   an ED f/u; he would like to go to [address] [facility] to get his stitches removed;   please call pt if he need to scheduled an appt with Dr. Leigha Rodarte  ---------------------------------------------------------------------------  --------------  CALL BACK INFO  What is the best way for the office to contact you? OK to leave message on   voicemail  Preferred Call Back Phone Number? [phone]  ---------------------------------------------------------------------------  --------------  SCRIPT ANSWERS  Relationship to Patient?  Self

## 2021-09-30 ENCOUNTER — OFFICE VISIT (OUTPATIENT)
Dept: FAMILY MEDICINE CLINIC | Age: 81
End: 2021-09-30
Payer: MEDICARE

## 2021-09-30 VITALS
SYSTOLIC BLOOD PRESSURE: 138 MMHG | WEIGHT: 186 LBS | BODY MASS INDEX: 26.63 KG/M2 | HEIGHT: 70 IN | HEART RATE: 53 BPM | DIASTOLIC BLOOD PRESSURE: 86 MMHG | OXYGEN SATURATION: 98 %

## 2021-09-30 DIAGNOSIS — S61.219D: Primary | ICD-10-CM

## 2021-09-30 PROCEDURE — 1123F ACP DISCUSS/DSCN MKR DOCD: CPT | Performed by: FAMILY MEDICINE

## 2021-09-30 PROCEDURE — 4040F PNEUMOC VAC/ADMIN/RCVD: CPT | Performed by: FAMILY MEDICINE

## 2021-09-30 PROCEDURE — G8427 DOCREV CUR MEDS BY ELIG CLIN: HCPCS | Performed by: FAMILY MEDICINE

## 2021-09-30 PROCEDURE — G8417 CALC BMI ABV UP PARAM F/U: HCPCS | Performed by: FAMILY MEDICINE

## 2021-09-30 PROCEDURE — 4004F PT TOBACCO SCREEN RCVD TLK: CPT | Performed by: FAMILY MEDICINE

## 2021-09-30 PROCEDURE — 99214 OFFICE O/P EST MOD 30 MIN: CPT | Performed by: FAMILY MEDICINE

## 2021-09-30 NOTE — PROGRESS NOTES
Diagnosis Orders   1. Laceration of finger without complication, subsequent encounter       Return in about 4 days (around 10/4/2021) for Suture removal.  Patient Instructions   Incision/Laceration repair    -Clean surgical area with antibacterial soap and water once daily. --You may be instructed to soak the wound with Hydrogen Peroxide to loosen scabbing around sutures, this is not to be done more often that every 3 days, should be for 30 seconds-1 min and then rinsed off with water.     -Keep surgical site moist with vaseline or antibiotic ointment (single, not tripleantibiotic or Neosporin) and apply a fresh bandage daily until a solid scab forms or if the wound is at risk for trauma or dirt.     -Follow up immediately if any growing redness (minimal redness or pale pink is normal along wound edges) surrounds the surgical site or if dripping drainage occurs at surgical site. Once a solid scab forms no more bandage needed. A wet scab can look yellow. This is not infection, just moisture.  -Once the lesion is healed be sure to apply sunscreen to the area to prevent burn of the newer and more delicate skin during the first 6 months of healing.    -If the scar begins to be raised you may massage the area firmly twice a day to help break down scar tissue and help the area become a flat scar. There is some evidence that Mederma applied to a scar daily for the first few months can help shrink and fade it more quickly then without intervention. Subjective:      Patient ID: Mango Tenorio is a 79 y.o. male who presents for:  Chief Complaint   Patient presents with   [address] from [facility]     9/20/21    Laceration    Suture / Staple Removal    Advance Care Planning       Patient states the 5 sutures of been holding well his fingers are feeling good. Sensation is fine. Limited swelling. Limited pain no pus.   No fevers      Current Outpatient Medications on File Prior to Visit   Medication Sig Dispense Refill    benazepril (LOTENSIN) 20 MG tablet TAKE 2 TABLETS BY MOUTH ONCE DAILY 180 tablet 1    escitalopram (LEXAPRO) 10 MG tablet TAKE 1 TABLET BY MOUTH EVERY DAY 90 tablet 2    Handicap Placard MISC by Does not apply route Exp 5 years 1 each 0    XARELTO 20 MG TABS tablet TAKE 1 TABLET BY MOUTH EVERY DAY    Lot [id number]  Exo 9/2022 7 tablet 0    metoprolol succinate (TOPROL XL) 50 MG extended release tablet Take 50 mg by mouth daily      rosuvastatin (CRESTOR) 10 MG tablet TAKE ONE TABLET BY MOUTH DAILY  30 tablet 0    metoprolol succinate (TOPROL XL) 25 MG extended release tablet Take 1 tablet by mouth daily (Patient taking differently: Take 75 mg by mouth daily ) 30 tablet 5    amLODIPine (NORVASC) 10 MG tablet Take 5 mg by mouth daily       rivaroxaban (XARELTO) 20 MG TABS tablet Take 1 tablet by mouth daily 30 tablet 5     No current facility-administered medications on file prior to visit.      Past Medical History:   Diagnosis Date    Atrial fibrillation ([facility] [address]     CAD (coronary artery disease)     Hematuria     High cholesterol     History of bladder cancer     Hyperlipidemia     Hypertension     Malignant neoplasm of urinary bladder ([facility] [address] 1/27/2020    S/P AAA repair     Spondylosis of lumbar region without myelopathy or radiculopathy      Past Surgical History:   Procedure Laterality Date    ABDOMINAL AORTIC ANEURYSM REPAIR      COLONOSCOPY N/A 5/26/2020    COLONOSCOPY DIAGNOSTIC performed by Ozzy Banerjee MD at [facility] 20 Left 2/11/2020    LEFT LOWER EXTREMITY REVASCULARIZATION performed by Wendy Caballero MD at [address] [facility] [place] SCRN NOT  W [facility] IND N/A 4/24/2017    COLONOSCOPY performed by Noemi Oneal MD at [address] ESOPHAGOGASTRODUODENOSCOPY TRANSORAL DIAGNOSTIC N/A 4/24/2017    EGD ESOPHAGOGASTRODUODENOSCOPY performed by Noemi Oneal MD at [facility] ENDOSCOPY N/A 5/26/2020    EGD DIAGNOSTIC ONLY performed by Jeffrey Del Angel MD at [address]  Marital status:      Spouse name: Not on file    Number of children: Not on file    Years of education: Not on file    Highest education level: Not on file   Occupational History    Not on file   Tobacco Use    Smoking status: Light Tobacco Smoker     Packs/day: 0.33     Years: 30.00     Pack years: 9.90     Types: Cigarettes     Start date: 10/1/1979    Smokeless tobacco: Never Used   Vaping Use    Vaping Use: Never used   Substance and Sexual Activity    Alcohol use: Yes     Comment: occasionally    Drug use: No    Sexual activity: Not on file   Other Topics Concern    Not on file   Social History Narrative    Not on file     Social Determinants of Health     Financial Resource Strain: Low Risk     Difficulty of Paying Living Expenses: Not very hard   Food Insecurity: No Food Insecurity    Worried About Running Out of Food in the Last Year: Never true    Marcell of Food in the Last Year: Never true   Transportation Needs:     Lack of Transportation (Medical):  Lack of Transportation (Non-Medical):    Physical Activity:     Days of Exercise per Week:     Minutes of Exercise per Session:    Stress:     Feeling of Stress :    Social Connections:     Frequency of Communication with Friends and Family:     Frequency of Social Gatherings with Friends and Family:     Attends Scientology Services:     Active Member of Clubs or Organizations:     Attends Club or Organization Meetings:     Marital Status:    Intimate Partner Violence:     Fear of Current or Ex-Partner:     Emotionally Abused:     Physically Abused:     Sexually Abused:      No family history on file. Allergies: Avelox [moxifloxacin hcl in nacl], Mobic [meloxicam], Moxifloxacin, and Z-eugene [azithromycin]    Review of Systems   Constitutional: Negative for chills and fever. Skin: Positive for wound. Allergic/Immunologic: Negative for environmental allergies, food allergies and immunocompromised state. Hematological: Negative for adenopathy. Does not bruise/bleed easily. Psychiatric/Behavioral: Negative for behavioral problems and sleep disturbance. Objective:   /86   Pulse 53   Ht 5' 10\" (1.778 m)   Wt 186 lb (84.4 kg)   SpO2 98%   BMI 26.69 kg/m²     Physical Exam  Vitals reviewed. Constitutional:       General: He is not in acute distress. Appearance: He is well-developed. HENT:      Head: Normocephalic and atraumatic. Right Ear: External ear normal.      Left Ear: External ear normal.      Nose: Nose normal.   Eyes:      General:         Right eye: No discharge. Left eye: No discharge. Conjunctiva/sclera: Conjunctivae normal.      Pupils: Pupils are equal, round, and reactive to light. Neck:      Thyroid: No thyromegaly. Cardiovascular:      Rate and Rhythm: Normal rate and regular rhythm. Pulmonary:      Effort: Pulmonary effort is normal. No respiratory distress. Abdominal:      General: There is no distension. Musculoskeletal:      Cervical back: Neck supple. Skin:     General: Skin is warm and dry. Comments: Sutures intact on the palmar aspect of 3 digits, left hand digit #2 3 and 4. Thickened eschar noted. Soaked with hydrogen peroxide. Afterwards every other suture was removed. All wounds remained intact. Neurological:      Mental Status: He is alert and oriented to person, place, and time. Coordination: Coordination normal.   Psychiatric:         Thought Content: Thought content normal.         Judgment: Judgment normal.         No results found for this visit on 09/30/21.     Recent Results (from the past 2016 hour(s))   POCT Urinalysis No Micro (Auto)    Collection Time: 09/03/21  9:58 AM   Result Value Ref Range    Color, UA yellow     Clarity, UA clear     Glucose, UA POC neg     Bilirubin, UA neg     Ketones, UA neg     Spec Grav, UA 1.025     Blood, UA POC small     pH, UA 6.0     Protein, UA POC moderate     Urobilinogen, UA 0.2     Leukocytes, UA neg     Nitrite, UA neg        [] Pt was seen by provider for      Minutes  Counseling and coordination of care was done for all assessment diagnosis listed for today with patient and any family/friend present. More than 50% of this visit was spent coordinating cuurent care, obtaining information for prior records, and counseling for current plan of action. Assessment:       Diagnosis Orders   1. Laceration of finger without complication, subsequent encounter           No orders of the defined types were placed in this encounter. No orders of the defined types were placed in this encounter. Medication List          Accurate as of September 30, 2021 11:59 PM. If you have any questions, ask your nurse or doctor. CHANGE how you take these medications    * metoprolol succinate 50 MG extended release tablet  Commonly known as: TOPROL XL  What changed: Another medication with the same name was changed. Make sure you understand how and when to take each. * metoprolol succinate 25 MG extended release tablet  Commonly known as: TOPROL XL  Take 1 tablet by mouth daily  What changed: how much to take     * Xarelto 20 MG Tabs tablet  Generic drug: rivaroxaban  TAKE 1 TABLET BY MOUTH EVERY DAY    Lot [id number]  Exo 9/2022  What changed: Another medication with the same name was added. Make sure you understand how and when to take each. Changed by: Sayda Hinson MD     * rivaroxaban 20 MG Tabs tablet  Commonly known as: Xarelto  Take 1 tablet by mouth daily  What changed: Another medication with the same name was added. Make sure you understand how and when to take each.   Changed by: Sayda Hinson MD     * rivaroxaban 20 MG Tabs tablet  Commonly known as: Xarelto  Take 1 tablet by mouth daily (with breakfast) LOT [id number] EXP 9/23  LOT [id number] EXP 9/22  What changed: You were already taking a medication with the same name, and this prescription was added. Make sure you understand how and when to take each. Changed by: Shannon Montano MD         * This list has 5 medication(s) that are the same as other medications prescribed for you. Read the directions carefully, and ask your doctor or other care provider to review them with you. CONTINUE taking these medications    amLODIPine 10 MG tablet  Commonly known as: NORVASC     amoxicillin-clavulanate 875-125 MG per tablet  Commonly known as: Augmentin  Take 1 tablet by mouth 2 times daily for 10 days     benazepril 20 MG tablet  Commonly known as: LOTENSIN  TAKE 2 TABLETS BY MOUTH ONCE DAILY     escitalopram 10 MG tablet  Commonly known as: LEXAPRO  TAKE 1 TABLET BY MOUTH EVERY DAY     Handicap Placard Misc  by Does not apply route Exp 5 years     rosuvastatin 10 MG tablet  Commonly known as: CRESTOR  TAKE ONE TABLET BY MOUTH DAILY           Where to Get Your Medications      Information about where to get these medications is not yet available    Ask your nurse or doctor about these medications  · rivaroxaban 20 MG Tabs tablet           Plan:   Return in about 4 days (around 10/4/2021) for Suture removal.    Patient Instructions   Incision/Laceration repair    -Clean surgical area with antibacterial soap and water once daily.       --You may be instructed to soak the wound with Hydrogen Peroxide to loosen scabbing around sutures, this is not to be done more often that every 3 days, should be for 30 seconds-1 min and then rinsed off with water.     -Keep surgical site moist with vaseline or antibiotic ointment (single, not tripleantibiotic or Neosporin) and apply a fresh bandage daily until a solid scab forms or if the wound is at risk for trauma or dirt.     -Follow up immediately if any growing redness (minimal redness or pale pink is normal along wound edges) surrounds the surgical site or if dripping drainage occurs at surgical site. Once a solid scab forms no more bandage needed. A wet scab can look yellow. This is not infection, just moisture.  -Once the lesion is healed be sure to apply sunscreen to the area to prevent burn of the newer and more delicate skin during the first 6 months of healing.    -If the scar begins to be raised you may massage the area firmly twice a day to help break down scar tissue and help the area become a flat scar. There is some evidence that Mederma applied to a scar daily for the first few months can help shrink and fade it more quickly then without intervention. This note was partially created with the assistance of dictation. This may lead to grammatical or spelling errors. Bernardo Neal M.D.

## 2021-09-30 NOTE — TELEPHONE ENCOUNTER
Orders Placed This Encounter   Medications    rivaroxaban (XARELTO) 20 MG TABS tablet     Sig: Take 1 tablet by mouth daily     Dispense:  30 tablet     Refill:  5       The above med(s) were e-scripted to the patient's pharmacy.    Please advise patient  Massimo Carrasquillo MD

## 2021-10-04 ENCOUNTER — OFFICE VISIT (OUTPATIENT)
Dept: FAMILY MEDICINE CLINIC | Age: 81
End: 2021-10-04
Payer: MEDICARE

## 2021-10-04 VITALS — WEIGHT: 186 LBS | BODY MASS INDEX: 26.63 KG/M2 | HEIGHT: 70 IN | TEMPERATURE: 96.5 F

## 2021-10-04 DIAGNOSIS — Z51.89 VISIT FOR WOUND CHECK: ICD-10-CM

## 2021-10-04 DIAGNOSIS — S61.219D: Primary | ICD-10-CM

## 2021-10-04 PROCEDURE — 4040F PNEUMOC VAC/ADMIN/RCVD: CPT | Performed by: FAMILY MEDICINE

## 2021-10-04 PROCEDURE — G8484 FLU IMMUNIZE NO ADMIN: HCPCS | Performed by: FAMILY MEDICINE

## 2021-10-04 PROCEDURE — 4004F PT TOBACCO SCREEN RCVD TLK: CPT | Performed by: FAMILY MEDICINE

## 2021-10-04 PROCEDURE — 99212 OFFICE O/P EST SF 10 MIN: CPT | Performed by: FAMILY MEDICINE

## 2021-10-04 PROCEDURE — G8427 DOCREV CUR MEDS BY ELIG CLIN: HCPCS | Performed by: FAMILY MEDICINE

## 2021-10-04 PROCEDURE — 1123F ACP DISCUSS/DSCN MKR DOCD: CPT | Performed by: FAMILY MEDICINE

## 2021-10-04 PROCEDURE — G8417 CALC BMI ABV UP PARAM F/U: HCPCS | Performed by: FAMILY MEDICINE

## 2021-10-04 NOTE — PROGRESS NOTES
Diagnosis Orders   1. Laceration of finger without complication, subsequent encounter     2. Visit for wound check       Return if symptoms worsen or fail to improve. Patient Instructions   Pt will return only as needed      Subjective:      Patient ID: Reggie Almeida is a 79 y.o. male who presents for:  Chief Complaint   Patient presents with    Suture / Staple Removal     left hand        Patient denies redness or signs of infection. No fever. He does note continued swelling and some abnormal sensation in the fingertips of the left hand. Current Outpatient Medications on File Prior to Visit   Medication Sig Dispense Refill    rivaroxaban (XARELTO) 20 MG TABS tablet Take 1 tablet by mouth daily 30 tablet 5    rivaroxaban (XARELTO) 20 MG TABS tablet Take 1 tablet by mouth daily (with breakfast) LOT [id number] EXP 9/23  LOT [id number] EXP 9/22 21 tablet 0    benazepril (LOTENSIN) 20 MG tablet TAKE 2 TABLETS BY MOUTH ONCE DAILY 180 tablet 1    escitalopram (LEXAPRO) 10 MG tablet TAKE 1 TABLET BY MOUTH EVERY DAY 90 tablet 2    Handicap Placard MISC by Does not apply route Exp 5 years 1 each 0    XARELTO 20 MG TABS tablet TAKE 1 TABLET BY MOUTH EVERY DAY    Lot [id number]  Exo 9/2022 7 tablet 0    metoprolol succinate (TOPROL XL) 50 MG extended release tablet Take 50 mg by mouth daily      rosuvastatin (CRESTOR) 10 MG tablet TAKE ONE TABLET BY MOUTH DAILY  30 tablet 0    metoprolol succinate (TOPROL XL) 25 MG extended release tablet Take 1 tablet by mouth daily (Patient taking differently: Take 75 mg by mouth daily ) 30 tablet 5    amLODIPine (NORVASC) 10 MG tablet Take 5 mg by mouth daily        No current facility-administered medications on file prior to visit.      Past Medical History:   Diagnosis Date    Atrial fibrillation ([facility] [address]     CAD (coronary artery disease)     Hematuria     High cholesterol     History of bladder cancer     Hyperlipidemia     Hypertension     Malignant neoplasm of Transportation (Non-Medical):    Physical Activity:     Days of Exercise per Week:     Minutes of Exercise per Session:    Stress:     Feeling of Stress :    Social Connections:     Frequency of Communication with Friends and Family:     Frequency of Social Gatherings with Friends and Family:     Attends Yazidism Services:     Active Member of Clubs or Organizations:     Attends Club or Organization Meetings:     Marital Status:    Intimate Partner Violence:     Fear of Current or Ex-Partner:     Emotionally Abused:     Physically Abused:     Sexually Abused:      History reviewed. No pertinent family history. Allergies: Avelox [moxifloxacin hcl in nacl], Mobic [meloxicam], Moxifloxacin, and Z-eugene [azithromycin]    Review of Systems   Constitutional: Negative for chills and fever. Objective:   Temp 96.5 °F (35.8 °C)   Ht 5' 10\" (1.778 m)   Wt 186 lb (84.4 kg)   BMI 26.69 kg/m²     Physical Exam  Constitutional:       General: He is not in acute distress. Appearance: Normal appearance. He is well-developed. He is not toxic-appearing. HENT:      Head: Normocephalic and atraumatic. Right Ear: Hearing and tympanic membrane normal.      Left Ear: Hearing and tympanic membrane normal.      Nose: Nose normal. No nasal deformity. Eyes:      General: Lids are normal.         Right eye: No discharge. Left eye: No discharge. Conjunctiva/sclera: Conjunctivae normal.      Pupils: Pupils are equal, round, and reactive to light. Neck:      Thyroid: No thyroid mass or thyromegaly. Vascular: No JVD. Trachea: Trachea and phonation normal.   Cardiovascular:      Rate and Rhythm: Normal rate and regular rhythm. Pulmonary:      Effort: No accessory muscle usage or respiratory distress. Musculoskeletal:      Cervical back: Full passive range of motion without pain.       Comments: FROM all large muscle groups and joints as witnessed when walking to exam table, getting on, and getting off the exam table. Skin:     General: Skin is warm and dry. Findings: No rash. Comments: Palmar surface distal digit #2, 3, 4 sutures intact. Good wound edge approximation. No discharge. Sutures removed without complication. Neurological:      Mental Status: He is alert. Motor: No tremor or atrophy. Gait: Gait normal.   Psychiatric:         Speech: Speech normal.         Behavior: Behavior normal.         Thought Content: Thought content normal.         No results found for this visit on 10/04/21. Recent Results (from the past 2016 hour(s))   POCT Urinalysis No Micro (Auto)    Collection Time: 09/03/21  9:58 AM   Result Value Ref Range    Color, UA yellow     Clarity, UA clear     Glucose, UA POC neg     Bilirubin, UA neg     Ketones, UA neg     Spec Grav, UA 1.025     Blood, UA POC small     pH, UA 6.0     Protein, UA POC moderate     Urobilinogen, UA 0.2     Leukocytes, UA neg     Nitrite, UA neg        [] Pt was seen by provider for      Minutes  Counseling and coordination of care was done for all assessment diagnosis listed for today with patient and any family/friend present. More than 50% of this visit was spent coordinating cuurent care, obtaining information for prior records, and counseling for current plan of action. Assessment:       Diagnosis Orders   1. Laceration of finger without complication, subsequent encounter     2. Visit for wound check           No orders of the defined types were placed in this encounter. No orders of the defined types were placed in this encounter. Medication List          Accurate as of October 4, 2021 11:42 AM. If you have any questions, ask your nurse or doctor. CHANGE how you take these medications    * metoprolol succinate 50 MG extended release tablet  Commonly known as: TOPROL XL  What changed: Another medication with the same name was changed.  Make sure you understand how and when to take each.     * metoprolol succinate 25 MG extended release tablet  Commonly known as: TOPROL XL  Take 1 tablet by mouth daily  What changed: how much to take         * This list has 2 medication(s) that are the same as other medications prescribed for you. Read the directions carefully, and ask your doctor or other care provider to review them with you. CONTINUE taking these medications    amLODIPine 10 MG tablet  Commonly known as: NORVASC     benazepril 20 MG tablet  Commonly known as: LOTENSIN  TAKE 2 TABLETS BY MOUTH ONCE DAILY     escitalopram 10 MG tablet  Commonly known as: LEXAPRO  TAKE 1 TABLET BY MOUTH EVERY DAY     Handicap Placard Misc  by Does not apply route Exp 5 years     rosuvastatin 10 MG tablet  Commonly known as: CRESTOR  TAKE ONE TABLET BY MOUTH DAILY     * Xarelto 20 MG Tabs tablet  Generic drug: rivaroxaban  TAKE 1 TABLET BY MOUTH EVERY DAY    Lot [id number]  Exo 9/2022     * rivaroxaban 20 MG Tabs tablet  Commonly known as: Xarelto  Take 1 tablet by mouth daily     * rivaroxaban 20 MG Tabs tablet  Commonly known as: Xarelto  Take 1 tablet by mouth daily (with breakfast) LOT [id number] EXP 9/23  LOT [id number] EXP 9/22         * This list has 3 medication(s) that are the same as other medications prescribed for you. Read the directions carefully, and ask your doctor or other care provider to review them with you. Plan:   Return if symptoms worsen or fail to improve. Patient Instructions   Pt will return only as needed      This note was partially created with the assistance of dictation. This may lead to grammatical or spelling errors. Bernardo Neal M.D.

## 2021-10-18 RX ORDER — BENAZEPRIL HYDROCHLORIDE 20 MG/1
TABLET ORAL
Qty: 240 TABLET | Refills: 0 | Status: SHIPPED | OUTPATIENT
Start: 2021-10-18 | End: 2022-03-07

## 2021-11-03 ENCOUNTER — HOSPITAL ENCOUNTER (OUTPATIENT)
Dept: ULTRASOUND IMAGING | Age: 81
Discharge: HOME | End: 2021-11-05
Payer: MEDICARE

## 2021-11-03 DIAGNOSIS — I70.213 ATHEROSCLEROSIS OF NATIVE ARTERY OF BOTH LOWER EXTREMITIES WITH INTERMITTENT CLAUDICATION (HCC): ICD-10-CM

## 2021-11-03 PROCEDURE — 93925 LOWER EXTREMITY STUDY: CPT

## 2022-02-08 ENCOUNTER — VIRTUAL VISIT (OUTPATIENT)
Dept: FAMILY MEDICINE CLINIC | Age: 82
End: 2022-02-08
Payer: MEDICARE

## 2022-02-08 DIAGNOSIS — Z85.51 HISTORY OF BLADDER CANCER: ICD-10-CM

## 2022-02-08 DIAGNOSIS — J41.0 SIMPLE CHRONIC BRONCHITIS (HCC): ICD-10-CM

## 2022-02-08 DIAGNOSIS — E78.5 HYPERLIPIDEMIA, UNSPECIFIED HYPERLIPIDEMIA TYPE: ICD-10-CM

## 2022-02-08 DIAGNOSIS — I73.9 PAD (PERIPHERAL ARTERY DISEASE) (HCC): ICD-10-CM

## 2022-02-08 DIAGNOSIS — I48.91 ATRIAL FIBRILLATION, UNSPECIFIED TYPE (HCC): ICD-10-CM

## 2022-02-08 DIAGNOSIS — Z00.00 ROUTINE GENERAL MEDICAL EXAMINATION AT A HEALTH CARE FACILITY: Primary | ICD-10-CM

## 2022-02-08 DIAGNOSIS — I10 PRIMARY HYPERTENSION: ICD-10-CM

## 2022-02-08 PROCEDURE — G8484 FLU IMMUNIZE NO ADMIN: HCPCS | Performed by: STUDENT IN AN ORGANIZED HEALTH CARE EDUCATION/TRAINING PROGRAM

## 2022-02-08 PROCEDURE — 4040F PNEUMOC VAC/ADMIN/RCVD: CPT | Performed by: STUDENT IN AN ORGANIZED HEALTH CARE EDUCATION/TRAINING PROGRAM

## 2022-02-08 PROCEDURE — G0439 PPPS, SUBSEQ VISIT: HCPCS | Performed by: STUDENT IN AN ORGANIZED HEALTH CARE EDUCATION/TRAINING PROGRAM

## 2022-02-08 PROCEDURE — 1123F ACP DISCUSS/DSCN MKR DOCD: CPT | Performed by: STUDENT IN AN ORGANIZED HEALTH CARE EDUCATION/TRAINING PROGRAM

## 2022-02-08 RX ORDER — AMLODIPINE BESYLATE 5 MG/1
TABLET ORAL DAILY
Status: ON HOLD | COMMUNITY
Start: 2021-10-25 | End: 2022-08-03 | Stop reason: HOSPADM

## 2022-02-08 ASSESSMENT — PATIENT HEALTH QUESTIONNAIRE - PHQ9
1. LITTLE INTEREST OR PLEASURE IN DOING THINGS: 0
SUM OF ALL RESPONSES TO PHQ QUESTIONS 1-9: 0
2. FEELING DOWN, DEPRESSED OR HOPELESS: 0
SUM OF ALL RESPONSES TO PHQ QUESTIONS 1-9: 0
SUM OF ALL RESPONSES TO PHQ QUESTIONS 1-9: 0
SUM OF ALL RESPONSES TO PHQ9 QUESTIONS 1 & 2: 0
SUM OF ALL RESPONSES TO PHQ QUESTIONS 1-9: 0

## 2022-02-08 ASSESSMENT — LIFESTYLE VARIABLES
HOW OFTEN DURING THE LAST YEAR HAVE YOU HAD A FEELING OF GUILT OR REMORSE AFTER DRINKING: 0
HOW OFTEN DURING THE LAST YEAR HAVE YOU FOUND THAT YOU WERE NOT ABLE TO STOP DRINKING ONCE YOU HAD STARTED: 0
AUDIT TOTAL SCORE: 2
HOW OFTEN DURING THE LAST YEAR HAVE YOU BEEN UNABLE TO REMEMBER WHAT HAPPENED THE NIGHT BEFORE BECAUSE YOU HAD BEEN DRINKING: 0
HOW OFTEN DURING THE LAST YEAR HAVE YOU NEEDED AN ALCOHOLIC DRINK FIRST THING IN THE MORNING TO GET YOURSELF GOING AFTER A NIGHT OF HEAVY DRINKING: 0
HOW MANY STANDARD DRINKS CONTAINING ALCOHOL DO YOU HAVE ON A TYPICAL DAY: 0
HAVE YOU OR SOMEONE ELSE BEEN INJURED AS A RESULT OF YOUR DRINKING: 0
HAS A RELATIVE, FRIEND, DOCTOR, OR ANOTHER HEALTH PROFESSIONAL EXPRESSED CONCERN ABOUT YOUR DRINKING OR SUGGESTED YOU CUT DOWN: 0
AUDIT-C TOTAL SCORE: 2
HOW OFTEN DURING THE LAST YEAR HAVE YOU FAILED TO DO WHAT WAS NORMALLY EXPECTED FROM YOU BECAUSE OF DRINKING: 0
HOW OFTEN DO YOU HAVE A DRINK CONTAINING ALCOHOL: 2
HOW OFTEN DO YOU HAVE SIX OR MORE DRINKS ON ONE OCCASION: 0

## 2022-02-08 NOTE — PROGRESS NOTES
lumbar region without myelopathy or radiculopathy        Past Surgical History:   Procedure Laterality Date    ABDOMINAL AORTIC ANEURYSM REPAIR      COLONOSCOPY N/A 5/26/2020    COLONOSCOPY DIAGNOSTIC performed by Gabriel Sinha MD at [facility] Left 2/11/2020    LEFT LOWER EXTREMITY REVASCULARIZATION performed by Jim Callejas MD at [facility]. [place] SCRN NOT  [address] IND N/A 4/24/2017    COLONOSCOPY performed by Leda Santoyo MD at . [facility] 61 ESOPHAGOGASTRODUODENOSCOPY TRANSORAL DIAGNOSTIC N/A 4/24/2017    EGD ESOPHAGOGASTRODUODENOSCOPY performed by Leda Santoyo MD at [facility] 9 5/26/2020    EGD DIAGNOSTIC ONLY performed by Gabriel Sinha MD at [facility]       No family history on file. CareTeam (Including outside providers/suppliers regularly involved in providing care):   Patient Care Team:  Narciso Del Angel MD as PCP - General (Family Medicine)  Narciso Del Angel MD as PCP - [facility]  Ruperto Yao MD as Consulting Physician (Cardiology)  Ly Merino MD (Urology)    Wt Readings from Last 3 Encounters:   10/04/21 186 lb (84.4 kg)   09/30/21 186 lb (84.4 kg)   09/20/21 185 lb (83.9 kg)      Patient-Reported Vitals 2/8/2022   Patient-Reported Weight 185 lbs   Patient-Reported Height 5 10      There is no height or weight on file to calculate BMI. Based upon direct observation of the patient, evaluation of cognition reveals recent and remote memory intact. Patient's complete Health Risk Assessment and screening values have been reviewed and are found in Flowsheets. The following problems were reviewed today and where indicated follow up appointments were made and/or referrals ordered.     Positive Risk Factor Screenings with Interventions:         Substance History:  Social History     Tobacco History     Smoking Status  Light Tobacco Smoker Smoking Start Date  10/1/1979 Smoking Frequency  0.33 packs/day for 30 years (9.9 pk yrs) Smoking Tobacco Type  Cigarettes    Smokeless Tobacco Use  Never Used          Alcohol History     Alcohol Use Status  Yes Comment  occasionally          Drug Use     Drug Use Status  No          Sexual Activity     Sexually Active  Not Asked               Alcohol Screening: Audit-C Score: 2  Total Score: 2    A score of 8 or more is associated with harmful or hazardous drinking. A score of 13 or more in women, and 15 or more in men, is likely to indicate alcohol dependence. Substance Abuse Interventions:  · states every \"once in a while\" drinks beer or a small amount of whisky        General Health and ACP:  General  In general, how would you say your health is?: Excellent  In the past 7 days, have you experienced any of the following?  New or Increased Pain, New or Increased Fatigue, Loneliness, Social Isolation, Stress or Anger?: None of These  Do you get the social and emotional support that you need?: Yes  Do you have a Living Will?: (!) No  Advance Directives     Power of [address] [place] [address] Will ACP-Advance Directive ACP-Power of     Not on File Filed on 05/27/20 Filed Not on File      General Health Risk Interventions:  · No Living Will: pt states he does have a will on file     Hearing/Vision:  No exam data present  Hearing/Vision  Do you or your family notice any trouble with your hearing that hasn't been managed with hearing aids?: (!) Yes  Do you have difficulty driving, watching TV, or doing any of your daily activities because of your eyesight?: No  Have you had an eye exam within the past year?: Appointment is scheduled  Hearing/Vision Interventions:  · Hearing concerns:  patient declines any further evaluation/treatment for hearing issues        Personalized Preventive Plan   Current Health Maintenance Status  Immunization History   Administered Date(s) Administered    COVID-19, Pfizer Purple top, DILUTE for use, 12+ yrs, 30mcg/0.3mL dose 01/22/2021, 02/11/2021, 09/28/2021    Influenza Vaccine, unspecified formulation 09/25/2012, 01/20/2014, 12/10/2015, 12/12/2016    Influenza Virus Vaccine 09/29/2019    Influenza, High Dose (Fluzone 65 yrs and older) 12/29/2017, 10/16/2018    Influenza, Triv, inactivated, subunit, adjuvanted, IM (Fluad 65 yrs and older) 09/28/2019    Pneumococcal Conjugate 13-valent ([id number]) 01/13/2017    Pneumococcal Polysaccharide ([id number]) 04/27/2008, 01/17/2017, 08/14/2018    Tdap (Boostrix, Adacel) 09/20/2021    Zoster Recombinant (Shingrix) 02/19/2019, 07/17/2019        Health Maintenance   Topic Date Due    Annual Wellness Visit (AWV)  12/23/2021    Lipid screen  04/12/2022    Potassium monitoring  04/12/2022    Creatinine monitoring  04/12/2022    Depression Monitoring  08/12/2022    Colon cancer screen colonoscopy  05/26/2023    DTaP/Tdap/Td vaccine (2 - Td or Tdap) 09/20/2031    Flu vaccine  Completed    Shingles Vaccine  Completed    Pneumococcal 65+ years Vaccine  Completed    COVID-19 Vaccine  Completed    Hepatitis A vaccine  Aged Out    Hepatitis B vaccine  Aged Out    Hib vaccine  Aged Out    Meningococcal (ACWY) vaccine  Aged Out     Recommendations for [place] Due: see orders and patient instructions/AVS.  . Recommended screening schedule for the next 5-10 years is provided to the patient in written form: see Patient Ro Ruiz was seen today for medicare awv.     Diagnoses and all orders for this visit:    Routine general medical examination at a health care facility    Primary hypertension    Atrial fibrillation, unspecified type ([address]    PAD (peripheral artery disease) ([address]    Hyperlipidemia, unspecified hyperlipidemia type    Simple chronic bronchitis ([address]    History of bladder cancer               Bianca Root . is a 79 y.o. male being evaluated by a Virtual Visit (phone) encounter to address concerns as mentioned above. A caregiver was present when appropriate. Due to this being a TeleHealth encounter (During GDOGL-16 public health emergency), evaluation of the following organ systems was limited: Vitals/Constitutional/EENT/Resp/CV/GI//MS/Neuro/Skin/Heme-Lymph-Imm. Pursuant to the emergency declaration under the [address] [place] [address], [address] [place] [address] authority and the [employer] Act, this Virtual Visit was conducted with patient's (and/or legal guardian's) consent, to reduce the patient's risk of exposure to COVID-19 and provide necessary medical care. The patient (and/or legal guardian) has also been advised to contact this office for worsening conditions or problems, and seek emergency medical treatment and/or call 911 if deemed necessary. Patient identification was verified at the start of the visit: Yes    Services were provided through phone to substitute for in-person clinic visit. Patient and provider were located at their individual homes. --Barbara Moore DO on 2/8/2022 at 11:51 AM    An electronic signature was used to authenticate this note.

## 2022-02-08 NOTE — PATIENT INSTRUCTIONS
Personalized Preventive Plan for Kenny Crowell Sr. - 2/8/2022  Medicare offers a range of preventive health benefits. Some of the tests and screenings are paid in full while other may be subject to a deductible, co-insurance, and/or copay. Some of these benefits include a comprehensive review of your medical history including lifestyle, illnesses that may run in your family, and various assessments and screenings as appropriate. After reviewing your medical record and screening and assessments performed today your provider may have ordered immunizations, labs, imaging, and/or referrals for you. A list of these orders (if applicable) as well as your Preventive Care list are included within your After Visit Summary for your review. Other Preventive Recommendations:    · A preventive eye exam performed by an eye specialist is recommended every 1-2 years to screen for glaucoma; cataracts, macular degeneration, and other eye disorders. · A preventive dental visit is recommended every 6 months. · Try to get at least 150 minutes of exercise per week or 10,000 steps per day on a pedometer . · Order or download the FREE \"Exercise & Physical Activity: Your Everyday Guide\" from [employer]. Call 5-533.178.6270 or search [employer] online. · You need 2667-9792 mg of calcium and 9818-5003 IU of vitamin D per day. It is possible to meet your calcium requirement with diet alone, but a vitamin D supplement is usually necessary to meet this goal.  · When exposed to the sun, use a sunscreen that protects against both UVA and UVB radiation with an SPF of 30 or greater. Reapply every 2 to 3 hours or after sweating, drying off with a towel, or swimming. · Always wear a seat belt when traveling in a car. Always wear a helmet when riding a bicycle or motorcycle.

## 2022-03-05 NOTE — TELEPHONE ENCOUNTER
Appointments    Encounter Information    Provider Department Appt Notes   8/8/2022 Last Wray MD [facility] 6 month follow up    9/2/2022 Alex Miguel MD; [facility] 1 year Cysto    2/9/2023 Last Wray MD [facility] AWV        Past Visits    Date Provider Specialty Visit Type Primary Dx   02/08/2022 Beverly Regan DO Family Medicine Virtual Visit Routine general medical examination at a [facility] facility   10/04/2021 Shruthi Carmichael MD Family Medicine Office Visit Laceration of finger without complication, subsequent encounter   09/30/2021 Shruthi Carmichael MD Family Medicine Office Visit Laceration of finger without complication, subsequent encounter   09/03/2021 Alex Miguel MD Urology Procedure visit Malignant neoplasm of urinary bladder, unspecified site [facility])   08/12/2021 ANNETTA Christine - NP Family Medicine Office Visit Laceration of right thumb without damage to nail, foreign body presence unspecified, initial

## 2022-03-07 RX ORDER — BENAZEPRIL HYDROCHLORIDE 20 MG/1
TABLET ORAL
Qty: 240 TABLET | Refills: 0 | Status: SHIPPED | OUTPATIENT
Start: 2022-03-07 | End: 2022-08-03

## 2022-04-11 LAB
ALBUMIN SERPL-MCNC: 4.4 G/DL (ref 3.5–4.6)
ALP BLD-CCNC: 67 U/L (ref 35–104)
ALT SERPL-CCNC: 12 U/L (ref 0–41)
ANION GAP SERPL CALCULATED.3IONS-SCNC: 8 MEQ/L (ref 9–15)
AST SERPL-CCNC: 22 U/L (ref 0–40)
BILIRUB SERPL-MCNC: 1.4 MG/DL (ref 0.2–0.7)
BILIRUBIN DIRECT: <0.2 MG/DL (ref 0–0.4)
BILIRUBIN, INDIRECT: ABNORMAL MG/DL (ref 0–0.6)
BUN BLDV-MCNC: 15 MG/DL (ref 8–23)
CALCIUM SERPL-MCNC: 9.2 MG/DL (ref 8.5–9.9)
CHLORIDE BLD-SCNC: 99 MEQ/L (ref 95–107)
CHOLESTEROL, TOTAL: 156 MG/DL (ref 0–199)
CO2: 28 MEQ/L (ref 20–31)
CREAT SERPL-MCNC: 0.99 MG/DL (ref 0.7–1.2)
GFR AFRICAN AMERICAN: >60
GFR NON-AFRICAN AMERICAN: >60
GLUCOSE BLD-MCNC: 91 MG/DL (ref 70–99)
HCT VFR BLD CALC: 48.9 % (ref 42–52)
HDLC SERPL-MCNC: 46 MG/DL (ref 40–59)
HEMOGLOBIN: 15.8 G/DL (ref 14–18)
LDL CHOLESTEROL CALCULATED: 91 MG/DL (ref 0–129)
MCH RBC QN AUTO: 31.5 PG (ref 27–31.3)
MCHC RBC AUTO-ENTMCNC: 32.3 % (ref 33–37)
MCV RBC AUTO: 97.5 FL (ref 80–100)
PDW BLD-RTO: 14.1 % (ref 11.5–14.5)
PLATELET # BLD: 137 K/UL (ref 130–400)
POTASSIUM SERPL-SCNC: 3.9 MEQ/L (ref 3.4–4.9)
RBC # BLD: 5.01 M/UL (ref 4.7–6.1)
SODIUM BLD-SCNC: 135 MEQ/L (ref 135–144)
TOTAL PROTEIN: 7.8 G/DL (ref 6.3–8)
TRIGL SERPL-MCNC: 97 MG/DL (ref 0–150)
WBC # BLD: 7 K/UL (ref 4.8–10.8)

## 2022-04-18 ENCOUNTER — OFFICE VISIT (OUTPATIENT)
Dept: FAMILY MEDICINE CLINIC | Age: 82
End: 2022-04-18
Payer: MEDICARE

## 2022-04-18 VITALS
OXYGEN SATURATION: 94 % | TEMPERATURE: 97 F | DIASTOLIC BLOOD PRESSURE: 82 MMHG | HEART RATE: 95 BPM | HEIGHT: 70 IN | SYSTOLIC BLOOD PRESSURE: 155 MMHG | WEIGHT: 186 LBS | BODY MASS INDEX: 26.63 KG/M2

## 2022-04-18 DIAGNOSIS — J40 BRONCHITIS: Primary | ICD-10-CM

## 2022-04-18 DIAGNOSIS — R05.9 COUGH: ICD-10-CM

## 2022-04-18 LAB
INFLUENZA A ANTIBODY: NORMAL
INFLUENZA B ANTIBODY: NORMAL
Lab: NORMAL
PERFORMING INSTRUMENT: NORMAL
QC PASS/FAIL: NORMAL
SARS-COV-2, POC: NORMAL

## 2022-04-18 PROCEDURE — G8427 DOCREV CUR MEDS BY ELIG CLIN: HCPCS | Performed by: PHYSICIAN ASSISTANT

## 2022-04-18 PROCEDURE — 87804 INFLUENZA ASSAY W/OPTIC: CPT | Performed by: PHYSICIAN ASSISTANT

## 2022-04-18 PROCEDURE — 4040F PNEUMOC VAC/ADMIN/RCVD: CPT | Performed by: PHYSICIAN ASSISTANT

## 2022-04-18 PROCEDURE — 1123F ACP DISCUSS/DSCN MKR DOCD: CPT | Performed by: PHYSICIAN ASSISTANT

## 2022-04-18 PROCEDURE — G8417 CALC BMI ABV UP PARAM F/U: HCPCS | Performed by: PHYSICIAN ASSISTANT

## 2022-04-18 PROCEDURE — 99213 OFFICE O/P EST LOW 20 MIN: CPT | Performed by: PHYSICIAN ASSISTANT

## 2022-04-18 PROCEDURE — 4004F PT TOBACCO SCREEN RCVD TLK: CPT | Performed by: PHYSICIAN ASSISTANT

## 2022-04-18 PROCEDURE — 87426 SARSCOV CORONAVIRUS AG IA: CPT | Performed by: PHYSICIAN ASSISTANT

## 2022-04-18 RX ORDER — AMOXICILLIN AND CLAVULANATE POTASSIUM 875; 125 MG/1; MG/1
1 TABLET, FILM COATED ORAL 2 TIMES DAILY
Qty: 14 TABLET | Refills: 0 | Status: SHIPPED | OUTPATIENT
Start: 2022-04-18 | End: 2022-04-25

## 2022-04-18 ASSESSMENT — ENCOUNTER SYMPTOMS
ABDOMINAL PAIN: 0
CHEST TIGHTNESS: 0
COUGH: 1
SHORTNESS OF BREATH: 0
TROUBLE SWALLOWING: 0
BACK PAIN: 0
DIARRHEA: 0
SINUS PRESSURE: 0
VOMITING: 0

## 2022-04-18 ASSESSMENT — PATIENT HEALTH QUESTIONNAIRE - PHQ9
SUM OF ALL RESPONSES TO PHQ QUESTIONS 1-9: 0
SUM OF ALL RESPONSES TO PHQ9 QUESTIONS 1 & 2: 0
SUM OF ALL RESPONSES TO PHQ QUESTIONS 1-9: 0
2. FEELING DOWN, DEPRESSED OR HOPELESS: 0
1. LITTLE INTEREST OR PLEASURE IN DOING THINGS: 0

## 2022-04-18 NOTE — PATIENT INSTRUCTIONS
Patient Education        Bronchitis: Care Instructions  Your Care Instructions     Bronchitis is inflammation of the bronchial tubes, which carry air to the lungs. The tubes swell and produce mucus, or phlegm. The mucus and inflamedbronchial tubes make you cough. You may have trouble breathing. Most cases of bronchitis are caused by viruses like those that cause colds. Antibiotics usually do not help and they may be harmful. Bronchitis usually develops rapidly and lasts about 2 to 3 weeks in otherwisehealthy people. Follow-up care is a key part of your treatment and safety. Be sure to make and go to all appointments, and call your doctor if you are having problems. It's also a good idea to know your test results and keep alist of the medicines you take. How can you care for yourself at home?  Take all medicines exactly as prescribed. Call your doctor if you think you are having a problem with your medicine.  Get some extra rest.   Take an over-the-counter pain medicine, such as acetaminophen (Tylenol), ibuprofen (Advil, Motrin), or naproxen (Aleve) to reduce fever and relieve body aches. Read and follow all instructions on the label.  Do not take two or more pain medicines at the same time unless the doctor told you to. Many pain medicines have acetaminophen, which is Tylenol. Too much acetaminophen (Tylenol) can be harmful.  Take an over-the-counter cough medicine to help quiet a dry, hacking cough so that you can sleep. Avoid cough medicines that have more than one active ingredient. Read and follow all instructions on the label.  Do not smoke. Smoking can make bronchitis worse. If you need help quitting, talk to your doctor about stop-smoking programs and medicines. These can increase your chances of quitting for good. When should you call for help? Call 911 anytime you think you may need emergency care. For example, call if:     You have severe trouble breathing.    Call your doctor now or seek immediate medical care if:     You have new or worse trouble breathing.      You cough up dark brown or bloody mucus (sputum).      You have a new or higher fever.      You have a new rash. Watch closely for changes in your health, and be sure to contact your doctor if:     You cough more deeply or more often, especially if you notice more mucus or a change in the color of your mucus.      You are not getting better as expected. Where can you learn more? Go to [email or web address] org and sign in to your [employer] account. Enter H333 in the Lecere box to learn more about \"Bronchitis: Care Instructions. \"     If you do not have an account, please click on the \"Sign Up Now\" link. Current as of: July 6, 2021               Content Version: 13.2  © 5070-9020 [employer]wise, Incorporated. Care instructions adapted under license by [facility] ([facility]). If you have questions about a medical condition or this instruction, always ask your healthcare professional. [address] any warranty or liability for your use of this information.

## 2022-04-18 NOTE — PROGRESS NOTES
Subjective:      Patient ID: Nicholas Sotelo is a 79 y.o. male who presents today for:  Chief Complaint   Patient presents with    Congestion     patient states he has chest congestion cold like smyptoms 5 weeks    Cough       HPI  79year old male who presents with a productive cough of thick sputum. States that it is keeping him up at night. Denies fever and chills.      Past Medical History:   Diagnosis Date    [facility]     CAD (coronary artery disease)     Hematuria     High cholesterol     History of bladder cancer     Hyperlipidemia     Hypertension     Malignant ne[facility] of urinary bladder [facility] 1/27/2020    S/P AAA repair     Spondylosis of lumbar region without myelopathy or radiculopathy      Past Surgical History:   Procedure Laterality Date    ABDOMINAL AORTIC ANEURYSM REPAIR      COLONOSCOPY N/A 5/26/2020    COLONOSCOPY DIAGNOSTIC performed by Virginia Pisano MD at [facility] 20 Left 2/11/2020    LEFT LOWER EXTREMITY REVASCULARIZATION performed by Alanna Peacock MD at [facility] SCRN NOT  [address] [place] [address] IND N/A 4/24/2017    COLONOSCOPY performed by Raegan Valencia MD at [facility] 61 ESOPHAGOGASTRODUODENOSCOPY TRANSORAL DIAGNOSTIC N/A 4/24/2017    EGD ESOPHAGOGASTRODUODENOSCOPY performed by Raegan Valencia MD at [place] [address] 5/26/2020    EGD DIAGNOSTIC ONLY performed by Virginia Pisano MD at [facility] Marital status:      Spouse name: Not on file    Number of children: Not on file    Years of education: Not on file    Highest education level: Not on file   Occupational History    Not on file   Tobacco Use    Smoking status: Light Tobacco Smoker     Packs/day: 0.33     Years: 30.00     Pack years: 9.90     Types: Cigarettes     Start date: 10/1/1979    Smokeless tobacco: Never Used Vaping Use    Vaping Use: Never used   Substance and Sexual Activity    Alcohol use: Yes     Comment: occasionally    Drug use: No    Sexual activity: Not on file   Other Topics Concern    Not on file   Social History Narrative    Not on file     Social Determinants of Health     Financial Resource Strain: Low Risk     Difficulty of Paying Living Expenses: Not very hard   Food Insecurity: No Food Insecurity    Worried About Running Out of Food in the Last Year: Never true    Marcell of Food in the Last Year: Never true   Transportation Needs:     Lack of Transportation (Medical): Not on file    Lack of Transportation (Non-Medical): Not on file   Physical Activity:     Days of Exercise per Week: Not on file    Minutes of Exercise per Session: Not on file   Stress:     Feeling of Stress : Not on file   Social Connections:     Frequency of Communication with Friends and Family: Not on file    Frequency of Social Gatherings with Friends and Family: Not on file    Attends Jew Services: Not on file    Active Member of [address] [place] [address] or Organizations: Not on file    Attends Club or Organization Meetings: Not on file    Marital Status: Not on file   Intimate Partner Violence:     Fear of Current or Ex-Partner: Not on file    Emotionally Abused: Not on file    Physically Abused: Not on file    Sexually Abused: Not on file   Housing Stability:     Unable to Pay for Housing in the Last Year: Not on file    Number of [place] in the Last Year: Not on file    Unstable Housing in the Last Year: Not on file     History reviewed. No pertinent family history.   Allergies   Allergen Reactions    Avelox [Moxifloxacin Hcl In Nacl]      thrush    Mobic [Meloxicam] Other (See Comments)    Moxifloxacin     Z-Sean [Azithromycin] Other (See Comments)     Thrush     Current Outpatient Medications   Medication Sig Dispense Refill    amoxicillin-clavulanate (AUGMENTIN) 875-125 MG per tablet Take 1 tablet by mouth 2 times daily for 7 days 14 tablet 0    benazepril (LOTENSIN) 20 MG tablet TAKE 2 TABLETS BY MOUTH EVERY  tablet 0    amLODIPine (NORVASC) 5 MG tablet Take by mouth      rivaroxaban (XARELTO) 20 MG TABS tablet Take 1 tablet by mouth daily 30 tablet 5    escitalopram (LEXAPRO) 10 MG tablet TAKE 1 TABLET BY MOUTH EVERY DAY 90 tablet 2    Handicap Placard MISC by Does not apply route Exp 5 years 1 each 0    metoprolol succinate (TOPROL XL) 50 MG extended release tablet Take 50 mg by mouth daily      rosuvastatin (CRESTOR) 10 MG tablet TAKE ONE TABLET BY MOUTH DAILY  30 tablet 0    metoprolol succinate (TOPROL XL) 25 MG extended release tablet Take 1 tablet by mouth daily (Patient taking differently: Take 75 mg by mouth daily ) 30 tablet 5     No current facility-administered medications for this visit. Review of Systems   Constitutional: Positive for chills. Negative for activity change, appetite change, fever and unexpected weight change. HENT: Positive for congestion. Negative for drooling, ear pain, nosebleeds, sinus pressure and trouble swallowing. Respiratory: Positive for cough. Negative for chest tightness and shortness of breath. Cardiovascular: Negative for chest pain and leg swelling. Gastrointestinal: Negative for abdominal pain, diarrhea and vomiting. Endocrine: Negative for polydipsia and polyphagia. Genitourinary: Negative for dysuria, flank pain and frequency. Musculoskeletal: Negative for back pain and myalgias. Skin: Negative for pallor and rash. Neurological: Negative for syncope, weakness and headaches. Hematological: Does not bruise/bleed easily. Psychiatric/Behavioral: Negative for agitation, behavioral problems and confusion. All other systems reviewed and are negative.       Objective:   BP (!) 155/82   Pulse 95   Temp 97 °F (36.1 °C) (Infrared)   Ht 5' 10\" (1.778 m)   Wt 186 lb (84.4 kg)   SpO2 94%   BMI 26.69 kg/m²     Physical Exam  Vitals and nursing note reviewed. Constitutional:       General: He is awake. He is not in acute distress. Appearance: Normal appearance. He is well-developed and normal weight. He is not ill-appearing, toxic-appearing or diaphoretic. Comments: No photophobia. No phonophobia. HENT:      Head: Normocephalic and atraumatic. No Zavala's sign. Right Ear: External ear normal.      Left Ear: External ear normal.      Nose: Nose normal. No congestion or rhinorrhea. Mouth/Throat:      Mouth: Mucous membranes are moist.      Pharynx: Oropharynx is clear. No oropharyngeal exudate or posterior oropharyngeal erythema. Eyes:      General: No scleral icterus. Right eye: No foreign body or discharge. Left eye: No discharge. Extraocular Movements: Extraocular movements intact. Conjunctiva/sclera: Conjunctivae normal.      Left eye: No exudate. Pupils: Pupils are equal, round, and reactive to light. Neck:      Vascular: No JVD. Trachea: No tracheal deviation. Comments: No meningismus. Cardiovascular:      Rate and Rhythm: Normal rate and regular rhythm. Heart sounds: Normal heart sounds. Heart sounds not distant. No murmur heard. No friction rub. No gallop. Pulmonary:      Effort: Pulmonary effort is normal. No respiratory distress. Breath sounds: Normal breath sounds. No stridor. No wheezing, rhonchi or rales. Chest:      Chest wall: No tenderness. Abdominal:      General: Abdomen is flat. Bowel sounds are normal. There is no distension. Palpations: Abdomen is soft. There is no mass. Tenderness: There is no abdominal tenderness. There is no right CVA tenderness, left CVA tenderness, guarding or rebound. Hernia: No hernia is present. Musculoskeletal:         General: No swelling, tenderness, deformity or signs of injury. Normal range of motion. Cervical back: Normal range of motion and neck supple. No rigidity. Lymphadenopathy:      Head:      Right side of head: No submental adenopathy. Left side of head: No submental adenopathy. Skin:     General: Skin is warm and dry. Capillary Refill: Capillary refill takes less than 2 seconds. Coloration: Skin is not jaundiced or pale. Findings: No bruising, erythema, lesion or rash. Neurological:      General: No focal deficit present. Mental Status: He is alert and oriented to person, place, and time. Mental status is at baseline. Cranial Nerves: No cranial nerve deficit. Sensory: No sensory deficit. Motor: No weakness. Coordination: Coordination normal.      Deep Tendon Reflexes: Reflexes are normal and symmetric. Psychiatric:         Mood and Affect: Mood normal.         Behavior: Behavior normal. Behavior is cooperative. Thought Content: Thought content normal.         Judgment: Judgment normal.         Assessment:       Diagnosis Orders   1. Bronchitis  amoxicillin-clavulanate (AUGMENTIN) 875-125 MG per tablet   2. Cough  amoxicillin-clavulanate (AUGMENTIN) 875-125 MG per tablet     No results found for this visit on 04/18/22. Plan:     Assessment & Plan   Casimiro Romero was seen today for congestion and cough. Diagnoses and all orders for this visit:    Bronchitis  -     amoxicillin-clavulanate (AUGMENTIN) 875-125 MG per tablet; Take 1 tablet by mouth 2 times daily for 7 days    Cough  -     amoxicillin-clavulanate (AUGMENTIN) 875-125 MG per tablet; Take 1 tablet by mouth 2 times daily for 7 days      No orders of the defined types were placed in this encounter. Orders Placed This Encounter   Medications    amoxicillin-clavulanate (AUGMENTIN) 875-125 MG per tablet     Sig: Take 1 tablet by mouth 2 times daily for 7 days     Dispense:  14 tablet     Refill:  0     There are no discontinued medications. Return if symptoms worsen or fail to improve.         Reviewed with the patient/family: current clinical status & medications. Side effects of the medication prescribed today, as well as treatment plan/rationale and result expectations have been discussed with the patient/family who expresses understanding. Patient will be discharged home in stable condition. Follow up with PCP to evaluate treatment results or return if symptoms worsen or fail to improve. Discussed signs and symptoms which require immediate follow-up in ED/call to 911. Understanding verbalized. I have reviewed the patient's medical history in detail and updated the computerized patient record.     KASH Oviedo

## 2022-04-22 ENCOUNTER — TELEPHONE (OUTPATIENT)
Dept: FAMILY MEDICINE CLINIC | Age: 82
End: 2022-04-22

## 2022-05-02 DIAGNOSIS — F32.A DEPRESSION, UNSPECIFIED DEPRESSION TYPE: ICD-10-CM

## 2022-05-02 RX ORDER — ESCITALOPRAM OXALATE 10 MG/1
TABLET ORAL
Qty: 180 TABLET | Refills: 0 | Status: SHIPPED | OUTPATIENT
Start: 2022-05-02

## 2022-05-18 PROBLEM — R05.9 COUGH: Status: RESOLVED | Noted: 2022-04-18 | Resolved: 2022-05-18

## 2022-07-31 ENCOUNTER — HOSPITAL ENCOUNTER (INPATIENT)
Age: 82
LOS: 3 days | Discharge: HOME | DRG: 193 | End: 2022-08-03
Attending: STUDENT IN AN ORGANIZED HEALTH CARE EDUCATION/TRAINING PROGRAM | Admitting: INTERNAL MEDICINE
Payer: MEDICARE

## 2022-07-31 ENCOUNTER — APPOINTMENT (OUTPATIENT)
Dept: GENERAL RADIOLOGY | Age: 82
DRG: 193 | End: 2022-07-31
Payer: MEDICARE

## 2022-07-31 ENCOUNTER — OFFICE VISIT (OUTPATIENT)
Dept: FAMILY MEDICINE CLINIC | Age: 82
End: 2022-07-31
Payer: MEDICARE

## 2022-07-31 VITALS
HEIGHT: 70 IN | SYSTOLIC BLOOD PRESSURE: 132 MMHG | TEMPERATURE: 99.3 F | BODY MASS INDEX: 26.48 KG/M2 | OXYGEN SATURATION: 94 % | HEART RATE: 124 BPM | WEIGHT: 185 LBS | DIASTOLIC BLOOD PRESSURE: 66 MMHG

## 2022-07-31 DIAGNOSIS — R09.02 HYPOXIA: ICD-10-CM

## 2022-07-31 DIAGNOSIS — I48.91 ATRIAL FIBRILLATION, UNSPECIFIED TYPE (HCC): Primary | ICD-10-CM

## 2022-07-31 DIAGNOSIS — J18.9 PNEUMONIA OF RIGHT LOWER LOBE DUE TO INFECTIOUS ORGANISM: Primary | ICD-10-CM

## 2022-07-31 DIAGNOSIS — J44.1 ACUTE EXACERBATION OF CHRONIC OBSTRUCTIVE PULMONARY DISEASE (COPD) (HCC): ICD-10-CM

## 2022-07-31 DIAGNOSIS — R00.0 TACHYCARDIA: ICD-10-CM

## 2022-07-31 DIAGNOSIS — R05.9 COUGH: ICD-10-CM

## 2022-07-31 DIAGNOSIS — D72.829 LEUKOCYTOSIS, UNSPECIFIED TYPE: ICD-10-CM

## 2022-07-31 DIAGNOSIS — I48.91 ATRIAL FIBRILLATION, UNSPECIFIED TYPE (HCC): ICD-10-CM

## 2022-07-31 DIAGNOSIS — Z79.01 CHRONIC ANTICOAGULATION: ICD-10-CM

## 2022-07-31 DIAGNOSIS — R50.9 FEVER, UNSPECIFIED FEVER CAUSE: ICD-10-CM

## 2022-07-31 LAB
ALBUMIN SERPL-MCNC: 4 G/DL (ref 3.5–4.6)
ALP BLD-CCNC: 69 U/L (ref 35–104)
ALT SERPL-CCNC: 14 U/L (ref 0–41)
ANION GAP SERPL CALCULATED.3IONS-SCNC: 11 MEQ/L (ref 9–15)
APTT: 39.6 SEC (ref 24.4–36.8)
AST SERPL-CCNC: 18 U/L (ref 0–40)
BANDED NEUTROPHILS RELATIVE PERCENT: 12 %
BASOPHILS ABSOLUTE: 0 K/UL (ref 0–0.2)
BASOPHILS RELATIVE PERCENT: 0.1 %
BILIRUB SERPL-MCNC: 2 MG/DL (ref 0.2–0.7)
BUN BLDV-MCNC: 16 MG/DL (ref 8–23)
CALCIUM SERPL-MCNC: 9.6 MG/DL (ref 8.5–9.9)
CHLORIDE BLD-SCNC: 101 MEQ/L (ref 95–107)
CO2: 26 MEQ/L (ref 20–31)
CREAT SERPL-MCNC: 1.08 MG/DL (ref 0.7–1.2)
EOSINOPHILS ABSOLUTE: 0 K/UL (ref 0–0.7)
EOSINOPHILS RELATIVE PERCENT: 0.1 %
GFR AFRICAN AMERICAN: >60
GFR NON-AFRICAN AMERICAN: >60
GLOBULIN: 3.8 G/DL (ref 2.3–3.5)
GLUCOSE BLD-MCNC: 110 MG/DL (ref 70–99)
HCT VFR BLD CALC: 45.4 % (ref 42–52)
HEMOGLOBIN: 15.2 G/DL (ref 14–18)
INFLUENZA A ANTIBODY: NORMAL
INFLUENZA A BY PCR: NEGATIVE
INFLUENZA B ANTIBODY: NORMAL
INFLUENZA B BY PCR: NEGATIVE
INR BLD: 1.4
LACTIC ACID: 1.3 MMOL/L (ref 0.5–2.2)
LYMPHOCYTES ABSOLUTE: 0.6 K/UL (ref 1–4.8)
LYMPHOCYTES RELATIVE PERCENT: 4 %
Lab: NORMAL
MCH RBC QN AUTO: 32 PG (ref 27–31.3)
MCHC RBC AUTO-ENTMCNC: 33.3 % (ref 33–37)
MCV RBC AUTO: 95.8 FL (ref 80–100)
MONOCYTES ABSOLUTE: 0.7 K/UL (ref 0.2–0.8)
MONOCYTES RELATIVE PERCENT: 4.8 %
NEUTROPHILS ABSOLUTE: 12.8 K/UL (ref 1.4–6.5)
NEUTROPHILS RELATIVE PERCENT: 79 %
PDW BLD-RTO: 14.4 % (ref 11.5–14.5)
PERFORMING INSTRUMENT: NORMAL
PLATELET # BLD: 123 K/UL (ref 130–400)
PLATELET SLIDE REVIEW: ABNORMAL
POTASSIUM SERPL-SCNC: 3.6 MEQ/L (ref 3.4–4.9)
PROCALCITONIN: 0.3 NG/ML (ref 0–0.15)
PROTHROMBIN TIME: 16.6 SEC (ref 12.3–14.9)
QC PASS/FAIL: NORMAL
RBC # BLD: 4.74 M/UL (ref 4.7–6.1)
RBC # BLD: NORMAL 10*6/UL
SARS-COV-2, NAAT: NOT DETECTED
SARS-COV-2, POC: NORMAL
SODIUM BLD-SCNC: 138 MEQ/L (ref 135–144)
TOTAL PROTEIN: 7.8 G/DL (ref 6.3–8)
WBC # BLD: 14.1 K/UL (ref 4.8–10.8)

## 2022-07-31 PROCEDURE — 1123F ACP DISCUSS/DSCN MKR DOCD: CPT | Performed by: NURSE PRACTITIONER

## 2022-07-31 PROCEDURE — G8417 CALC BMI ABV UP PARAM F/U: HCPCS | Performed by: NURSE PRACTITIONER

## 2022-07-31 PROCEDURE — 71045 X-RAY EXAM CHEST 1 VIEW: CPT

## 2022-07-31 PROCEDURE — 96375 TX/PRO/DX INJ NEW DRUG ADDON: CPT

## 2022-07-31 PROCEDURE — 80053 COMPREHEN METABOLIC PANEL: CPT

## 2022-07-31 PROCEDURE — 2580000003 HC RX 258: Performed by: STUDENT IN AN ORGANIZED HEALTH CARE EDUCATION/TRAINING PROGRAM

## 2022-07-31 PROCEDURE — G8427 DOCREV CUR MEDS BY ELIG CLIN: HCPCS | Performed by: NURSE PRACTITIONER

## 2022-07-31 PROCEDURE — 6360000002 HC RX W HCPCS: Performed by: STUDENT IN AN ORGANIZED HEALTH CARE EDUCATION/TRAINING PROGRAM

## 2022-07-31 PROCEDURE — 85610 PROTHROMBIN TIME: CPT

## 2022-07-31 PROCEDURE — 84145 PROCALCITONIN (PCT): CPT

## 2022-07-31 PROCEDURE — 6370000000 HC RX 637 (ALT 250 FOR IP): Performed by: STUDENT IN AN ORGANIZED HEALTH CARE EDUCATION/TRAINING PROGRAM

## 2022-07-31 PROCEDURE — 93005 ELECTROCARDIOGRAM TRACING: CPT | Performed by: STUDENT IN AN ORGANIZED HEALTH CARE EDUCATION/TRAINING PROGRAM

## 2022-07-31 PROCEDURE — 85025 COMPLETE CBC W/AUTO DIFF WBC: CPT

## 2022-07-31 PROCEDURE — 6360000002 HC RX W HCPCS: Performed by: NURSE PRACTITIONER

## 2022-07-31 PROCEDURE — 87426 SARSCOV CORONAVIRUS AG IA: CPT | Performed by: NURSE PRACTITIONER

## 2022-07-31 PROCEDURE — 83605 ASSAY OF LACTIC ACID: CPT

## 2022-07-31 PROCEDURE — 2580000003 HC RX 258: Performed by: NURSE PRACTITIONER

## 2022-07-31 PROCEDURE — 4004F PT TOBACCO SCREEN RCVD TLK: CPT | Performed by: NURSE PRACTITIONER

## 2022-07-31 PROCEDURE — 2500000003 HC RX 250 WO HCPCS: Performed by: STUDENT IN AN ORGANIZED HEALTH CARE EDUCATION/TRAINING PROGRAM

## 2022-07-31 PROCEDURE — 93005 ELECTROCARDIOGRAM TRACING: CPT

## 2022-07-31 PROCEDURE — 2700000000 HC OXYGEN THERAPY PER DAY

## 2022-07-31 PROCEDURE — 2060000000 HC ICU INTERMEDIATE R&B

## 2022-07-31 PROCEDURE — 87804 INFLUENZA ASSAY W/OPTIC: CPT | Performed by: NURSE PRACTITIONER

## 2022-07-31 PROCEDURE — 94640 AIRWAY INHALATION TREATMENT: CPT

## 2022-07-31 PROCEDURE — 85730 THROMBOPLASTIN TIME PARTIAL: CPT

## 2022-07-31 PROCEDURE — 87635 SARS-COV-2 COVID-19 AMP PRB: CPT

## 2022-07-31 PROCEDURE — 87502 INFLUENZA DNA AMP PROBE: CPT

## 2022-07-31 PROCEDURE — 36415 COLL VENOUS BLD VENIPUNCTURE: CPT

## 2022-07-31 PROCEDURE — 93000 ELECTROCARDIOGRAM COMPLETE: CPT | Performed by: NURSE PRACTITIONER

## 2022-07-31 PROCEDURE — 96367 TX/PROPH/DG ADDL SEQ IV INF: CPT

## 2022-07-31 PROCEDURE — 87040 BLOOD CULTURE FOR BACTERIA: CPT

## 2022-07-31 PROCEDURE — 6370000000 HC RX 637 (ALT 250 FOR IP): Performed by: NURSE PRACTITIONER

## 2022-07-31 PROCEDURE — 96365 THER/PROPH/DIAG IV INF INIT: CPT

## 2022-07-31 PROCEDURE — 94761 N-INVAS EAR/PLS OXIMETRY MLT: CPT

## 2022-07-31 PROCEDURE — 94664 DEMO&/EVAL PT USE INHALER: CPT

## 2022-07-31 PROCEDURE — 94667 MNPJ CHEST WALL 1ST: CPT

## 2022-07-31 PROCEDURE — 99214 OFFICE O/P EST MOD 30 MIN: CPT | Performed by: NURSE PRACTITIONER

## 2022-07-31 PROCEDURE — 99285 EMERGENCY DEPT VISIT HI MDM: CPT

## 2022-07-31 RX ORDER — ACETAMINOPHEN 650 MG/1
650 SUPPOSITORY RECTAL EVERY 6 HOURS PRN
Status: DISCONTINUED | OUTPATIENT
Start: 2022-07-31 | End: 2022-08-03 | Stop reason: HOSPADM

## 2022-07-31 RX ORDER — ENOXAPARIN SODIUM 100 MG/ML
40 INJECTION SUBCUTANEOUS DAILY
Status: DISCONTINUED | OUTPATIENT
Start: 2022-07-31 | End: 2022-07-31

## 2022-07-31 RX ORDER — LABETALOL HYDROCHLORIDE 5 MG/ML
20 INJECTION, SOLUTION INTRAVENOUS EVERY 4 HOURS PRN
Status: DISCONTINUED | OUTPATIENT
Start: 2022-07-31 | End: 2022-08-03 | Stop reason: HOSPADM

## 2022-07-31 RX ORDER — ONDANSETRON 2 MG/ML
4 INJECTION INTRAMUSCULAR; INTRAVENOUS EVERY 6 HOURS PRN
Status: DISCONTINUED | OUTPATIENT
Start: 2022-07-31 | End: 2022-08-03 | Stop reason: HOSPADM

## 2022-07-31 RX ORDER — ROSUVASTATIN CALCIUM 10 MG/1
10 TABLET, COATED ORAL NIGHTLY
Status: DISCONTINUED | OUTPATIENT
Start: 2022-07-31 | End: 2022-08-03 | Stop reason: HOSPADM

## 2022-07-31 RX ORDER — PREDNISOLONE ACETATE 10 MG/ML
1 SUSPENSION/ DROPS OPHTHALMIC 2 TIMES DAILY
COMMUNITY

## 2022-07-31 RX ORDER — DILTIAZEM HYDROCHLORIDE 5 MG/ML
10 INJECTION INTRAVENOUS ONCE
Status: COMPLETED | OUTPATIENT
Start: 2022-07-31 | End: 2022-07-31

## 2022-07-31 RX ORDER — ENOXAPARIN SODIUM 100 MG/ML
1 INJECTION SUBCUTANEOUS ONCE
Status: DISCONTINUED | OUTPATIENT
Start: 2022-07-31 | End: 2022-07-31

## 2022-07-31 RX ORDER — SODIUM CHLORIDE 0.9 % (FLUSH) 0.9 %
5-40 SYRINGE (ML) INJECTION PRN
Status: DISCONTINUED | OUTPATIENT
Start: 2022-07-31 | End: 2022-08-03 | Stop reason: HOSPADM

## 2022-07-31 RX ORDER — ESCITALOPRAM OXALATE 10 MG/1
10 TABLET ORAL DAILY
Status: DISCONTINUED | OUTPATIENT
Start: 2022-07-31 | End: 2022-08-03 | Stop reason: HOSPADM

## 2022-07-31 RX ORDER — ACETAMINOPHEN 325 MG/1
650 TABLET ORAL EVERY 6 HOURS PRN
Status: DISCONTINUED | OUTPATIENT
Start: 2022-07-31 | End: 2022-08-03 | Stop reason: HOSPADM

## 2022-07-31 RX ORDER — METHYLPREDNISOLONE SODIUM SUCCINATE 40 MG/ML
40 INJECTION, POWDER, LYOPHILIZED, FOR SOLUTION INTRAMUSCULAR; INTRAVENOUS EVERY 6 HOURS
Status: DISCONTINUED | OUTPATIENT
Start: 2022-07-31 | End: 2022-08-01

## 2022-07-31 RX ORDER — DOXYCYCLINE HYCLATE 100 MG
100 TABLET ORAL 2 TIMES DAILY
Qty: 20 TABLET | Refills: 0 | Status: CANCELLED | OUTPATIENT
Start: 2022-07-31 | End: 2022-08-10

## 2022-07-31 RX ORDER — SODIUM CHLORIDE 0.9 % (FLUSH) 0.9 %
5-40 SYRINGE (ML) INJECTION EVERY 12 HOURS SCHEDULED
Status: DISCONTINUED | OUTPATIENT
Start: 2022-07-31 | End: 2022-08-03 | Stop reason: HOSPADM

## 2022-07-31 RX ORDER — GUAIFENESIN 600 MG/1
1200 TABLET, EXTENDED RELEASE ORAL 2 TIMES DAILY
Status: DISCONTINUED | OUTPATIENT
Start: 2022-07-31 | End: 2022-08-03 | Stop reason: HOSPADM

## 2022-07-31 RX ORDER — 0.9 % SODIUM CHLORIDE 0.9 %
1000 INTRAVENOUS SOLUTION INTRAVENOUS ONCE
Status: COMPLETED | OUTPATIENT
Start: 2022-07-31 | End: 2022-07-31

## 2022-07-31 RX ORDER — IPRATROPIUM BROMIDE AND ALBUTEROL SULFATE 2.5; .5 MG/3ML; MG/3ML
1 SOLUTION RESPIRATORY (INHALATION) ONCE
Status: COMPLETED | OUTPATIENT
Start: 2022-07-31 | End: 2022-07-31

## 2022-07-31 RX ORDER — AMLODIPINE BESYLATE 5 MG/1
5 TABLET ORAL DAILY
Status: DISCONTINUED | OUTPATIENT
Start: 2022-07-31 | End: 2022-08-02

## 2022-07-31 RX ORDER — PREDNISONE 20 MG/1
40 TABLET ORAL DAILY
Status: DISCONTINUED | OUTPATIENT
Start: 2022-08-03 | End: 2022-08-01

## 2022-07-31 RX ORDER — METOPROLOL SUCCINATE 50 MG/1
50 TABLET, EXTENDED RELEASE ORAL DAILY
Status: DISCONTINUED | OUTPATIENT
Start: 2022-07-31 | End: 2022-07-31

## 2022-07-31 RX ORDER — POLYETHYLENE GLYCOL 3350 17 G/17G
17 POWDER, FOR SOLUTION ORAL DAILY PRN
Status: DISCONTINUED | OUTPATIENT
Start: 2022-07-31 | End: 2022-08-03 | Stop reason: HOSPADM

## 2022-07-31 RX ORDER — LISINOPRIL 20 MG/1
40 TABLET ORAL DAILY
Refills: 0 | Status: DISCONTINUED | OUTPATIENT
Start: 2022-07-31 | End: 2022-08-02

## 2022-07-31 RX ORDER — IPRATROPIUM BROMIDE AND ALBUTEROL SULFATE 2.5; .5 MG/3ML; MG/3ML
1 SOLUTION RESPIRATORY (INHALATION)
Status: DISCONTINUED | OUTPATIENT
Start: 2022-07-31 | End: 2022-07-31

## 2022-07-31 RX ORDER — METHYLPREDNISOLONE SODIUM SUCCINATE 125 MG/2ML
125 INJECTION, POWDER, LYOPHILIZED, FOR SOLUTION INTRAMUSCULAR; INTRAVENOUS ONCE
Status: COMPLETED | OUTPATIENT
Start: 2022-07-31 | End: 2022-07-31

## 2022-07-31 RX ORDER — ONDANSETRON 4 MG/1
4 TABLET, ORALLY DISINTEGRATING ORAL EVERY 8 HOURS PRN
Status: DISCONTINUED | OUTPATIENT
Start: 2022-07-31 | End: 2022-08-03 | Stop reason: HOSPADM

## 2022-07-31 RX ORDER — SODIUM CHLORIDE 9 MG/ML
INJECTION, SOLUTION INTRAVENOUS PRN
Status: DISCONTINUED | OUTPATIENT
Start: 2022-07-31 | End: 2022-08-03 | Stop reason: HOSPADM

## 2022-07-31 RX ORDER — ALBUTEROL SULFATE 2.5 MG/3ML
2.5 SOLUTION RESPIRATORY (INHALATION)
Status: DISCONTINUED | OUTPATIENT
Start: 2022-07-31 | End: 2022-08-03 | Stop reason: HOSPADM

## 2022-07-31 RX ORDER — PREDNISOLONE ACETATE 10 MG/ML
1 SUSPENSION/ DROPS OPHTHALMIC 2 TIMES DAILY
Status: DISCONTINUED | OUTPATIENT
Start: 2022-07-31 | End: 2022-08-03 | Stop reason: HOSPADM

## 2022-07-31 RX ORDER — IPRATROPIUM BROMIDE AND ALBUTEROL SULFATE 2.5; .5 MG/3ML; MG/3ML
1 SOLUTION RESPIRATORY (INHALATION) 4 TIMES DAILY
Status: DISCONTINUED | OUTPATIENT
Start: 2022-07-31 | End: 2022-08-02

## 2022-07-31 RX ORDER — VITS A,C,E/LUTEIN/MINERALS 300MCG-200
1 TABLET ORAL DAILY
Status: DISCONTINUED | OUTPATIENT
Start: 2022-07-31 | End: 2022-08-03 | Stop reason: HOSPADM

## 2022-07-31 RX ORDER — MAGNESIUM SULFATE IN WATER 40 MG/ML
4000 INJECTION, SOLUTION INTRAVENOUS ONCE
Status: COMPLETED | OUTPATIENT
Start: 2022-07-31 | End: 2022-07-31

## 2022-07-31 RX ADMIN — SODIUM CHLORIDE 1000 ML: 9 INJECTION, SOLUTION INTRAVENOUS at 14:50

## 2022-07-31 RX ADMIN — IPRATROPIUM BROMIDE AND ALBUTEROL SULFATE 1 AMPULE: .5; 2.5 SOLUTION RESPIRATORY (INHALATION) at 16:12

## 2022-07-31 RX ADMIN — Medication 1 TABLET: at 21:04

## 2022-07-31 RX ADMIN — MAGNESIUM SULFATE HEPTAHYDRATE 4000 MG: 40 INJECTION, SOLUTION INTRAVENOUS at 16:17

## 2022-07-31 RX ADMIN — RIVAROXABAN 20 MG: 20 TABLET, FILM COATED ORAL at 20:59

## 2022-07-31 RX ADMIN — METHYLPREDNISOLONE SODIUM SUCCINATE 40 MG: 40 INJECTION, POWDER, FOR SOLUTION INTRAMUSCULAR; INTRAVENOUS at 23:23

## 2022-07-31 RX ADMIN — METHYLPREDNISOLONE SODIUM SUCCINATE 125 MG: 125 INJECTION, POWDER, FOR SOLUTION INTRAMUSCULAR; INTRAVENOUS at 16:17

## 2022-07-31 RX ADMIN — PREDNISOLONE ACETATE 1 DROP: 10 SUSPENSION/ DROPS OPHTHALMIC at 21:05

## 2022-07-31 RX ADMIN — PIPERACILLIN AND TAZOBACTAM 3375 MG: 3; .375 INJECTION, POWDER, FOR SOLUTION INTRAVENOUS at 14:55

## 2022-07-31 RX ADMIN — SODIUM CHLORIDE, PRESERVATIVE FREE 10 ML: 5 INJECTION INTRAVENOUS at 21:01

## 2022-07-31 RX ADMIN — LISINOPRIL 40 MG: 20 TABLET ORAL at 20:59

## 2022-07-31 RX ADMIN — IPRATROPIUM BROMIDE AND ALBUTEROL SULFATE 1 AMPULE: 2.5; .5 SOLUTION RESPIRATORY (INHALATION) at 19:26

## 2022-07-31 RX ADMIN — ROSUVASTATIN CALCIUM 10 MG: 10 TABLET, FILM COATED ORAL at 20:59

## 2022-07-31 RX ADMIN — GUAIFENESIN 1200 MG: 600 TABLET, EXTENDED RELEASE ORAL at 20:59

## 2022-07-31 RX ADMIN — DILTIAZEM HYDROCHLORIDE 10 MG: 5 INJECTION INTRAVENOUS at 14:56

## 2022-07-31 ASSESSMENT — ENCOUNTER SYMPTOMS
VOMITING: 0
COUGH: 1
SHORTNESS OF BREATH: 0
BACK PAIN: 0
DIARRHEA: 0
SHORTNESS OF BREATH: 0
COUGH: 1
ALLERGIC/IMMUNOLOGIC NEGATIVE: 1
EYES NEGATIVE: 1
SINUS PRESSURE: 0
TROUBLE SWALLOWING: 0
RHINORRHEA: 1
ABDOMINAL PAIN: 0
WHEEZING: 0
VOMITING: 0
GASTROINTESTINAL NEGATIVE: 1
ABDOMINAL PAIN: 0
SORE THROAT: 0
NAUSEA: 0
CHEST TIGHTNESS: 0
WHEEZING: 1
DIARRHEA: 0
SHORTNESS OF BREATH: 1
CHEST TIGHTNESS: 1

## 2022-07-31 NOTE — H&P
Hospitalist History and Physical  Name: Romeo Koch Sr.  Age: 79 y.o. Gender: male  CodeStatus: Full Code  Allergies: Avelox [Moxifloxacin Hcl In Nacl]  Mobic [Meloxicam]  Moxifloxacin  Z-Sean [Azithromycin]    Chief Complaint:Cough (Sent from urgent care)    Primary Care Provider: Susan Sharif MD  InpatientTreatment Team: Treatment Team: Attending Provider: Sharath Winston DO; Registered Nurse: Terry Yousif RN  Admission Date: 7/31/2022      Subjective: Patient is an 23-year-old male with past medical history of CAD PAF on Xarelto, HTN and COPD who presented to the emergency room with 7-day history of fever, cough, shortness of breath and decreased appetite. Patient was seen in urgent care, Covid/flu negative. Due to wheezing and dyspnea patient was referred to have further evaluation in the emergency room. During ER workup patient became hypoxic (SpO2 82%). Procalcitonin elevated, WBC 14, CXR shows right lower lobe infiltration. Lungs rhonchorous with expiratory wheezing. Patient was hypertensive but stable. Physical Exam  Constitutional:       General: He is not in acute distress. Appearance: He is well-developed. He is ill-appearing. HENT:      Head: Normocephalic and atraumatic. Nose: Nose normal.      Mouth/Throat:      Mouth: Mucous membranes are moist.      Pharynx: Oropharynx is clear. Eyes:      Conjunctiva/sclera: Conjunctivae normal.      Pupils: Pupils are equal, round, and reactive to light. Cardiovascular:      Rate and Rhythm: Tachycardia present. Rhythm irregular. Heart sounds: Normal heart sounds. Pulmonary:      Effort: Respiratory distress present. Breath sounds: Wheezing and rhonchi present. Abdominal:      General: Bowel sounds are normal.      Palpations: Abdomen is soft. Musculoskeletal:         General: Normal range of motion. Cervical back: Normal range of motion and neck supple. Skin:     General: Skin is warm and dry.       Capillary neoplasm of urinary bladder ([facility] [address] 1/27/2020    S/P AAA repair     Spondylosis of lumbar region without myelopathy or radiculopathy        Past Surgical History:   Procedure Laterality Date    ABDOMINAL AORTIC ANEURYSM REPAIR      COLONOSCOPY N/A 5/26/2020    COLONOSCOPY DIAGNOSTIC performed by Maite Tobias MD at [address] Left 2/11/2020    LEFT LOWER EXTREMITY REVASCULARIZATION performed by Fritz Castillo MD at [facility] SCRN NOT  [address] IND N/A 4/24/2017    COLONOSCOPY performed by John Lujan MD at [address] ESOPHAGOGASTRODUODENOSCOPY TRANSORAL DIAGNOSTIC N/A 4/24/2017    EGD ESOPHAGOGASTRODUODENOSCOPY performed by John Lujan MD at [address] [facility] 5/26/2020    EGD DIAGNOSTIC ONLY performed by Maite Tobias MD at [facility]        History reviewed. No pertinent family history.      Social History     Socioeconomic History    Marital status:      Spouse name: Not on file    Number of children: Not on file    Years of education: Not on file    Highest education level: Not on file   Occupational History    Not on file   Tobacco Use    Smoking status: Light Smoker     Packs/day: 0.33     Years: 30.00     Pack years: 9.90     Types: Cigarettes     Start date: 10/1/1979    Smokeless tobacco: Never   Vaping Use    Vaping Use: Never used   Substance and Sexual Activity    Alcohol use: Yes     Comment: occasionally    Drug use: No    Sexual activity: Not on file   Other Topics Concern    Not on file   Social History Narrative    Not on file     Social Determinants of Health     Financial Resource Strain: Low Risk     Difficulty of Paying Living Expenses: Not very hard   Food Insecurity: No Food Insecurity    Worried About Running Out of Food in the Last Year: Never true    Ran Out of Food in the Last Year: Never true   Transportation Needs: Not on file   Physical Activity: Not on file   Stress: Not on file   Social Connections: Not on file   Intimate Partner Violence: Not on file   Housing Stability: Not on file        Infusion Medications:    sodium chloride       Scheduled Medications:    magnesium sulfate  4,000 mg IntraVENous Once    sodium chloride flush  5-40 mL IntraVENous 2 times per day    enoxaparin  40 mg SubCUTAneous Daily    ipratropium-albuterol  1 ampule Inhalation Q4H WA    methylPREDNISolone  40 mg IntraVENous Q6H    Followed by    Forrest Verdugo ON 8/3/2022] predniSONE  40 mg Oral Daily     PRN Meds: sodium chloride flush, sodium chloride, ondansetron **OR** ondansetron, polyethylene glycol, acetaminophen **OR** acetaminophen    Labs:   Recent Labs     07/31/22  1215   WBC 14.1*   HGB 15.2   HCT 45.4   *     Recent Labs     07/31/22  1215      K 3.6      CO2 26   BUN 16   CREATININE 1.08   CALCIUM 9.6     Recent Labs     07/31/22  1215   AST 18   ALT 14   BILITOT 2.0*   ALKPHOS 69     Recent Labs     07/31/22  1215   INR 1.4     No results for input(s): CKTOTAL, TROPONINI in the last 72 hours. Urinalysis:   Lab Results   Component Value Date/Time    NITRU Negative 12/24/2019 09:15 AM    WBCUA 0-2 12/24/2019 09:15 AM    BACTERIA Negative 12/24/2019 09:15 AM    RBCUA 6-10 12/24/2019 09:15 AM    BLOODU small 09/03/2021 09:58 AM    BLOODU TRACE 12/24/2019 09:15 AM    SPECGRAV 1.025 09/03/2021 09:58 AM    SPECGRAV 1.010 12/24/2019 09:15 AM    GLUCOSEU neg 09/03/2021 09:58 AM    GLUCOSEU Negative 12/24/2019 09:15 AM    GLUCOSEU NEG 11/01/2011 09:38 AM       Radiology:   Most recent    Chest CT      WITH CONTRAST:Results for orders placed during the hospital encounter of 06/04/19    CT CHEST W CONTRAST    Narrative  CT of the Chest with intravenous contrast medium    History:  Abnormal chest radiograph.     Technical Factors:    CT imaging of the chest was obtained and formatted as 5 mm contiguous axial images from the thoracic inlet through the adrenal glands. Sagittal and coronal reconstruction obtained during postprocessing. Intravenous contrast medium:  Isovue-370, 75 mL    Comparison:  Chest radiographs, May 28, 2019, May 20, 2019. Findings:    Right lung shows emphysematous change. Consolidation lateral segment right middle lobe identified (series 2, image 35. No mass identified. No nodules, masses, pleural effusion visualized. Left lung shows emphysematous change. No nodules, masses, foci of consolidation. No hilar, mediastinal, or axillary lymph node enlargement. Thoracic aorta normal in course and caliber. Limited imaging upper abdomen shows cephalad extent of abdominal aortic stent graft. No osteoblastic, and no osteolytic lesions. Disc space narrowing with anterior osteophytes mid to lower thoracic spine. Impression  Post obstructive pneumonitis versus atelectasis/pneumonia, right middle lobe. No mass identified. Emphysema. Abdominal aortic stent graft. All CT scans at this facility use dose modulation, iterative reconstruction, and/or weight based dosing when appropriate to reduce radiation dose to as low as reasonably achievable. WITHOUT CONTRAST: Results for orders placed during the hospital encounter of 12/24/19    CT CHEST WO CONTRAST    Narrative  EXAMINATION: CT CHEST WO CONTRAST    DATE:12/24/2019 10:30 AM    CLINICAL HISTORY: Anterior chest pain. Shortness of breath. Pight lung mass vs pneumonia    COMPARISON:  June 4, 2019    TECHNIQUE: Helical CT was performed through the chest without IV contrast., All CT scans at this facility use dose modulation, iterative reconstruction, and/or weight based dosing when appropriate to reduce radiation dose to as low as reasonably  achievable. FINDINGS    Lungs: Persistent triangle shaped consolidation abutting the right heart border that is narrower at the hilum with the apex pointing laterally.  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 The  possibility of a chronic pneumonia is not excluded but there would not be this much volume loss in that situation. There is no obvious underlying mass lesion. No definite endobronchial mass demonstrated. Mediastinum : Small lymph nodes consistent with reactive adenopathy. Pleura:Normal. No effusion or thickening    Vessels:Thoracic aorta is intact. Bones:Normal    Upper abdomen:No significant abnormality of the visualized structures of the upper abdomen    Impression  PERSISTENT RIGHT MIDDLE LOBE COLLAPSE. CXR      2-view: Results for orders placed during the hospital encounter of 08/26/19    XR CHEST STANDARD (2 VW)    Narrative  EXAMINATION: Chest x-ray, 2 view    HISTORY: Colitis. Cough. TECHNIQUE: Frontal and lateral views of the chest    COMPARISON:    FINDINGS:    Cardiomediastinal silhouette is within normal limits. No pneumothorax, pleural effusion, or focal consolidation. Left lower lobe subsegmental atelectasis. Interval resolution of right middle lobe atelectasis. Degenerative changes of the spine. Impression  No acute intrathoracic process. Results for orders placed in visit on 05/28/19    XR CHEST STANDARD (2 VW)    Narrative  EXAMINATION: XR CHEST (2 VIEWS):    DATE AND TIME: 5/28/2019 at 2:32 PM.    CLINICAL HISTORY: SHORTNESS OF BREATH. PNEUMONIA OF RIGHT MIDDLE LOBE DUE TO INFECTIOUS ORGANISM. COMPARISONS: May 20, 2019. FINDINGS: Persistent atelectatic change in the right middle lobe. Findings raise suspicion and concern for a possible endobronchial lesion centrally. Consider correlation with CT. Remaining lung fields clear. Pleural angles sharp. Bones intact. Impression  PERSISTENT RIGHT MIDDLE LOBE ATELECTASIS. UNDERLYING ENDOBRONCHIAL LESION OF CONCERN.        Portable: Results for orders placed during the hospital encounter of 07/31/22    XR CHEST PORTABLE    Narrative  Exam: XR CHEST PORTABLE    History: Cough and

## 2022-07-31 NOTE — PROGRESS NOTES
[facility] Respiratory Therapy Evaluation   Current Order:  DUONEB Q4 W/A       Home Regimen: NONE      Ordering Physician: Gil MONTALVO  Re-evaluation Date:  8/3     Diagnosis: COPD EXAC, PNEUMONIA      Patient Status: Stable / Unstable + Physician notified    The following MDI Criteria must be met in order to convert aerosol to MDI with spacer. If unable to meet, MDI will be converted to aerosol:  []  Patient able to demonstrate the ability to use MDI effectively  []  Patient alert and cooperative  []  Patient able to take deep breath with 5-10 second hold  []  Medication(s) available in this delivery method   []  Peak flow greater than or equal to 200 ml/min            Current Order Substituted To  (same drug, same frequency)   Aerosol to MDI [] Albuterol Sulfate 0.083% unit dose by aerosol Albuterol Sulfate MDI 2 puffs by inhalation with spacer    [] Levalbuterol 1.25 mg unit dose by aerosol Levalbuterol MDI 2 puffs by inhalation with spacer    [] Levalbuterol 0.63 mg unit dose by aerosol Levalbuterol MDI 2 puffs by inhalation with spacer    [] Ipratropium Bromide 0.02% unit dose by aerosol Ipratropium Bromide MDI 2 puffs by inhalation with spacer    [] Duoneb (Ipratropium + Albuterol) unit dose by aerosol Ipratropium MDI + Albuterol MDI 2 puffs by inhalation w/spacer   MDI to Aerosol [] Albuterol Sulfate MDI Albuterol Sulfate 0.083% unit dose by aerosol    [] Levalbuterol MDI 2 puffs by inhalation Levalbuterol 1.25 mg unit dose by aerosol    [] Ipratropium Bromide MDI by inhalation Ipratropium Bromide 0.02% unit dose by aerosol    [] Combivent (Ipratropium + Albuterol) MDI by inhalation Duoneb (Ipratropium + Albuterol) unit dose by aerosol       Treatment Assessment [Frequency/Schedule]:  Change frequency to: __________________________________________________per Protocol, P&T, MEC      Points 0 1 2 3 4   Pulmonary Status  Non-Smoker  []   Smoking history   < 20 pack years  []   Smoking history  ? 20 pack years  []   Pulmonary Disorder  (acute or chronic)  []   Severe or Chronic w/ Exacerbation  [x]     Surgical Status No [x]   Surgeries     General []   Surgery Lower []   Abdominal Thoracic or []   Upper Abdominal Thoracic with  PulmonaryDisorder  []     Chest X-ray Clear/Not  Ordered     []  Chronic Changes  Results Pending  []  Infiltrates, atelectasis, pleural effusion, or edema  [x]  Infiltrates in more than one lobe []  Infiltrate + Atelectasis, &/or pleural effusion  []    Respiratory Pattern Regular,  RR = 12-20 []  Increased,  RR = 21-25 [x]  PAL, irregular,  or RR = 26-30 []  Decreased FEV1  or RR = 31-35 []  Severe SOB, use  of accessory muscles, or RR ? 35  []    Mental Status Alert, oriented,  Cooperative [x]  Confused but Follows commands []  Lethargic or unable to follow commands []  Obtunded  []  Comatose  []    Breath Sounds Clear to  auscultation  []  Decreased unilaterally or  in bases only []  Decreased  bilaterally  []  Crackles or intermittent wheezes []  Wheezes [x]    Cough Strong, Spontan., & nonproductive []  Strong,  spontaneous, &  productive [x]  Weak,  Nonproductive []  Weak, productive or  with wheezes []  No spontaneous  cough or may require suctioning []    Level of Activity Ambulatory []  Ambulatory w/ Assist  [x]  Non-ambulatory []  Paraplegic []  Quadriplegic []    Total    Score:__13_____     Triage Score:_3_______      Tri       Triage:     1. (>20) Freq: Q3    2. (16-20) Freq: Q4   3. (11-15) Freq: QID & Albuterol Q2 PRN    4. (6-10) Freq: TID & Albuterol Q2 PRN    5. (0-5) Freq Q4prn

## 2022-07-31 NOTE — ED NOTES
Pt up to go to the restroom in room, <5 feet. Pt called this nurse, pt states he feels very short of breath. Pt observed having dyspnea on exertion, wheezing. Placed pt back in bed,pt placed on 3L nc.                  Kit Price RN  07/31/22 4170 Trilobed Flap Text: The defect edges were debeveled with a #15 scalpel blade.  Given the location of the defect and the proximity to free margins a trilobed flap was deemed most appropriate.  Using a sterile surgical marker, an appropriate trilobed flap drawn around the defect.    The area thus outlined was incised deep to adipose tissue with a #15 scalpel blade.  The skin margins were undermined to an appropriate distance in all directions utilizing iris scissors.

## 2022-07-31 NOTE — FLOWSHEET NOTE
To one [address] [place] [address] to room and call light. SOB with exertion. O2 3l NC. Spouse at bedside. Chart and meds reviewed. Admission in progress. Afib 93 on tele. Will update physician meds ready for review. Electronically signed by Loc Haas RN on 7/31/2022 at 6:20 PM    1800 dinner ordered. Cup given for specimen collection.  Electronically signed by Loc Haas RN on 7/31/2022 at 6:25 PM

## 2022-07-31 NOTE — Clinical Note
Charge RN: This RN was informed that the patient called 911 from the lobby. The writer attempted to reeducate pt on misuese of Emergency Services. Pt informed that pt received a room before pt called 911. Pt educated on importance of proper use of 911   Discharge Plan[de-identified] Other/[facility])   Telemetry/Cardiac Monitoring Required?: Yes

## 2022-07-31 NOTE — ACP (ADVANCE CARE PLANNING)
Advance Care Planning     Advance Care Planning Activator (Inpatient)  Conversation Note      Date of ACP Conversation: 7/31/2022     Conversation Conducted with: Patient with Decision Making Capacity    ACP Activator: Sheri Lopez RN        Health Care Decision Maker:     Current Designated Health Care Decision Maker:     Primary Decision Maker: Arcadio Ma - [phone]    Secondary Decision Maker: Mady Narvaez Child - [phone]        Care Preferences    Ventilation: \"If you were in your present state of health and suddenly became very ill and were unable to breathe on your own, what would your preference be about the use of a ventilator (breathing machine) if it were available to you? \"      Would the patient desire the use of ventilator (breathing machine)?: yes    \"If your health worsens and it becomes clear that your chance of recovery is unlikely, what would your preference be about the use of a ventilator (breathing machine) if it were available to you? \"     Would the patient desire the use of ventilator (breathing machine)?: states \"probably\"      Resuscitation  \"CPR works best to restart the heart when there is a sudden event, like a heart attack, in someone who is otherwise healthy. Unfortunately, CPR does not typically restart the heart for people who have serious health conditions or who are very sick. \"    \"In the event your heart stopped as a result of an underlying serious health condition, would you want attempts to be made to restart your heart (answer \"yes\" for attempt to resuscitate) or would you prefer a natural death (answer \"no\" for do not attempt to resuscitate)? \" yes       [x] Yes   [] No   Educated Patient / Trevon Awan regarding differences between Advance Directives and portable DNR orders.     Length of ACP Conversation in minutes:  10    Conversation Outcomes:  [x] ACP discussion completed  [] Existing advance directive reviewed with patient; no changes to patient's previously recorded wishes  [] New Advance Directive completed  [] Portable Do Not Rescitate prepared for Provider review and signature  [] POLST/POST/MOLST/MOST prepared for Provider review and signature      Follow-up plan:    [] Schedule follow-up conversation to continue planning  [] Referred individual to Provider for additional questions/concerns   [] Advised patient/agent/surrogate to review completed ACP document and update if needed with changes in condition, patient preferences or care setting    [x] This note routed to one or more involved healthcare providers

## 2022-07-31 NOTE — PROGRESS NOTES
Subjective  Suzan Duff ., 79 y.o. male presents today with:  Chief Complaint   Patient presents with    URI     Pt states fever, cough, chills, sweats, discolored phlegm x 3 days. HPI  Patient here with wife  States Thursday he started with a cough, thick mucus  Took a mucinex DM  Yesterday fever 102 took tylenol and then was 98  This AM was 101  He does have a hx of COPD has had PNA 2019  Breathing doesn't feel labored, phlegm is thick/ yellow/green  Does not have inhalers or home 02   Denies CP, SOB, wheezing  Remote hx of Afib on xarelto not sure the last time he was in afib  Took home covid test and was negative today     Patient walked with pulse ox- did not drop below 91% with ambulation   Review of Systems   Constitutional:  Positive for chills, fatigue and fever (tmax 102). HENT:  Positive for congestion and rhinorrhea. Negative for sore throat. Respiratory:  Positive for cough. Negative for shortness of breath and wheezing. Cardiovascular:  Negative for chest pain and palpitations. Gastrointestinal:  Negative for abdominal pain, diarrhea, nausea and vomiting. Musculoskeletal:  Negative for myalgias. Neurological:  Negative for dizziness, light-headedness and headaches.      Past Medical History:   Diagnosis Date    Atrial fibrillation (HCC)     CAD (coronary artery disease)     Hematuria     High cholesterol     History of bladder cancer     Hyperlipidemia     Hypertension     Malignant neoplasm of urinary bladder ([facility] [address] 1/27/2020    S/P AAA repair     Spondylosis of lumbar region without myelopathy or radiculopathy      Past Surgical History:   Procedure Laterality Date    ABDOMINAL AORTIC ANEURYSM REPAIR      COLONOSCOPY N/A 5/26/2020    COLONOSCOPY DIAGNOSTIC performed by Frida Oviedo MD at [address] [facility] [address] Left 2/11/2020    LEFT LOWER EXTREMITY REVASCULARIZATION performed by Candance Quin, MD at [facility]CRN NOT [facility] IND N/A 4/24/2017    COLONOSCOPY performed by Talia Dumont MD at [address] TRANSORAL DIAGNOSTIC N/A 4/24/2017    EGD ESOPHAGOGASTRODUODENOSCOPY performed by Talia Dumont MD at [address] [place] [address] 5/26/2020    EGD DIAGNOSTIC ONLY performed by Jesus Villar MD at [address] [place] [address] History    Marital status:      Spouse name: Not on file    Number of children: Not on file    Years of education: Not on file    Highest education level: Not on file   Occupational History    Not on file   Tobacco Use    Smoking status: Light Smoker     Packs/day: 0.33     Years: 30.00     Pack years: 9.90     Types: Cigarettes     Start date: 10/1/1979    Smokeless tobacco: Never   Vaping Use    Vaping Use: Never used   Substance and Sexual Activity    Alcohol use: Yes     Comment: occasionally    Drug use: No    Sexual activity: Not on file   Other Topics Concern    Not on file   Social History Narrative    Not on file     Social Determinants of Health     Financial Resource Strain: Low Risk     Difficulty of Paying Living Expenses: Not very hard   Food Insecurity: No Food Insecurity    Worried About Running Out of Food in the Last Year: Never true    Ran Out of Food in the Last Year: Never true   Transportation Needs: Not on file   Physical Activity: Not on file   Stress: Not on file   Social Connections: Not on file   Intimate Partner Violence: Not on file   Housing Stability: Not on file     History reviewed. No pertinent family history.   Allergies   Allergen Reactions    Avelox [Moxifloxacin Hcl In Nacl]      thrush    Mobic [Meloxicam] Other (See Comments)    Moxifloxacin     Z-Sean [Azithromycin] Other (See Comments)     Thrush     Current Outpatient Medications   Medication Sig Dispense Refill    escitalopram (LEXAPRO) 10 MG tablet TAKE 1 TABLET BY MOUTH EVERY  tablet 0 Assessment & Plan    Diagnosis Orders   1. Atrial fibrillation, unspecified type ([address]        2. Fever, unspecified fever cause  POCT COVID-19, Antigen    POCT Influenza A/B      3. Tachycardia  EKG 12 lead    EKG 12 lead      4. Cough        ECG confirmed Afib rate in office 110-130's he is on Xarelto  His covid and flu tests were both negative  Hx of PNA= lungs were decreased througout and has thick yellow/green sputum- concern for PNA  Labs and imaging not available today   Due to vitals and ECG it was recommended that patient be seen in the ER for further eval/workup   Wife and patient refused EMS transport, aware of risks of self-transport,, wife to take patient to [facility] [place]- report was called  Orders Placed This Encounter   Procedures    POCT COVID-19, Antigen     Order Specific Question:   Is this test for diagnosis or screening? Answer:   Diagnosis of ill patient     Order Specific Question:   Symptomatic for COVID-19 as defined by CDC? Answer:   Yes     Order Specific Question:   Date of Symptom Onset     Answer:   7/30/2022     Order Specific Question:   Hospitalized for COVID-19? Answer:   No     Order Specific Question:   Admitted to ICU for COVID-19? Answer:   No     Order Specific Question:   Employed in healthcare setting? Answer:   Unknown     Order Specific Question:   Resident in a congregate (group) care setting? Answer:   Unknown     Order Specific Question:   Pregnant: Answer:   No     Order Specific Question:   Previously tested for COVID-19? Answer:   Yes    POCT Influenza A/B    EKG 12 lead     Standing Status:   Future     Number of Occurrences:   1     Standing Expiration Date:   9/29/2022     Order Specific Question:   Reason for Exam?     Answer: Tachycardia     No orders of the defined types were placed in this encounter. There are no discontinued medications. No follow-ups on file.     Reviewed with the patient: current clinical status,medications, activities and diet. Side effects, adverse effects of the medication prescribed today, as well as treatment plan/ rationale and result expectations have been discussed with the patient who expresses understanding and desires to proceed. Close follow up to evaluate treatment results and for coordination of care. I have reviewed the patient's medical history in detail and updated the computerized patient record.     Monika Koehler, ANNETTA - CNP

## 2022-07-31 NOTE — ED PROVIDER NOTES
[facility] ED  eMERGENCY dEPARTMENT eNCOUnter      Pt Name: Mitul Darby. MRN: [id number]  Remigio 1940  Date of evaluation: 7/31/2022  Provider: Koreen Skiff, DO    CHIEF COMPLAINT       Chief Complaint   Patient presents with    Cough     Sent from urgent care         HISTORY OF PRESENT ILLNESS   (Location/Symptom, Timing/Onset,Context/Setting, Quality, Duration, Modifying Factors, Severity)  Note limiting factors. Mitul Darby is a 79 y.o. male who presents to the emergency department with 7 days of fever 102. Cough, decreased appetite and not feeling well. Patient denies any chest pain. Patient denies any shortness of breath. Patient does have a history of COPD on exam his lungs are clear. Patient denies nausea or vomiting. Patient is denies being around anyone with COVID. Patient's wife states his cough is worse and she feels that he is sickly. They went to an urgent care and referred to the ER for higher level care and evaluation out of concern that he would have pneumonia. Patient's EKG showed atrial fibrillation with 103 heart rate. Initially the patient's wife denied that they had atrial fibrillation. However during the patient's hospital stay it became known that the patient has had a history of A. fib and that is why he is on a chronic anticoagulation with Xarelto. The history is provided by the patient and the spouse. NursingNotes were reviewed. REVIEW OF SYSTEMS    (2-9 systems for level 4, 10 or more for level 5)     Review of Systems   Constitutional:  Positive for activity change, appetite change, chills, diaphoresis, fatigue and fever. Negative for unexpected weight change. HENT:  Negative for drooling, ear pain, nosebleeds, sinus pressure and trouble swallowing. Respiratory:  Positive for cough. Negative for chest tightness and shortness of breath. Cardiovascular:  Negative for chest pain and leg swelling. Gastrointestinal:  Negative for abdominal pain, diarrhea and vomiting. Endocrine: Negative for polydipsia and polyphagia. Genitourinary:  Negative for dysuria, flank pain and frequency. Musculoskeletal:  Negative for back pain and myalgias. Skin:  Negative for pallor and rash. Neurological:  Negative for syncope, weakness and headaches. Hematological:  Does not bruise/bleed easily. All other systems reviewed and are negative. Except as noted above the remainder of the review of systems was reviewed and negative.        PAST MEDICAL HISTORY     Past Medical History:   Diagnosis Date    Atrial fibrillation (HCC)     CAD (coronary artery disease)     Hematuria     High cholesterol     History of bladder cancer     Hyperlipidemia     Hypertension     Malignant neoplasm of urinary bladder ([facility] [address] 1/27/2020    S/P AAA repair     Spondylosis of lumbar region without myelopathy or radiculopathy          SURGICALHISTORY       Past Surgical History:   Procedure Laterality Date    ABDOMINAL AORTIC ANEURYSM REPAIR      COLONOSCOPY N/A 5/26/2020    COLONOSCOPY DIAGNOSTIC performed by Samantha Amezcua MD at [address] [address] [address] Left 2/11/2020    LEFT LOWER EXTREMITY REVASCULARIZATION performed by Leia Stoner MD at [facility] SCRN NOT  [address] [place] [address] N/A 4/24/2017    COLONOSCOPY performed by Khoa Luna MD at [address] [facility] ESOPHAGOGASTRODUODENOSCOPY TRANSORAL DIAGNOSTIC N/A 4/24/2017    EGD ESOPHAGOGASTRODUODENOSCOPY performed by Khoa Luna MD at [address] [place] [address] 5/26/2020    EGD DIAGNOSTIC ONLY performed by Samantha Amezcua MD at [address] [facility]       Previous Medications    AMLODIPINE (NORVASC) 5 MG TABLET    Take by mouth    BENAZEPRIL (LOTENSIN) 20 MG TABLET    TAKE 2 TABLETS BY MOUTH EVERY DAY    ESCITALOPRAM (LEXAPRO) 10 MG TABLET    TAKE 1 TABLET BY MOUTH EVERY DAY    HANDICAP PLACARD MISC    by Does not apply route Exp 5 years    METOPROLOL SUCCINATE (TOPROL XL) 25 MG EXTENDED RELEASE TABLET    Take 1 tablet by mouth daily    METOPROLOL SUCCINATE (TOPROL XL) 50 MG EXTENDED RELEASE TABLET    Take 50 mg by mouth daily    RIVAROXABAN (XARELTO) 20 MG TABS TABLET    Take 1 tablet by mouth daily    ROSUVASTATIN (CRESTOR) 10 MG TABLET    TAKE ONE TABLET BY MOUTH DAILY        ALLERGIES     Avelox [moxifloxacin hcl in nacl], Mobic [meloxicam], Moxifloxacin, and Z-eugene [azithromycin]    FAMILY HISTORY     History reviewed. No pertinent family history. SOCIAL HISTORY       Social History     Socioeconomic History    Marital status:      Spouse name: None    Number of children: None    Years of education: None    Highest education level: None   Tobacco Use    Smoking status: Light Smoker     Packs/day: 0.33     Years: 30.00     Pack years: 9.90     Types: Cigarettes     Start date: 10/1/1979    Smokeless tobacco: Never   Vaping Use    Vaping Use: Never used   Substance and Sexual Activity    Alcohol use: Yes     Comment: occasionally    Drug use: No     Social Determinants of Health     Financial Resource Strain: Low Risk     Difficulty of Paying Living Expenses: Not very hard   Food Insecurity: No Food Insecurity    Worried About Running Out of Food in the Last Year: Never true    Ran Out of Food in the Last Year: Never true       SCREENINGS      @FLOW([id number])@      PHYSICAL EXAM    (up to 7 for level 4, 8 or more for level 5)     ED Triage Vitals [07/31/22 1200]   BP Temp Temp Source Heart Rate Resp SpO2 Height Weight   132/81 98.1 °F (36.7 °C) Oral (!) 123 18 90 % 5' 8\" (1.727 m) 185 lb (83.9 kg)       Physical Exam  Vitals and nursing note reviewed. Constitutional:       General: He is in acute distress. Appearance: He is well-developed. He is not ill-appearing, toxic-appearing or diaphoretic. HENT:      Head: Normocephalic and atraumatic. No Zavala's sign. Right Ear: External ear normal.      Left Ear: External ear normal.      Nose: Nose normal.      Mouth/Throat:      Mouth: Mucous membranes are moist.      Pharynx: No oropharyngeal exudate. Eyes:      General: No scleral icterus. Right eye: No foreign body. Conjunctiva/sclera: Conjunctivae normal.      Left eye: No exudate. Pupils: Pupils are equal, round, and reactive to light. Neck:      Vascular: No JVD. Trachea: No tracheal deviation. Cardiovascular:      Rate and Rhythm: Regular rhythm. Tachycardia present. Occasional Extrasystoles are present. Pulses: Normal pulses. Radial pulses are 2+ on the right side and 2+ on the left side. Heart sounds: Normal heart sounds, S1 normal and S2 normal. Heart sounds not distant. No murmur heard. No systolic murmur is present. No diastolic murmur is present. No friction rub. No gallop. No S3 or S4 sounds. Pulmonary:      Effort: Pulmonary effort is normal. No respiratory distress. Breath sounds: No stridor. Rhonchi present. No wheezing or rales. Abdominal:      General: Bowel sounds are normal. There is no distension. Palpations: Abdomen is soft. Tenderness: There is no abdominal tenderness. There is no guarding or rebound. Musculoskeletal:         General: No tenderness or deformity. Normal range of motion. Cervical back: Normal range of motion and neck supple. No rigidity. Right lower leg: No edema. Left lower leg: No edema. Lymphadenopathy:      Head:      Right side of head: No submental adenopathy. Left side of head: No submental adenopathy. Skin:     General: Skin is warm and dry. Capillary Refill: Capillary refill takes less than 2 seconds. Coloration: Skin is not jaundiced or pale. Findings: No bruising, erythema, lesion or rash. Neurological:      General: No focal deficit present.       Mental Status: He is alert and oriented to person, place, and time. Mental status is at baseline. Cranial Nerves: No cranial nerve deficit. Coordination: Coordination normal.      Deep Tendon Reflexes: Reflexes are normal and symmetric. Psychiatric:         Behavior: Behavior normal.         Thought Content: Thought content normal.         Judgment: Judgment normal.       DIAGNOSTIC RESULTS     EKG: All EKG's are interpreted by the Emergency Department Physician who either signs or Co-signsthis chart in the absence of a cardiologist.    EKG #1: Atrial fibrillation with rapid ventricular response 109 bpm.  Normal axis. QT intervals 354 ms. No PVCs. EKG #2: Atrial fibrillation with rapid ventricular response at 101 bpm.  Normal axis. Diffuse artifact. [place] baseline V3. LVH voltage. QT intervals 208 ms. No PVCs. EKG #3: A. fib with RVR at 104 bpm.  Normal axis. Diffuse artifact in the limb leads. LVH voltage. QT intervals 358 ms. No PVCs. RADIOLOGY:   Non-plain filmimages such as CT, Ultrasound and MRI are read by the radiologist. Plain radiographic images are visualized and preliminarily interpreted by the emergency physician with the below findings:    Chest x-ray: Right lower lobe infiltrate, no pneumothorax. This was discussed with the radiologist Dr. Vj Velez. Interpretation per the Radiologist below, if available at the time ofthis note:    XR CHEST PORTABLE   Final Result      Right lower lobe infiltrate.                   ED BEDSIDE ULTRASOUND:   Performed by ED Physician - none    LABS:  Labs Reviewed   CBC WITH AUTO DIFFERENTIAL - Abnormal; Notable for the following components:       Result Value    WBC 14.1 (*)     MCH 32.0 (*)     Platelets 793 (*)     Neutrophils Absolute 12.8 (*)     Lymphocytes Absolute 0.6 (*)     All other components within normal limits   COMPREHENSIVE METABOLIC PANEL - Abnormal; Notable for the following components:    Glucose 110 (*)     Total Bilirubin 2.0 (*)     Globulin 3.8 (*)     All other components within normal limits   PROCALCITONIN - Abnormal; Notable for the following components:    Procalcitonin 0.30 (*)     All other components within normal limits   PROTIME-INR - Abnormal; Notable for the following components:    Protime 16.6 (*)     All other components within normal limits   APTT - Abnormal; Notable for the following components:    aPTT 39.6 (*)     All other components within normal limits   COVID-19, RAPID   RAPID INFLUENZA A/B ANTIGENS   CULTURE, BLOOD 1   CULTURE, BLOOD 2   LACTIC ACID       All other labs were within normal range or not returned as of this dictation. EMERGENCY DEPARTMENT COURSE and DIFFERENTIAL DIAGNOSIS/MDM:   Vitals:    Vitals:    07/31/22 1430 07/31/22 1500 07/31/22 1545 07/31/22 1551   BP: (!) 120/93 133/70     Pulse: (!) 106 89     Resp: 18 18 27    Temp:       TempSrc:       SpO2: 93% 93% (!) 82% 94%   Weight:       Height:               MDM  Patient received IV fluids. Patient received IV antibiotics. Blood cultures x2 were done. The first hospitalist Dr. Randall asked the patient to get his fluids, and consider discharging the patient since he did not fail outpatient therapy. The ER physician mentioned the procalcitonin and white blood cell count were both elevated. During this observation. The patient was also given Cardizem which also was discussed with Dr. Randall. The patient is on Xarelto. Further anticoagulation would be unnecessary. The patient got up and walked to the restroom. ED attending called to the room. Patient is now wheezing. Pulse ox dropped to 82%. No pain with inspiration. The ER physician believes the patient would benefit from hospitalization. DuoNeb treatment was ordered. Case discussed with Dr. Melania Anderson. Case discussed with [facility] the nurse practitioner with the hospitalist service. Patient updated on plan. CRITICAL CARE TIME   Total Critical Care time was 47minutes, excluding separately reportableprocedures.   There was a high probability of clinicallysignificant/life threatening deterioration in the patient's condition which required my urgent intervention. CONSULTS:  None    PROCEDURES:  Unless otherwise noted below, none     Procedures    FINAL IMPRESSION      1. Pneumonia of right lower lobe due to infectious organism    2. Leukocytosis, unspecified type    3. Atrial fibrillation, unspecified type ([facility] [address]    4. Chronic anticoagulation    5. Acute exacerbation of chronic obstructive pulmonary disease (COPD) ([facility] [address]    6. Hypoxia          DISPOSITION/PLAN   DISPOSITION Admitted 07/31/2022 03:58:51 PM      PATIENT REFERRED TO:  No follow-up provider specified.     DISCHARGE MEDICATIONS:  New Prescriptions    No medications on file          (Please note that portions of this note were completed with a voice recognition program.  Efforts were made to edit the dictations but occasionally words are mis-transcribed.)    Cristopher Boast, DO (electronically signed)  Attending Emergency Physician          Cristopher Boast, DO  07/31/22 [address],   07/31/22 [address], DO  07/31/22 1634

## 2022-07-31 NOTE — CARE COORDINATION
[facility] Case Management Initial Discharge Assessment    Met with Patient to discuss discharge plan. PCP: Pasha Walker MD                                Date of Last Visit: months    [facility] Patient: No        [facility] Notified: no    If no PCP, list provided? N/A    Discharge Planning    Living Arrangements: independently at home    Who do you live with? wife    Who helps you with your care:  self or spouse    If lives at home:     Do you have any barriers navigating in your home? no    Patient can perform ADL? Yes    Current Services (outpatient and in home) :  None    Dialysis: No    Is transportation available to get to your appointments? Yes    DME Equipment:  no    Respiratory equipment: None    Respiratory provider:  no     [facility]    Consult with Medication Assistance Program?  No      Patient agreeable to [place] [address]? Declined    Patient agreeable to SNF/Rehab? Declined    Other discharge needs identified? N/A    Does Patient Have a High-Risk for Readmission Diagnosis (CHF, PN, MI, COPD)? Yes     If Yes,    Consult with pulmonologist? No  Consult with cardiologist? No  Cardiac Rehab referral if EF <35%? N/A  Consult with Pharmacy for medication assessment prior to discharge? No  Consult with Behavioral health to aid in depression, anxiety, or coping issues? No  Palliative Care Consult? No  Pulmonary Rehab order for COPD, PN, and CHF (if EF > 35%)? N/A   Does patient have a reliable scale and know how to read it (for CHF)? N/A  Nutrition consult for CHF? N/A  Respiratory therapy consult that includes bedside instruction on administration of nebulizers and/or inhalers, and assessment of oxygen and equipment needs in the home? No    Initial Discharge Plan? (Note: please see concurrent daily documentation for any updates after initial note). Pt denied any d/c needs in ER. Cm to assess for further d/c needs and referrals.     Readmission Risk              Risk of Unplanned Readmission:  0         Electronically signed by Alyssa Cardona RN on 7/31/2022 at 2:53 PM

## 2022-07-31 NOTE — ED TRIAGE NOTES
Patient c/o cough and fever, went to Urgent Care and sent to ER for possibly pneumonia. Respirations equal and unlabored.

## 2022-07-31 NOTE — EC ADMISSION CRITERIA
AdmissionCare    Guideline: Chronic Obstructive Pulmonary Disease, Inpatient    Based on the indications selected for the patient, the bed status of Admit to Inpatient was determined to be MET    The following indications were selected as present at the time of evaluation of the patient:   - Admission is indicated for    - High-risk active acute comorbidity (eg, dysrhythmia, heart failure, pleural effusion, pneumothorax, myopathy, rib fracture) with new or worsened (eg, from baseline) signs or symptoms of COPD (eg, dyspnea, Tachypnea, cough, sputum production)    - New or worsened (eg, from baseline) signs or symptoms of COPD (eg, dyspnea, Tachypnea, cough, sputum production) that persist despite observation care    AdmissionCare documentation entered by: [address], 26th edition, Copyright © 2022 [address] All Rights Reserved.  [id number]:00

## 2022-08-01 LAB
ALBUMIN SERPL-MCNC: 3.1 G/DL (ref 3.5–4.6)
ALP BLD-CCNC: 62 U/L (ref 35–104)
ALT SERPL-CCNC: 15 U/L (ref 0–41)
ANION GAP SERPL CALCULATED.3IONS-SCNC: 14 MEQ/L (ref 9–15)
AST SERPL-CCNC: 19 U/L (ref 0–40)
BASOPHILS ABSOLUTE: 0 K/UL (ref 0–0.2)
BASOPHILS RELATIVE PERCENT: 0 %
BILIRUB SERPL-MCNC: 1.1 MG/DL (ref 0.2–0.7)
BUN BLDV-MCNC: 20 MG/DL (ref 8–23)
CALCIUM SERPL-MCNC: 8.7 MG/DL (ref 8.5–9.9)
CHLORIDE BLD-SCNC: 104 MEQ/L (ref 95–107)
CO2: 19 MEQ/L (ref 20–31)
CREAT SERPL-MCNC: 1.02 MG/DL (ref 0.7–1.2)
EKG ATRIAL RATE: 107 BPM
EKG ATRIAL RATE: 87 BPM
EKG Q-T INTERVAL: 354 MS
EKG Q-T INTERVAL: 358 MS
EKG QRS DURATION: 106 MS
EKG QRS DURATION: 98 MS
EKG QTC CALCULATION (BAZETT): 470 MS
EKG QTC CALCULATION (BAZETT): 476 MS
EKG R AXIS: 36 DEGREES
EKG R AXIS: 45 DEGREES
EKG T AXIS: -9 DEGREES
EKG T AXIS: 44 DEGREES
EKG VENTRICULAR RATE: 104 BPM
EKG VENTRICULAR RATE: 109 BPM
EOSINOPHILS ABSOLUTE: 0 K/UL (ref 0–0.7)
EOSINOPHILS RELATIVE PERCENT: 0 %
GFR AFRICAN AMERICAN: >60
GFR NON-AFRICAN AMERICAN: >60
GLOBULIN: 3.5 G/DL (ref 2.3–3.5)
GLUCOSE BLD-MCNC: 177 MG/DL (ref 70–99)
HCT VFR BLD CALC: 41.9 % (ref 42–52)
HEMOGLOBIN: 13.9 G/DL (ref 14–18)
LYMPHOCYTES ABSOLUTE: 0.2 K/UL (ref 1–4.8)
LYMPHOCYTES RELATIVE PERCENT: 2.5 %
MAGNESIUM: 2.5 MG/DL (ref 1.7–2.4)
MCH RBC QN AUTO: 31.9 PG (ref 27–31.3)
MCHC RBC AUTO-ENTMCNC: 33.2 % (ref 33–37)
MCV RBC AUTO: 96.2 FL (ref 80–100)
MONOCYTES ABSOLUTE: 0.4 K/UL (ref 0.2–0.8)
MONOCYTES RELATIVE PERCENT: 4.2 %
NEUTROPHILS ABSOLUTE: 8.5 K/UL (ref 1.4–6.5)
NEUTROPHILS RELATIVE PERCENT: 93.3 %
PDW BLD-RTO: 14.6 % (ref 11.5–14.5)
PLATELET # BLD: 119 K/UL (ref 130–400)
POTASSIUM REFLEX MAGNESIUM: 3.4 MEQ/L (ref 3.4–4.9)
PROCALCITONIN: 0.29 NG/ML (ref 0–0.15)
RBC # BLD: 4.36 M/UL (ref 4.7–6.1)
SODIUM BLD-SCNC: 137 MEQ/L (ref 135–144)
TOTAL PROTEIN: 6.6 G/DL (ref 6.3–8)
WBC # BLD: 9.1 K/UL (ref 4.8–10.8)

## 2022-08-01 PROCEDURE — 6370000000 HC RX 637 (ALT 250 FOR IP): Performed by: INTERNAL MEDICINE

## 2022-08-01 PROCEDURE — 94761 N-INVAS EAR/PLS OXIMETRY MLT: CPT

## 2022-08-01 PROCEDURE — 93010 ELECTROCARDIOGRAM REPORT: CPT | Performed by: INTERNAL MEDICINE

## 2022-08-01 PROCEDURE — 2700000000 HC OXYGEN THERAPY PER DAY

## 2022-08-01 PROCEDURE — 6360000002 HC RX W HCPCS: Performed by: NURSE PRACTITIONER

## 2022-08-01 PROCEDURE — 83735 ASSAY OF MAGNESIUM: CPT

## 2022-08-01 PROCEDURE — 99223 1ST HOSP IP/OBS HIGH 75: CPT | Performed by: INTERNAL MEDICINE

## 2022-08-01 PROCEDURE — 6360000002 HC RX W HCPCS: Performed by: INTERNAL MEDICINE

## 2022-08-01 PROCEDURE — 85025 COMPLETE CBC W/AUTO DIFF WBC: CPT

## 2022-08-01 PROCEDURE — 2500000003 HC RX 250 WO HCPCS: Performed by: INTERNAL MEDICINE

## 2022-08-01 PROCEDURE — 2580000003 HC RX 258: Performed by: INTERNAL MEDICINE

## 2022-08-01 PROCEDURE — 2580000003 HC RX 258: Performed by: NURSE PRACTITIONER

## 2022-08-01 PROCEDURE — 84145 PROCALCITONIN (PCT): CPT

## 2022-08-01 PROCEDURE — 6370000000 HC RX 637 (ALT 250 FOR IP): Performed by: NURSE PRACTITIONER

## 2022-08-01 PROCEDURE — 80053 COMPREHEN METABOLIC PANEL: CPT

## 2022-08-01 PROCEDURE — 36415 COLL VENOUS BLD VENIPUNCTURE: CPT

## 2022-08-01 PROCEDURE — 94640 AIRWAY INHALATION TREATMENT: CPT

## 2022-08-01 PROCEDURE — 2060000000 HC ICU INTERMEDIATE R&B

## 2022-08-01 RX ORDER — METOPROLOL SUCCINATE 100 MG/1
100 TABLET, EXTENDED RELEASE ORAL DAILY
Status: DISCONTINUED | OUTPATIENT
Start: 2022-08-02 | End: 2022-08-03

## 2022-08-01 RX ORDER — AZITHROMYCIN 500 MG/1
250 TABLET, FILM COATED ORAL DAILY
Status: DISCONTINUED | OUTPATIENT
Start: 2022-08-02 | End: 2022-08-03 | Stop reason: HOSPADM

## 2022-08-01 RX ORDER — PREDNISONE 10 MG/1
40 TABLET ORAL DAILY
Status: DISCONTINUED | OUTPATIENT
Start: 2022-08-02 | End: 2022-08-03 | Stop reason: HOSPADM

## 2022-08-01 RX ORDER — METOPROLOL TARTRATE 5 MG/5ML
2.5 INJECTION INTRAVENOUS EVERY 6 HOURS
Status: DISCONTINUED | OUTPATIENT
Start: 2022-08-01 | End: 2022-08-01

## 2022-08-01 RX ORDER — POTASSIUM CHLORIDE 20 MEQ/1
20 TABLET, EXTENDED RELEASE ORAL 2 TIMES DAILY WITH MEALS
Status: DISCONTINUED | OUTPATIENT
Start: 2022-08-01 | End: 2022-08-03 | Stop reason: HOSPADM

## 2022-08-01 RX ORDER — AZITHROMYCIN 500 MG/1
500 TABLET, FILM COATED ORAL DAILY
Status: COMPLETED | OUTPATIENT
Start: 2022-08-01 | End: 2022-08-01

## 2022-08-01 RX ORDER — METOPROLOL TARTRATE 5 MG/5ML
2.5 INJECTION INTRAVENOUS EVERY 6 HOURS PRN
Status: DISCONTINUED | OUTPATIENT
Start: 2022-08-01 | End: 2022-08-03 | Stop reason: HOSPADM

## 2022-08-01 RX ADMIN — PREDNISOLONE ACETATE 1 DROP: 10 SUSPENSION/ DROPS OPHTHALMIC at 08:53

## 2022-08-01 RX ADMIN — METOPROLOL SUCCINATE 75 MG: 50 TABLET, EXTENDED RELEASE ORAL at 08:47

## 2022-08-01 RX ADMIN — IPRATROPIUM BROMIDE AND ALBUTEROL SULFATE 1 AMPULE: 2.5; .5 SOLUTION RESPIRATORY (INHALATION) at 07:19

## 2022-08-01 RX ADMIN — METOPROLOL TARTRATE 25 MG: 25 TABLET, FILM COATED ORAL at 12:15

## 2022-08-01 RX ADMIN — LISINOPRIL 40 MG: 20 TABLET ORAL at 08:48

## 2022-08-01 RX ADMIN — CEFTRIAXONE SODIUM 1000 MG: 1 INJECTION, POWDER, FOR SOLUTION INTRAMUSCULAR; INTRAVENOUS at 13:12

## 2022-08-01 RX ADMIN — IPRATROPIUM BROMIDE AND ALBUTEROL SULFATE 1 AMPULE: 2.5; .5 SOLUTION RESPIRATORY (INHALATION) at 11:02

## 2022-08-01 RX ADMIN — RIVAROXABAN 20 MG: 20 TABLET, FILM COATED ORAL at 08:48

## 2022-08-01 RX ADMIN — ESCITALOPRAM OXALATE 10 MG: 10 TABLET ORAL at 08:48

## 2022-08-01 RX ADMIN — PREDNISOLONE ACETATE 1 DROP: 10 SUSPENSION/ DROPS OPHTHALMIC at 20:58

## 2022-08-01 RX ADMIN — AMLODIPINE BESYLATE 5 MG: 5 TABLET ORAL at 08:48

## 2022-08-01 RX ADMIN — AZITHROMYCIN MONOHYDRATE 500 MG: 500 TABLET ORAL at 13:11

## 2022-08-01 RX ADMIN — SODIUM CHLORIDE, PRESERVATIVE FREE 10 ML: 5 INJECTION INTRAVENOUS at 20:58

## 2022-08-01 RX ADMIN — METHYLPREDNISOLONE SODIUM SUCCINATE 40 MG: 40 INJECTION, POWDER, FOR SOLUTION INTRAMUSCULAR; INTRAVENOUS at 08:50

## 2022-08-01 RX ADMIN — METHYLPREDNISOLONE SODIUM SUCCINATE 40 MG: 40 INJECTION, POWDER, FOR SOLUTION INTRAMUSCULAR; INTRAVENOUS at 03:55

## 2022-08-01 RX ADMIN — GUAIFENESIN 1200 MG: 600 TABLET, EXTENDED RELEASE ORAL at 08:48

## 2022-08-01 RX ADMIN — IPRATROPIUM BROMIDE AND ALBUTEROL SULFATE 1 AMPULE: 2.5; .5 SOLUTION RESPIRATORY (INHALATION) at 20:08

## 2022-08-01 RX ADMIN — ROSUVASTATIN CALCIUM 10 MG: 10 TABLET, FILM COATED ORAL at 20:58

## 2022-08-01 RX ADMIN — METOPROLOL TARTRATE 2.5 MG: 5 INJECTION INTRAVENOUS at 16:47

## 2022-08-01 RX ADMIN — GUAIFENESIN 1200 MG: 600 TABLET, EXTENDED RELEASE ORAL at 20:58

## 2022-08-01 RX ADMIN — Medication 1 TABLET: at 08:48

## 2022-08-01 RX ADMIN — IPRATROPIUM BROMIDE AND ALBUTEROL SULFATE 1 AMPULE: 2.5; .5 SOLUTION RESPIRATORY (INHALATION) at 15:59

## 2022-08-01 RX ADMIN — SODIUM CHLORIDE, PRESERVATIVE FREE 10 ML: 5 INJECTION INTRAVENOUS at 08:51

## 2022-08-01 RX ADMIN — POTASSIUM CHLORIDE 20 MEQ: 1500 TABLET, EXTENDED RELEASE ORAL at 16:41

## 2022-08-01 NOTE — PROGRESS NOTES
Hospitalist Progress Note      PCP: Ama Roberts MD    Date of Admission: 7/31/2022    Chief Complaint:    Chief Complaint   Patient presents with    Cough     Sent from urgent care       Subjective:  Patient denies fevers, chills, sweats, CP, SOB, diarrhea or burning micturition. Feels uch better. 12 point ROS negative other than mentioned above     Medications:  Reviewed    Infusion Medications    sodium chloride       Scheduled Medications    [START ON 8/2/2022] predniSONE  40 mg Oral Daily    [START ON 8/2/2022] metoprolol succinate  100 mg Oral Daily    sodium chloride flush  5-40 mL IntraVENous 2 times per day    guaiFENesin  1,200 mg Oral BID    ipratropium-albuterol  1 ampule Inhalation 4x daily    lisinopril  40 mg Oral Daily    amLODIPine  5 mg Oral Daily    escitalopram  10 mg Oral Daily    prednisoLONE acetate  1 drop Left Eye BID    rivaroxaban  20 mg Oral Daily    rosuvastatin  10 mg Oral Nightly    ocuvite-lutein  1 tablet Oral Daily     PRN Meds: sodium chloride flush, sodium chloride, ondansetron **OR** ondansetron, polyethylene glycol, acetaminophen **OR** acetaminophen, labetalol, albuterol      Intake/Output Summary (Last 24 hours) at 8/1/2022 1218  Last data filed at 8/1/2022 3054  Gross per 24 hour   Intake 120 ml   Output --   Net 120 ml       Exam:    BP (!) 155/102   Pulse (!) 134   Temp 98.2 °F (36.8 °C) (Oral)   Resp 18   Ht 5' 8\" (1.727 m)   Wt 179 lb 9.6 oz (81.5 kg)   SpO2 95%   BMI 27.31 kg/m²     General appearance: No apparent distress, appears stated age and cooperative. HEENT:  Conjunctivae/corneas clear. Neck: Trachea midline. Respiratory:  Normal respiratory effort. Clear to auscultation  Cardiovascular: irregularly irregular  Abdomen: Soft, non-tender, non-distended with normal bowel sounds.   Musculoskeletal: No clubbing, cyanosis or edema bilaterally  Neuro: Non Focal.   Capillary Refill: Brisk,< 3 seconds   Peripheral Pulses: +2 palpable, equal bilaterally Labs:   Recent Labs     07/31/22  1215 08/01/22  0517   WBC 14.1* 9.1   HGB 15.2 13.9*   HCT 45.4 41.9*   * 119*     Recent Labs     07/31/22  1215 08/01/22  0517    137   K 3.6 3.4    104   CO2 26 19*   BUN 16 20   CREATININE 1.08 1.02   CALCIUM 9.6 8.7     Recent Labs     07/31/22  1215 08/01/22  0517   AST 18 19   ALT 14 15   BILITOT 2.0* 1.1*   ALKPHOS 69 62     Recent Labs     07/31/22  1215   INR 1.4     No results for input(s): Prentis Revels in the last 72 hours. Urinalysis:      Lab Results   Component Value Date/Time    NITRU Negative 12/24/2019 09:15 AM    WBCUA 0-2 12/24/2019 09:15 AM    BACTERIA Negative 12/24/2019 09:15 AM    RBCUA 6-10 12/24/2019 09:15 AM    BLOODU small 09/03/2021 09:58 AM    BLOODU TRACE 12/24/2019 09:15 AM    SPECGRAV 1.025 09/03/2021 09:58 AM    SPECGRAV 1.010 12/24/2019 09:15 AM    GLUCOSEU neg 09/03/2021 09:58 AM    GLUCOSEU Negative 12/24/2019 09:15 AM    GLUCOSEU NEG 11/01/2011 09:38 AM       Radiology:  XR CHEST PORTABLE   Final Result      Right lower lobe infiltrate. Assessment/Plan:    #Acute hypoxic respiratory failure secondary to PNA and COPD exacerbation both POA     - Prendisone 40mg QD     - CTX and azithromycin pending pulm eval    #A Fib with RVR     - likely due to the above; cardiology consulted for assistance with rate control    #HTN    - continue home meds    Active Hospital Problems    Diagnosis Date Noted    Pneumonia [J18.9] 07/31/2022     Priority: Medium    Community acquired pneumonia of right lower lobe of lung [J18.9] 07/31/2022     Priority: Medium    COPD exacerbation ([facility] [address] [J44.1] 07/31/2022     Priority: Medium      Additional work up or/and treatment plan may be added today or then after based on clinical progression. I am managing a portion of pt care. Some medical issues are handled by other specialists.  Additional work up and treatment should be done in out pt setting by pt PCP and other out pt providers. In addition to examining and evaluating pt, I spent additional time explaining care, normal and abnormal findings, and treatment plan. All of pt questions were answered. Counseling, diet and education were  provided. Case will be discussed with nursing staff when appropriate. Family will be updated if and when appropriate. Diet: ADULT DIET;  Regular    Code Status: Full Code    PT/OT Eval     Electronically signed by Moira Patterson MD on 8/1/2022 at 12:18 PM

## 2022-08-01 NOTE — PROGRESS NOTES
Shift assessment complete, Pt is resting in bed, denies any needs at this time, call light with in reach, will continue to monitor.

## 2022-08-01 NOTE — CARE COORDINATION
This Care Transition Nurse provided Pneumonia / COPD booklet and zones sheet and reviewed. Stressed importance of phoning physician promptly for symptoms in the yellow zone and ems for symptoms in the red zone. Pneumonia: Discussed causes of pneumonia, symptoms, treatment, tests that may be ordered. Discussed importance of taking antibiotics until gone, drinking plenty of fluids, especially water, coughing and deep breathing, continue to use incentive spirometer (IS) and flutter valve device at home. Get adequate rest and eat a well balanced diet. Importance of infection prevention: good handwashing, disposing of used tissues in the proper receptacle, masking. Avoid drinking alcoholic beverages. COPD : Discussed cause of COPD, how lungs work, treatment. Discussed avoiding respiratory infection by good handwashing/hand sanitizing, masking to protect airway, avoiding ill contacts, keeping nebulizer setup and other respiratory equipment clean. Discussed common inhaled irritants to avoid. Discussed pursed lip breathing and abdominal breathing. Discussed positions  to reduce shortness of breath, energy conservation, COPD medications, importance of taking antibiotics and prednisone as ordered until gone, importance of keeping updated with vaccines. Discussed importance of nutrition, limiting sodium, drinking plenty of fluids especially water. Discussed benefits of Pulmonary Rehab and Palliative Care. Stressed importance of smoking cessation. Informed of the Everton Travis and Becky Peacock at [facility] as a resource for smoking cessation and more information about COPD. Stressed importance of physician (PCP) follow up within one week of discharge and follow up with pulmonologist as directed. Patient voiced understanding. 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 He continues to smoke a pack of cigarettes every 3 days. Strongly encouraged smoking cessation. He drinks plenty of water and other fluids. He was not sure how to use IS and flutter valve device. Taught how to use these and encouraged to continue to use here in the hospital and after discharge. He has not been taking any medications for COPD as he cannot afford the inhalers.  Advised to discuss this with pulmonologist as there may be some inhalers that are more affordable, there may be samples available, there may be discounts from inhaler ,etc.

## 2022-08-01 NOTE — CONSULTS
Pulmonary Medicine  Consult Note      Reason for consultation: Shortness of breath    Consulting physician: ANNETTA Dumont    HISTORY OF PRESENT ILLNESS:    This is a 78-year-old male who was presented to ER from urgent care with 7 days history of cough shortness of breath decreased appetite and fever. He had COVID/flu negative. Due to wheezing and dyspnea patient was referred to have further evaluation in the ER. During ER work-up patient become hypoxic O2 saturation was 82%. He has slightly elevated procalcitonin leukocytosis and chest x-ray showing right lower lobe infiltration. He was also having expiratory wheezing during exam and was admitted due to acute on chronic respiratory failure with hypoxia secondary to COPD exacerbation and possible right lower lobe pneumonia. He was seen by Dr. Marchia Aase in past, had a PFT showing severe obstructive pulmonary disease with FEV1 2.7 to 49% predicted FEV1 is 1.9 142% predicted with ratio 70%. He was given inhaler. He thought it was too costly and stopped using it. Currently is not following with any pulmonologist.  He said he is feeling little better today than yesterday. .          Past Medical History:        Diagnosis Date    Atrial fibrillation (HCC)     CAD (coronary artery disease)     COPD exacerbation (HCC) 7/31/2022    Hematuria     High cholesterol     History of bladder cancer     Hyperlipidemia     Hypertension     Malignant neoplasm of urinary bladder ([address] 1/27/2020    S/P AAA repair     Spondylosis of lumbar region without myelopathy or radiculopathy        Past Surgical History:        Procedure Laterality Date    ABDOMINAL AORTIC ANEURYSM REPAIR      COLONOSCOPY N/A 5/26/2020    COLONOSCOPY DIAGNOSTIC performed by Alireza Lara MD at [address] Left 2/11/2020    LEFT LOWER EXTREMITY REVASCULARIZATION performed by Mansi Cho MD at [facility] SCRN NOT  [address] [place] [address] N/A 4/24/2017 COLONOSCOPY performed by Leonardo Li MD at [facility] ESOPHAGOGASTRODUODENOSCOPY TRANSORAL DIAGNOSTIC N/A 4/24/2017    EGD ESOPHAGOGASTRODUODENOSCOPY performed by Leonardo Li MD at [address] [place] [address] 5/26/2020    EGD DIAGNOSTIC ONLY performed by Marisel Foote MD at [address] History:     reports that he has been smoking cigarettes. He started smoking about 42 years ago. He has a 9.90 pack-year smoking history. He has never used smokeless tobacco. He reports current alcohol use. He reports that he does not use drugs. Family History:   History reviewed. No pertinent family history. Allergies: Avelox [moxifloxacin hcl in nacl], Mobic [meloxicam], Moxifloxacin, and Z-eugene [azithromycin]        MEDICATIONS during current hospitalization:    Continuous Infusions:   sodium chloride         Scheduled Meds:   [START ON 8/2/2022] predniSONE  40 mg Oral Daily    [START ON 8/2/2022] metoprolol succinate  100 mg Oral Daily    cefTRIAXone (ROCEPHIN) IV  1,000 mg IntraVENous Q24H    [START ON 8/2/2022] azithromycin  250 mg Oral Daily    sodium chloride flush  5-40 mL IntraVENous 2 times per day    guaiFENesin  1,200 mg Oral BID    ipratropium-albuterol  1 ampule Inhalation 4x daily    lisinopril  40 mg Oral Daily    amLODIPine  5 mg Oral Daily    escitalopram  10 mg Oral Daily    prednisoLONE acetate  1 drop Left Eye BID    rivaroxaban  20 mg Oral Daily    rosuvastatin  10 mg Oral Nightly    ocuvite-lutein  1 tablet Oral Daily       PRN Meds:sodium chloride flush, sodium chloride, ondansetron **OR** ondansetron, polyethylene glycol, acetaminophen **OR** acetaminophen, labetalol, albuterol    REVIEW OF SYSTEMS:  As in history of present illness  Other 14 point review of system is negative.      PHYSICAL EXAM:    Vitals:  /87   Pulse (!) 135   Temp 98.4 °F (36.9 °C) (Oral)   Resp 18   Ht 5' 8\" (1.727 m)   Wt 179 lb 9.6 oz (81.5 kg)   SpO2 95%   BMI 27.31 kg/m²   General:   Alert, awake, Oriented x3  .comfortable in bed, No distress. Head: Atraumatic , Normocephalic   Eyes: PERRL. No sclera icterus. No conjunctival injection. No discharge   ENT: No nasal  discharge. Pharynx clear. Neck:  Trachea midline. No thyromegaly, no JVD, No cervical adenopathy. Chest : Bilaterally symmetrical ,Normal effort,  No accessory muscle use  Lung : Diminished BS bilateraly. Right basilar Rales. Few scattered wheezing. No rhonchi. Heart[de-identified] Normal  rate. Regular rhythm. No mumur ,  Rub or gallop  ABD: Non-tender. Non-distended. No masses. No organmegaly. Normal bowel sounds. No hernia. Extremities: No pitting in both lower leg , No Cyanosis ,No clubbing  Neuro: no cranial nerve abnormality, normal reflex and sensation, no focal weakness   Skin: Warm and dry. No erythema rash on exposed extremities. Data Review  Recent Labs     07/31/22  1215 08/01/22  0517   WBC 14.1* 9.1   HGB 15.2 13.9*   HCT 45.4 41.9*   * 119*      Recent Labs     07/31/22  1215 08/01/22  0517    137   K 3.6 3.4    104   CO2 26 19*   BUN 16 20   CREATININE 1.08 1.02   GLUCOSE 110* 177*       MV Settings: ABGs: No results for input(s): PHART, XQO1JTL, PO2ART, PWS2ASB, BEART, D7VINBPP, QOJ6XEH in the last 72 hours. O2 Device: None (Room air)  O2 Flow Rate (L/min): (S) 3 L/min (Decreased to 2L)  Lab Results   Component Value Date/Time    LACTA 1.3 07/31/2022 12:15 PM    LACTA 1.8 12/24/2019 09:30 AM    LACTA 1.4 11/27/2019 04:58 PM       Radiology  XR CHEST PORTABLE    Result Date: 7/31/2022  Exam: XR CHEST PORTABLE History: Cough and fever. Technique: AP portable view of the chest obtained. Comparison: Portal chest radiographs December 24, 2019 Findings: The cardiomediastinal silhouette is within normal limits. Patchy and linear opacities of the right lung base. No pneumothorax or pleural effusion. No acute osseous abnormality.      Right lower lobe infiltrate. Assessment and  plan:     Acute hypoxic respiratory failure due to COPD exacerbation and right lower lobe pneumonia  COPD with exacerbation  Right lower lobe pneumonia, community-acquired  A. fib with controlled rate  Hypertension    He had chest x-ray shows right lower lobe infiltration. He is currently on IV Rocephin 1 g every 24 hourly and Zithromax 250 mg mg daily. Prednisone 40 mg daily. Changed to p.o. Ceftin 5 mg twice daily for 10 days when discharged home. Taper dose of prednisone. Recommend PFT as outpatient. Home O2 evaluation prior to discharge. Consider Trelegy Ellipta 1 puff daily or Breztri HFA 2 puff twice daily when discharged home. Follow-up in office 2 to 4 weeks. NOTE: This report was transcribed using voice recognition software. Every effort was made to ensure accuracy; however, inadvertent computerized transcription errors may be present.     Electronically signed by [id number] Huizar Se, MD, FCCP on 8/1/2022 at 3:53 PM

## 2022-08-01 NOTE — CONSULTS
Cardiology Consult Note      Date:   8/1/2022  Patient name:  Josy Collazo. Date of admission:  7/31/2022 12:04 PM  MRN:   [id number]  YOB: 1940  Time of Consult:  11:31 AM    Consulting Cardiologist: Dr. Delaney Fritz MD  [facility] Edgardo APRN-CNP  Primary Cardiologist:  Dr. Delaney Fritz    Referring Provider: Dr. Fracisco Gonzales Reason:  Atrial Fib with RVR    Assessment:  Atrial Fib with RVR: History of permananet atrial fibrillation with rate control strategy. Presented to  most likely secondary to infectious process. 2. Long term anticoagulation:  Xarleto       3. Hypertension:  Sub optimal control       4. Cough: Chest x-ray showed right lower lobe infiltrate. Procalcitonin 0.30. Admitted with pneumonia, per medicine. Plan:  Monitor on telemetry  2. Continue Xarelto  3. Increase current dose of Metoprolol succinate to 100 mg PO dialy, will give one additional dose of 25 mg PO today to total 100 mg  4. Further recommendations per Dr. Lamin Lisa         HPI:   Josy Ervin is a 79 y.o.  male patient who is being at the request of Dr. Bess Terrazas for inpatient consultation of atrial fibrillation. He was admitted on 7/31/2022. Previous [facility] and [address] records have been reviewed in detail. 79 yr old male with a PMH of HTN, permanent atrial fibrillation with rate control strategy with long term anticoagulation Xarelto, HLD, AAA with endograft vascular stent graft, COPD, PVD, smoker who presented to [facility] [address] [facility] ER 7/31/2022 with CCO cough, decreased appetite and fever of 102 approximately 7 days. EKG showed A. fib with RVR  bpm.  Chest x-ray showed right lower lobe infiltrate. Abnormal labs; procalcitonin 0.30, WBCs 14.1. He states that he has had a productive cough for approximately 1 week. He denies chest pain, chest pressure chest tightness, lightheadedness, dizziness or palpitations. He does admits to exertional shortness of breath.  He is currently COLONOSCOPY performed by Devonte Shirley MD at [address] [facility] ESOPHAGOGASTRODUODENOSCOPY TRANSORAL DIAGNOSTIC N/A 4/24/2017    EGD ESOPHAGOGASTRODUODENOSCOPY performed by Devonte Shirley MD at [address] [place] [address] 5/26/2020    EGD DIAGNOSTIC ONLY performed by Sunny Essex, MD at [facility]       Family History:   History reviewed. No pertinent family history. Social  History:     Social History     Tobacco Use    Smoking status: Light Smoker     Packs/day: 0.33     Years: 30.00     Pack years: 9.90     Types: Cigarettes     Start date: 10/1/1979    Smokeless tobacco: Never   Substance Use Topics    Alcohol use: Yes     Comment: occasionally       Home Medications:    Prior to Admission medications    Medication Sig Start Date End Date Taking?  Authorizing Provider   Multiple Vitamins-Minerals (ICAPS AREDS FORMULA PO) Take 1 tablet by mouth daily   Yes Historical Provider, MD   prednisoLONE acetate (PRED FORTE) 1 % ophthalmic suspension Place 1 drop into the left eye 2 times daily   Yes Historical Provider, MD   escitalopram (LEXAPRO) 10 MG tablet TAKE 1 TABLET BY MOUTH EVERY DAY 5/2/22   Aaron Bolton MD   benazepril (LOTENSIN) 20 MG tablet TAKE 2 TABLETS BY MOUTH EVERY DAY  Patient taking differently: No sig reported 3/7/22   Aaron Bolton MD   amLODIPine (NORVASC) 5 MG tablet Take by mouth daily 10/25/21   Historical Provider, MD   rivaroxaban (XARELTO) 20 MG TABS tablet Take 1 tablet by mouth daily 9/30/21   Aaron Bolton MD   Handicap Placard MISC by Does not apply route Exp 5 years 1/27/21   Aaron Bolton MD   rosuvastatin (CRESTOR) 10 MG tablet TAKE ONE TABLET BY MOUTH DAILY  1/7/20   Aaron Bolton MD   metoprolol succinate (TOPROL XL) 25 MG extended release tablet Take 1 tablet by mouth daily  Patient taking differently: No sig reported 1/28/19   Aaron Bolton MD       Current Medications:   sodium chloride         IV Medications:  Current Facility-Administered Medications   Medication Dose Route Frequency Provider Last Rate Last Admin    [START ON 8/2/2022] predniSONE (DELTASONE) tablet 40 mg  40 mg Oral Daily Trudy Roper MD        sodium chloride flush 0.9 % injection 5-40 mL  5-40 mL IntraVENous 2 times per day Adiel Man, APRN - CNP   10 mL at 08/01/22 0851    sodium chloride flush 0.9 % injection 5-40 mL  5-40 mL IntraVENous PRN Adiel Man, APRN - CNP        0.9 % sodium chloride infusion   IntraVENous PRN Adiel Man, APRN - CNP        ondansetron (ZOFRAN-ODT) disintegrating tablet 4 mg  4 mg Oral Q8H PRN Adiel Man, APRN - CNP        Or    ondansetron (ZOFRAN) injection 4 mg  4 mg IntraVENous Q6H PRN Adiel Man, APRN - CNP        polyethylene glycol (GLYCOLAX) packet 17 g  17 g Oral Daily PRN Adiel Man, APRN - CNP        acetaminophen (TYLENOL) tablet 650 mg  650 mg Oral Q6H PRN Adiel Man, APRN - CNP        Or    acetaminophen (TYLENOL) suppository 650 mg  650 mg Rectal Q6H PRN Adiel Man, APRN - CNP        guaiFENesin [facility]) extended release tablet 1,200 mg  1,200 mg Oral BID Adiel Abdi, APRN - CNP   1,200 mg at 08/01/22 0848    labetalol (NORMODYNE;TRANDATE) injection 20 mg  20 mg IntraVENous Q4H PRN Adiel Man, APRN - CNP        ipratropium-albuterol (DUONEB) nebulizer solution 1 ampule  1 ampule Inhalation 4x daily Adiel Man, APRN - CNP   1 ampule at 08/01/22 1102    albuterol (PROVENTIL) nebulizer solution 2.5 mg  2.5 mg Nebulization Q2H PRN Adiel Man, APRN - CNP        lisinopril (PRINIVIL;ZESTRIL) tablet 40 mg  40 mg Oral Daily Adiel Man, APRN - CNP   40 mg at 08/01/22 0848    amLODIPine (NORVASC) tablet 5 mg  5 mg Oral Daily Adiel Man, APRN - CNP   5 mg at 08/01/22 0848    escitalopram (LEXAPRO) tablet 10 mg  10 mg Oral Daily ANNETTA Enrique CNP   10 mg at 08/01/22 0848    metoprolol succinate (TOPROL XL) extended release tablet 75 mg  75 mg Oral Daily ANNETTA Enrique - CNP 75 mg at 08/01/22 0847    prednisoLONE acetate (PRED FORTE) 1 % ophthalmic suspension 1 drop  1 drop Left Eye BID [facility], APRN - CNP   1 drop at 08/01/22 0853    rivaroxaban (XARELTO) tablet 20 mg  20 mg Oral Daily [facility], APRN - CNP   20 mg at 08/01/22 0848    rosuvastatin (CRESTOR) tablet 10 mg  10 mg Oral Nightly [facility], APRN - CNP   10 mg at 07/31/22 2059    antioxidant multivitamin (OCUVITE) tablet  1 tablet Oral Daily [facility], APRN - CNP   1 tablet at 08/01/22 0848       ROS:   12 point review of systems was obtained in detail and is negative other than that noted above or below. Review of Systems  Constitutional: No weight loss, fever, chills, weakness. Positive for fatigue. HEENT: No visual loss, blurred vision, double vision or yellow sclerae. Skin: No rash or itching  Cardiovascular:  No chest pain, pressure or discomfort. No palpitations or edema. Respiratory:  Positive for exertional shortness of breath, cough and sputum. Gastrointestinal:  No anorexia, nausea, vomiting or diarrhea. No abdominal pain. No bloody or dark tarry stools. Neurological:  No headache, dizziness, syncope, paralysis, ataxia, numbness or tingling in the extremities. No change in bowel or bladder control. Musculoskeletal:  No muscle, back pain, joint pain or stiffness. Hematologic: No anemia, bleeding or bruising.          Vital Signs:  Vitals:    07/31/22 1928 07/31/22 2045 08/01/22 0522 08/01/22 0737   BP:  (!) 146/82  (!) 155/102   Pulse:  (!) 104  (!) 134   Resp:  22  18   Temp:  98.5 °F (36.9 °C)  98.2 °F (36.8 °C)   TempSrc:  Oral  Oral   SpO2: 95% 95%  95%   Weight:   179 lb 9.6 oz (81.5 kg)    Height:           Intake/Output Summary (Last 24 hours) at 8/1/2022 1131  Last data filed at 8/1/2022 4025  Gross per 24 hour   Intake 120 ml   Output --   Net 120 ml       Patient Vitals for the past 96 hrs (Last 3 readings):   Weight   08/01/22 0522 179 lb 9.6 oz (81.5 kg)   07/31/22 1200 185 INR:  Recent Labs     07/31/22  1215   PROTIME 16.6*   INR 1.4       TSH:  Lab Results   Component Value Date/Time    TSH 1.200 03/13/2020 10:29 AM       Lipid Profile:  Lab Results   Component Value Date/Time    TRIG 97 04/11/2022 12:10 PM    HDL 46 04/11/2022 12:10 PM    LDLCALC 91 04/11/2022 12:10 PM       HgbA1C:  Lab Results   Component Value Date/Time    LABA1C 5.7 01/04/2014 02:08 PM       Lactate Level:  Recent Labs     07/31/22  1215   LACTA 1.3       CMP:  Recent Labs     07/31/22  1215 08/01/22  0517    137   K 3.6 3.4    104   CO2 26 19*   BUN 16 20   CREATININE 1.08 1.02   GLUCOSE 110* 177*   CALCIUM 9.6 8.7   PROT 7.8 6.6   LABALBU 4.0 3.1*   BILITOT 2.0* 1.1*   ALKPHOS 69 62   AST 18 19   ALT 14 15       Amylase:  No results for input(s): AMYLASE in the last 72 hours. Lipase:  No results for input(s): LIPASE in the last 72 hours. ABG:  No results for input(s): PH, PO2, PCO2, HCO3, BE, O2SAT in the last 72 hours. Radiology:   XR CHEST PORTABLE   Final Result      Right lower lobe infiltrate. XR CHEST PORTABLE    Result Date: 7/31/2022  Exam: XR CHEST PORTABLE History: Cough and fever. Technique: AP portable view of the chest obtained. Comparison: Portal chest radiographs December 24, 2019 Findings: The cardiomediastinal silhouette is within normal limits. Patchy and linear opacities of the right lung base. No pneumothorax or pleural effusion. No acute osseous abnormality. Right lower lobe infiltrate. Impression:   Principal Problem:    Pneumonia  Active Problems:    Community acquired pneumonia of right lower lobe of lung    COPD exacerbation ([address]  Resolved Problems:    * No resolved hospital problems.  *    Patient Active Problem List   Diagnosis    Hypertension    Hyperlipidemia    History of bladder cancer    High cholesterol    Hematuria    Atrial fibrillation (HCC)    S/P AAA repair    Spondylosis of lumbar region without myelopathy or radiculopathy    Iron deficiency anemia secondary to inadequate dietary iron intake    Fitting and adjustment of orthopedic device    Simple chronic bronchitis (HCC)    Malignant neoplasm of urinary bladder (HCC)    PAD (peripheral artery disease) (HCC)    Absolute anemia    Adenomatous polyp of ascending colon    Adenomatous polyp of sigmoid colon    Chronic superficial gastritis without bleeding    Bronchitis    Pneumonia    Community acquired pneumonia of right lower lobe of lung    COPD exacerbation ([facility]           Thank you for the opportunity to participate in the care of your patient. Do not hesitate to call if you have any questions.     Electronically signed by ANNETTA Huang CNP, [facility] on 8/1/2022 at 11:31 AM

## 2022-08-02 LAB
ALBUMIN SERPL-MCNC: 3.3 G/DL (ref 3.5–4.6)
ALP BLD-CCNC: 66 U/L (ref 35–104)
ALT SERPL-CCNC: 28 U/L (ref 0–41)
ANION GAP SERPL CALCULATED.3IONS-SCNC: 10 MEQ/L (ref 9–15)
AST SERPL-CCNC: 35 U/L (ref 0–40)
BASOPHILS ABSOLUTE: 0 K/UL (ref 0–0.2)
BASOPHILS RELATIVE PERCENT: 0 %
BILIRUB SERPL-MCNC: 0.6 MG/DL (ref 0.2–0.7)
BUN BLDV-MCNC: 32 MG/DL (ref 8–23)
CALCIUM SERPL-MCNC: 9.1 MG/DL (ref 8.5–9.9)
CHLORIDE BLD-SCNC: 103 MEQ/L (ref 95–107)
CO2: 22 MEQ/L (ref 20–31)
CREAT SERPL-MCNC: 1.1 MG/DL (ref 0.7–1.2)
EOSINOPHILS ABSOLUTE: 0 K/UL (ref 0–0.7)
EOSINOPHILS RELATIVE PERCENT: 0 %
GFR AFRICAN AMERICAN: >60
GFR NON-AFRICAN AMERICAN: >60
GLOBULIN: 3.7 G/DL (ref 2.3–3.5)
GLUCOSE BLD-MCNC: 152 MG/DL (ref 70–99)
HCT VFR BLD CALC: 40.5 % (ref 42–52)
HEMOGLOBIN: 13.3 G/DL (ref 14–18)
LYMPHOCYTES ABSOLUTE: 0.4 K/UL (ref 1–4.8)
LYMPHOCYTES RELATIVE PERCENT: 2.8 %
MCH RBC QN AUTO: 31.4 PG (ref 27–31.3)
MCHC RBC AUTO-ENTMCNC: 32.9 % (ref 33–37)
MCV RBC AUTO: 95.5 FL (ref 80–100)
MONOCYTES ABSOLUTE: 1 K/UL (ref 0.2–0.8)
MONOCYTES RELATIVE PERCENT: 6.8 %
NEUTROPHILS ABSOLUTE: 13.4 K/UL (ref 1.4–6.5)
NEUTROPHILS RELATIVE PERCENT: 90.4 %
PDW BLD-RTO: 14.7 % (ref 11.5–14.5)
PLATELET # BLD: 154 K/UL (ref 130–400)
POTASSIUM REFLEX MAGNESIUM: 3.9 MEQ/L (ref 3.4–4.9)
POTASSIUM SERPL-SCNC: 3.9 MEQ/L (ref 3.4–4.9)
RBC # BLD: 4.24 M/UL (ref 4.7–6.1)
SODIUM BLD-SCNC: 135 MEQ/L (ref 135–144)
TOTAL PROTEIN: 7 G/DL (ref 6.3–8)
WBC # BLD: 14.8 K/UL (ref 4.8–10.8)

## 2022-08-02 PROCEDURE — 36415 COLL VENOUS BLD VENIPUNCTURE: CPT

## 2022-08-02 PROCEDURE — 6370000000 HC RX 637 (ALT 250 FOR IP): Performed by: NURSE PRACTITIONER

## 2022-08-02 PROCEDURE — 99233 SBSQ HOSP IP/OBS HIGH 50: CPT | Performed by: INTERNAL MEDICINE

## 2022-08-02 PROCEDURE — 87070 CULTURE OTHR SPECIMN AEROBIC: CPT

## 2022-08-02 PROCEDURE — 80053 COMPREHEN METABOLIC PANEL: CPT

## 2022-08-02 PROCEDURE — 94664 DEMO&/EVAL PT USE INHALER: CPT

## 2022-08-02 PROCEDURE — 2700000000 HC OXYGEN THERAPY PER DAY

## 2022-08-02 PROCEDURE — 85025 COMPLETE CBC W/AUTO DIFF WBC: CPT

## 2022-08-02 PROCEDURE — 2580000003 HC RX 258: Performed by: INTERNAL MEDICINE

## 2022-08-02 PROCEDURE — 6370000000 HC RX 637 (ALT 250 FOR IP)

## 2022-08-02 PROCEDURE — 6360000002 HC RX W HCPCS

## 2022-08-02 PROCEDURE — 2580000003 HC RX 258: Performed by: NURSE PRACTITIONER

## 2022-08-02 PROCEDURE — 6360000002 HC RX W HCPCS: Performed by: INTERNAL MEDICINE

## 2022-08-02 PROCEDURE — 2060000000 HC ICU INTERMEDIATE R&B

## 2022-08-02 PROCEDURE — 89220 SPUTUM SPECIMEN COLLECTION: CPT

## 2022-08-02 PROCEDURE — 6370000000 HC RX 637 (ALT 250 FOR IP): Performed by: INTERNAL MEDICINE

## 2022-08-02 PROCEDURE — 94640 AIRWAY INHALATION TREATMENT: CPT

## 2022-08-02 PROCEDURE — 94761 N-INVAS EAR/PLS OXIMETRY MLT: CPT

## 2022-08-02 RX ORDER — FUROSEMIDE 10 MG/ML
INJECTION INTRAMUSCULAR; INTRAVENOUS
Status: COMPLETED
Start: 2022-08-02 | End: 2022-08-02

## 2022-08-02 RX ORDER — FUROSEMIDE 10 MG/ML
20 INJECTION INTRAMUSCULAR; INTRAVENOUS ONCE
Status: COMPLETED | OUTPATIENT
Start: 2022-08-02 | End: 2022-08-02

## 2022-08-02 RX ORDER — LISINOPRIL 10 MG/1
10 TABLET ORAL DAILY
Status: DISCONTINUED | OUTPATIENT
Start: 2022-08-03 | End: 2022-08-03 | Stop reason: HOSPADM

## 2022-08-02 RX ORDER — POTASSIUM CHLORIDE 20 MEQ/1
20 TABLET, EXTENDED RELEASE ORAL ONCE
Status: COMPLETED | OUTPATIENT
Start: 2022-08-02 | End: 2022-08-02

## 2022-08-02 RX ORDER — IPRATROPIUM BROMIDE AND ALBUTEROL SULFATE 2.5; .5 MG/3ML; MG/3ML
1 SOLUTION RESPIRATORY (INHALATION) EVERY 4 HOURS PRN
Status: DISCONTINUED | OUTPATIENT
Start: 2022-08-02 | End: 2022-08-03 | Stop reason: HOSPADM

## 2022-08-02 RX ADMIN — METOPROLOL SUCCINATE 100 MG: 100 TABLET, EXTENDED RELEASE ORAL at 08:37

## 2022-08-02 RX ADMIN — DILTIAZEM HYDROCHLORIDE 60 MG: 30 TABLET, FILM COATED ORAL at 10:49

## 2022-08-02 RX ADMIN — FUROSEMIDE 20 MG: 10 INJECTION INTRAMUSCULAR; INTRAVENOUS at 10:59

## 2022-08-02 RX ADMIN — FUROSEMIDE 20 MG: 10 INJECTION, SOLUTION INTRAMUSCULAR; INTRAVENOUS at 10:59

## 2022-08-02 RX ADMIN — SODIUM CHLORIDE, PRESERVATIVE FREE 10 ML: 5 INJECTION INTRAVENOUS at 08:44

## 2022-08-02 RX ADMIN — DILTIAZEM HYDROCHLORIDE 60 MG: 30 TABLET, FILM COATED ORAL at 18:26

## 2022-08-02 RX ADMIN — PREDNISOLONE ACETATE 1 DROP: 10 SUSPENSION/ DROPS OPHTHALMIC at 20:16

## 2022-08-02 RX ADMIN — ESCITALOPRAM OXALATE 10 MG: 10 TABLET ORAL at 08:44

## 2022-08-02 RX ADMIN — AZITHROMYCIN MONOHYDRATE 250 MG: 500 TABLET ORAL at 08:37

## 2022-08-02 RX ADMIN — PREDNISONE 40 MG: 10 TABLET ORAL at 08:37

## 2022-08-02 RX ADMIN — PREDNISOLONE ACETATE 1 DROP: 10 SUSPENSION/ DROPS OPHTHALMIC at 08:53

## 2022-08-02 RX ADMIN — GUAIFENESIN 1200 MG: 600 TABLET, EXTENDED RELEASE ORAL at 20:16

## 2022-08-02 RX ADMIN — ROSUVASTATIN CALCIUM 10 MG: 10 TABLET, FILM COATED ORAL at 20:16

## 2022-08-02 RX ADMIN — Medication 1 TABLET: at 08:36

## 2022-08-02 RX ADMIN — IPRATROPIUM BROMIDE AND ALBUTEROL SULFATE 1 AMPULE: 2.5; .5 SOLUTION RESPIRATORY (INHALATION) at 11:32

## 2022-08-02 RX ADMIN — POTASSIUM CHLORIDE 20 MEQ: 1500 TABLET, EXTENDED RELEASE ORAL at 18:26

## 2022-08-02 RX ADMIN — SODIUM CHLORIDE, PRESERVATIVE FREE 10 ML: 5 INJECTION INTRAVENOUS at 20:19

## 2022-08-02 RX ADMIN — POTASSIUM CHLORIDE 20 MEQ: 1500 TABLET, EXTENDED RELEASE ORAL at 08:36

## 2022-08-02 RX ADMIN — GUAIFENESIN 1200 MG: 600 TABLET, EXTENDED RELEASE ORAL at 08:36

## 2022-08-02 RX ADMIN — AMLODIPINE BESYLATE 5 MG: 5 TABLET ORAL at 08:38

## 2022-08-02 RX ADMIN — RIVAROXABAN 20 MG: 20 TABLET, FILM COATED ORAL at 08:37

## 2022-08-02 RX ADMIN — LISINOPRIL 40 MG: 20 TABLET ORAL at 08:37

## 2022-08-02 RX ADMIN — IPRATROPIUM BROMIDE AND ALBUTEROL SULFATE 1 AMPULE: 2.5; .5 SOLUTION RESPIRATORY (INHALATION) at 07:11

## 2022-08-02 RX ADMIN — CEFTRIAXONE SODIUM 1000 MG: 1 INJECTION, POWDER, FOR SOLUTION INTRAMUSCULAR; INTRAVENOUS at 12:17

## 2022-08-02 RX ADMIN — POTASSIUM CHLORIDE 20 MEQ: 1500 TABLET, EXTENDED RELEASE ORAL at 11:01

## 2022-08-02 NOTE — PROGRESS NOTES
INPATIENT PROGRESS NOTES    PATIENT NAME: Gagan Escalona Sr. MRN: [id number]  SERVICE DATE:  August 2, 2022   SERVICE TIME:  4:12 PM      PRIMARY SERVICE: Pulmonary Disease    CHIEF COMPLAIN: Right lower lobe pneumonia      INTERVAL HPI: Patient seen and examined at bedside, Interval Notes, orders reviewed. Nursing notes noted  Patient is feeling better. He has minimal cough. He is on 2 L O2 via nasal cannula and O2 saturation 99%. He has A. fib with RVR and cardiology following. Rate 130-150 at times. He does have exertional shortness of breath but feeling much better than before. No chest pain. No nausea vomiting or diarrhea. OBJECTIVE    Body mass index is 27.38 kg/m². PHYSICAL EXAM:  Vitals:  BP (!) 131/8   Pulse (!) 132   Temp 97.5 °F (36.4 °C) (Oral)   Resp 18   Ht 5' 8\" (1.727 m)   Wt 180 lb 1.6 oz (81.7 kg)   SpO2 99%   BMI 27.38 kg/m²   General: Alert, awake . comfortable in bed, No distress. Head: Atraumatic , Normocephalic   Eyes: PERRL. No sclera icterus. No conjunctival injection. No discharge   ENT: No nasal  discharge. Pharynx clear. Neck:  Trachea midline. No thyromegaly, no JVD, No cervical adenopathy. Chest : Bilaterally symmetrical ,Normal effort,  No accessory muscle use  Lung : . Fair BS bilateral, decreased BS at bases. No Rales. Few scattered wheezing. No rhonchi. Heart[de-identified] Tachycardia, irregularly irregular. No mumur ,  Rub or gallop  ABD: Non-tender. Non-distended. No masses. No organmegaly. Normal bowel sounds. No hernia.   Ext : No Pitting both leg , No Cyanosis No clubbing  Neuro: no focal weakness          DATA:   Recent Labs     08/01/22 0517 08/02/22 0521   WBC 9.1 14.8*   HGB 13.9* 13.3*   HCT 41.9* 40.5*   MCV 96.2 95.5   * 154     Recent Labs     08/01/22 0517 08/02/22 0521    135   K 3.4 3.9  3.9    103   CO2 19* 22   BUN 20 32*   CREATININE 1.02 1.10   GLUCOSE 177* 152*   CALCIUM 8.7 9.1   PROT 6.6 7.0   LABALBU 3.1* 3.3*   BILITOT 1.1* 0.6   ALKPHOS 62 66   AST 19 35   ALT 15 28   LABGLOM >60.0 >60.0   GFRAA >60.0 >60.0   GLOB 3.5 3.7*       MV Settings:          No results for input(s): PHART, DJS6UXL, PO2ART, JUE7ISK, BEART, G3QYEVDW in the last 72 hours. O2 Device: Nasal cannula  O2 Flow Rate (L/min): 2 L/min    ADULT DIET; Regular     MEDICATIONS during current hospitalization:    Continuous Infusions:   sodium chloride         Scheduled Meds:   [START ON 8/3/2022] lisinopril  10 mg Oral Daily    dilTIAZem  60 mg Oral 4 times per day    predniSONE  40 mg Oral Daily    metoprolol succinate  100 mg Oral Daily    cefTRIAXone (ROCEPHIN) IV  1,000 mg IntraVENous Q24H    azithromycin  250 mg Oral Daily    potassium chloride  20 mEq Oral BID WC    sodium chloride flush  5-40 mL IntraVENous 2 times per day    guaiFENesin  1,200 mg Oral BID    ipratropium-albuterol  1 ampule Inhalation 4x daily    escitalopram  10 mg Oral Daily    prednisoLONE acetate  1 drop Left Eye BID    rivaroxaban  20 mg Oral Daily    rosuvastatin  10 mg Oral Nightly    ocuvite-lutein  1 tablet Oral Daily       PRN Meds:metoprolol, sodium chloride flush, sodium chloride, ondansetron **OR** ondansetron, polyethylene glycol, acetaminophen **OR** acetaminophen, labetalol, albuterol    Radiology  XR CHEST PORTABLE    Result Date: 7/31/2022  Exam: XR CHEST PORTABLE History: Cough and fever. Technique: AP portable view of the chest obtained. Comparison: Portal chest radiographs December 24, 2019 Findings: The cardiomediastinal silhouette is within normal limits. Patchy and linear opacities of the right lung base. No pneumothorax or pleural effusion. No acute osseous abnormality. Right lower lobe infiltrate.      IMPRESSION AND SUGGESTION:  Acute hypoxic respiratory failure due to COPD exacerbation and right lower lobe pneumonia  COPD with exacerbation  Right lower lobe pneumonia, community-acquired  A. fib with controlled rate  Hypertension    He is currently on IV Rocephin 1 g every 24 hourly and Zithromax 250 mg mg daily. Prednisone 40 mg daily. Changed to p.o. Ceftin 500 mg twice daily for 10 days when discharged home. Taper dose of prednisone. Recommend PFT as outpatient. Home O2 evaluation prior to discharge. He cannot afford to have inhaler. He will have a nebulizer with DuoNeb 4 times daily when discharged home. Follow-up in office 2 to 4 weeks. NOTE: This report was transcribed using voice recognition software. Every effort was made to ensure accuracy; however, inadvertent computerized transcription errors may be present.       Electronically signed by Cole Yates MD, [facility] on 8/2/2022 at 4:12 PM

## 2022-08-02 NOTE — CARE COORDINATION
PT WITH AF RVR, MED ADJUSTMENTS ORDERED. WILL FOLLOW.  PULMONARY REHAB AND WILL NEED HOME 02 EVAL PRIOR TO DC. Statement Selected

## 2022-08-02 NOTE — PROGRESS NOTES
[facility] Daily Progress Note  Name: Piper Loyd Sr.  Age: 79 y.o. Gender: male  CodeStatus: Full Code    Primary Cardiology : Dr. Mali Marmloejo MD  [address] [facility] Cardiology : DR. Mali Machuca APRN-CNP  Primary Care Provider: Pilar Snider MD  Admission Date: 7/31/2022    Chief complaint/Associated symptoms:   Milena Hays was seen and examined at bedside    He is currently sitting up in chair and states that he feels better and his shortness of breath has improved. He denies any chest pain, palpitations. I have personally spoken with Dr. Chetan Funes regarding labs, HR and vitals. Recommendation for medication adjustment as noted below. Assessment:  Atrial Fib with RVR: History of permananet atrial fibrillation with rate control strategy. Presented to 's most likely secondary to infectious process. We will add Cardizem 60 mg PO Q 6 hrs with parameters, discontinue Amlodipine. If HR does not improve with PO Cardizem may need to consider Cardizem IV for HR greater than 120.      2. Long term anticoagulation:  Xarleto     3. Hypertension:  Improving. Decrease Lisinopril dose to 10 mg PO dailyand discontinue Amlodipine  with addition of Cardizem PO.      4. Cough/pneumonia : Chest x-ray showed right lower lobe infiltrate. Procalcitonin 0.30. Admitted with pneumonia, per medicine. Will give one dose of Lasix IV this morning with an additional 20 meq Potassium PO total 40 meq PO this AM.  Will continue to monitor electrolytes closely. Plan:  Monitor on telemetry  2. Continue Xarelto  3. Discontinue Amlodlipine  4. Decrease Lisinopril to 10 mg PO daily  5. Lasix 20 mg IV x 1 today  6. Potassium 20 meq PO x 1 in addition to daily dose  7. Cardizem 60 mg PO Q 6 hrs with parameters  8. Monitor electrolytes closely and suggest maintain K + 4=/> 0 and mag =/> 2.0  9.  Further recommendations per Dr. Chetan Funes         Physical Exam  Constitutional:  Well developed, awake/alert/oriented x3, no distress, alert and cooperative. Respiratory/Thorax: Diminished throughout   Cardiovascular: Irregular, rate and rhythm, no murmurs, normal S1 and S2, PMI non displaced. Gastrointestinal:  Non distended, soft, non-tender, no rebound tenderness or guarding, Genitourinary:  deferred  Musculoskeletal:  No apparent injury. Extremities:  No cyanosis, edema, contusions or wounds, no clubbing. Neurological:  Alert and oriented x3. Moves extremities spontaneous with purpose  Psychological:  Appropriate mood and behavior  Skin:  Warm and dry,  no lesions or rashes. Allergies: Avelox [Moxifloxacin Hcl In Nacl]  Mobic [Meloxicam]  Moxifloxacin  Z-Sean [Azithromycin]    Medications:  Reviewed  Home Medications    Infusion Medications:    sodium chloride       Scheduled Medications:    predniSONE  40 mg Oral Daily    metoprolol succinate  100 mg Oral Daily    cefTRIAXone (ROCEPHIN) IV  1,000 mg IntraVENous Q24H    azithromycin  250 mg Oral Daily    potassium chloride  20 mEq Oral BID WC    sodium chloride flush  5-40 mL IntraVENous 2 times per day    guaiFENesin  1,200 mg Oral BID    ipratropium-albuterol  1 ampule Inhalation 4x daily    lisinopril  40 mg Oral Daily    amLODIPine  5 mg Oral Daily    escitalopram  10 mg Oral Daily    prednisoLONE acetate  1 drop Left Eye BID    rivaroxaban  20 mg Oral Daily    rosuvastatin  10 mg Oral Nightly    ocuvite-lutein  1 tablet Oral Daily     PRN Meds: metoprolol, sodium chloride flush, sodium chloride, ondansetron **OR** ondansetron, polyethylene glycol, acetaminophen **OR** acetaminophen, labetalol, albuterol    Vitals  Vitals:    08/02/22 0241   BP: (!) 129/98   Pulse: (!) 132   Resp: 18   Temp:    SpO2: 99%       I&O  No documented urine output       Telemetry:  Atrial fib with RVR uncontrolled with 's - 150's.        Labs:   Recent Labs     07/31/22  1215 08/01/22  0517 08/02/22  0521   WBC 14.1* 9.1 14.8*   HGB 15.2 13.9* 13.3*   HCT 45.4 41.9* 40.5*   * Additional work up and treatment should be done in out pt setting by pt PCP and other out pt providers. In addition to examining and evaluating pt, I spent additional time explaining care, normaland abnormal findings, and treatment plan. All of pt questions were answered. Counseling, diet and education were provided. Case will be discussed with nursing staff when appropriate. Family will be updated if and whenappropriate.       Electronically signed by ANNETTA Brown CNP on 8/2/2022 at 8:40 AM

## 2022-08-02 NOTE — PROGRESS NOTES
Hospitalist Progress Note      PCP: Marcia Fortune MD    Date of Admission: 7/31/2022    Chief Complaint:    Chief Complaint   Patient presents with    Cough     Sent from urgent care       Subjective:  Patient denies fevers, chills, sweats, CP, SOB, diarrhea or burning micturition. Feels uch better. 12 point ROS negative other than mentioned above     Medications:  Reviewed    Infusion Medications    sodium chloride       Scheduled Medications    [START ON 8/3/2022] lisinopril  10 mg Oral Daily    dilTIAZem  60 mg Oral 4 times per day    predniSONE  40 mg Oral Daily    metoprolol succinate  100 mg Oral Daily    cefTRIAXone (ROCEPHIN) IV  1,000 mg IntraVENous Q24H    azithromycin  250 mg Oral Daily    potassium chloride  20 mEq Oral BID WC    sodium chloride flush  5-40 mL IntraVENous 2 times per day    guaiFENesin  1,200 mg Oral BID    ipratropium-albuterol  1 ampule Inhalation 4x daily    escitalopram  10 mg Oral Daily    prednisoLONE acetate  1 drop Left Eye BID    rivaroxaban  20 mg Oral Daily    rosuvastatin  10 mg Oral Nightly    ocuvite-lutein  1 tablet Oral Daily     PRN Meds: metoprolol, sodium chloride flush, sodium chloride, ondansetron **OR** ondansetron, polyethylene glycol, acetaminophen **OR** acetaminophen, labetalol, albuterol      Intake/Output Summary (Last 24 hours) at 8/2/2022 1342  Last data filed at 8/2/2022 1217  Gross per 24 hour   Intake 480 ml   Output --   Net 480 ml       Exam:    BP (!) 131/8   Pulse (!) 132   Temp 97.5 °F (36.4 °C) (Oral)   Resp 18   Ht 5' 8\" (1.727 m)   Wt 180 lb 1.6 oz (81.7 kg)   SpO2 99%   BMI 27.38 kg/m²     General appearance: No apparent distress, appears stated age and cooperative. HEENT:  Conjunctivae/corneas clear. Neck: Trachea midline. Respiratory:  Normal respiratory effort. Clear to auscultation  Cardiovascular: irregularly irregular  Abdomen: Soft, non-tender, non-distended with normal bowel sounds.   Musculoskeletal: No clubbing, cyanosis or edema bilaterally  Neuro: Non Focal.   Capillary Refill: Brisk,< 3 seconds   Peripheral Pulses: +2 palpable, equal bilaterally     Labs:   Recent Labs     07/31/22  1215 08/01/22  0517 08/02/22 0521   WBC 14.1* 9.1 14.8*   HGB 15.2 13.9* 13.3*   HCT 45.4 41.9* 40.5*   * 119* 154       Recent Labs     07/31/22  1215 08/01/22  0517 08/02/22  0521    137 135   K 3.6 3.4 3.9  3.9    104 103   CO2 26 19* 22   BUN 16 20 32*   CREATININE 1.08 1.02 1.10   CALCIUM 9.6 8.7 9.1       Recent Labs     07/31/22  1215 08/01/22 0517 08/02/22 0521   AST 18 19 35   ALT 14 15 28   BILITOT 2.0* 1.1* 0.6   ALKPHOS 69 62 66       Recent Labs     07/31/22 1215   INR 1.4       No results for input(s): CKTOTAL, TROPONINI in the last 72 hours. Urinalysis:      Lab Results   Component Value Date/Time    NITRU Negative 12/24/2019 09:15 AM    WBCUA 0-2 12/24/2019 09:15 AM    BACTERIA Negative 12/24/2019 09:15 AM    RBCUA 6-10 12/24/2019 09:15 AM    BLOODU small 09/03/2021 09:58 AM    BLOODU TRACE 12/24/2019 09:15 AM    SPECGRAV 1.025 09/03/2021 09:58 AM    SPECGRAV 1.010 12/24/2019 09:15 AM    GLUCOSEU neg 09/03/2021 09:58 AM    GLUCOSEU Negative 12/24/2019 09:15 AM    GLUCOSEU NEG 11/01/2011 09:38 AM       Radiology:  XR CHEST PORTABLE   Final Result      Right lower lobe infiltrate.               Assessment/Plan:    #Acute hypoxic respiratory failure secondary to PNA and COPD exacerbation both POA     - Prendisone 40mg QD     - CTX and azithromycin; d/c on ceftin when ok for d/c    #A Fib with RVR     - Meds being adjusted by cardiology; hopeful for tomorrow    #HTN    - continue home meds    Active Hospital Problems    Diagnosis Date Noted    Pneumonia [J18.9] 07/31/2022     Priority: Medium    Community acquired pneumonia of right lower lobe of lung [J18.9] 07/31/2022     Priority: Medium    [facility] [J44.1] 07/31/2022     Priority: Medium      Additional work up or/and treatment plan may be added today or then after based on clinical progression. I am managing a portion of pt care. Some medical issues are handled by other specialists. Additional work up and treatment should be done in out pt setting by pt PCP and other out pt providers. In addition to examining and evaluating pt, I spent additional time explaining care, normal and abnormal findings, and treatment plan. All of pt questions were answered. Counseling, diet and education were  provided. Case will be discussed with nursing staff when appropriate. Family will be updated if and when appropriate. Diet: ADULT DIET;  Regular    Code Status: Full Code    PT/OT Eval     Electronically signed by Haritha Hutton MD on 8/2/2022 at 1:42 PM

## 2022-08-02 NOTE — FLOWSHEET NOTE
Verified with Nathanael Parsons NP medication orders were okay to given in addition to AM medications. Pulled the medications on over ride due to a issue with the omnicelle.   /77    Verified HR with monitor tech   Pts HR is steadily 100 - 119 peaking at 135

## 2022-08-02 NOTE — PROGRESS NOTES
[facility] Respiratory Therapy Evaluation   Current Order:  duoneb qid      Home Regimen: none      Ordering Physician:    Re-evaluation Date:  8/5     Diagnosis: pneumonia      Patient Status: Stable    The following MDI Criteria must be met in order to convert aerosol to MDI with spacer. If unable to meet, MDI will be converted to aerosol:  []  Patient able to demonstrate the ability to use MDI effectively  []  Patient alert and cooperative  []  Patient able to take deep breath with 5-10 second hold  []  Medication(s) available in this delivery method   []  Peak flow greater than or equal to 200 ml/min            Current Order Substituted To  (same drug, same frequency)   Aerosol to MDI [] Albuterol Sulfate 0.083% unit dose by aerosol Albuterol Sulfate MDI 2 puffs by inhalation with spacer    [] Levalbuterol 1.25 mg unit dose by aerosol Levalbuterol MDI 2 puffs by inhalation with spacer    [] Levalbuterol 0.63 mg unit dose by aerosol Levalbuterol MDI 2 puffs by inhalation with spacer    [] Ipratropium Bromide 0.02% unit dose by aerosol Ipratropium Bromide MDI 2 puffs by inhalation with spacer    [] Duoneb (Ipratropium + Albuterol) unit dose by aerosol Ipratropium MDI + Albuterol MDI 2 puffs by inhalation w/spacer   MDI to Aerosol [] Albuterol Sulfate MDI Albuterol Sulfate 0.083% unit dose by aerosol    [] Levalbuterol MDI 2 puffs by inhalation Levalbuterol 1.25 mg unit dose by aerosol    [] Ipratropium Bromide MDI by inhalation Ipratropium Bromide 0.02% unit dose by aerosol    [] Combivent (Ipratropium + Albuterol) MDI by inhalation Duoneb (Ipratropium + Albuterol) unit dose by aerosol       Treatment Assessment [Frequency/Schedule]:  Change frequency to: ____duoneb q 4 prn______________________________________________per Protocol, P&T, MEC      Points 0 1 2 3 4   Pulmonary Status  Non-Smoker  []   Smoking history   < 20 pack years  []   Smoking history  ?  20 pack years  [x]   Pulmonary Disorder  (acute or

## 2022-08-03 VITALS
OXYGEN SATURATION: 96 % | RESPIRATION RATE: 16 BRPM | HEART RATE: 81 BPM | HEIGHT: 68 IN | DIASTOLIC BLOOD PRESSURE: 83 MMHG | SYSTOLIC BLOOD PRESSURE: 142 MMHG | WEIGHT: 175.4 LBS | BODY MASS INDEX: 26.58 KG/M2 | TEMPERATURE: 97.7 F

## 2022-08-03 LAB
ALBUMIN SERPL-MCNC: 3.4 G/DL (ref 3.5–4.6)
ALP BLD-CCNC: 61 U/L (ref 35–104)
ALT SERPL-CCNC: 45 U/L (ref 0–41)
ANION GAP SERPL CALCULATED.3IONS-SCNC: 9 MEQ/L (ref 9–15)
AST SERPL-CCNC: 43 U/L (ref 0–40)
BASOPHILS ABSOLUTE: 0 K/UL (ref 0–0.2)
BASOPHILS RELATIVE PERCENT: 0.1 %
BILIRUB SERPL-MCNC: 0.5 MG/DL (ref 0.2–0.7)
BUN BLDV-MCNC: 36 MG/DL (ref 8–23)
CALCIUM SERPL-MCNC: 8.5 MG/DL (ref 8.5–9.9)
CHLORIDE BLD-SCNC: 103 MEQ/L (ref 95–107)
CO2: 24 MEQ/L (ref 20–31)
CREAT SERPL-MCNC: 1.07 MG/DL (ref 0.7–1.2)
EOSINOPHILS ABSOLUTE: 0 K/UL (ref 0–0.7)
EOSINOPHILS RELATIVE PERCENT: 0 %
GFR AFRICAN AMERICAN: >60
GFR NON-AFRICAN AMERICAN: >60
GLOBULIN: 3.2 G/DL (ref 2.3–3.5)
GLUCOSE BLD-MCNC: 129 MG/DL (ref 70–99)
HCT VFR BLD CALC: 39.7 % (ref 42–52)
HEMOGLOBIN: 13.4 G/DL (ref 14–18)
LYMPHOCYTES ABSOLUTE: 0.5 K/UL (ref 1–4.8)
LYMPHOCYTES RELATIVE PERCENT: 3.6 %
MCH RBC QN AUTO: 32.1 PG (ref 27–31.3)
MCHC RBC AUTO-ENTMCNC: 33.9 % (ref 33–37)
MCV RBC AUTO: 94.8 FL (ref 80–100)
MONOCYTES ABSOLUTE: 0.8 K/UL (ref 0.2–0.8)
MONOCYTES RELATIVE PERCENT: 6.6 %
NEUTROPHILS ABSOLUTE: 11.5 K/UL (ref 1.4–6.5)
NEUTROPHILS RELATIVE PERCENT: 89.7 %
PDW BLD-RTO: 14.5 % (ref 11.5–14.5)
PLATELET # BLD: 156 K/UL (ref 130–400)
POTASSIUM REFLEX MAGNESIUM: 4.3 MEQ/L (ref 3.4–4.9)
RBC # BLD: 4.18 M/UL (ref 4.7–6.1)
SODIUM BLD-SCNC: 136 MEQ/L (ref 135–144)
TOTAL PROTEIN: 6.6 G/DL (ref 6.3–8)
WBC # BLD: 12.8 K/UL (ref 4.8–10.8)

## 2022-08-03 PROCEDURE — 6370000000 HC RX 637 (ALT 250 FOR IP): Performed by: NURSE PRACTITIONER

## 2022-08-03 PROCEDURE — 2580000003 HC RX 258: Performed by: INTERNAL MEDICINE

## 2022-08-03 PROCEDURE — 6370000000 HC RX 637 (ALT 250 FOR IP): Performed by: INTERNAL MEDICINE

## 2022-08-03 PROCEDURE — 99232 SBSQ HOSP IP/OBS MODERATE 35: CPT | Performed by: INTERNAL MEDICINE

## 2022-08-03 PROCEDURE — 85025 COMPLETE CBC W/AUTO DIFF WBC: CPT

## 2022-08-03 PROCEDURE — 36415 COLL VENOUS BLD VENIPUNCTURE: CPT

## 2022-08-03 PROCEDURE — 6360000002 HC RX W HCPCS: Performed by: INTERNAL MEDICINE

## 2022-08-03 PROCEDURE — 2700000000 HC OXYGEN THERAPY PER DAY

## 2022-08-03 PROCEDURE — 94761 N-INVAS EAR/PLS OXIMETRY MLT: CPT

## 2022-08-03 PROCEDURE — 80053 COMPREHEN METABOLIC PANEL: CPT

## 2022-08-03 PROCEDURE — 2580000003 HC RX 258: Performed by: NURSE PRACTITIONER

## 2022-08-03 RX ORDER — DILTIAZEM HYDROCHLORIDE 180 MG/1
360 CAPSULE, COATED, EXTENDED RELEASE ORAL DAILY
Status: DISCONTINUED | OUTPATIENT
Start: 2022-08-04 | End: 2022-08-03 | Stop reason: HOSPADM

## 2022-08-03 RX ORDER — CEFUROXIME AXETIL 500 MG/1
500 TABLET ORAL 2 TIMES DAILY
Qty: 14 TABLET | Refills: 0 | Status: SHIPPED | OUTPATIENT
Start: 2022-08-03 | End: 2022-08-10

## 2022-08-03 RX ORDER — DILTIAZEM HYDROCHLORIDE 360 MG/1
360 CAPSULE, EXTENDED RELEASE ORAL DAILY
Qty: 30 CAPSULE | Refills: 3 | Status: SHIPPED | OUTPATIENT
Start: 2022-08-04

## 2022-08-03 RX ORDER — LISINOPRIL 10 MG/1
10 TABLET ORAL DAILY
Qty: 30 TABLET | Refills: 3 | Status: SHIPPED | OUTPATIENT
Start: 2022-08-04

## 2022-08-03 RX ORDER — PREDNISONE 20 MG/1
40 TABLET ORAL DAILY
Qty: 10 TABLET | Refills: 0 | Status: SHIPPED | OUTPATIENT
Start: 2022-08-04 | End: 2022-08-09 | Stop reason: ALTCHOICE

## 2022-08-03 RX ORDER — IPRATROPIUM BROMIDE AND ALBUTEROL SULFATE 2.5; .5 MG/3ML; MG/3ML
3 SOLUTION RESPIRATORY (INHALATION) EVERY 4 HOURS PRN
Qty: 360 ML | Refills: 0 | Status: SHIPPED | OUTPATIENT
Start: 2022-08-03

## 2022-08-03 RX ORDER — GUAIFENESIN 600 MG/1
1200 TABLET, EXTENDED RELEASE ORAL 2 TIMES DAILY
Qty: 60 TABLET | Refills: 0 | Status: SHIPPED | OUTPATIENT
Start: 2022-08-03

## 2022-08-03 RX ADMIN — PREDNISOLONE ACETATE 1 DROP: 10 SUSPENSION/ DROPS OPHTHALMIC at 09:12

## 2022-08-03 RX ADMIN — CEFTRIAXONE SODIUM 1000 MG: 1 INJECTION, POWDER, FOR SOLUTION INTRAMUSCULAR; INTRAVENOUS at 14:02

## 2022-08-03 RX ADMIN — PREDNISONE 40 MG: 10 TABLET ORAL at 09:13

## 2022-08-03 RX ADMIN — AZITHROMYCIN MONOHYDRATE 250 MG: 500 TABLET ORAL at 09:14

## 2022-08-03 RX ADMIN — GUAIFENESIN 1200 MG: 600 TABLET, EXTENDED RELEASE ORAL at 09:12

## 2022-08-03 RX ADMIN — RIVAROXABAN 20 MG: 20 TABLET, FILM COATED ORAL at 09:14

## 2022-08-03 RX ADMIN — DILTIAZEM HYDROCHLORIDE 60 MG: 30 TABLET, FILM COATED ORAL at 05:57

## 2022-08-03 RX ADMIN — METOPROLOL SUCCINATE 100 MG: 100 TABLET, EXTENDED RELEASE ORAL at 09:13

## 2022-08-03 RX ADMIN — POTASSIUM CHLORIDE 20 MEQ: 1500 TABLET, EXTENDED RELEASE ORAL at 09:20

## 2022-08-03 RX ADMIN — LISINOPRIL 10 MG: 10 TABLET ORAL at 09:14

## 2022-08-03 RX ADMIN — SODIUM CHLORIDE, PRESERVATIVE FREE 10 ML: 5 INJECTION INTRAVENOUS at 09:20

## 2022-08-03 RX ADMIN — ESCITALOPRAM OXALATE 10 MG: 10 TABLET ORAL at 09:14

## 2022-08-03 RX ADMIN — DILTIAZEM HYDROCHLORIDE 60 MG: 30 TABLET, FILM COATED ORAL at 00:45

## 2022-08-03 RX ADMIN — Medication 1 TABLET: at 09:19

## 2022-08-03 RX ADMIN — DILTIAZEM HYDROCHLORIDE 60 MG: 30 TABLET, FILM COATED ORAL at 15:08

## 2022-08-03 NOTE — PROGRESS NOTES
[facility] [place] Daily Progress Note  Name: Herbie March Sr.  Age: 79 y.o. Gender: male  CodeStatus: Full Code    Primary Cardiology : Dr. Edward Barker MD  [address] [facility] Cardiology : Dr. Ita Mortensen APRN-CNP  Primary Care Provider: Miguel Angel Vogt MD  Admission Date: 7/31/2022      CARDIOOGIST NOTE  I have personally performed a complete face to face history and physical on this patient. I have reviewed the notations as entered by NP Mary Mortensen I have personally  formulated the impression and plan on this patient and amended as necessary. Daniela Villegas  [address] [email or web address] at this time. No angina. Less dyspnea. HR ranges 60's to 120 ave last 24 70's. Lung clear, cor irreg. Discussed that heart rate will vary over next couple days as metoprolol wears off and cardizem kicks in.reviewed potential of edema with cardizem. Follow up arranged. OK for discharge. Kalie Ceja MD      Chief complaint/Associated symptoms:   Kirti Lyn was seen and examined at bedside    He is currently sitting up in chair with his wife at bedside. He states that he feels better and is anxious to go home. He denies chest pain, shortness of breath and states that his coughing has improved. He is currently on room air and states that he took his 02 off. Assessment:  Atrial Fib with RVR: History of permananet atrial fibrillation with rate control strategy. Presented to 's most likely secondary to infectious process. Cardizem 60 mg PO Q 6 hrs with parameters added yesterday to beta blocker. Due to COPD exac will discontinue beta blocker and start Cardizem  mg PO daily starting tomorrow 8/4/2022.       2. Long term anticoagulation:  Xarleto     3. Hypertension:  Improving. 4. Cough/pneumonia : Chest x-ray showed right lower lobe infiltrate. Procalcitonin 0.30. Admitted with pneumonia, per medicine. 5. Elevated LFT:  ALT 45/AST 43. Plan:  Monitor on telemetry  2.  Continue Xarelto  3. Will discontinue metoprolol succinate and Cardizem 60 mg PO q 6 hours after today dose. 4. Start Cardizem 360 mg PO daily first dose 8/4/2022  5. Monitor electrolytes closely and suggest maintain K + 4=/> 0 and mag =/> 2.0  6. Possible discharge today after seen by Dr. Dede Rollins   7. Further recommendations per Dr. Dede Rollins         Physical Exam  Constitutional:  Well developed, awake/alert/oriented x3, no distress, alert and cooperative. Respiratory/Thorax: Wheezing  throughout   Cardiovascular: Irregular rhythm, regular rate, no murmurs, normal S1 and S2, PMI non displaced. Gastrointestinal:  Non distended, soft, non-tender, no rebound tenderness or guarding, Genitourinary:  deferred  Musculoskeletal:  No apparent injury. Extremities:  No cyanosis, edema, contusions or wounds, no clubbing. Neurological:  Alert and oriented x3. Moves extremities spontaneous with purpose  Psychological:  Appropriate mood and behavior  Skin:  Warm and dry,  no lesions or rashes. Allergies:  Avelox [Moxifloxacin Hcl In Nacl]  Mobic [Meloxicam]  Moxifloxacin  Z-Sean [Azithromycin]    Medications:  Reviewed  Home Medications    Infusion Medications:    sodium chloride       Scheduled Medications:    lisinopril  10 mg Oral Daily    dilTIAZem  60 mg Oral 4 times per day    predniSONE  40 mg Oral Daily    metoprolol succinate  100 mg Oral Daily    cefTRIAXone (ROCEPHIN) IV  1,000 mg IntraVENous Q24H    azithromycin  250 mg Oral Daily    potassium chloride  20 mEq Oral BID WC    sodium chloride flush  5-40 mL IntraVENous 2 times per day    guaiFENesin  1,200 mg Oral BID    escitalopram  10 mg Oral Daily    prednisoLONE acetate  1 drop Left Eye BID    rivaroxaban  20 mg Oral Daily    rosuvastatin  10 mg Oral Nightly    ocuvite-lutein  1 tablet Oral Daily     PRN Meds: ipratropium-albuterol, metoprolol, sodium chloride flush, sodium chloride, ondansetron **OR** ondansetron, polyethylene glycol, acetaminophen **OR** acetaminophen, labetalol, albuterol    Vitals  Vitals:    08/03/22 0913   BP: (!) 142/83   Pulse: 81   Resp:    Temp:    SpO2:        I&O  No documented urine output       Telemetry:  Atrial fib controlled 70's - 80's. Labs:   Recent Labs     08/01/22  0517 08/02/22  0521 08/03/22  0505   WBC 9.1 14.8* 12.8*   HGB 13.9* 13.3* 13.4*   HCT 41.9* 40.5* 39.7*   * 154 156       Recent Labs     08/01/22  0517 08/02/22  0521 08/03/22  0505    135 136   K 3.4 3.9  3.9 4.3    103 103   CO2 19* 22 24   BUN 20 32* 36*   CREATININE 1.02 1.10 1.07   CALCIUM 8.7 9.1 8.5       Recent Labs     08/01/22  0517 08/02/22  0521 08/03/22  0505   AST 19 35 43*   ALT 15 28 45*   BILITOT 1.1* 0.6 0.5   ALKPHOS 62 66 61       Recent Labs     07/31/22  1215   INR 1.4       No results for input(s): CKTOTAL, TROPONINI in the last 72 hours. Urinalysis:   Lab Results   Component Value Date/Time    NITRU Negative 12/24/2019 09:15 AM    WBCUA 0-2 12/24/2019 09:15 AM    BACTERIA Negative 12/24/2019 09:15 AM    RBCUA 6-10 12/24/2019 09:15 AM    BLOODU small 09/03/2021 09:58 AM    BLOODU TRACE 12/24/2019 09:15 AM    SPECGRAV 1.025 09/03/2021 09:58 AM    SPECGRAV 1.010 12/24/2019 09:15 AM    GLUCOSEU neg 09/03/2021 09:58 AM    GLUCOSEU Negative 12/24/2019 09:15 AM    GLUCOSEU NEG 11/01/2011 09:38 AM       Radiology:        Portable: Results for orders placed during the hospital encounter of 07/31/22    XR CHEST PORTABLE    Narrative  Exam: XR CHEST PORTABLE    History: Cough and fever. Technique: AP portable view of the chest obtained. Comparison: Portal chest radiographs December 24, 2019    Findings: The cardiomediastinal silhouette is within normal limits. Patchy and linear opacities of the right lung base. No pneumothorax or pleural effusion. No acute osseous abnormality. Impression  Right lower lobe infiltrate.         Active Hospital Problems    Diagnosis Date Noted    Pneumonia [J18.9] 07/31/2022 Priority: Medium    Community acquired pneumonia of right lower lobe of lung [J18.9] 07/31/2022     Priority: Medium    COPD exacerbation ([facility] [J44.1] 07/31/2022     Priority: Medium       Additional work up or/and treatment plan may be added today or then after based on clinical progression. I am managing a portion of pt care. Some medical issues are handled by other specialists. Additional work up and treatment should be done in out pt setting by pt PCP and other out pt providers. In addition to examining and evaluating pt, I spent additional time explaining care, normaland abnormal findings, and treatment plan. All of pt questions were answered. Counseling, diet and education were provided. Case will be discussed with nursing staff when appropriate. Family will be updated if and whenappropriate.       Electronically signed by ANNETTA Fuchs CNP on 8/3/2022 at 10:09 AM

## 2022-08-03 NOTE — PROGRESS NOTES
Physician Progress Note      PATIENT:               Gregory Phillip  CSN #:                  [id number]  :                       1940  ADMIT DATE:       2022 12:04 PM  [address] [place] DATE:  RESPONDING  PROVIDER #:        Mohan Ellington MD          QUERY TEXT:    Pt admitted with acute hypoxic respiratory failure 2/2 pneumonia and AECOPD. On arrival to ED WBC 14.1, Procalcitonin 0.30, -123. If possible, please   document in the progress notes and discharge summary if you are evaluating   and /or treating any of the following: The medical record reflects the following:  Risk Factors: pneumonia, COPD  Clinical Indicators: On arrival to ED WBC 14.1 (subsequent WBC 9.1/14.8/12.8),   Procalcitonin 0.30; -123 (permanent afib) acute respiratory failure   with hypoxia, Spo2 82% on RA  Treatment: NS 1L bolus, IV Zithromax, IV Rocephin, IV Zosyn x1, Metoprolol,   Cardizem, supplemental O2 3-4L, nebs, pulmonology consult    Thank you, Noel Nunes RN BSN [facility]  [phone]  Options provided:  -- Sepsis, present on admission  -- Pneumonia without Sepsis  -- Other - I will add my own diagnosis  -- Disagree - Not applicable / Not valid  -- Disagree - Clinically unable to determine / Unknown  -- Refer to Clinical Documentation Reviewer    PROVIDER RESPONSE TEXT:    This patient has pneumonia without Sepsis.     Query created by: Phylliss Lombard on 8/3/2022 9:50 AM      Electronically signed by:  Mohan Ellington MD 8/3/2022 1:11 PM

## 2022-08-03 NOTE — PROGRESS NOTES
INPATIENT PROGRESS NOTES    PATIENT NAME: Silas Nair Sr. MRN: [id number]  SERVICE DATE:  August 3, 2022   SERVICE TIME:  12:46 PM      PRIMARY SERVICE: Pulmonary Disease    CHIEF COMPLAIN: Right lower lobe pneumonia      INTERVAL HPI: Patient seen and examined at bedside, Interval Notes, orders reviewed. Nursing notes noted  Patient is feeling better. He has minimal cough. He is on 2 L O2 via nasal cannula and O2 saturation 99%. He has A. fib with RVR and cardiology following. Rate 130-150 at times. He does have exertional shortness of breath but feeling much better than before. No chest pain. No nausea vomiting or diarrhea. OBJECTIVE    Body mass index is 26.67 kg/m². PHYSICAL EXAM:  Vitals:  BP (!) 142/83   Pulse 81   Temp 97.7 °F (36.5 °C) (Oral)   Resp 16   Ht 5' 8\" (1.727 m)   Wt 175 lb 6.4 oz (79.6 kg)   SpO2 96%   BMI 26.67 kg/m²   General: Alert, awake . comfortable in bed, No distress. Head: Atraumatic , Normocephalic   Eyes: PERRL. No sclera icterus. No conjunctival injection. No discharge   ENT: No nasal  discharge. Pharynx clear. Neck:  Trachea midline. No thyromegaly, no JVD, No cervical adenopathy. Chest : Bilaterally symmetrical ,Normal effort,  No accessory muscle use  Lung : . Fair BS bilateral, decreased BS at bases. No Rales. Few scattered wheezing. No rhonchi. Heart[de-identified] Tachycardia, irregularly irregular. No mumur ,  Rub or gallop  ABD: Non-tender. Non-distended. No masses. No organmegaly. Normal bowel sounds. No hernia.   Ext : No Pitting both leg , No Cyanosis No clubbing  Neuro: no focal weakness          DATA:   Recent Labs     08/02/22  0521 08/03/22  0505   WBC 14.8* 12.8*   HGB 13.3* 13.4*   HCT 40.5* 39.7*   MCV 95.5 94.8    156     Recent Labs     08/02/22  0521 08/03/22  0505    136   K 3.9  3.9 4.3    103   CO2 22 24   BUN 32* 36*   CREATININE 1.10 1.07   GLUCOSE 152* 129*   CALCIUM 9.1 8.5   PROT 7.0 6.6   LABALBU 3.3* 3.4*   BILITOT 0.6 0. 5   ALKPHOS 66 61   AST 35 43*   ALT 28 45*   LABGLOM >60.0 >60.0   GFRAA >60.0 >60.0   GLOB 3.7* 3.2       MV Settings:          No results for input(s): PHART, VZS3YLU, PO2ART, YDG9HPE, BEART, O5MICCNY in the last 72 hours. O2 Device: Nasal cannula  O2 Flow Rate (L/min): 2 L/min    ADULT DIET; Regular     MEDICATIONS during current hospitalization:    Continuous Infusions:   sodium chloride         Scheduled Meds:   dilTIAZem  60 mg Oral 4 times per day    [START ON 8/4/2022] dilTIAZem  360 mg Oral Daily    lisinopril  10 mg Oral Daily    predniSONE  40 mg Oral Daily    cefTRIAXone (ROCEPHIN) IV  1,000 mg IntraVENous Q24H    azithromycin  250 mg Oral Daily    potassium chloride  20 mEq Oral BID WC    sodium chloride flush  5-40 mL IntraVENous 2 times per day    guaiFENesin  1,200 mg Oral BID    escitalopram  10 mg Oral Daily    prednisoLONE acetate  1 drop Left Eye BID    rivaroxaban  20 mg Oral Daily    rosuvastatin  10 mg Oral Nightly    ocuvite-lutein  1 tablet Oral Daily       PRN Meds:ipratropium-albuterol, metoprolol, sodium chloride flush, sodium chloride, ondansetron **OR** ondansetron, polyethylene glycol, acetaminophen **OR** acetaminophen, labetalol, albuterol    Radiology  XR CHEST PORTABLE    Result Date: 7/31/2022  Exam: XR CHEST PORTABLE History: Cough and fever. Technique: AP portable view of the chest obtained. Comparison: Portal chest radiographs December 24, 2019 Findings: The cardiomediastinal silhouette is within normal limits. Patchy and linear opacities of the right lung base. No pneumothorax or pleural effusion. No acute osseous abnormality. Right lower lobe infiltrate.      IMPRESSION AND SUGGESTION:  Acute hypoxic respiratory failure due to COPD exacerbation and right lower lobe pneumonia  COPD with exacerbation  Right lower lobe pneumonia, community-acquired, clinically improving  A. fib with controlled rate  Hypertension    He is currently on IV Rocephin 1 g every 24 hourly and Zithromax 250 mg mg daily. Prednisone 40 mg daily. Changed to p.o. Ceftin 500 mg twice daily for 10 days when discharged home. Taper dose of prednisone. Recommend PFT as outpatient. Home O2 evaluation prior to discharge. Marianna Hernadez He will have a nebulizer with DuoNeb 4 times daily when discharged home. Follow-up in office 2 to 4 weeks. NOTE: This report was transcribed using voice recognition software. Every effort was made to ensure accuracy; however, inadvertent computerized transcription errors may be present.       Electronically signed by Tracey Hogan MD, FCCP on 8/3/2022 at 12:46 PM

## 2022-08-03 NOTE — DISCHARGE SUMMARY
Hospital Medicine Discharge Summary    Deb Mahoney  :  1940  MRN:  [id number]    Admit date:  2022  Discharge date:  8/3/2022    Admitting Physician:  Rohit Fenton DO  Primary Care Physician:  Marcia Fortune MD      Discharge Diagnoses:    A Fib with RVR; Pneumonia; COPD exacerbation     Chief Complaint   Patient presents with    Cough     Sent from urgent care     Hospital Course:   Patient presented with an acute exacerbation of COPD as well as a community acquired pneumonia. He responded well to steroids and abx and will be discharged on a pred taper and ceftin per pulm recommendations. He also went into A Fib with RVR and his medications will be reviewed and adjusted by cardiology prior to discharge. Exam on discharge:   BP (!) 142/83   Pulse 81   Temp 97.7 °F (36.5 °C) (Oral)   Resp 16   Ht 5' 8\" (1.727 m)   Wt 175 lb 6.4 oz (79.6 kg)   SpO2 96%   BMI 26.67 kg/m²   General appearance: No apparent distress, appears stated age and cooperative. HEENT:  Conjunctivae/corneas clear. Neck: Trachea midline. Respiratory:  Normal respiratory effort. Clear to auscultation  Cardiovascular: irregularly irregular  Abdomen: Soft, non-tender, non-distended with normal bowel sounds. Musculoskeletal: No clubbing, cyanosis or edema bilaterally  Neuro: Non Focal.  Capillary Refill: Brisk,< 3 seconds  Peripheral Pulses: +2 palpable, equal bilaterally     Patient was seen by the following consultants   Consults:  IP CONSULT TO PULMONOLOGY  PULMONARY REHAB EVALUATION  IP CONSULT TO CARDIOLOGY    Significant Diagnostic Studies:    Refer to chart     Please refer to chart if no studies are shown here    XR CHEST PORTABLE    Result Date: 2022  Exam: XR CHEST PORTABLE History: Cough and fever. Technique: AP portable view of the chest obtained. Comparison: Portal chest radiographs 2019 Findings: The cardiomediastinal silhouette is within normal limits.   Patchy and linear opacities of the right lung base. No pneumothorax or pleural effusion. No acute osseous abnormality. Right lower lobe infiltrate. Discharge Medications:         Medication List        START taking these medications      cefUROXime 500 MG tablet  Commonly known as: CEFTIN  Take 1 tablet by mouth in the morning and 1 tablet before bedtime. Do all this for 7 days. guaiFENesin 600 MG extended release tablet  Commonly known as: MUCINEX  Take 2 tablets by mouth in the morning and 2 tablets before bedtime. ipratropium-albuterol 0.5-2.5 (3) MG/3ML Soln nebulizer solution  Commonly known as: DUONEB  Inhale 3 mLs into the lungs every 4 hours as needed for Shortness of Breath     predniSONE 20 MG tablet  Commonly known as: DELTASONE  Take 2 tablets by mouth in the morning for 5 days. Start taking on: August 4, 2022            CONTINUE taking these medications      escitalopram 10 MG tablet  Commonly known as: LEXAPRO  TAKE 1 TABLET BY MOUTH EVERY DAY     Handicap Placard Misc  by Does not apply route Exp 5 years     ICAPS AREDS FORMULA PO     prednisoLONE acetate 1 % ophthalmic suspension  Commonly known as: PRED FORTE     rivaroxaban 20 MG Tabs tablet  Commonly known as: Xarelto  Take 1 tablet by mouth daily     rosuvastatin 10 MG tablet  Commonly known as: CRESTOR  TAKE ONE TABLET BY MOUTH DAILY            ASK your doctor about these medications      amLODIPine 5 MG tablet  Commonly known as: NORVASC     benazepril 20 MG tablet  Commonly known as: LOTENSIN  TAKE 2 TABLETS BY MOUTH EVERY DAY     metoprolol succinate 25 MG extended release tablet  Commonly known as: TOPROL XL  Take 1 tablet by mouth daily  Ask about: Which instructions should I use?                Where to Get Your Medications        These medications were sent to [address], [address] [place] [address]      Phone: [phone]   cefUROXime 500 MG tablet  guaiFENesin 600 MG extended release tablet  ipratropium-albuterol 0.5-2.5 (3) MG/3ML Soln nebulizer solution  predniSONE 20 MG tablet         Disposition:   If discharged to Home, Any [facility] needs that were indicated and/or required as been addressed and set up by Social Work. Condition at discharge: good     Activity: activity as tolerated    Total time taken for discharging this patient: 40 minutes. Greater than 70% of time was spent focused exclusively on this patient. Time was taken to review chart, discuss plans with consultants, reconciling medications, discussing plan answering questions with patient.      Signed:  Marj Gay MD  8/3/2022, 1:16 PM  ----------------------------------------------------------------------------------------------------------------------    Allison Velazquez,

## 2022-08-03 NOTE — PROGRESS NOTES
08/03/22 1453   Resting (Room Air)   SpO2 96      During Walk (Room Air)   SpO2 93      After Walk   Does the Patient Qualify for Home O2 No   Does the Patient Need Portable Oxygen Tanks No

## 2022-08-04 LAB — CULTURE, RESPIRATORY: NORMAL

## 2022-08-04 NOTE — FLOWSHEET NOTE
0710- Bedside report received. Patient is alert and oriented, resting comfortably in bed. Patient wearing 2L NC and unlabored breathing. Patient denies any pain at this time. Call light remains within reach. 0915- Morning assessment completed. Patient up to chair and breakfast completed. Patient denies any pain at this time. Breath sounds diminished on the left and rhonchi noted on the lower right lobe on 2L NC. Afib on tele, rate controlled. Call light remains within reach. 1400- Abx initiated. Unable to pull cardezim from the [facility]. Clarification from pharmacy on order. 1500- Cardezim order clarified and able to be pulled from [facility]. Patient IV and tele removed for discharge. Discharge paperwork in progress. 1615-Care plan completed for discharge. Appropriate education addressed in discharge instructions. Instructions completed and reviewed with patient. Verbalize understanding of instructions and follow up. Personal belongings gathered. No complaints. Transport wheelchair called. Family member present.       Electronically signed by Jack Manuel RN on 8/3/2022 at 9:15 PM

## 2022-08-05 LAB
BLOOD CULTURE, ROUTINE: NORMAL
CULTURE, BLOOD 2: NORMAL

## 2022-08-09 ENCOUNTER — OFFICE VISIT (OUTPATIENT)
Dept: FAMILY MEDICINE CLINIC | Age: 82
End: 2022-08-09
Payer: MEDICARE

## 2022-08-09 VITALS
WEIGHT: 184 LBS | HEIGHT: 70 IN | TEMPERATURE: 97.1 F | OXYGEN SATURATION: 97 % | HEART RATE: 69 BPM | DIASTOLIC BLOOD PRESSURE: 60 MMHG | SYSTOLIC BLOOD PRESSURE: 134 MMHG | BODY MASS INDEX: 26.34 KG/M2

## 2022-08-09 DIAGNOSIS — I48.91 ATRIAL FIBRILLATION, UNSPECIFIED TYPE (HCC): ICD-10-CM

## 2022-08-09 DIAGNOSIS — J18.9 PNEUMONIA OF RIGHT LOWER LOBE DUE TO INFECTIOUS ORGANISM: Primary | ICD-10-CM

## 2022-08-09 DIAGNOSIS — Z09 HOSPITAL DISCHARGE FOLLOW-UP: ICD-10-CM

## 2022-08-09 DIAGNOSIS — J41.0 SIMPLE CHRONIC BRONCHITIS (HCC): ICD-10-CM

## 2022-08-09 PROCEDURE — 1111F DSCHRG MED/CURRENT MED MERGE: CPT | Performed by: STUDENT IN AN ORGANIZED HEALTH CARE EDUCATION/TRAINING PROGRAM

## 2022-08-09 PROCEDURE — 99214 OFFICE O/P EST MOD 30 MIN: CPT | Performed by: STUDENT IN AN ORGANIZED HEALTH CARE EDUCATION/TRAINING PROGRAM

## 2022-08-09 NOTE — PROGRESS NOTES
Post-Discharge Transitional Care  Follow Up      Brissa Carrasquillo Sr. YOB: 1940  Chief Complaint   Patient presents with    Follow-Up from Hospital     Dr Nohemi Connor pt    Blood Pressure Check     Pt states that BP is ranging high to low pt brought reading from home. Date of Office Visit:  8/9/2022  Date of Hospital Admission: 7/31/22  Date of Hospital Discharge: 8/3/22  Risk of hospital readmission (high >=14%. Medium >=10%) :Readmission Risk Score: 13    Care management risk score Rising risk (score 2-5) and Complex Care (Scores >=6): No Risk Score On File     Non face to face  following discharge, date last encounter closed (first attempt may have been earlier): *No documented post hospital discharge outreach found in the last 14 days    Call initiated 2 business days of discharge: *No response recorded in the last 14 days    ASSESSMENT/PLAN:   Pneumonia of right lower lobe due to infectious organism  -     XR CHEST STANDARD (2 VW); Future  Simple chronic bronchitis (HCC)  Atrial fibrillation, unspecified type [facility])  Hospital discharge follow-up  -     MO DISCHARGE MEDS RECONCILED W/ CURRENT OUTPATIENT MED LIST    Medical Decision Making: moderate complexity  Return for 2 days BP recheck MA pt to bring his home cuff. Subjective:   HPI:  Follow up of Hospital problems/diagnosis(es):   A Fib with RVR; Pneumonia; COPD exacerbation     Inpatient course: Discharge summary reviewed- see chart. Patient presented with an acute exacerbation of COPD as well as a community acquired pneumonia. He responded well to steroids and abx and will be discharged on a pred taper and ceftin per pulm recommendations. He also went into A Fib with RVR and his medications will be reviewed and adjusted by cardiology prior to discharge. Interval history/Current status:   Follows with cardiology, Dr. Marry Ashley  Taking lisinopril and Cardizem, B-block was d/c  Has BP cuff at home, checking 1-2 times daily  Upper arm cuff  Readings at home are elevated with diastolic in the 869Q  Does not think his cuff has been checked for accuracy   Cough and breathing are back to baseline  No fevers or chills    Patient Active Problem List   Diagnosis    Hypertension    Hyperlipidemia    History of bladder cancer    High cholesterol    Hematuria    Atrial fibrillation (HCC)    S/P AAA repair    Spondylosis of lumbar region without myelopathy or radiculopathy    Iron deficiency anemia secondary to inadequate dietary iron intake    Fitting and adjustment of orthopedic device    Simple chronic bronchitis (HCC)    Malignant neoplasm of urinary bladder (HCC)    PAD (peripheral artery disease) (HCC)    Absolute anemia    Adenomatous polyp of ascending colon    Adenomatous polyp of sigmoid colon    Chronic superficial gastritis without bleeding    Bronchitis    Pneumonia    Community acquired pneumonia of right lower lobe of lung    COPD exacerbation ([facility] [address]       Medications listed as ordered at the time of discharge from hospital     Medication List            Accurate as of August 9, 2022  2:27 PM. If you have any questions, ask your nurse or doctor. CONTINUE taking these medications      cefUROXime 500 MG tablet  Commonly known as: CEFTIN  Take 1 tablet by mouth in the morning and 1 tablet before bedtime. Do all this for 7 days. dilTIAZem 360 MG extended release capsule  Commonly known as: CARDIZEM CD  Take 1 capsule by mouth in the morning. escitalopram 10 MG tablet  Commonly known as: LEXAPRO  TAKE 1 TABLET BY MOUTH EVERY DAY     guaiFENesin 600 MG extended release tablet  Commonly known as: MUCINEX  Take 2 tablets by mouth in the morning and 2 tablets before bedtime.      Handicap Placard Misc  by Does not apply route Exp 5 years     ICAPS AREDS FORMULA PO     ipratropium-albuterol 0.5-2.5 (3) MG/3ML Soln nebulizer solution  Commonly known as: DUONEB  Inhale 3 mLs into the lungs every 4 hours as needed for Shortness of Breath     lisinopril 10 MG tablet  Commonly known as: PRINIVIL;ZESTRIL  Take 1 tablet by mouth in the morning. prednisoLONE acetate 1 % ophthalmic suspension  Commonly known as: PRED FORTE     rivaroxaban 20 MG Tabs tablet  Commonly known as: Xarelto  Take 1 tablet by mouth daily     rosuvastatin 10 MG tablet  Commonly known as: CRESTOR  TAKE ONE TABLET BY MOUTH DAILY            STOP taking these medications      predniSONE 20 MG tablet  Commonly known as: DELTASONE  Stopped by: Todd Granda DO                Medications marked \"taking\" at this time  Outpatient Medications Marked as Taking for the 8/9/22 encounter (Office Visit) with Todd Granda DO   Medication Sig Dispense Refill    ipratropium-albuterol (DUONEB) 0.5-2.5 (3) MG/3ML SOLN nebulizer solution Inhale 3 mLs into the lungs every 4 hours as needed for Shortness of Breath 360 mL 0    guaiFENesin (MUCINEX) 600 MG extended release tablet Take 2 tablets by mouth in the morning and 2 tablets before bedtime. 60 tablet 0    cefUROXime (CEFTIN) 500 MG tablet Take 1 tablet by mouth in the morning and 1 tablet before bedtime. Do all this for 7 days. 14 tablet 0    lisinopril (PRINIVIL;ZESTRIL) 10 MG tablet Take 1 tablet by mouth in the morning. 30 tablet 3    dilTIAZem (CARDIZEM CD) 360 MG extended release capsule Take 1 capsule by mouth in the morning. 30 capsule 3    prednisoLONE acetate (PRED FORTE) 1 % ophthalmic suspension Place 1 drop into the left eye 2 times daily      escitalopram (LEXAPRO) 10 MG tablet TAKE 1 TABLET BY MOUTH EVERY  tablet 0    rivaroxaban (XARELTO) 20 MG TABS tablet Take 1 tablet by mouth daily 30 tablet 5    Handicap Placard MISC by Does not apply route Exp 5 years 1 each 0    rosuvastatin (CRESTOR) 10 MG tablet TAKE ONE TABLET BY MOUTH DAILY  30 tablet 0        Medications patient taking as of now reconciled against medications ordered at time of hospital discharge:  Yes    A comprehensive review of systems was negative normal and symmetric. Psychiatric:         Mood and Affect: Mood normal.         Behavior: Behavior normal. Behavior is cooperative. Thought Content: Thought content normal.         Judgment: Judgment normal.      An electronic signature was used to authenticate this note.   --Ava Domingo DO

## 2022-08-11 ENCOUNTER — NURSE ONLY (OUTPATIENT)
Dept: FAMILY MEDICINE CLINIC | Age: 82
End: 2022-08-11

## 2022-08-11 ENCOUNTER — TELEPHONE (OUTPATIENT)
Dept: FAMILY MEDICINE CLINIC | Age: 82
End: 2022-08-11

## 2022-08-11 VITALS — DIASTOLIC BLOOD PRESSURE: 70 MMHG | SYSTOLIC BLOOD PRESSURE: 138 MMHG

## 2022-08-11 NOTE — TELEPHONE ENCOUNTER
Pt spouse calling. States patient in office today for bp check. States office refused to give pt a covid test.   States pt used at home covid test came back positve. Pt states symptoms started yesterday, wed. Low grade fever  Cough  Loss of taste. Body aches    Advised CDC recommendations. Quarantine for 5 days from onset symptoms. If able to quarantine for second 5 days that is best, however if they go out to wear a mask. Treating symptoms with OTC medications. If drastic worsening of symptoms or trouble breathing ED. They want to know if pcp advises anything else with pt medical hx. Also states they have a  Monday and want to know if they can go if they wear masks.      Pt ph. [phone] or [phone]

## 2022-08-11 NOTE — TELEPHONE ENCOUNTER
Spoke to wife. Aware that I spoke to pt during BP check and he mentioned that he was fatigued a feverish. I explained to pt that he would need to be seen by provider in order to get covid tested. He said to forget it, he would test at home. Wife, Genaro Valenzuela, aware that pt is to quarantine and that he should not go to  on Mon! Quarantine starts today, and you should stay aware from pt as much as possible during this time.

## 2022-08-18 ENCOUNTER — HOSPITAL ENCOUNTER (OUTPATIENT)
Dept: GENERAL RADIOLOGY | Age: 82
Discharge: HOME | End: 2022-08-20
Payer: MEDICARE

## 2022-08-18 ENCOUNTER — OFFICE VISIT (OUTPATIENT)
Dept: PULMONOLOGY | Age: 82
End: 2022-08-18
Payer: MEDICARE

## 2022-08-18 VITALS
OXYGEN SATURATION: 97 % | HEART RATE: 83 BPM | TEMPERATURE: 97.6 F | SYSTOLIC BLOOD PRESSURE: 135 MMHG | WEIGHT: 187 LBS | DIASTOLIC BLOOD PRESSURE: 74 MMHG | BODY MASS INDEX: 26.83 KG/M2

## 2022-08-18 DIAGNOSIS — J44.9 CHRONIC OBSTRUCTIVE PULMONARY DISEASE, UNSPECIFIED COPD TYPE (HCC): Primary | ICD-10-CM

## 2022-08-18 DIAGNOSIS — Z72.0 TOBACCO ABUSE: ICD-10-CM

## 2022-08-18 DIAGNOSIS — U07.1 COVID-19: ICD-10-CM

## 2022-08-18 DIAGNOSIS — J44.9 CHRONIC OBSTRUCTIVE PULMONARY DISEASE, UNSPECIFIED COPD TYPE (HCC): ICD-10-CM

## 2022-08-18 DIAGNOSIS — J18.9 COMMUNITY ACQUIRED PNEUMONIA OF RIGHT LOWER LOBE OF LUNG: ICD-10-CM

## 2022-08-18 DIAGNOSIS — Z09 HOSPITAL DISCHARGE FOLLOW-UP: ICD-10-CM

## 2022-08-18 PROCEDURE — G8427 DOCREV CUR MEDS BY ELIG CLIN: HCPCS | Performed by: INTERNAL MEDICINE

## 2022-08-18 PROCEDURE — 1111F DSCHRG MED/CURRENT MED MERGE: CPT | Performed by: INTERNAL MEDICINE

## 2022-08-18 PROCEDURE — 3023F SPIROM DOC REV: CPT | Performed by: INTERNAL MEDICINE

## 2022-08-18 PROCEDURE — 1123F ACP DISCUSS/DSCN MKR DOCD: CPT | Performed by: INTERNAL MEDICINE

## 2022-08-18 PROCEDURE — 4004F PT TOBACCO SCREEN RCVD TLK: CPT | Performed by: INTERNAL MEDICINE

## 2022-08-18 PROCEDURE — G8417 CALC BMI ABV UP PARAM F/U: HCPCS | Performed by: INTERNAL MEDICINE

## 2022-08-18 PROCEDURE — 99214 OFFICE O/P EST MOD 30 MIN: CPT | Performed by: INTERNAL MEDICINE

## 2022-08-18 PROCEDURE — 71046 X-RAY EXAM CHEST 2 VIEWS: CPT

## 2022-08-18 ASSESSMENT — ENCOUNTER SYMPTOMS
COUGH: 1
CHEST TIGHTNESS: 0
RHINORRHEA: 0
VOMITING: 0
EYE ITCHING: 0
SORE THROAT: 0
SHORTNESS OF BREATH: 0
ABDOMINAL PAIN: 0
NAUSEA: 0
VOICE CHANGE: 0
DIARRHEA: 0
WHEEZING: 0

## 2022-08-18 NOTE — PROGRESS NOTES
Subjective:     Reggie Almeida is a 79 y.o. male who complains today of:     Chief Complaint   Patient presents with    Follow-Up from Hospital     Pneumonia       HPI  He said he was in hospital in 7/31 and discharge on 8/3. He was in hospital with acute exacerbation of COPD as well as a community acquired pneumonia. discharged on a prednisone, guaifenesin  600 mg BID , neb with duoneb QID  and Ceftin 500 mg x7 days . CXR  7/31/22 rt. Lower lobe infiltration. He said he had covid when discharge home. He took paxlovid. He did not have CXR done . he still smoke less than 1/2 ppd. No C/o shortness of breath with exertion. No Wheezing. Cough with clear Sputum. No Hemoptysis. No Chest tightness. No Chest pain with radiation  or pleuritic pain. No  leg edema. No orthopnea. No Fever or chills. No Rhinorrhea and postnasal drip. He is using bronchodilator with Duoneb QID prn    PFT  2019 Severe obstructive pulmonary disease. There is no response to  bronchodilator. Static lung volume study suggests hyperinflation. Diffusion capacity is severely impaired. Airway resistance is  increased. Allergies:   Avelox [moxifloxacin hcl in nacl], Mobic [meloxicam], Moxifloxacin, and Z-eugene [azithromycin]  Past Medical History:   Diagnosis Date    Atrial fibrillation (HCC)     CAD (coronary artery disease)     COPD exacerbation ([facility] 7/31/2022    Hematuria     High cholesterol     History of bladder cancer     Hyperlipidemia     Hypertension     Malignant neoplasm of urinary bladder ([facility] 1/27/2020    S/P AAA repair     Spondylosis of lumbar region without myelopathy or radiculopathy      Past Surgical History:   Procedure Laterality Date    ABDOMINAL AORTIC ANEURYSM REPAIR      COLONOSCOPY N/A 5/26/2020    COLONOSCOPY DIAGNOSTIC performed by Marisel Foote MD at [address] Left 2/11/2020    LEFT LOWER EXTREMITY REVASCULARIZATION performed by Nola John MD at [facility] HERNIA REPAIR  1998    [place] COL[place] SCRN NOT  [address] IND N/A 4/24/2017    COLONOSCOPY performed by Socorro Madrigal MD at [address] ESOPHAGOGASTRODUODENOSCOPY TRANSORAL DIAGNOSTIC N/A 4/24/2017    EGD ESOPHAGOGASTRODUODENOSCOPY performed by Socorro Madrigal MD at [address] [place] [address] 5/26/2020    EGD DIAGNOSTIC ONLY performed by Alfred Child MD at [facility]     History reviewed. No pertinent family history. Social History     Socioeconomic History    Marital status:      Spouse name: Not on file    Number of children: Not on file    Years of education: Not on file    Highest education level: Not on file   Occupational History    Not on file   Tobacco Use    Smoking status: Light Smoker     Packs/day: 0.33     Years: 30.00     Pack years: 9.90     Types: Cigarettes     Start date: 10/1/1979     Passive exposure: Current    Smokeless tobacco: Never    Tobacco comments:     2 packs per week   Vaping Use    Vaping Use: Never used   Substance and Sexual Activity    Alcohol use: Yes     Comment: occasionally    Drug use: No    Sexual activity: Not on file   Other Topics Concern    Not on file   Social History Narrative    Not on file     Social Determinants of Health     Financial Resource Strain: Not on file   Food Insecurity: Not on file   Transportation Needs: Not on file   Physical Activity: Not on file   Stress: Not on file   Social Connections: Not on file   Intimate Partner Violence: Not on file   Housing Stability: Not on file         Review of Systems   Constitutional:  Negative for chills, diaphoresis, fatigue and fever. HENT:  Negative for congestion, mouth sores, nosebleeds, postnasal drip, rhinorrhea, sneezing, sore throat and voice change. Eyes:  Negative for itching and visual disturbance. Respiratory:  Positive for cough. Negative for chest tightness, shortness of breath and wheezing. Cardiovascular: Negative. Cranial Nerves: No cranial nerve deficit. Psychiatric:         Behavior: Behavior normal.       Current Outpatient Medications   Medication Sig Dispense Refill    ipratropium-albuterol (DUONEB) 0.5-2.5 (3) MG/3ML SOLN nebulizer solution Inhale 3 mLs into the lungs every 4 hours as needed for Shortness of Breath 360 mL 0    guaiFENesin (MUCINEX) 600 MG extended release tablet Take 2 tablets by mouth in the morning and 2 tablets before bedtime. 60 tablet 0    lisinopril (PRINIVIL;ZESTRIL) 10 MG tablet Take 1 tablet by mouth in the morning. 30 tablet 3    dilTIAZem (CARDIZEM CD) 360 MG extended release capsule Take 1 capsule by mouth in the morning. 30 capsule 3    prednisoLONE acetate (PRED FORTE) 1 % ophthalmic suspension Place 1 drop into the left eye 2 times daily      escitalopram (LEXAPRO) 10 MG tablet TAKE 1 TABLET BY MOUTH EVERY  tablet 0    rivaroxaban (XARELTO) 20 MG TABS tablet Take 1 tablet by mouth daily 30 tablet 5    Handicap Placard MISC by Does not apply route Exp 5 years 1 each 0    rosuvastatin (CRESTOR) 10 MG tablet TAKE ONE TABLET BY MOUTH DAILY  30 tablet 0    Multiple Vitamins-Minerals (ICAPS AREDS FORMULA PO) Take 1 tablet by mouth daily (Patient not taking: No sig reported)       No current facility-administered medications for this visit. Results for orders placed during the hospital encounter of 08/26/19    XR CHEST STANDARD (2 VW)    Narrative  EXAMINATION: Chest x-ray, 2 view    HISTORY: Colitis. Cough. TECHNIQUE: Frontal and lateral views of the chest    COMPARISON:    FINDINGS:    Cardiomediastinal silhouette is within normal limits. No pneumothorax, pleural effusion, or focal consolidation. Left lower lobe subsegmental atelectasis. Interval resolution of right middle lobe atelectasis. Degenerative changes of the spine. Impression  No acute intrathoracic process.       Results for orders placed in visit on 05/28/19    XR CHEST STANDARD (2 VW)    Narrative  EXAMINATION: XR CHEST (2 VIEWS):    DATE AND TIME: 2019 at 2:32 PM.    CLINICAL HISTORY: SHORTNESS OF BREATH. PNEUMONIA OF RIGHT MIDDLE LOBE DUE TO INFECTIOUS ORGANISM. COMPARISONS: May 20, 2019. FINDINGS: Persistent atelectatic change in the right middle lobe. Findings raise suspicion and concern for a possible endobronchial lesion centrally. Consider correlation with CT. Remaining lung fields clear. Pleural angles sharp. Bones intact. Impression  PERSISTENT RIGHT MIDDLE LOBE ATELECTASIS. UNDERLYING ENDOBRONCHIAL LESION OF CONCERN. Results for orders placed in visit on 19    XR CHEST STANDARD (2 VW)    Narrative  EXAMINATION: XR CHEST (2 VW)    REASON FOR EXAM:J40 Bronchitis ICD10    FINDINGS: 2 views of the chest were obtained. The heart is normal in size. However, there is obliteration of the right heart border and increased density overlying the heart on the lateral view. On suspicious of the right middle lobe pneumonia  developing. No pleural effusion. Thoracic aorta tortuous but intact and unchanged. Extrathoracic soft tissues and bony structures unremarkable. Impression  SUBSEGMENTAL RIGHT MIDDLE LOBE PNEUMONIA. FOLLOW TO RESOLUTION.  ]  Results for orders placed during the hospital encounter of 22    XR CHEST PORTABLE    Narrative  Exam: XR CHEST PORTABLE    History: Cough and fever. Technique: AP portable view of the chest obtained. Comparison: Portal chest radiographs 2019    Findings: The cardiomediastinal silhouette is within normal limits. Patchy and linear opacities of the right lung base. No pneumothorax or pleural effusion. No acute osseous abnormality. Impression  Right lower lobe infiltrate.       Results for orders placed during the hospital encounter of 19    XR CHEST PORTABLE    Narrative  Patient MRN: [id number]    : 1940    Age:  [de-identified] years    Gender: Male    Order Date: 2019 9:15 AM.    Exam: XR CHEST PORTABLE    Number of Views: 2    Indication:  Shortness of breath, COPD    Comparison: 8/26/2019    Findings: Airspace disease in the medial right lung base and infrahilar region is more prominent than on previous study. No pneumothorax. Cardiomediastinal silhouette within normal limits. Impression  Impression:  1. Abnormal airspace disease right hilar/infrahilar region with asymmetry. Further evaluation with CT scan of the chest is recommended to exclude any potential mass/collection or lymphadenopathy versus possible pneumonia.  ]  No results found for this or any previous visit.  ]  Results for orders placed during the hospital encounter of 12/24/19    CT CHEST WO CONTRAST    Narrative  EXAMINATION: CT CHEST WO CONTRAST    DATE:12/24/2019 10:30 AM    CLINICAL HISTORY: Anterior chest pain. Shortness of breath. Pight lung mass vs pneumonia    COMPARISON:  June 4, 2019    TECHNIQUE: Helical CT was performed through the chest without IV contrast., All CT scans at this facility use dose modulation, iterative reconstruction, and/or weight based dosing when appropriate to reduce radiation dose to as low as reasonably  achievable. FINDINGS    Lungs: Persistent triangle shaped consolidation abutting the right heart border that is narrower at the hilum with the apex pointing laterally. 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 The  possibility of a chronic pneumonia is not excluded but there would not be this much volume loss in that situation. There is no obvious underlying mass lesion. No definite endobronchial mass demonstrated. Mediastinum : Small lymph nodes consistent with reactive adenopathy. Pleura:Normal. No effusion or thickening    Vessels:Thoracic aorta is intact.     Bones:Normal    Upper abdomen:No significant abnormality of the visualized structures of the upper abdomen    Impression  PERSISTENT RIGHT MIDDLE LOBE COLLAPSE.  ]  Results for orders placed during the hospital encounter of 06/04/19    CT CHEST W CONTRAST    Narrative  CT of the Chest with intravenous contrast medium    History:  Abnormal chest radiograph. Technical Factors:    CT imaging of the chest was obtained and formatted as 5 mm contiguous axial images from the thoracic inlet through the adrenal glands. Sagittal and coronal reconstruction obtained during postprocessing. Intravenous contrast medium:  Isovue-370, 75 mL    Comparison:  Chest radiographs, May 28, 2019, May 20, 2019. Findings:    Right lung shows emphysematous change. Consolidation lateral segment right middle lobe identified (series 2, image 35. No mass identified. No nodules, masses, pleural effusion visualized. Left lung shows emphysematous change. No nodules, masses, foci of consolidation. No hilar, mediastinal, or axillary lymph node enlargement. Thoracic aorta normal in course and caliber. Limited imaging upper abdomen shows cephalad extent of abdominal aortic stent graft. No osteoblastic, and no osteolytic lesions. Disc space narrowing with anterior osteophytes mid to lower thoracic spine. Impression  Post obstructive pneumonitis versus atelectasis/pneumonia, right middle lobe. No mass identified. Emphysema. Abdominal aortic stent graft. All CT scans at this facility use dose modulation, iterative reconstruction, and/or weight based dosing when appropriate to reduce radiation dose to as low as reasonably achievable.  ]    Assessment/Plan:     1. Chronic obstructive pulmonary disease, unspecified COPD type ([address]  He said he was in hospital in 7/31 and discharge on 8/3. He was in hospital with acute exacerbation of COPD as well as a No C/o shortness of breath with exertion. No Wheezing. Cough with clear Sputum. He is using bronchodilator with Duoneb QID prn. Continue bronchodilator therapy as before    PFT  2019 Severe obstructive pulmonary disease. There is no response to  bronchodilator. Static lung volume study suggests hyperinflation. Diffusion capacity is severely impaired. Airway resistance is  increased. 2. Community acquired pneumonia of right lower lobe of lung  community acquired pneumonia. discharged on a prednisone, guaifenesin  600 mg BID , neb with duoneb QID  and Ceftin 500 mg x7 days . CXR  7/31/22 rt. Lower lobe infiltration. .     3. Tobacco abuse  he still smoke less than 1/2 ppd    4. COVID-19  He said he had covid when discharge home. He took paxlovid. - XR CHEST STANDARD (2 VW);  Future     He does not  want to keep any f/u here , he will follow with PCP        Luann Levi MD

## 2022-09-02 ENCOUNTER — OFFICE VISIT (OUTPATIENT)
Dept: FAMILY MEDICINE CLINIC | Age: 82
End: 2022-09-02
Payer: MEDICARE

## 2022-09-02 ENCOUNTER — PROCEDURE VISIT (OUTPATIENT)
Dept: UROLOGY | Age: 82
End: 2022-09-02
Payer: MEDICARE

## 2022-09-02 VITALS
HEART RATE: 111 BPM | SYSTOLIC BLOOD PRESSURE: 138 MMHG | HEIGHT: 70 IN | WEIGHT: 180 LBS | BODY MASS INDEX: 25.77 KG/M2 | DIASTOLIC BLOOD PRESSURE: 70 MMHG | OXYGEN SATURATION: 98 %

## 2022-09-02 VITALS
WEIGHT: 182 LBS | OXYGEN SATURATION: 95 % | HEART RATE: 78 BPM | HEIGHT: 70 IN | DIASTOLIC BLOOD PRESSURE: 70 MMHG | SYSTOLIC BLOOD PRESSURE: 136 MMHG | TEMPERATURE: 98.8 F | BODY MASS INDEX: 26.05 KG/M2

## 2022-09-02 DIAGNOSIS — I48.91 ATRIAL FIBRILLATION, UNSPECIFIED TYPE (HCC): Primary | ICD-10-CM

## 2022-09-02 DIAGNOSIS — N13.8 BPH WITH OBSTRUCTION/LOWER URINARY TRACT SYMPTOMS: ICD-10-CM

## 2022-09-02 DIAGNOSIS — E78.5 HYPERLIPIDEMIA, UNSPECIFIED HYPERLIPIDEMIA TYPE: ICD-10-CM

## 2022-09-02 DIAGNOSIS — C67.9 MALIGNANT NEOPLASM OF URINARY BLADDER, UNSPECIFIED SITE (HCC): Primary | ICD-10-CM

## 2022-09-02 DIAGNOSIS — I10 PRIMARY HYPERTENSION: ICD-10-CM

## 2022-09-02 DIAGNOSIS — I73.9 PAD (PERIPHERAL ARTERY DISEASE) (HCC): ICD-10-CM

## 2022-09-02 DIAGNOSIS — N40.1 BPH WITH OBSTRUCTION/LOWER URINARY TRACT SYMPTOMS: ICD-10-CM

## 2022-09-02 PROCEDURE — 99214 OFFICE O/P EST MOD 30 MIN: CPT | Performed by: FAMILY MEDICINE

## 2022-09-02 PROCEDURE — 4004F PT TOBACCO SCREEN RCVD TLK: CPT | Performed by: UROLOGY

## 2022-09-02 PROCEDURE — 1123F ACP DISCUSS/DSCN MKR DOCD: CPT | Performed by: UROLOGY

## 2022-09-02 PROCEDURE — 1111F DSCHRG MED/CURRENT MED MERGE: CPT | Performed by: FAMILY MEDICINE

## 2022-09-02 PROCEDURE — 52000 CYSTOURETHROSCOPY: CPT | Performed by: UROLOGY

## 2022-09-02 PROCEDURE — G8417 CALC BMI ABV UP PARAM F/U: HCPCS | Performed by: UROLOGY

## 2022-09-02 PROCEDURE — G8427 DOCREV CUR MEDS BY ELIG CLIN: HCPCS | Performed by: FAMILY MEDICINE

## 2022-09-02 PROCEDURE — 1123F ACP DISCUSS/DSCN MKR DOCD: CPT | Performed by: FAMILY MEDICINE

## 2022-09-02 PROCEDURE — 4004F PT TOBACCO SCREEN RCVD TLK: CPT | Performed by: FAMILY MEDICINE

## 2022-09-02 PROCEDURE — G8427 DOCREV CUR MEDS BY ELIG CLIN: HCPCS | Performed by: UROLOGY

## 2022-09-02 PROCEDURE — 81003 URINALYSIS AUTO W/O SCOPE: CPT | Performed by: UROLOGY

## 2022-09-02 PROCEDURE — G8417 CALC BMI ABV UP PARAM F/U: HCPCS | Performed by: FAMILY MEDICINE

## 2022-09-02 PROCEDURE — 1111F DSCHRG MED/CURRENT MED MERGE: CPT | Performed by: UROLOGY

## 2022-09-02 PROCEDURE — 99213 OFFICE O/P EST LOW 20 MIN: CPT | Performed by: UROLOGY

## 2022-09-02 RX ORDER — DOXYCYCLINE HYCLATE 100 MG
100 TABLET ORAL ONCE
Qty: 1 TABLET | Refills: 0 | COMMUNITY
Start: 2022-09-02 | End: 2022-09-02

## 2022-09-02 RX ORDER — ALFUZOSIN HYDROCHLORIDE 10 MG/1
10 TABLET, EXTENDED RELEASE ORAL DAILY
Qty: 90 TABLET | Refills: 3 | Status: SHIPPED | OUTPATIENT
Start: 2022-09-02

## 2022-09-02 SDOH — ECONOMIC STABILITY: FOOD INSECURITY: WITHIN THE PAST 12 MONTHS, YOU WORRIED THAT YOUR FOOD WOULD RUN OUT BEFORE YOU GOT MONEY TO BUY MORE.: NEVER TRUE

## 2022-09-02 SDOH — ECONOMIC STABILITY: FOOD INSECURITY: WITHIN THE PAST 12 MONTHS, THE FOOD YOU BOUGHT JUST DIDN'T LAST AND YOU DIDN'T HAVE MONEY TO GET MORE.: NEVER TRUE

## 2022-09-02 SDOH — ECONOMIC STABILITY: TRANSPORTATION INSECURITY
IN THE PAST 12 MONTHS, HAS LACK OF TRANSPORTATION KEPT YOU FROM MEETINGS, WORK, OR FROM GETTING THINGS NEEDED FOR DAILY LIVING?: NO

## 2022-09-02 SDOH — ECONOMIC STABILITY: TRANSPORTATION INSECURITY
IN THE PAST 12 MONTHS, HAS THE LACK OF TRANSPORTATION KEPT YOU FROM MEDICAL APPOINTMENTS OR FROM GETTING MEDICATIONS?: NO

## 2022-09-02 ASSESSMENT — SOCIAL DETERMINANTS OF HEALTH (SDOH): HOW HARD IS IT FOR YOU TO PAY FOR THE VERY BASICS LIKE FOOD, HOUSING, MEDICAL CARE, AND HEATING?: NOT HARD AT ALL

## 2022-09-02 ASSESSMENT — ENCOUNTER SYMPTOMS
ABDOMINAL PAIN: 0
ABDOMINAL DISTENTION: 0

## 2022-09-02 NOTE — PROGRESS NOTES
Subjective:      Patient ID: Stew Mayorga is a 79 y.o. male    HPI  This is a 79 y.o. 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 Since last being seen on 9/3/21, he has no hematuria or dysuria or pain. He has worsened NF and wants to consider a BPH medication trial. Flomax made him dizzy in past and stopped it, but it helped with his LUTs. He has no new medical or surgical problems. He continues to smoke ciggs. He wants to proceed with cystoscopy today.  He elected against PSA testing in past.     Past Medical History:   Diagnosis Date    Atrial fibrillation (HCC)     CAD (coronary artery disease)     COPD exacerbation ([facility] 7/31/2022    Hematuria     High cholesterol     History of bladder cancer     Hyperlipidemia     Hypertension     Malignant neoplasm of urinary bladder ([facility] 1/27/2020    S/P AAA repair     Spondylosis of lumbar region without myelopathy or radiculopathy      Past Surgical History:   Procedure Laterality Date    ABDOMINAL AORTIC ANEURYSM REPAIR      COLONOSCOPY N/A 5/26/2020    COLONOSCOPY DIAGNOSTIC performed by Elie Al MD at [address] Left 2/11/2020    LEFT LOWER EXTREMITY REVASCULARIZATION performed by Kandy Reynoso MD at [address] N/A 4/24/2017    COLONOSCOPY performed by Izabela Matute MD at [address] [facility] ESOPHAGOGASTRODUODENOSCOPY TRANSORAL DIAGNOSTIC N/A 4/24/2017    EGD ESOPHAGOGASTRODUODENOSCOPY performed by Izabela Matute MD at [address] [place] [address] 5/26/2020    EGD DIAGNOSTIC ONLY performed by Elie Al MD at [address] [place] [address] History    Marital status:      Spouse name: None    Number of children: None    Years of education: None    Highest education level: None   Tobacco Use    Smoking status: Light Smoker     Packs/day: 0.33     Years: 30.00     Pack years: 9.90     Types: Cigarettes     Start date: 10/1/1979     Passive exposure: Current    Smokeless tobacco: Never    Tobacco comments:     2 packs per week   Vaping Use    Vaping Use: Never used   Substance and Sexual Activity    Alcohol use: Yes     Comment: occasionally    Drug use: No     History reviewed. No pertinent family history. Current Outpatient Medications   Medication Sig Dispense Refill    doxycycline hyclate (VIBRA-TABS) 100 MG tablet Take 1 tablet by mouth once for 1 dose 1 tablet 0    ipratropium-albuterol (DUONEB) 0.5-2.5 (3) MG/3ML SOLN nebulizer solution Inhale 3 mLs into the lungs every 4 hours as needed for Shortness of Breath 360 mL 0    guaiFENesin (MUCINEX) 600 MG extended release tablet Take 2 tablets by mouth in the morning and 2 tablets before bedtime. 60 tablet 0    lisinopril (PRINIVIL;ZESTRIL) 10 MG tablet Take 1 tablet by mouth in the morning. 30 tablet 3    dilTIAZem (CARDIZEM CD) 360 MG extended release capsule Take 1 capsule by mouth in the morning. 30 capsule 3    Multiple Vitamins-Minerals (ICAPS AREDS FORMULA PO) Take 1 tablet by mouth daily      prednisoLONE acetate (PRED FORTE) 1 % ophthalmic suspension Place 1 drop into the left eye 2 times daily      escitalopram (LEXAPRO) 10 MG tablet TAKE 1 TABLET BY MOUTH EVERY  tablet 0    rivaroxaban (XARELTO) 20 MG TABS tablet Take 1 tablet by mouth daily 30 tablet 5    Handicap Placard MISC by Does not apply route Exp 5 years 1 each 0    rosuvastatin (CRESTOR) 10 MG tablet TAKE ONE TABLET BY MOUTH DAILY  30 tablet 0     No current facility-administered medications for this visit. Avelox [moxifloxacin hcl in nacl], Mobic [meloxicam], Moxifloxacin, and Z-eugene [azithromycin]  reviewed      Review of Systems   Constitutional:  Negative for unexpected weight change.    Gastrointestinal:  Negative for abdominal distention and abdominal pain. Genitourinary:  Positive for frequency. Negative for dysuria and hematuria. Objective:   Physical Exam  Abdominal:      General: There is no distension. Palpations: Abdomen is soft. Neurological:      Mental Status: He is alert. Psychiatric:         Mood and Affect: Mood normal.           Contains abnormal data POCT Urinalysis No Micro (Auto)  Order: [id number]  Status: Final result    Visible to patient: Yes (not seen)    Next appt: Today at 02:00 PM in [address] [place], [place] [address]wolfgang Rinaldi MD    Dx: Malignant neoplasm of urinary bladder. ..    0 Result Notes  Component 9/2/22 1019    Color, UA yellow    Clarity, UA clear    Glucose, UA POC neg    Bilirubin, UA neg    Ketones, UA neg    Spec Grav, UA 1.020    Blood, UA POC trace-intact    pH, UA 6.0    Protein, UA  mg/dL    Urobilinogen, UA 0.2    Leukocytes, UA neg    Nitrite, UA neg               Specimen Collected: 09/02/22 10:19 EDT Last Resulted:            Cystoscopy Procedure Note    Pre-operative Diagnosis: H/O TaG1 UC of bladder     Post-operative Diagnosis: Same plus benign findings     Surgeon: Júnior Pate      Assistants: Elias Alarcon MA     Anesthesia: Local anesthesia topical 2% lidocaine gel     Procedure Details   The risks, benefits, complications, treatment options, and expected outcomes were discussed with the patient. The patient concurred with the proposed plan, giving informed consent. Cystoscopy was performed today under local anesthesia, using sterile technique. The patient was placed in the supine position, prepped and draped in the usual sterile fashion. A flexible cystoscope was used to inspect the entire bladder including retroflexion.       Findings:  Anterior urethra: normal  Prostatic Urethra:normal mucosa with tri-lobar hypertrophy of prostate  Bladder: Normal mucosa without tumors, stones, clots or FB  Ureteral orifice(s) were normal . Ureteral orifice(s) were in the normal location. Specimens: None     Complications:  None; patient tolerated the procedure well. Disposition: To home. Condition: stable    Assessment: This is a 79 y.o. 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 I reassured him of these findings and recommend cystoscopy in 1 yr. he remains not interested in future PSA's. He wants to try Uroxatral for recurrent NF and felt Flomax helped in past but got dizzy. The proper dosing and SE of Uroxatral were reviewed today and he wants to proceed.        Plan:      F/U 1 yr for office cystoscopy, local  Needs new vascular surgeon and will give him Dr Lexis Ziegler contact info        Pura Blanc MD

## 2022-09-02 NOTE — PROGRESS NOTES
Chief Complaint   Patient presents with    Hypertension     [facility] end of July        HPI:  Alan Vasquez Sr. is a 79 y.o. male    Blood pressure issues   Needed to check in     Wants donny of xarelto     Had cataract surgery on left eye recently     Lost vision  Had infection in left eye    Hospitalized for pneumonia  Then developed covid recently     [de-identified] with [facility] for A-fib for quite some time    He is on anti-arrhythmics   Dr. Rocio Floyd and Dr. Josefa Lazaro  He is on eliquis    His HR always runs on the low side he states        Past Medical History:   Diagnosis Date    Atrial fibrillation ([address]     CAD (coronary artery disease)     COPD exacerbation ([address] 7/31/2022    Hematuria     High cholesterol     History of bladder cancer     Hyperlipidemia     Hypertension     Malignant neoplasm of urinary bladder ([facility] 1/27/2020    S/P AAA repair     Spondylosis of lumbar region without myelopathy or radiculopathy      Past Surgical History:   Procedure Laterality Date    ABDOMINAL AORTIC ANEURYSM REPAIR      COLONOSCOPY N/A 5/26/2020    COLONOSCOPY DIAGNOSTIC performed by Lino Sheikh MD at [address] Left 2/11/2020    LEFT LOWER EXTREMITY REVASCULARIZATION performed by Alisha Rosado MD at [address] [facility] [address] [facility] [address]  [address] [facility] N/A 4/24/2017    COLONOSCOPY performed by Andrew Watkins MD at [address] TRANSORAL DIAGNOSTIC N/A 4/24/2017    EGD ESOPHAGOGASTRODUODENOSCOPY performed by Andrew Watkins MD at [address] [place] [address] 5/26/2020    EGD DIAGNOSTIC ONLY performed by Lino Sheikh MD at [facility]     History reviewed. No pertinent family history.   Social History     Socioeconomic History    Marital status:      Spouse name: None    Number of children: None    Years of education: None    Highest education level: None   Tobacco Use    Smoking status: Light Smoker Packs/day: 0.33     Years: 30.00     Pack years: 9.90     Types: Cigarettes     Start date: 10/1/1979     Passive exposure: Current    Smokeless tobacco: Never    Tobacco comments:     2 packs per week   Vaping Use    Vaping Use: Never used   Substance and Sexual Activity    Alcohol use: Yes     Comment: occasionally    Drug use: No     Social Determinants of Health     Financial Resource Strain: Low Risk     Difficulty of Paying Living Expenses: Not hard at all   Food Insecurity: No Food Insecurity    Worried About Running Out of Food in the Last Year: Never true    Ran Out of Food in the Last Year: Never true   Transportation Needs: No Transportation Needs    Lack of Transportation (Medical): No    Lack of Transportation (Non-Medical): No     Current Outpatient Medications   Medication Sig Dispense Refill    doxycycline hyclate (VIBRA-TABS) 100 MG tablet Take 1 tablet by mouth once for 1 dose 1 tablet 0    alfuzosin (UROXATRAL) 10 MG extended release tablet Take 1 tablet by mouth daily 90 tablet 3    ipratropium-albuterol (DUONEB) 0.5-2.5 (3) MG/3ML SOLN nebulizer solution Inhale 3 mLs into the lungs every 4 hours as needed for Shortness of Breath 360 mL 0    guaiFENesin (MUCINEX) 600 MG extended release tablet Take 2 tablets by mouth in the morning and 2 tablets before bedtime. 60 tablet 0    lisinopril (PRINIVIL;ZESTRIL) 10 MG tablet Take 1 tablet by mouth in the morning. 30 tablet 3    dilTIAZem (CARDIZEM CD) 360 MG extended release capsule Take 1 capsule by mouth in the morning.  30 capsule 3    Multiple Vitamins-Minerals (ICAPS AREDS FORMULA PO) Take 1 tablet by mouth daily      prednisoLONE acetate (PRED FORTE) 1 % ophthalmic suspension Place 1 drop into the left eye 2 times daily      escitalopram (LEXAPRO) 10 MG tablet TAKE 1 TABLET BY MOUTH EVERY  tablet 0    rivaroxaban (XARELTO) 20 MG TABS tablet Take 1 tablet by mouth daily 30 tablet 5    Handicap Placard MISC by Does not apply route Exp 5 1.59 08/26/2019    INR 1.4 07/31/2022    GLUF 89 10/11/2012    LABA1C 5.7 01/04/2014         A&P   Diagnosis Orders   1. Atrial fibrillation, unspecified type ([address]        2. PAD (peripheral artery disease) ([address]        3. Primary hypertension        4.  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 Vascular claudication  Will get PVR study    Annabell Gilford, MD

## 2022-11-07 ENCOUNTER — OFFICE VISIT (OUTPATIENT)
Dept: FAMILY MEDICINE CLINIC | Age: 82
End: 2022-11-07
Payer: MEDICARE

## 2022-11-07 VITALS
OXYGEN SATURATION: 98 % | BODY MASS INDEX: 26.05 KG/M2 | SYSTOLIC BLOOD PRESSURE: 122 MMHG | HEART RATE: 113 BPM | WEIGHT: 182 LBS | HEIGHT: 70 IN | DIASTOLIC BLOOD PRESSURE: 70 MMHG | TEMPERATURE: 97.4 F

## 2022-11-07 DIAGNOSIS — J40 BRONCHITIS: Primary | ICD-10-CM

## 2022-11-07 PROCEDURE — 3078F DIAST BP <80 MM HG: CPT | Performed by: NURSE PRACTITIONER

## 2022-11-07 PROCEDURE — 4004F PT TOBACCO SCREEN RCVD TLK: CPT | Performed by: NURSE PRACTITIONER

## 2022-11-07 PROCEDURE — G8417 CALC BMI ABV UP PARAM F/U: HCPCS | Performed by: NURSE PRACTITIONER

## 2022-11-07 PROCEDURE — G8484 FLU IMMUNIZE NO ADMIN: HCPCS | Performed by: NURSE PRACTITIONER

## 2022-11-07 PROCEDURE — 3074F SYST BP LT 130 MM HG: CPT | Performed by: NURSE PRACTITIONER

## 2022-11-07 PROCEDURE — 1123F ACP DISCUSS/DSCN MKR DOCD: CPT | Performed by: NURSE PRACTITIONER

## 2022-11-07 PROCEDURE — 99213 OFFICE O/P EST LOW 20 MIN: CPT | Performed by: NURSE PRACTITIONER

## 2022-11-07 PROCEDURE — G8427 DOCREV CUR MEDS BY ELIG CLIN: HCPCS | Performed by: NURSE PRACTITIONER

## 2022-11-07 RX ORDER — DOXYCYCLINE HYCLATE 100 MG
100 TABLET ORAL 2 TIMES DAILY
Qty: 14 TABLET | Refills: 0 | Status: SHIPPED | OUTPATIENT
Start: 2022-11-07 | End: 2022-11-14

## 2022-11-07 RX ORDER — PREDNISONE 20 MG/1
40 TABLET ORAL DAILY
Qty: 10 TABLET | Refills: 0 | Status: SHIPPED | OUTPATIENT
Start: 2022-11-07 | End: 2022-11-12

## 2022-11-07 ASSESSMENT — ENCOUNTER SYMPTOMS
DIARRHEA: 0
RHINORRHEA: 0
NAUSEA: 0
WHEEZING: 0
COLOR CHANGE: 0
CHEST TIGHTNESS: 0
CONSTIPATION: 0
HEMOPTYSIS: 0
COUGH: 1
ABDOMINAL DISTENTION: 0
BACK PAIN: 0
SHORTNESS OF BREATH: 0
SORE THROAT: 0
HEARTBURN: 0
VOMITING: 0
SINUS PRESSURE: 0
TROUBLE SWALLOWING: 0
ABDOMINAL PAIN: 0
SINUS PAIN: 0

## 2022-11-07 NOTE — PROGRESS NOTES
[facility] Encounter      [address] [facility]       Chief Complaint   Patient presents with    Other     Congestion in chest, cough, dry throat. Pt declining testing       Nurses Notes reviewed and I agree except as noted in the HPI. HISTORY OF PRESENT ILLNESS   Verito Doss Sr. is a 79 y.o. male who presents   Cough  This is a new problem. The current episode started in the past 7 days. The problem has been gradually worsening. The problem occurs constantly. The cough is Productive of sputum. Associated symptoms include nasal congestion and postnasal drip. Pertinent negatives include no chest pain, chills, ear congestion, ear pain, fever, headaches, heartburn, hemoptysis, myalgias, rash, rhinorrhea, sore throat, shortness of breath, sweats, weight loss or wheezing. Nothing aggravates the symptoms. His past medical history is significant for bronchitis and COPD. There is no history of asthma, bronchiectasis, emphysema, environmental allergies or pneumonia. REVIEW OF SYSTEMS     Review of Systems   Constitutional:  Negative for activity change, appetite change, chills, diaphoresis, fatigue, fever and weight loss. HENT:  Positive for postnasal drip. Negative for congestion, ear pain, rhinorrhea, sinus pressure, sinus pain, sore throat and trouble swallowing. Eyes:  Negative for visual disturbance. Respiratory:  Positive for cough. Negative for hemoptysis, chest tightness, shortness of breath and wheezing. Cardiovascular:  Negative for chest pain and palpitations. Gastrointestinal:  Negative for abdominal distention, abdominal pain, constipation, diarrhea, heartburn, nausea and vomiting. Genitourinary:  Negative for difficulty urinating, flank pain, frequency and urgency. Musculoskeletal:  Negative for arthralgias, back pain, joint swelling, myalgias, neck pain and neck stiffness. Skin:  Negative for color change and rash.    Allergic/Immunologic: Negative for environmental allergies. Neurological:  Negative for dizziness, tremors, seizures, syncope, speech difficulty, weakness, light-headedness, numbness and headaches. PAST MEDICAL HISTORY         Diagnosis Date    Atrial fibrillation ([facility]     CAD (coronary artery disease)     COPD exacerbation ([facility] 7/31/2022    Hematuria     High cholesterol     History of bladder cancer     Hyperlipidemia     Hypertension     Malignant neoplasm of urinary bladder ([facility] 1/27/2020    S/P AAA repair     Spondylosis of lumbar region without myelopathy or radiculopathy        SURGICAL HISTORY     Patient  has a past surgical history that includes hernia repair (1998); Abdominal aortic aneurysm repair; pr esophagogastroduodenoscopy transoral diagnostic (N/A, 4/24/2017); pr colon ca scrn not hi rsk ind (N/A, 4/24/2017); femoral bypass (Left, 2/11/2020); Upper gastrointestinal endoscopy (N/A, 5/26/2020); and Colonoscopy (N/A, 5/26/2020). CURRENT MEDICATIONS       Previous Medications    ALFUZOSIN (UROXATRAL) 10 MG EXTENDED RELEASE TABLET    Take 1 tablet by mouth daily    DILTIAZEM (CARDIZEM CD) 360 MG EXTENDED RELEASE CAPSULE    Take 1 capsule by mouth in the morning. ESCITALOPRAM (LEXAPRO) 10 MG TABLET    TAKE 1 TABLET BY MOUTH EVERY DAY    GUAIFENESIN (MUCINEX) 600 MG EXTENDED RELEASE TABLET    Take 2 tablets by mouth in the morning and 2 tablets before bedtime. HANDICAP PLACARD MISC    by Does not apply route Exp 5 years    IPRATROPIUM-ALBUTEROL (DUONEB) 0.5-2.5 (3) MG/3ML SOLN NEBULIZER SOLUTION    Inhale 3 mLs into the lungs every 4 hours as needed for Shortness of Breath    LISINOPRIL (PRINIVIL;ZESTRIL) 10 MG TABLET    Take 1 tablet by mouth in the morning.     MULTIPLE VITAMINS-MINERALS (ICAPS AREDS FORMULA PO)    Take 1 tablet by mouth daily    PREDNISOLONE ACETATE (PRED FORTE) 1 % OPHTHALMIC SUSPENSION    Place 1 drop into the left eye 2 times daily    RIVAROXABAN (XARELTO) 20 MG TABS TABLET    Take 1 tablet by mouth daily [id number]  4/24    ROSUVASTATIN (CRESTOR) 10 MG TABLET    TAKE ONE TABLET BY MOUTH DAILY        ALLERGIES     Patientis is allergic to avelox [moxifloxacin hcl in nacl], mobic [meloxicam], moxifloxacin, and z-eugene [azithromycin]. FAMILY HISTORY     Patient's family history is not on file. SOCIAL HISTORY     Patient  reports that he has been smoking cigarettes. He started smoking about 43 years ago. He has a 9.90 pack-year smoking history. He has been exposed to tobacco smoke. He has never used smokeless tobacco. He reports current alcohol use. He reports that he does not use drugs. PHYSICAL EXAM     ED TRIAGE VITALS  BP: 122/70, Temp: 97.4 °F (36.3 °C), Heart Rate: (!) 113,  , SpO2: 98 %  Physical Exam  Constitutional:       General: He is not in acute distress. Appearance: Normal appearance. He is normal weight. He is not ill-appearing, toxic-appearing or diaphoretic. HENT:      Head: Normocephalic and atraumatic. Right Ear: Hearing, tympanic membrane, ear canal and external ear normal.      Left Ear: Hearing, tympanic membrane, ear canal and external ear normal.      Nose: No congestion or rhinorrhea. Eyes:      General:         Right eye: No discharge. Left eye: No discharge. Conjunctiva/sclera: Conjunctivae normal.      Pupils: Pupils are equal, round, and reactive to light. Cardiovascular:      Rate and Rhythm: Normal rate and regular rhythm. Pulses: Normal pulses. Pulmonary:      Effort: Pulmonary effort is normal.      Breath sounds: Wheezing present. Abdominal:      General: There is no distension. Tenderness: There is no abdominal tenderness. Musculoskeletal:         General: No tenderness or signs of injury. Normal range of motion. Cervical back: Normal range of motion and neck supple. No rigidity or tenderness. Skin:     General: Skin is warm and dry. Capillary Refill: Capillary refill takes less than 2 seconds.    Neurological: General: No focal deficit present. Mental Status: He is alert and oriented to person, place, and time. Mental status is at baseline. Cranial Nerves: No cranial nerve deficit. Sensory: No sensory deficit. Motor: No weakness. Coordination: Coordination normal.       DIAGNOSTICRESULTS   Labs:   Declined testing    IMAGING:    URGENT CARE COURSE:     Vitals:    11/07/22 1358   BP: 122/70   Pulse: (!) 113   Temp: 97.4 °F (36.3 °C)   SpO2: 98%   Weight: 182 lb (82.6 kg)   Height: 5' 10\" (1.778 m)         PROCEDURES:  None  FINAL IMPRESSION      1. Bronchitis        DISPOSITION/PLAN   DISPOSITION      PATIENT REFERRED TO:  Return in about 1 week (around 11/14/2022) for follow up with PCP. DISCHARGE MEDICATIONS:  New Prescriptions    DOXYCYCLINE HYCLATE (VIBRA-TABS) 100 MG TABLET    Take 1 tablet by mouth 2 times daily for 7 days    PREDNISONE (DELTASONE) 20 MG TABLET    Take 2 tablets by mouth daily for 5 days     Cannot display discharge medications since this is not an admission.       Aquiles Rashid, APRN - CNP

## 2022-11-29 RX ORDER — RIVAROXABAN 20 MG/1
TABLET, FILM COATED ORAL
Qty: 30 TABLET | Refills: 5 | Status: SHIPPED | OUTPATIENT
Start: 2022-11-29

## 2022-11-29 NOTE — TELEPHONE ENCOUNTER
Future Appointments    Encounter Information    Provider Department Appt Notes   2/9/2023 Livan Ahumada MD [facility] Primary Care AWV    9/8/2023 Gali Mcclellan MD; Kaylee, [address] [facility] 1 Year office Cysto     Past Visits    Date Provider Specialty Visit Type Primary Dx   11/07/2022 ANNETTA Oropeza - CNP Family Medicine Office Visit Bronchitis   09/02/2022 Livan Ahumada MD Family Medicine Office Visit Atrial fibrillation, unspecified type [facility])

## 2022-12-09 ENCOUNTER — TELEPHONE (OUTPATIENT)
Dept: FAMILY MEDICINE CLINIC | Age: 82
End: 2022-12-09

## 2022-12-09 DIAGNOSIS — I48.91 ATRIAL FIBRILLATION, UNSPECIFIED TYPE (HCC): Primary | ICD-10-CM

## 2022-12-19 LAB
ANION GAP SERPL CALCULATED.3IONS-SCNC: 13 MEQ/L (ref 9–15)
BUN BLDV-MCNC: 17 MG/DL (ref 8–23)
CALCIUM SERPL-MCNC: 9.6 MG/DL (ref 8.5–9.9)
CHLORIDE BLD-SCNC: 101 MEQ/L (ref 95–107)
CO2: 28 MEQ/L (ref 20–31)
CREAT SERPL-MCNC: 1.21 MG/DL (ref 0.7–1.2)
GFR SERPL CREATININE-BSD FRML MDRD: 59.3 ML/MIN/{1.73_M2}
GLUCOSE BLD-MCNC: 89 MG/DL (ref 70–99)
HCT VFR BLD CALC: 45.7 % (ref 42–52)
HEMOGLOBIN: 15 G/DL (ref 14–18)
MCH RBC QN AUTO: 30.8 PG (ref 27–31.3)
MCHC RBC AUTO-ENTMCNC: 32.8 % (ref 33–37)
MCV RBC AUTO: 94 FL (ref 79–92.2)
PDW BLD-RTO: 16.3 % (ref 11.5–14.5)
PLATELET # BLD: 143 K/UL (ref 130–400)
POTASSIUM SERPL-SCNC: 4.1 MEQ/L (ref 3.4–4.9)
RBC # BLD: 4.86 M/UL (ref 4.7–6.1)
SODIUM BLD-SCNC: 142 MEQ/L (ref 135–144)
WBC # BLD: 8.4 K/UL (ref 4.8–10.8)

## 2023-01-16 ENCOUNTER — OFFICE VISIT (OUTPATIENT)
Dept: FAMILY MEDICINE CLINIC | Age: 83
End: 2023-01-16
Payer: MEDICARE

## 2023-01-16 VITALS
SYSTOLIC BLOOD PRESSURE: 124 MMHG | BODY MASS INDEX: 26.11 KG/M2 | TEMPERATURE: 96.9 F | WEIGHT: 182 LBS | HEART RATE: 119 BPM | OXYGEN SATURATION: 92 % | DIASTOLIC BLOOD PRESSURE: 80 MMHG

## 2023-01-16 DIAGNOSIS — J40 BRONCHITIS: ICD-10-CM

## 2023-01-16 DIAGNOSIS — R00.0 TACHYCARDIA: ICD-10-CM

## 2023-01-16 DIAGNOSIS — I48.91 ATRIAL FIBRILLATION, UNSPECIFIED TYPE (HCC): ICD-10-CM

## 2023-01-16 DIAGNOSIS — J44.1 COPD EXACERBATION (HCC): Primary | ICD-10-CM

## 2023-01-16 PROCEDURE — 3079F DIAST BP 80-89 MM HG: CPT | Performed by: FAMILY MEDICINE

## 2023-01-16 PROCEDURE — 99214 OFFICE O/P EST MOD 30 MIN: CPT | Performed by: FAMILY MEDICINE

## 2023-01-16 PROCEDURE — 1123F ACP DISCUSS/DSCN MKR DOCD: CPT | Performed by: FAMILY MEDICINE

## 2023-01-16 PROCEDURE — 3074F SYST BP LT 130 MM HG: CPT | Performed by: FAMILY MEDICINE

## 2023-01-16 RX ORDER — PREDNISONE 20 MG/1
40 TABLET ORAL DAILY
Qty: 10 TABLET | Refills: 0 | Status: SHIPPED | OUTPATIENT
Start: 2023-01-16 | End: 2023-01-21

## 2023-01-16 RX ORDER — METOPROLOL SUCCINATE 50 MG/1
TABLET, EXTENDED RELEASE ORAL
COMMUNITY
Start: 2023-01-13

## 2023-01-16 RX ORDER — DOXYCYCLINE HYCLATE 100 MG
100 TABLET ORAL 2 TIMES DAILY
Qty: 10 TABLET | Refills: 0 | Status: SHIPPED | OUTPATIENT
Start: 2023-01-16 | End: 2023-01-21

## 2023-01-16 ASSESSMENT — PATIENT HEALTH QUESTIONNAIRE - PHQ9
SUM OF ALL RESPONSES TO PHQ QUESTIONS 1-9: 0
1. LITTLE INTEREST OR PLEASURE IN DOING THINGS: 0
SUM OF ALL RESPONSES TO PHQ QUESTIONS 1-9: 0
SUM OF ALL RESPONSES TO PHQ9 QUESTIONS 1 & 2: 0
SUM OF ALL RESPONSES TO PHQ QUESTIONS 1-9: 0
2. FEELING DOWN, DEPRESSED OR HOPELESS: 0
SUM OF ALL RESPONSES TO PHQ QUESTIONS 1-9: 0

## 2023-01-16 NOTE — PROGRESS NOTES
Viviana Judge Sr. (:  1940) is a 79 y.o. male,Established patient, here for evaluation of the following chief complaint(s):  Cough (Pt states it feels like bronchitis again. Just had bronchitis 22. New episode started, about 4 to 5 days ago. )        SUBJECTIVE    History of present illness:     Patient is an 80 yo M w/ hx of COPD not on home O2, afib on xarelto, HTN, PAD  Presents with concern for bronchitis  Had episode in November treated with doxycycline  States he is having a bad cough   Started 5 days ago  Denies sick contacts  Reports increased sputum purulence and volume  Also reports increased dyspnea     Taking Xarelto every day  Has not missed a does  Denies chest pain, hemoptysis  No leg swelling or tenderness  No extended car rides or plane flights     Sees Cardiology for Afib   Will be doing Holter monitor soon         Review of Symptoms: As per HPI.      Past Medical History:   Diagnosis Date    Atrial fibrillation ([address]     CAD (coronary artery disease)     COPD exacerbation ([address] 2022    Hematuria     High cholesterol     History of bladder cancer     Hyperlipidemia     Hypertension     Malignant neoplasm of urinary bladder ([address] 2020    S/P AAA repair     Spondylosis of lumbar region without myelopathy or radiculopathy      Past Surgical History:   Procedure Laterality Date    ABDOMINAL AORTIC ANEURYSM REPAIR      COLONOSCOPY N/A 2020    COLONOSCOPY DIAGNOSTIC performed by Naif Calixto MD at [address] [address] [address] Left 2020    LEFT LOWER EXTREMITY REVASCULARIZATION performed by Mell Vivas MD at [facility] SCRN NOT  [address] [place] [address] N/A 2017    COLONOSCOPY performed by Clarke Cheng MD at [facility] ESOPHAGOGASTRODUODENOSCOPY TRANSORAL DIAGNOSTIC N/A 2017    EGD ESOPHAGOGASTRODUODENOSCOPY performed by Clarke Cheng MD at [address] [place] [address] 5/26/2020    EGD DIAGNOSTIC ONLY performed by Jane Colon MD at [facility]     History reviewed. No pertinent family history. Social History     Socioeconomic History    Marital status:      Spouse name: Not on file    Number of children: Not on file    Years of education: Not on file    Highest education level: Not on file   Occupational History    Not on file   Tobacco Use    Smoking status: Light Smoker     Packs/day: 0.33     Years: 30.00     Pack years: 9.90     Types: Cigarettes     Start date: 10/1/1979     Passive exposure: Current    Smokeless tobacco: Never    Tobacco comments:     2 packs per week   Vaping Use    Vaping Use: Never used   Substance and Sexual Activity    Alcohol use: Yes     Comment: occasionally    Drug use: No    Sexual activity: Not on file   Other Topics Concern    Not on file   Social History Narrative    Not on file     Social Determinants of Health     Financial Resource Strain: Low Risk     Difficulty of Paying Living Expenses: Not hard at all   Food Insecurity: No Food Insecurity    Worried About Running Out of Food in the Last Year: Never true    [address] in the Last Year: Never true   Transportation Needs: No Transportation Needs    Lack of Transportation (Medical): No    Lack of Transportation (Non-Medical): No   Physical Activity: Not on file   Stress: Not on file   Social Connections: Not on file   Intimate Partner Violence: Not on file   Housing Stability: Not on file     Avelox [moxifloxacin hcl in nacl], Mobic [meloxicam], Moxifloxacin, and Z-eugene [azithromycin]  Prior to Admission medications    Medication Sig Start Date End Date Taking?  Authorizing Provider   metoprolol succinate (TOPROL XL) 50 MG extended release tablet TAKE 1 TABLET BY MOUTH DAILY 1/13/23  Yes Historical Provider, MD   predniSONE (DELTASONE) 20 MG tablet Take 2 tablets by mouth daily for 5 days 1/16/23 1/21/23 Yes Julian Estevez DO   doxycycline hyclate (VIBRA-TABS) 100 MG tablet Take 1 tablet by mouth 2 times daily for 5 days 1/16/23 1/21/23 Yes Julian Estevez DO   XARELTO 20 MG TABS tablet TAKE 1 TABLET BY MOUTH DAILY 11/29/22  Yes Rekha Ward MD   alfuzosin (UROXATRAL) 10 MG extended release tablet Take 1 tablet by mouth daily 9/2/22  Yes Fredrick Mortimer, MD   ipratropium-albuterol (DUONEB) 0.5-2.5 (3) MG/3ML SOLN nebulizer solution Inhale 3 mLs into the lungs every 4 hours as needed for Shortness of Breath 8/3/22  Yes Margaerth Gunn MD   guaiFENesin (MUCINEX) 600 MG extended release tablet Take 2 tablets by mouth in the morning and 2 tablets before bedtime. 8/3/22  Yes Margareth Gunn MD   dilTIAZem (CARDIZEM CD) 360 MG extended release capsule Take 1 capsule by mouth in the morning. 8/4/22  Yes ANNETTA Hernandez CNP   Multiple Vitamins-Minerals (ICAPS AREDS FORMULA PO) Take 1 tablet by mouth daily   Yes Historical Provider, MD   prednisoLONE acetate (PRED FORTE) 1 % ophthalmic suspension Place 1 drop into the left eye 2 times daily   Yes Historical Provider, MD   escitalopram (LEXAPRO) 10 MG tablet TAKE 1 TABLET BY MOUTH EVERY DAY 5/2/22  Yes Rekha Ward MD   Handicap Placard MISC by Does not apply route Exp 5 years 1/27/21  Yes Rekha Ward MD   rosuvastatin (CRESTOR) 10 MG tablet TAKE ONE TABLET BY MOUTH DAILY  1/7/20  Yes Rekha Ward MD   lisinopril (PRINIVIL;ZESTRIL) 10 MG tablet Take 1 tablet by mouth in the morning.   Patient not taking: Reported on 1/16/2023 8/4/22   ANNETTA Hernandez CNP   benazepril (LOTENSIN) 20 MG tablet TAKE 2 TABLETS BY MOUTH EVERY DAY  Patient taking differently: No sig reported 3/7/22 8/3/22  Rekha Ward MD       OBJECTIVE     /80 (Site: Right Upper Arm, Position: Sitting, Cuff Size: Medium Adult)   Pulse (!) 119   Temp 96.9 °F (36.1 °C) (Tympanic)   Wt 182 lb (82.6 kg)   SpO2 92%   BMI 26.11 kg/m²     General: alert and oriented, NAD  Head: normocephalic, atraumatic  Cardiovascular: rate irregular and fast no murmurs, gallops, or rubs  Respiratory: wheezing bilaterally, prolonged expiratory phase  Chest: increased AP diameter   Neurological: no focal neurological deficits  Psychological: normal mood and affect      ASSESSMENT/PLAN      1. COPD exacerbation (HCC)  Acute on chronic. Likely triggered by viral bronchitis  Evidenced by inc dyspnea and sputum purulence  Will give 5 day course of prednisone and doxycycline  Duoneb PRN  Advised patient to f/u with PCP to discuss long term COPD management after acute episode resolves   Last PFTs 2019: FEV1/FVC ratio 70%, FEV1: 40%  Recommend tobacco cessation     2. Bronchitis  Acute. Has had several episodes in past few months    3. Tachycardia  Acute. Ddx- afib, PE, AECOPD  CV exam consistent with Afib  Wells score: 1.5, low risk  Has not missed Xarelto doses  No signs of DVT on exam  Advise patient to present to ED if chest pain, hemoptysis, or worsening dyspnea should occur     4. Atrial fibrillation, unspecified type (HCC)   Chronic. Hemodynamically stable  Follows with Cards  Continue diltiazem and Xarelto         Return in about 4 weeks (around 2/13/2023) for w/ PCP COPD. An electronic signature was used to authenticate this note.     --Tawana Sidhu DO

## 2023-02-09 ENCOUNTER — OFFICE VISIT (OUTPATIENT)
Dept: FAMILY MEDICINE CLINIC | Age: 83
End: 2023-02-09
Payer: MEDICARE

## 2023-02-09 VITALS
OXYGEN SATURATION: 94 % | SYSTOLIC BLOOD PRESSURE: 130 MMHG | HEIGHT: 70 IN | WEIGHT: 182.4 LBS | HEART RATE: 80 BPM | DIASTOLIC BLOOD PRESSURE: 80 MMHG | BODY MASS INDEX: 26.11 KG/M2 | TEMPERATURE: 98 F

## 2023-02-09 DIAGNOSIS — I10 PRIMARY HYPERTENSION: ICD-10-CM

## 2023-02-09 DIAGNOSIS — I48.91 ATRIAL FIBRILLATION, UNSPECIFIED TYPE (HCC): ICD-10-CM

## 2023-02-09 DIAGNOSIS — I73.9 PAD (PERIPHERAL ARTERY DISEASE) (HCC): ICD-10-CM

## 2023-02-09 DIAGNOSIS — Z00.00 MEDICARE ANNUAL WELLNESS VISIT, SUBSEQUENT: Primary | ICD-10-CM

## 2023-02-09 DIAGNOSIS — R00.0 TACHYCARDIA: ICD-10-CM

## 2023-02-09 DIAGNOSIS — E78.5 HYPERLIPIDEMIA, UNSPECIFIED HYPERLIPIDEMIA TYPE: ICD-10-CM

## 2023-02-09 LAB
ANION GAP SERPL CALCULATED.3IONS-SCNC: 12 MEQ/L (ref 9–15)
BUN BLDV-MCNC: 16 MG/DL (ref 8–23)
CALCIUM SERPL-MCNC: 9.3 MG/DL (ref 8.5–9.9)
CHLORIDE BLD-SCNC: 102 MEQ/L (ref 95–107)
CO2: 29 MEQ/L (ref 20–31)
CREAT SERPL-MCNC: 1.27 MG/DL (ref 0.7–1.2)
GFR SERPL CREATININE-BSD FRML MDRD: 55.9 ML/MIN/{1.73_M2}
GLUCOSE BLD-MCNC: 114 MG/DL (ref 70–99)
HCT VFR BLD CALC: 45.7 % (ref 42–52)
HEMOGLOBIN: 15.2 G/DL (ref 14–18)
MCH RBC QN AUTO: 30.8 PG (ref 27–31.3)
MCHC RBC AUTO-ENTMCNC: 33.3 % (ref 33–37)
MCV RBC AUTO: 92.6 FL (ref 79–92.2)
PDW BLD-RTO: 16.2 % (ref 11.5–14.5)
PLATELET # BLD: 154 K/UL (ref 130–400)
POTASSIUM SERPL-SCNC: 3.6 MEQ/L (ref 3.4–4.9)
RBC # BLD: 4.93 M/UL (ref 4.7–6.1)
SODIUM BLD-SCNC: 143 MEQ/L (ref 135–144)
WBC # BLD: 7.9 K/UL (ref 4.8–10.8)

## 2023-02-09 PROCEDURE — 3075F SYST BP GE 130 - 139MM HG: CPT | Performed by: FAMILY MEDICINE

## 2023-02-09 PROCEDURE — 3079F DIAST BP 80-89 MM HG: CPT | Performed by: FAMILY MEDICINE

## 2023-02-09 PROCEDURE — G8484 FLU IMMUNIZE NO ADMIN: HCPCS | Performed by: FAMILY MEDICINE

## 2023-02-09 PROCEDURE — 1123F ACP DISCUSS/DSCN MKR DOCD: CPT | Performed by: FAMILY MEDICINE

## 2023-02-09 PROCEDURE — G0439 PPPS, SUBSEQ VISIT: HCPCS | Performed by: FAMILY MEDICINE

## 2023-02-09 SDOH — ECONOMIC STABILITY: INCOME INSECURITY: HOW HARD IS IT FOR YOU TO PAY FOR THE VERY BASICS LIKE FOOD, HOUSING, MEDICAL CARE, AND HEATING?: NOT HARD AT ALL

## 2023-02-09 SDOH — ECONOMIC STABILITY: FOOD INSECURITY: WITHIN THE PAST 12 MONTHS, THE FOOD YOU BOUGHT JUST DIDN'T LAST AND YOU DIDN'T HAVE MONEY TO GET MORE.: NEVER TRUE

## 2023-02-09 SDOH — ECONOMIC STABILITY: HOUSING INSECURITY
IN THE LAST 12 MONTHS, WAS THERE A TIME WHEN YOU DID NOT HAVE A STEADY PLACE TO SLEEP OR SLEPT IN A SHELTER (INCLUDING NOW)?: NO

## 2023-02-09 SDOH — ECONOMIC STABILITY: FOOD INSECURITY: WITHIN THE PAST 12 MONTHS, YOU WORRIED THAT YOUR FOOD WOULD RUN OUT BEFORE YOU GOT MONEY TO BUY MORE.: NEVER TRUE

## 2023-02-09 ASSESSMENT — PATIENT HEALTH QUESTIONNAIRE - PHQ9
SUM OF ALL RESPONSES TO PHQ QUESTIONS 1-9: 0
2. FEELING DOWN, DEPRESSED OR HOPELESS: 0
1. LITTLE INTEREST OR PLEASURE IN DOING THINGS: 0
SUM OF ALL RESPONSES TO PHQ QUESTIONS 1-9: 0
SUM OF ALL RESPONSES TO PHQ QUESTIONS 1-9: 0
SUM OF ALL RESPONSES TO PHQ9 QUESTIONS 1 & 2: 0
SUM OF ALL RESPONSES TO PHQ QUESTIONS 1-9: 0

## 2023-02-09 ASSESSMENT — LIFESTYLE VARIABLES
HOW OFTEN DO YOU HAVE A DRINK CONTAINING ALCOHOL: MONTHLY OR LESS
HOW MANY STANDARD DRINKS CONTAINING ALCOHOL DO YOU HAVE ON A TYPICAL DAY: 1 OR 2

## 2023-02-09 NOTE — PROGRESS NOTES
Medicare Annual Wellness Visit    Sabine Elliott is here for Medicare AW    Assessment & Plan   Medicare annual wellness visit, subsequent  Atrial fibrillation, unspecified type ([address]  Tachycardia  PAD (peripheral artery disease) ([facility]  Primary hypertension  Hyperlipidemia, unspecified hyperlipidemia type      Recommendations for Preventive Services Due: see orders and patient instructions/AVS.  Recommended screening schedule for the next 5-10 years is provided to the patient in written form: see Patient Instructions/AVS.    Follow up with Cardiology     They order his blood work     BP is fine at home   Cardiology will adjust as needed          Return for Medicare Annual Wellness Visit in 1 year. Subjective   Chief Complaint   Patient presents with    Medicare AWV       Patient's complete Health Risk Assessment and screening values have been reviewed and are found in Flowsheets. The following problems were reviewed today and where indicated follow up appointments were made and/or referrals ordered.     Positive Risk Factor Screenings with Interventions:                 Weight and Activity:  Physical Activity: Inactive    Days of Exercise per Week: 7 days    Minutes of Exercise per Session: 0 min     On average, how many days per week do you engage in moderate to strenuous exercise (like a brisk walk)?: 7 days  Have you lost any weight without trying in the past 3 months?: No  Body mass index: (!) 26.17      Inactivity Interventions:  See AVS for additional education material  See A/P for plan and any pertinent orders       Hearing Screen:  Do you or your family notice any trouble with your hearing that hasn't been managed with hearing aids?: (!) Yes    Interventions:  See AVS for additional education material  See A/P for any pertinent orders    Vision Screen:  Do you have difficulty driving, watching TV, or doing any of your daily activities because of your eyesight?: (!) Yes  Have you had an eye exam within the past year?: Yes  No results found. Interventions:   See AVS for additional education material  See A/P for any pertinent orders    Safety:  Do you have either shower bars, grab bars, non-slip mats or non-slip surfaces in your shower or bathtub?: (!) No  Interventions:  See AVS for additional education material  See A/P for plan and any pertinent orders      Tobacco Use:  Tobacco Use: High Risk    Smoking Tobacco Use: Light Smoker    Smokeless Tobacco Use: Never    Passive Exposure: Current     E-cigarette/Vaping       Questions Responses    E-cigarette/Vaping Use Never User    Start Date     Passive Exposure No    Quit Date     Counseling Given Yes    Comments           Interventions:  See AVS for additional education material  See A/P for plan and any pertinent orders                    Objective   Vitals:    02/09/23 1314 02/09/23 1326 02/09/23 1337   BP: (!) 160/92 (!) 156/94 130/80   Site: Left Upper Arm Right Upper Arm    Position: Sitting Sitting    Cuff Size: Medium Adult Medium Adult    Pulse: 80     Temp: 98 °F (36.7 °C)     SpO2: 94%     Weight: 182 lb 6.4 oz (82.7 kg)     Height: 5' 10\" (1.778 m)        Body mass index is 26.17 kg/m². Physical Exam:  /80   Pulse 80   Temp 98 °F (36.7 °C)   Ht 5' 10\" (1.778 m)   Wt 182 lb 6.4 oz (82.7 kg)   SpO2 94%   BMI 26.17 kg/m²     Gen: Well, NAD, Alert, Oriented x 3   HEENT: EOMI, eyes clear, MMM  Skin: without rash or jaundice  Neck: no significant lymphadenopathy or thyromegaly  Lungs: CTA B w/out Rales/Wheezes/Rhonchi, Good respiratory effort   Heart: RRR, S1S2, w/out M/R/G, non-displaced PMI     Ext: No C/C/E Bilaterally. Neuro: Neurovascularly intact w/ Sensory/Motor intact UE/LE Bilaterally.           Allergies   Allergen Reactions    Avelox [Moxifloxacin Hcl In Nacl]      thrush    Mobic [Meloxicam] Other (See Comments)    Moxifloxacin     Z-Sean [Azithromycin] Other (See Comments)     Thrush     Prior to Visit Medications Medication Sig Taking? Authorizing Provider   metoprolol succinate (TOPROL XL) 50 MG extended release tablet TAKE 1 TABLET BY MOUTH DAILY Yes Historical Provider, MD   XARELTO 20 MG TABS tablet TAKE 1 TABLET BY MOUTH DAILY Yes Reilly Villarreal MD   alfuzosin (UROXATRAL) 10 MG extended release tablet Take 1 tablet by mouth daily Yes Brenna Valadez MD   ipratropium-albuterol (DUONEB) 0.5-2.5 (3) MG/3ML SOLN nebulizer solution Inhale 3 mLs into the lungs every 4 hours as needed for Shortness of Breath Yes Kyra Thayer MD   guaiFENesin (MUCINEX) 600 MG extended release tablet Take 2 tablets by mouth in the morning and 2 tablets before bedtime. Yes Kyra Thayer MD   dilTIAZem (CARDIZEM CD) 360 MG extended release capsule Take 1 capsule by mouth in the morning.  Yes Gisela Mccrary APRN - CNP   Multiple Vitamins-Minerals (ICAPS AREDS FORMULA PO) Take 1 tablet by mouth daily Yes Historical Provider, MD   prednisoLONE acetate (PRED FORTE) 1 % ophthalmic suspension Place 1 drop into the left eye 2 times daily Yes Historical Provider, MD   escitalopram (LEXAPRO) 10 MG tablet TAKE 1 TABLET BY MOUTH EVERY DAY Yes Reilly Villarreal MD   Handicap Placard MISC by Does not apply route Exp 5 years Yes Reilly Villarreal MD   rosuvastatin (CRESTOR) 10 MG tablet TAKE ONE TABLET BY MOUTH DAILY  Yes Reilly Villarreal MD   benazepril (LOTENSIN) 20 MG tablet TAKE 2 TABLETS BY MOUTH EVERY DAY  Patient taking differently: No sig reported  Reilly Villarreal MD       [facility] (Including outside providers/suppliers regularly involved in providing care):   Patient Care Team:  Reilly Villarreal MD as PCP - General (Family Medicine)  Reilly Villarreal MD as PCP - Empaneled Provider  Shirley Chinchilla MD as Consulting Physician (Cardiology)  Brenna Valadez MD (Urology)     Reviewed and updated this visit:  Tobacco  Allergies  Meds  Problems  Med Hx  Surg Hx  Soc Hx  Fam Hx               Ameena Oates MD

## 2023-02-09 NOTE — PATIENT INSTRUCTIONS
Learning About Being Active as an Older Adult  Why is being active important as you get older? Being active is one of the best things you can do for your health. And it's never too late to start. Being active--or getting active, if you aren't already--has definite benefits. It can:  Give you more energy,  Keep your mind sharp. Improve balance to reduce your risk of falls. Help you manage chronic illness with fewer medicines. No matter how old you are, how fit you are, or what health problems you have, there is a form of activity that will work for you. And the more physical activity you can do, the better your overall health will be. What kinds of activity can help you stay healthy? Being more active will make your daily activities easier. Physical activity includes planned exercise and things you do in daily life. There are four types of activity:  Aerobic. Doing aerobic activity makes your heart and lungs strong. Includes walking, dancing, and gardening. Aim for at least 2½ hours spread throughout the week. It improves your energy and can help you sleep better. Muscle-strengthening. This type of activity can help maintain muscle and strengthen bones. Includes climbing stairs, using resistance bands, and lifting or carrying heavy loads. Aim for at least twice a week. It can help protect the knees and other joints. Stretching. Stretching gives you better range of motion in joints and muscles. Includes upper arm stretches, calf stretches, and gentle yoga. Aim for at least twice a week, preferably after your muscles are warmed up from other activities. It can help you function better in daily life. Balancing. This helps you stay coordinated and have good posture. Includes heel-to-toe walking, alissa chi, and certain types of yoga. Aim for at least 3 days a week. It can reduce your risk of falling.   Even if you have a hard time meeting the recommendations, it's better to be more active than less active. All activity done in each category counts toward your weekly total. You'd be surprised how daily things like carrying groceries, keeping up with grandchildren, and taking the stairs can add up. What keeps you from being active? If you've had a hard time being more active, you're not alone. Maybe you remember being able to do more. Or maybe you've never thought of yourself as being active. It's frustrating when you can't do the things you want. Being more active can help. What's holding you back? Getting started. Have a goal, but break it into easy tasks. Small steps build into big accomplishments. Staying motivated. If you feel like skipping your activity, remember your goal. Maybe you want to move better and stay independent. Every activity gets you one step closer. Not feeling your best.  Start with 5 minutes of an activity you enjoy. Prove to yourself you can do it. As you get comfortable, increase your time. You may not be where you want to be. But you're in the process of getting there. Everyone starts somewhere. How can you find safe ways to stay active? Talk with your doctor about any physical challenges you're facing. Make a plan with your doctor if you have a health problem or aren't sure how to get started with activity. If you're already active, ask your doctor if there is anything you should change to stay safe as your body and health change. If you tend to feel dizzy after you take medicine, avoid activity at that time. Try being active before you take your medicine. This will reduce your risk of falls. If you plan to be active at home, make sure to clear your space before you get started. Remove things like TV cords, coffee tables, and throw rugs. It's safest to have plenty of space to move freely. The key to getting more active is to take it slow and steady. Try to improve only a little bit at a time.  Pick just one area to improve on at first. And if an activity hurts, stop and talk to your doctor. Where can you learn more? Go to http://www.ramírez.com/ and enter P600 to learn more about \"Learning About Being Active as an Older Adult. \"  Current as of: October 10, 2022               Content Version: 13.5  © 3145-2696 Healthwise, Incorporated. Care instructions adapted under license by [facility] ([facility]). If you have questions about a medical condition or this instruction, always ask your healthcare professional. [place] [address] any warranty or liability for your use of this information. Learning About Vision Tests  What are vision tests? The four most common vision tests are visual acuity tests, refraction, visual field tests, and color vision tests. Visual acuity (sharpness) tests  These tests are used: To see if you need glasses or contact lenses. To monitor an eye problem. To check an eye injury. Visual acuity tests are done as part of routine exams. You may also have this test when you get your 's license or apply for some types of jobs. Visual field tests  These tests are used: To check for vision loss in any area of your range of vision. To screen for certain eye diseases. To look for nerve damage after a stroke, head injury, or other problem that could reduce blood flow to the brain. Refraction and color tests  A refraction test is done to find the right prescription for glasses and contact lenses. A color vision test is done to check for color blindness. Color vision is often tested as part of a routine exam. You may also have this test when you apply for a job where recognizing different colors is important, such as , electronics, or the [employer]. How are vision tests done? Visual acuity test   You cover one eye at a time. You read aloud from a wall chart across the room. You read aloud from a small card that you hold in your hand. Refraction   You look into a special device.   The device puts lenses of different strengths in front of each eye to see how strong your glasses or contact lenses need to be. Visual field tests   Your doctor may have you look through special machines. Or your doctor may simply have you stare straight ahead while they move a finger into and out of your field of vision. Color vision test   You look at pieces of printed test patterns in various colors. You say what number or symbol you see. Your doctor may have you trace the number or symbol using a pointer. How do these tests feel? There is very little chance of having a problem from this test. If dilating drops are used for a vision test, they may make the eyes sting and cause a medicine taste in the mouth. Follow-up care is a key part of your treatment and safety. Be sure to make and go to all appointments, and call your doctor if you are having problems. It's also a good idea to know your test results and keep a list of the medicines you take. Where can you learn more? Go to http://www.ramírez.com/ and enter G551 to learn more about \"Learning About Vision Tests. \"  Current as of: October 12, 2022               Content Version: 13.5  © 3195-1267 Healthwise, [employer]. Care instructions adapted under license by [facility] ([facility]). If you have questions about a medical condition or this instruction, always ask your healthcare professional. [address] any warranty or liability for your use of this information. Advance Directives: Care Instructions  Overview  An advance directive is a legal way to state your wishes at the end of your life. It tells your family and your doctor what to do if you can't say what you want. There are two main types of advance directives. You can change them any time your wishes change. Living will. This form tells your family and your doctor your wishes about life support and other treatment. The form is also called a declaration.   Medical power of . This form lets you name a person to make treatment decisions for you when you can't speak for yourself. This person is called a health care agent (health care proxy, health care surrogate). The form is also called a durable power of  for health care. If you do not have an advance directive, decisions about your medical care may be made by a family member, or by a doctor or a  who doesn't know you. It may help to think of an advance directive as a gift to the people who care for you. If you have one, they won't have to make tough decisions by themselves. For more information, including forms for your state, see the [address] website (www.caringinfo.org/planning/advance-directives/). Follow-up care is a key part of your treatment and safety. Be sure to make and go to all appointments, and call your doctor if you are having problems. It's also a good idea to know your test results and keep a list of the medicines you take. What should you include in an advance directive? Many states have a unique advance directive form. (It may ask you to address specific issues.) Or you might use a universal form that's approved by many states. If your form doesn't tell you what to address, it may be hard to know what to include in your advance directive. Use the questions below to help you get started. Who do you want to make decisions about your medical care if you are not able to? What life-support measures do you want if you have a serious illness that gets worse over time or can't be cured? What are you most afraid of that might happen? (Maybe you're afraid of having pain, losing your independence, or being kept alive by machines.)  Where would you prefer to die? (Your home? A hospital? A nursing home?)  Do you want to donate your organs when you die? Do you want certain Jehovah's witness practices performed before you die? When should you call for help?   Be sure to contact your doctor if you have any questions. Where can you learn more? Go to http://www.ramírez.com/ and enter R264 to learn more about \"Advance Directives: Care Instructions. \"  Current as of: June 16, 2022               Content Version: 13.5  © 6413-3232 Healthwise, [employer]. Care instructions adapted under license by [facility] ([facility]). If you have questions about a medical condition or this instruction, always ask your healthcare professional. [address] any warranty or liability for your use of this information. A Healthy Heart: Care Instructions  Your Care Instructions     Coronary artery disease, also called heart disease, occurs when a substance called plaque builds up in the vessels that supply oxygen-rich blood to your heart muscle. This can narrow the blood vessels and reduce blood flow. A heart attack happens when blood flow is completely blocked. A high-fat diet, smoking, and other factors increase the risk of heart disease. Your doctor has found that you have a chance of having heart disease. You can do lots of things to keep your heart healthy. It may not be easy, but you can change your diet, exercise more, and quit smoking. These steps really work to lower your chance of heart disease. Follow-up care is a key part of your treatment and safety. Be sure to make and go to all appointments, and call your doctor if you are having problems. It's also a good idea to know your test results and keep a list of the medicines you take. How can you care for yourself at home? Diet    Use less salt when you cook and eat. This helps lower your blood pressure. Taste food before salting. Add only a little salt when you think you need it. With time, your taste buds will adjust to less salt.     Eat fewer snack items, fast foods, canned soups, and other high-salt, high-fat, processed foods.     Read food labels and try to avoid saturated and trans fats.  They increase your risk of heart disease by raising cholesterol levels.     Limit the amount of solid fat-butter, margarine, and shortening-you eat. Use olive, peanut, or canola oil when you cook. Bake, broil, and steam foods instead of frying them.     Eat a variety of fruit and vegetables every day. Dark green, deep orange, red, or yellow fruits and vegetables are especially good for you. Examples include spinach, carrots, peaches, and berries.     Foods high in fiber can reduce your cholesterol and provide important vitamins and minerals. High-fiber foods include whole-grain cereals and breads, oatmeal, beans, brown rice, citrus fruits, and apples.     Eat lean proteins. Heart-healthy proteins include seafood, lean meats and poultry, eggs, beans, peas, nuts, seeds, and soy products.     Limit drinks and foods with added sugar. These include candy, desserts, and soda pop. Lifestyle changes    If your doctor recommends it, get more exercise. Walking is a good choice. Bit by bit, increase the amount you walk every day. Try for at least 30 minutes on most days of the week. You also may want to swim, bike, or do other activities.     Do not smoke. If you need help quitting, talk to your doctor about stop-smoking programs and medicines. These can increase your chances of quitting for good. Quitting smoking may be the most important step you can take to protect your heart. It is never too late to quit.     Limit alcohol to 2 drinks a day for men and 1 drink a day for women. Too much alcohol can cause health problems.     Manage other health problems such as diabetes, high blood pressure, and high cholesterol. If you think you may have a problem with alcohol or drug use, talk to your doctor. Medicines    Take your medicines exactly as prescribed. Call your doctor if you think you are having a problem with your medicine.     If your doctor recommends aspirin, take the amount directed each day.  Make sure you take aspirin and not another kind of pain reliever, such as acetaminophen (Tylenol).   When should you call for help?   Call 911 if you have symptoms of a heart attack. These may include:    Chest pain or pressure, or a strange feeling in the chest.     Sweating.     Shortness of breath.     Pain, pressure, or a strange feeling in the back, neck, jaw, or upper belly or in one or both shoulders or arms.     Lightheadedness or sudden weakness.     A fast or irregular heartbeat.   After you call 911, the  may tell you to chew 1 adult-strength or 2 to 4 low-dose aspirin. Wait for an ambulance. Do not try to drive yourself.  Watch closely for changes in your health, and be sure to contact your doctor if you have any problems.  Where can you learn more?  Go to https://www.Binary Th[employer].net/patientEd and enter F075 to learn more about \"A Healthy Heart: Care Instructions.\"  Current as of: September 7, 2022               Content Version: 13.5  © 7894-4295 [employer].   Care instructions adapted under license by [employer]. If you have questions about a medical condition or this instruction, always ask your healthcare professional. [employer] disclaims any warranty or liability for your use of this information.      Personalized Preventive Plan for Darío Moscoso Sr. - 2/9/2023  Medicare offers a range of preventive health benefits. Some of the tests and screenings are paid in full while other may be subject to a deductible, co-insurance, and/or copay.    Some of these benefits include a comprehensive review of your medical history including lifestyle, illnesses that may run in your family, and various assessments and screenings as appropriate.    After reviewing your medical record and screening and assessments performed today your provider may have ordered immunizations, labs, imaging, and/or referrals for you.  A list of these orders (if applicable) as well as your Preventive Care list are included within your After Visit Summary for your  review. Other Preventive Recommendations:    A preventive eye exam performed by an eye specialist is recommended every 1-2 years to screen for glaucoma; cataracts, macular degeneration, and other eye disorders. A preventive dental visit is recommended every 6 months. Try to get at least 150 minutes of exercise per week or 10,000 steps per day on a pedometer . Order or download the FREE \"Exercise & Physical Activity: Your Everyday Guide\" from [employer]. Call 5-305.268.7874 or search [employer] online. You need 5512-3305 mg of calcium and 4334-9283 IU of vitamin D per day. It is possible to meet your calcium requirement with diet alone, but a vitamin D supplement is usually necessary to meet this goal.  When exposed to the sun, use a sunscreen that protects against both UVA and UVB radiation with an SPF of 30 or greater. Reapply every 2 to 3 hours or after sweating, drying off with a towel, or swimming. Always wear a seat belt when traveling in a car. Always wear a helmet when riding a bicycle or motorcycle.

## 2023-02-21 DIAGNOSIS — F32.A DEPRESSION, UNSPECIFIED DEPRESSION TYPE: ICD-10-CM

## 2023-02-21 RX ORDER — ESCITALOPRAM OXALATE 10 MG/1
TABLET ORAL
Qty: 180 TABLET | Refills: 0 | Status: SHIPPED | OUTPATIENT
Start: 2023-02-21

## 2023-05-05 LAB
LEFT VENTRICULAR EJECTION FRACTION MODE: NORMAL
LV EF: 55 %

## 2023-06-27 ENCOUNTER — TELEPHONE (OUTPATIENT)
Dept: FAMILY MEDICINE CLINIC | Age: 83
End: 2023-06-27

## 2023-06-27 ENCOUNTER — HOSPITAL ENCOUNTER (INPATIENT)
Age: 83
LOS: 1 days | Discharge: HOME | DRG: 194 | End: 2023-06-28
Attending: STUDENT IN AN ORGANIZED HEALTH CARE EDUCATION/TRAINING PROGRAM | Admitting: INTERNAL MEDICINE
Payer: MEDICARE

## 2023-06-27 ENCOUNTER — APPOINTMENT (OUTPATIENT)
Dept: GENERAL RADIOLOGY | Age: 83
DRG: 194 | End: 2023-06-27
Payer: MEDICARE

## 2023-06-27 DIAGNOSIS — J18.9 PNEUMONIA OF RIGHT LOWER LOBE DUE TO INFECTIOUS ORGANISM: Primary | ICD-10-CM

## 2023-06-27 DIAGNOSIS — J44.1 COPD EXACERBATION (HCC): ICD-10-CM

## 2023-06-27 LAB
ALBUMIN SERPL-MCNC: 3.8 G/DL (ref 3.5–4.6)
ALP SERPL-CCNC: 68 U/L (ref 35–104)
ALT SERPL-CCNC: 13 U/L (ref 0–41)
ANION GAP SERPL CALCULATED.3IONS-SCNC: 9 MEQ/L (ref 9–15)
AST SERPL-CCNC: 17 U/L (ref 0–40)
BACTERIA URNS QL MICRO: NEGATIVE /HPF
BASOPHILS # BLD: 0.1 K/UL (ref 0–0.2)
BASOPHILS NFR BLD: 0.4 %
BILIRUB SERPL-MCNC: 1 MG/DL (ref 0.2–0.7)
BILIRUB UR QL STRIP: NEGATIVE
BNP BLD-MCNC: 2737 PG/ML
BUN SERPL-MCNC: 17 MG/DL (ref 8–23)
CALCIUM SERPL-MCNC: 9 MG/DL (ref 8.5–9.9)
CHLORIDE SERPL-SCNC: 103 MEQ/L (ref 95–107)
CK SERPL-CCNC: 28 U/L (ref 0–190)
CLARITY UR: CLEAR
CO2 SERPL-SCNC: 28 MEQ/L (ref 20–31)
COLOR UR: YELLOW
CREAT SERPL-MCNC: 1.05 MG/DL (ref 0.7–1.2)
EKG ATRIAL RATE: 288 BPM
EKG Q-T INTERVAL: 372 MS
EKG QRS DURATION: 106 MS
EKG QTC CALCULATION (BAZETT): 457 MS
EKG R AXIS: -8 DEGREES
EKG T AXIS: 61 DEGREES
EKG VENTRICULAR RATE: 91 BPM
EOSINOPHIL # BLD: 0.1 K/UL (ref 0–0.7)
EOSINOPHIL NFR BLD: 0.7 %
EPI CELLS #/AREA URNS AUTO: ABNORMAL /HPF (ref 0–5)
ERYTHROCYTE [DISTWIDTH] IN BLOOD BY AUTOMATED COUNT: 14.9 % (ref 11.5–14.5)
GLOBULIN SER CALC-MCNC: 3.5 G/DL (ref 2.3–3.5)
GLUCOSE SERPL-MCNC: 110 MG/DL (ref 70–99)
GLUCOSE UR STRIP-MCNC: NEGATIVE MG/DL
HCT VFR BLD AUTO: 43.5 % (ref 42–52)
HGB BLD-MCNC: 14.3 G/DL (ref 14–18)
HGB UR QL STRIP: NEGATIVE
HYALINE CASTS #/AREA URNS AUTO: ABNORMAL /HPF (ref 0–5)
KETONES UR STRIP-MCNC: ABNORMAL MG/DL
LEUKOCYTE ESTERASE UR QL STRIP: NEGATIVE
LYMPHOCYTES # BLD: 0.6 K/UL (ref 1–4.8)
LYMPHOCYTES NFR BLD: 4.2 %
MAGNESIUM SERPL-MCNC: 2.3 MG/DL (ref 1.7–2.4)
MCH RBC QN AUTO: 31.1 PG (ref 27–31.3)
MCHC RBC AUTO-ENTMCNC: 33 % (ref 33–37)
MCV RBC AUTO: 94.4 FL (ref 79–92.2)
MONOCYTES # BLD: 1.1 K/UL (ref 0.2–0.8)
MONOCYTES NFR BLD: 8.1 %
NEUTROPHILS # BLD: 12.2 K/UL (ref 1.4–6.5)
NEUTS SEG NFR BLD: 86.6 %
NITRITE UR QL STRIP: NEGATIVE
PH UR STRIP: 5.5 [PH] (ref 5–9)
PLATELET # BLD AUTO: 163 K/UL (ref 130–400)
POTASSIUM SERPL-SCNC: 4 MEQ/L (ref 3.4–4.9)
PROCALCITONIN SERPL IA-MCNC: 0.07 NG/ML (ref 0–0.15)
PROT SERPL-MCNC: 7.3 G/DL (ref 6.3–8)
PROT UR STRIP-MCNC: 100 MG/DL
RBC # BLD AUTO: 4.61 M/UL (ref 4.7–6.1)
RBC #/AREA URNS AUTO: ABNORMAL /HPF (ref 0–5)
SODIUM SERPL-SCNC: 140 MEQ/L (ref 135–144)
SP GR UR STRIP: 1.03 (ref 1–1.03)
TROPONIN T SERPL-MCNC: <0.01 NG/ML (ref 0–0.01)
URINE REFLEX TO CULTURE: ABNORMAL
UROBILINOGEN UR STRIP-ACNC: 1 E.U./DL
WBC # BLD AUTO: 14.1 K/UL (ref 4.8–10.8)
WBC #/AREA URNS AUTO: ABNORMAL /HPF (ref 0–5)

## 2023-06-27 PROCEDURE — 83735 ASSAY OF MAGNESIUM: CPT

## 2023-06-27 PROCEDURE — 36415 COLL VENOUS BLD VENIPUNCTURE: CPT

## 2023-06-27 PROCEDURE — 94761 N-INVAS EAR/PLS OXIMETRY MLT: CPT

## 2023-06-27 PROCEDURE — 6360000002 HC RX W HCPCS: Performed by: INTERNAL MEDICINE

## 2023-06-27 PROCEDURE — 6370000000 HC RX 637 (ALT 250 FOR IP): Performed by: STUDENT IN AN ORGANIZED HEALTH CARE EDUCATION/TRAINING PROGRAM

## 2023-06-27 PROCEDURE — 71045 X-RAY EXAM CHEST 1 VIEW: CPT

## 2023-06-27 PROCEDURE — 96367 TX/PROPH/DG ADDL SEQ IV INF: CPT

## 2023-06-27 PROCEDURE — 84145 PROCALCITONIN (PCT): CPT

## 2023-06-27 PROCEDURE — 1210000000 HC MED SURG R&B

## 2023-06-27 PROCEDURE — 81001 URINALYSIS AUTO W/SCOPE: CPT

## 2023-06-27 PROCEDURE — 82550 ASSAY OF CK (CPK): CPT

## 2023-06-27 PROCEDURE — 93005 ELECTROCARDIOGRAM TRACING: CPT | Performed by: STUDENT IN AN ORGANIZED HEALTH CARE EDUCATION/TRAINING PROGRAM

## 2023-06-27 PROCEDURE — 2580000003 HC RX 258: Performed by: STUDENT IN AN ORGANIZED HEALTH CARE EDUCATION/TRAINING PROGRAM

## 2023-06-27 PROCEDURE — 6360000002 HC RX W HCPCS: Performed by: STUDENT IN AN ORGANIZED HEALTH CARE EDUCATION/TRAINING PROGRAM

## 2023-06-27 PROCEDURE — 94664 DEMO&/EVAL PT USE INHALER: CPT

## 2023-06-27 PROCEDURE — 80053 COMPREHEN METABOLIC PANEL: CPT

## 2023-06-27 PROCEDURE — 99285 EMERGENCY DEPT VISIT HI MDM: CPT

## 2023-06-27 PROCEDURE — 96365 THER/PROPH/DIAG IV INF INIT: CPT

## 2023-06-27 PROCEDURE — 2580000003 HC RX 258: Performed by: INTERNAL MEDICINE

## 2023-06-27 PROCEDURE — 93010 ELECTROCARDIOGRAM REPORT: CPT | Performed by: INTERNAL MEDICINE

## 2023-06-27 PROCEDURE — 2700000000 HC OXYGEN THERAPY PER DAY

## 2023-06-27 PROCEDURE — 94640 AIRWAY INHALATION TREATMENT: CPT

## 2023-06-27 PROCEDURE — 2500000003 HC RX 250 WO HCPCS: Performed by: INTERNAL MEDICINE

## 2023-06-27 PROCEDURE — 6370000000 HC RX 637 (ALT 250 FOR IP): Performed by: INTERNAL MEDICINE

## 2023-06-27 PROCEDURE — 83880 ASSAY OF NATRIURETIC PEPTIDE: CPT

## 2023-06-27 PROCEDURE — 85025 COMPLETE CBC W/AUTO DIFF WBC: CPT

## 2023-06-27 PROCEDURE — 87040 BLOOD CULTURE FOR BACTERIA: CPT

## 2023-06-27 PROCEDURE — 84484 ASSAY OF TROPONIN QUANT: CPT

## 2023-06-27 RX ORDER — SODIUM CHLORIDE 0.9 % (FLUSH) 0.9 %
5-40 SYRINGE (ML) INJECTION PRN
Status: DISCONTINUED | OUTPATIENT
Start: 2023-06-27 | End: 2023-06-28 | Stop reason: HOSPADM

## 2023-06-27 RX ORDER — ENOXAPARIN SODIUM 100 MG/ML
40 INJECTION SUBCUTANEOUS DAILY
Status: DISCONTINUED | OUTPATIENT
Start: 2023-06-27 | End: 2023-06-27

## 2023-06-27 RX ORDER — ROSUVASTATIN CALCIUM 10 MG/1
10 TABLET, COATED ORAL NIGHTLY
Status: DISCONTINUED | OUTPATIENT
Start: 2023-06-27 | End: 2023-06-28 | Stop reason: HOSPADM

## 2023-06-27 RX ORDER — SODIUM CHLORIDE 9 MG/ML
INJECTION, SOLUTION INTRAVENOUS PRN
Status: DISCONTINUED | OUTPATIENT
Start: 2023-06-27 | End: 2023-06-28 | Stop reason: HOSPADM

## 2023-06-27 RX ORDER — ENOXAPARIN SODIUM 100 MG/ML
40 INJECTION SUBCUTANEOUS DAILY
Status: DISCONTINUED | OUTPATIENT
Start: 2023-06-27 | End: 2023-06-28 | Stop reason: HOSPADM

## 2023-06-27 RX ORDER — SODIUM CHLORIDE 0.9 % (FLUSH) 0.9 %
5-40 SYRINGE (ML) INJECTION EVERY 12 HOURS SCHEDULED
Status: DISCONTINUED | OUTPATIENT
Start: 2023-06-27 | End: 2023-06-28 | Stop reason: HOSPADM

## 2023-06-27 RX ORDER — ALBUTEROL SULFATE 2.5 MG/3ML
2.5 SOLUTION RESPIRATORY (INHALATION)
Status: DISCONTINUED | OUTPATIENT
Start: 2023-06-27 | End: 2023-06-28 | Stop reason: HOSPADM

## 2023-06-27 RX ORDER — PREDNISONE 20 MG/1
40 TABLET ORAL DAILY
Status: DISCONTINUED | OUTPATIENT
Start: 2023-06-28 | End: 2023-06-28 | Stop reason: HOSPADM

## 2023-06-27 RX ORDER — DOXYCYCLINE HYCLATE 100 MG/1
100 CAPSULE ORAL ONCE
Status: COMPLETED | OUTPATIENT
Start: 2023-06-27 | End: 2023-06-27

## 2023-06-27 RX ORDER — CLOPIDOGREL BISULFATE 75 MG/1
75 TABLET ORAL DAILY
Status: DISCONTINUED | OUTPATIENT
Start: 2023-06-27 | End: 2023-06-28 | Stop reason: HOSPADM

## 2023-06-27 RX ORDER — GUAIFENESIN 600 MG/1
600 TABLET, EXTENDED RELEASE ORAL 2 TIMES DAILY
Status: DISCONTINUED | OUTPATIENT
Start: 2023-06-27 | End: 2023-06-28 | Stop reason: HOSPADM

## 2023-06-27 RX ORDER — POLYETHYLENE GLYCOL 3350 17 G/17G
17 POWDER, FOR SOLUTION ORAL DAILY PRN
Status: DISCONTINUED | OUTPATIENT
Start: 2023-06-27 | End: 2023-06-28 | Stop reason: HOSPADM

## 2023-06-27 RX ORDER — IPRATROPIUM BROMIDE AND ALBUTEROL SULFATE 2.5; .5 MG/3ML; MG/3ML
1 SOLUTION RESPIRATORY (INHALATION)
Status: DISCONTINUED | OUTPATIENT
Start: 2023-06-27 | End: 2023-06-28 | Stop reason: HOSPADM

## 2023-06-27 RX ORDER — MAGNESIUM SULFATE IN WATER 40 MG/ML
2000 INJECTION, SOLUTION INTRAVENOUS ONCE
Status: COMPLETED | OUTPATIENT
Start: 2023-06-27 | End: 2023-06-27

## 2023-06-27 RX ORDER — ASPIRIN 81 MG/1
81 TABLET, CHEWABLE ORAL DAILY
COMMUNITY

## 2023-06-27 RX ORDER — CLOPIDOGREL BISULFATE 75 MG/1
75 TABLET ORAL DAILY
COMMUNITY

## 2023-06-27 RX ORDER — METOPROLOL SUCCINATE 50 MG/1
50 TABLET, EXTENDED RELEASE ORAL DAILY
Status: DISCONTINUED | OUTPATIENT
Start: 2023-06-27 | End: 2023-06-28 | Stop reason: HOSPADM

## 2023-06-27 RX ORDER — PREDNISONE 20 MG/1
60 TABLET ORAL ONCE
Status: COMPLETED | OUTPATIENT
Start: 2023-06-27 | End: 2023-06-27

## 2023-06-27 RX ORDER — ONDANSETRON 2 MG/ML
4 INJECTION INTRAMUSCULAR; INTRAVENOUS EVERY 6 HOURS PRN
Status: DISCONTINUED | OUTPATIENT
Start: 2023-06-27 | End: 2023-06-28 | Stop reason: HOSPADM

## 2023-06-27 RX ORDER — ESCITALOPRAM OXALATE 10 MG/1
10 TABLET ORAL DAILY
Status: DISCONTINUED | OUTPATIENT
Start: 2023-06-27 | End: 2023-06-28 | Stop reason: HOSPADM

## 2023-06-27 RX ORDER — IPRATROPIUM BROMIDE AND ALBUTEROL SULFATE 2.5; .5 MG/3ML; MG/3ML
2 SOLUTION RESPIRATORY (INHALATION) ONCE
Status: COMPLETED | OUTPATIENT
Start: 2023-06-27 | End: 2023-06-27

## 2023-06-27 RX ORDER — IPRATROPIUM BROMIDE AND ALBUTEROL SULFATE 2.5; .5 MG/3ML; MG/3ML
1 SOLUTION RESPIRATORY (INHALATION)
Status: DISCONTINUED | OUTPATIENT
Start: 2023-06-27 | End: 2023-06-27

## 2023-06-27 RX ORDER — ACETAMINOPHEN 500 MG
1000 TABLET ORAL ONCE
Status: COMPLETED | OUTPATIENT
Start: 2023-06-27 | End: 2023-06-27

## 2023-06-27 RX ORDER — ACETAMINOPHEN 650 MG/1
650 SUPPOSITORY RECTAL EVERY 6 HOURS PRN
Status: DISCONTINUED | OUTPATIENT
Start: 2023-06-27 | End: 2023-06-28 | Stop reason: HOSPADM

## 2023-06-27 RX ORDER — DOXYCYCLINE HYCLATE 100 MG
100 TABLET ORAL 2 TIMES DAILY
Qty: 10 TABLET | Refills: 0 | Status: SHIPPED | OUTPATIENT
Start: 2023-06-27 | End: 2023-06-28 | Stop reason: HOSPADM

## 2023-06-27 RX ORDER — GUAIFENESIN/DEXTROMETHORPHAN 100-10MG/5
5 SYRUP ORAL 3 TIMES DAILY PRN
Qty: 120 ML | Refills: 0 | Status: SHIPPED | OUTPATIENT
Start: 2023-06-27 | End: 2023-07-07

## 2023-06-27 RX ORDER — ONDANSETRON 4 MG/1
4 TABLET, ORALLY DISINTEGRATING ORAL EVERY 8 HOURS PRN
Status: DISCONTINUED | OUTPATIENT
Start: 2023-06-27 | End: 2023-06-28 | Stop reason: HOSPADM

## 2023-06-27 RX ORDER — AMOXICILLIN AND CLAVULANATE POTASSIUM 875; 125 MG/1; MG/1
1 TABLET, FILM COATED ORAL 2 TIMES DAILY
Qty: 10 TABLET | Refills: 0 | Status: SHIPPED | OUTPATIENT
Start: 2023-06-27 | End: 2023-06-28 | Stop reason: HOSPADM

## 2023-06-27 RX ORDER — NICOTINE 21 MG/24HR
1 PATCH, TRANSDERMAL 24 HOURS TRANSDERMAL DAILY
Status: DISCONTINUED | OUTPATIENT
Start: 2023-06-27 | End: 2023-06-28 | Stop reason: HOSPADM

## 2023-06-27 RX ORDER — ACETAMINOPHEN 325 MG/1
650 TABLET ORAL EVERY 6 HOURS PRN
Status: DISCONTINUED | OUTPATIENT
Start: 2023-06-27 | End: 2023-06-28 | Stop reason: HOSPADM

## 2023-06-27 RX ORDER — GUAIFENESIN/DEXTROMETHORPHAN 100-10MG/5
5 SYRUP ORAL ONCE
Status: COMPLETED | OUTPATIENT
Start: 2023-06-27 | End: 2023-06-27

## 2023-06-27 RX ORDER — ASPIRIN 81 MG/1
81 TABLET, CHEWABLE ORAL DAILY
Status: DISCONTINUED | OUTPATIENT
Start: 2023-06-27 | End: 2023-06-28 | Stop reason: HOSPADM

## 2023-06-27 RX ORDER — PREDNISONE 20 MG/1
60 TABLET ORAL DAILY
Qty: 15 TABLET | Refills: 0 | Status: SHIPPED | OUTPATIENT
Start: 2023-06-27 | End: 2023-06-28 | Stop reason: HOSPADM

## 2023-06-27 RX ORDER — ACETAMINOPHEN 500 MG
1000 TABLET ORAL EVERY 6 HOURS PRN
Qty: 60 TABLET | Refills: 0 | Status: SHIPPED | OUTPATIENT
Start: 2023-06-27 | End: 2023-07-14

## 2023-06-27 RX ADMIN — DOXYCYCLINE 100 MG: 100 INJECTION, POWDER, LYOPHILIZED, FOR SOLUTION INTRAVENOUS at 16:33

## 2023-06-27 RX ADMIN — DOXYCYCLINE HYCLATE 100 MG: 100 CAPSULE ORAL at 11:46

## 2023-06-27 RX ADMIN — GUAIFENESIN SYRUP AND DEXTROMETHORPHAN 5 ML: 100; 10 SYRUP ORAL at 11:46

## 2023-06-27 RX ADMIN — PREDNISONE 60 MG: 20 TABLET ORAL at 11:44

## 2023-06-27 RX ADMIN — GUAIFENESIN 600 MG: 600 TABLET ORAL at 16:32

## 2023-06-27 RX ADMIN — ROSUVASTATIN CALCIUM 10 MG: 10 TABLET, FILM COATED ORAL at 23:20

## 2023-06-27 RX ADMIN — MAGNESIUM SULFATE HEPTAHYDRATE 2000 MG: 40 INJECTION, SOLUTION INTRAVENOUS at 12:07

## 2023-06-27 RX ADMIN — IPRATROPIUM BROMIDE AND ALBUTEROL SULFATE 2 DOSE: 2.5; .5 SOLUTION RESPIRATORY (INHALATION) at 12:01

## 2023-06-27 RX ADMIN — SODIUM CHLORIDE, PRESERVATIVE FREE 10 ML: 5 INJECTION INTRAVENOUS at 23:20

## 2023-06-27 RX ADMIN — CEFTRIAXONE SODIUM 1000 MG: 1 INJECTION, POWDER, FOR SOLUTION INTRAMUSCULAR; INTRAVENOUS at 12:49

## 2023-06-27 RX ADMIN — IPRATROPIUM BROMIDE AND ALBUTEROL SULFATE 1 DOSE: 2.5; .5 SOLUTION RESPIRATORY (INHALATION) at 19:42

## 2023-06-27 RX ADMIN — ACETAMINOPHEN 1000 MG: 500 TABLET ORAL at 11:43

## 2023-06-27 RX ADMIN — ENOXAPARIN SODIUM 40 MG: 100 INJECTION SUBCUTANEOUS at 16:32

## 2023-06-27 ASSESSMENT — ENCOUNTER SYMPTOMS
WHEEZING: 1
ABDOMINAL PAIN: 0
SORE THROAT: 0
NAUSEA: 0
CHEST TIGHTNESS: 1
EYE PAIN: 0
DIARRHEA: 0
RHINORRHEA: 1
VOMITING: 0
COUGH: 1
BACK PAIN: 0
SHORTNESS OF BREATH: 1

## 2023-06-27 ASSESSMENT — PAIN - FUNCTIONAL ASSESSMENT
PAIN_FUNCTIONAL_ASSESSMENT: NONE - DENIES PAIN
PAIN_FUNCTIONAL_ASSESSMENT: 0-10

## 2023-06-27 ASSESSMENT — PAIN SCALES - GENERAL: PAINLEVEL_OUTOF10: 0

## 2023-06-27 NOTE — H&P
69-year-old man with past medical history of A-fib, coronary disease, COPD, pneumonia around this time comes in for shortness of breath. Coughing with greenish phlegm production. Patient still smokes. Cut down from many cigarettes to 5 to 6 cigarettes/day. Patient had a chest x-ray showing chronic bronchitis and right middle lobe and right lower lobe pneumonia. Patient denies home use of oxygen. Denies fever chills. But has cough. Progressive getting worse. Last time he was here for pneumonia he was here for 4 days.   Patient was wheezing in the ER received breathing treatment and antibiotics          Past Medical History:   Diagnosis Date    Atrial fibrillation ([address])     CAD (coronary artery disease)     COPD exacerbation ([address]) 7/31/2022    Hematuria     High cholesterol     History of bladder cancer     Hyperlipidemia     Hypertension     Malignant neoplasm of urinary bladder ([address]) 1/27/2020    S/P AAA repair     Spondylosis of lumbar region without myelopathy or radiculopathy      Past Surgical History:   Procedure Laterality Date    ABDOMINAL AORTIC ANEURYSM REPAIR      COLONOSCOPY N/A 5/26/2020    COLONOSCOPY DIAGNOSTIC performed by Antoine Gan MD at [address] [facility] 2/11/2020    LEFT LOWER EXTREMITY REVASCULARIZATION performed by Mirna Rain MD at [facility] SCRN NOT HI [address] IND N/A 4/24/2017    COLONOSCOPY performed by Hector Weinberg MD at [address] ESOPHAGOGASTRODUODENOSCOPY TRANSORAL DIAGNOSTIC N/A 4/24/2017    EGD ESOPHAGOGASTRODUODENOSCOPY performed by Hector Weinberg MD at [address] [facility] 5/26/2020    EGD DIAGNOSTIC ONLY performed by Antoine Gan MD at [facility] History       Smoking Status  Light Smoker Smoking Start Date  10/1/1979 Smoking Frequency  0.33 packs/day for 30.00 years (9.90 pk-yrs) Smoking Tobacco

## 2023-06-27 NOTE — ED NOTES
Report called to [facility]. No tele ordered. Transportation notified. Patient and wife updated.       Ankush Hollins RN  06/27/23 1736

## 2023-06-27 NOTE — ED NOTES
Ambulatory pulse ox was consistently 90 % on room air. Once pt was back in bed O2 remained 90%. Pt stated he was concerned because he is typically 97% Dr Agustin Mariano and Kaelyn Medico RN made aware.      Joey Lyon  06/27/23 [id number]

## 2023-06-27 NOTE — CARE COORDINATION
Case Management Assessment  Initial Evaluation    Date/Time of Evaluation: 6/27/2023 4:06 PM  Assessment Completed by: MAYDA Morales    If patient is discharged prior to next notation, then this note serves as note for discharge by case management. Patient Name: Justo Dunbar. YOB: 1940  Diagnosis: Acute exacerbation of chronic obstructive pulmonary disease (COPD) ([address]) [J44.1]  COPD exacerbation (HCC) [J44.1]  Pneumonia of right lower lobe due to infectious organism [J18.9]                   Date / Time: 6/27/2023 10:28 AM    Patient Admission Status: Inpatient   Readmission Risk (Low < 19, Mod (19-27), High > 27): Readmission Risk Score: 11.2    Current PCP: Chava Alves MD  PCP verified by CM? Yes    Chart Reviewed: Yes      History Provided by: Patient, Spouse  Patient Orientation: Alert and Oriented, Person, Place, Situation, Self    Patient Cognition: Alert    Hospitalization in the last 30 days (Readmission):  No    If yes, Readmission Assessment in  Navigator will be completed. Advance Directives:      Code Status: Full Code   Patient's Primary Decision Maker is: Legal Next of Kin    Primary Decision Maker: BlankaElida - Spouse - [phone]    Secondary Decision Maker: Corinne Bates - Child - [phone]    Discharge Planning:    Patient lives with: Spouse/Significant Other Type of Home: House  Primary Care Giver: Self  Patient Support Systems include: Spouse/Significant Other   Current Financial resources: Medicare  Current community resources: None  Current services prior to admission: None            Current DME:              Type of Home Care services:  None    ADLS  Prior functional level: Independent in ADLs/IADLs  Current functional level: Independent in ADLs/IADLs    PT AM-PAC:   /24  OT AM-PAC:   /24    Family can provide assistance at DC:  Yes  Would you like Case Management to discuss the discharge plan with any other family members/significant

## 2023-06-27 NOTE — ACP (ADVANCE CARE PLANNING)
Advance Care Planning   Healthcare Decision Maker:    Primary Decision Maker: Yifan Ras - [phone]    Secondary Decision Maker: Patrica Haley - [place] - [phone]    Click here to complete Healthcare Decision Makers including selection of the Healthcare Decision Maker Relationship (ie \"Primary\"). Today we documented Decision Maker(s) consistent with Legal Next of Kin hierarchy.        If the relationship to the patient does NOT follow our state's Next of Kin hierarchy, the patient MUST complete an ACP Document to allow him/her to act on the patient's behalf. :      Electronically signed by MAYDA Bear on 6/27/2023 at 4:06 PM

## 2023-06-27 NOTE — ED TRIAGE NOTES
Pt states he has chronic COPD and SOB. Pt states has cough congestion mucus build up.   Pt is aox4 pwd

## 2023-06-27 NOTE — CARE COORDINATION
Definition of COPD discussed with patient and his family. Lung function explained. Types of COPD differentiated for patient. Symptoms discussed including: difficulty breathing, SOB, coughing, excess mucous, weakness and fatigue, or wheezing. Causes discussed. Pt currently smoking, and has been exposed to air pollution or dust.   Different testing for COPD and most common treatments reviewed. Importance of preventing infection including hand hygiene, keeping inhaler mouthpieces clean, and getting the flu and pneumonia vaccinations. Care Map of what to expect while hospitalized reviewed with patient. Ways to ease SOB explained including pursed lip breathing and diaphragmatic breathing. Energy conservation discussed. Possible medications that may be ordered were reviewed. I stressed that their physician would make that decision and explained the importance of taking the medication as directed. Good nutrition choices discussed. Pt denies losing weight. COPD booklet and zone pamphlet left for patient review. Definition of pneumonia discussed. Causes of different types of pneumonia reviewed. Symptoms discussed and pt does understand that they may have some or all of these symptoms. Testing to diagnose pneumonia reviewed as well as possible treatments. Importance of avoiding infections discussed including: taking medication as directed, washing hands, disposing of used tissues, getting the pneumonia and flu vaccines, and avoiding others who are ill. Importance of not smoking and to avoid others who may be smoking around patient stressed. Pneumonia Zones also reviewed. \"Green\" zone is the goal, \"Yellow\" zone means to call the doctor, and \"Red\" zone means to call the doctor ASAP or call 911. Copies of Pneumonia booklet and Zone Pamphlet given to patient. Offer for patient to express any concerns or questions. Pt denies having further questions at this time.   Electronically signed by

## 2023-06-27 NOTE — ED PROVIDER NOTES
[facility]  eMERGENCY dEPARTMENT eNCOUnter      Pt Name: Stanislaw Avila. MRN: [id number]  [address] [facility] [place] 1940  Date of evaluation: 6/27/2023  Provider: Rosie Villa MD  Note Started: 6/27/23 10:44 AM EDT    HISTORY OF PRESENT ILLNESS      Chief Complaint   Patient presents with    Shortness of Breath     Chronic sob , copd        The history is provided by the Patient. Stanislaw Avila is a 79 y.o. male with a PMH clinically significant for  AAA s/p repair, HTN, HLD, Afib on Eliquis, CAD, COPD, and Anemia presenting to the ED via PV c/o worsening cough, congestion, shortness of breath, wheezing, rhinorrhea, PAL and generalized fatigue with symptoms intermittent over the past 6 weeks. States that symptoms do seem to get worse with the change of season. Has been using his nebulizer at home with mild relief. States he is sometimes able to get up some clear sputum, but intermittently also with inability to produce sputum. Denies any associated: F/C/D, HA, Neck pain/stiffness, Lightheadedness, Dizziness, Hemoptysis, CP, New or worsening BLE Edema/pain, Abd pain, N/V/D/C, or Dysuria. States history of similar previous episodes with COPD in the past.  States they have otherwise been feeling well. Taking all medications as indicated: yes. Per Chart Review: PMH as noted above obtained via outpatient chart review. Most recent CT NYDIA Chest w/o evidence of LA/Left Atrial appendage thrombus. Extensive 3-vessel CAD. Centrilobar emphysema. REVIEW OF SYSTEMS       Review of Systems   Constitutional:  Positive for fatigue. Negative for chills and fever. HENT:  Positive for congestion and rhinorrhea. Negative for sore throat. Eyes:  Negative for pain and visual disturbance. Respiratory:  Positive for cough, chest tightness, shortness of breath and wheezing. Cardiovascular:  Negative for chest pain, palpitations and leg swelling.    Gastrointestinal:  Negative for abdominal pain,

## 2023-06-27 NOTE — FLOWSHEET NOTE
Pt to 480. Oriented to room and call light. Chart and meds reviewed. Admission in progress. Family member at bedside. Will notify admitting physician pt received to floor. Pt :               Alert and oriented. RESP:    even and non labored at rest           Lung sounds:   diminished/ harsh cough                              Oxygen: 2l NC applied    Pain: denies  IV: 20 LEFT  TELE: afib  Dressings:   Precautions:              Falls:  35      Zaire: 22  Noted Labs: na 140 k 4.0 bun 17 cr 1.05 w 14.1 h 14.3      Call light in reach. NOTES: BC drawn.

## 2023-06-28 VITALS
WEIGHT: 185 LBS | HEIGHT: 68 IN | OXYGEN SATURATION: 96 % | RESPIRATION RATE: 20 BRPM | DIASTOLIC BLOOD PRESSURE: 86 MMHG | BODY MASS INDEX: 28.04 KG/M2 | HEART RATE: 98 BPM | TEMPERATURE: 98.1 F | SYSTOLIC BLOOD PRESSURE: 141 MMHG

## 2023-06-28 LAB
ALBUMIN SERPL-MCNC: 3.9 G/DL (ref 3.5–4.6)
ALP SERPL-CCNC: 66 U/L (ref 35–104)
ALT SERPL-CCNC: 14 U/L (ref 0–41)
ANION GAP SERPL CALCULATED.3IONS-SCNC: 13 MEQ/L (ref 9–15)
AST SERPL-CCNC: 17 U/L (ref 0–40)
BASOPHILS # BLD: 0 K/UL (ref 0–0.2)
BASOPHILS NFR BLD: 0.1 %
BILIRUB SERPL-MCNC: 0.7 MG/DL (ref 0.2–0.7)
BUN SERPL-MCNC: 23 MG/DL (ref 8–23)
CALCIUM SERPL-MCNC: 9.1 MG/DL (ref 8.5–9.9)
CHLORIDE SERPL-SCNC: 99 MEQ/L (ref 95–107)
CO2 SERPL-SCNC: 24 MEQ/L (ref 20–31)
CREAT SERPL-MCNC: 1.17 MG/DL (ref 0.7–1.2)
EOSINOPHIL # BLD: 0 K/UL (ref 0–0.7)
EOSINOPHIL NFR BLD: 0 %
ERYTHROCYTE [DISTWIDTH] IN BLOOD BY AUTOMATED COUNT: 14.7 % (ref 11.5–14.5)
GLOBULIN SER CALC-MCNC: 3.7 G/DL (ref 2.3–3.5)
GLUCOSE SERPL-MCNC: 147 MG/DL (ref 70–99)
HCT VFR BLD AUTO: 43.7 % (ref 42–52)
HGB BLD-MCNC: 14.3 G/DL (ref 14–18)
LYMPHOCYTES # BLD: 0.5 K/UL (ref 1–4.8)
LYMPHOCYTES NFR BLD: 3.3 %
MCH RBC QN AUTO: 30.6 PG (ref 27–31.3)
MCHC RBC AUTO-ENTMCNC: 32.6 % (ref 33–37)
MCV RBC AUTO: 93.9 FL (ref 79–92.2)
MONOCYTES # BLD: 0.4 K/UL (ref 0.2–0.8)
MONOCYTES NFR BLD: 2.6 %
NEUTROPHILS # BLD: 13.4 K/UL (ref 1.4–6.5)
NEUTS SEG NFR BLD: 94 %
PLATELET # BLD AUTO: 178 K/UL (ref 130–400)
POTASSIUM SERPL-SCNC: 4.3 MEQ/L (ref 3.4–4.9)
PROT SERPL-MCNC: 7.6 G/DL (ref 6.3–8)
RBC # BLD AUTO: 4.66 M/UL (ref 4.7–6.1)
SODIUM SERPL-SCNC: 136 MEQ/L (ref 135–144)
WBC # BLD AUTO: 14.3 K/UL (ref 4.8–10.8)

## 2023-06-28 PROCEDURE — 6360000002 HC RX W HCPCS: Performed by: INTERNAL MEDICINE

## 2023-06-28 PROCEDURE — 6370000000 HC RX 637 (ALT 250 FOR IP): Performed by: INTERNAL MEDICINE

## 2023-06-28 PROCEDURE — 2500000003 HC RX 250 WO HCPCS: Performed by: INTERNAL MEDICINE

## 2023-06-28 PROCEDURE — 36415 COLL VENOUS BLD VENIPUNCTURE: CPT

## 2023-06-28 PROCEDURE — 80053 COMPREHEN METABOLIC PANEL: CPT

## 2023-06-28 PROCEDURE — 2700000000 HC OXYGEN THERAPY PER DAY

## 2023-06-28 PROCEDURE — 2580000003 HC RX 258: Performed by: INTERNAL MEDICINE

## 2023-06-28 PROCEDURE — 94761 N-INVAS EAR/PLS OXIMETRY MLT: CPT

## 2023-06-28 PROCEDURE — 94640 AIRWAY INHALATION TREATMENT: CPT

## 2023-06-28 PROCEDURE — 85025 COMPLETE CBC W/AUTO DIFF WBC: CPT

## 2023-06-28 RX ORDER — DOXYCYCLINE HYCLATE 100 MG
100 TABLET ORAL 2 TIMES DAILY
Qty: 14 TABLET | Refills: 0 | Status: SHIPPED | OUTPATIENT
Start: 2023-06-28 | End: 2023-07-05

## 2023-06-28 RX ORDER — PREDNISONE 20 MG/1
40 TABLET ORAL DAILY
Qty: 10 TABLET | Refills: 0 | Status: SHIPPED | OUTPATIENT
Start: 2023-06-29 | End: 2023-07-04

## 2023-06-28 RX ORDER — CEFUROXIME AXETIL 500 MG/1
500 TABLET ORAL 2 TIMES DAILY
Qty: 14 TABLET | Refills: 0 | Status: SHIPPED | OUTPATIENT
Start: 2023-06-28 | End: 2023-07-05

## 2023-06-28 RX ADMIN — METOPROLOL SUCCINATE 50 MG: 50 TABLET, EXTENDED RELEASE ORAL at 08:06

## 2023-06-28 RX ADMIN — DILTIAZEM HYDROCHLORIDE 360 MG: 240 CAPSULE, COATED, EXTENDED RELEASE ORAL at 08:06

## 2023-06-28 RX ADMIN — ESCITALOPRAM OXALATE 10 MG: 10 TABLET ORAL at 08:06

## 2023-06-28 RX ADMIN — PREDNISONE 40 MG: 20 TABLET ORAL at 08:06

## 2023-06-28 RX ADMIN — CLOPIDOGREL BISULFATE 75 MG: 75 TABLET ORAL at 08:06

## 2023-06-28 RX ADMIN — DOXYCYCLINE 100 MG: 100 INJECTION, POWDER, LYOPHILIZED, FOR SOLUTION INTRAVENOUS at 03:50

## 2023-06-28 RX ADMIN — ENOXAPARIN SODIUM 40 MG: 100 INJECTION SUBCUTANEOUS at 08:07

## 2023-06-28 RX ADMIN — ASPIRIN 81 MG 81 MG: 81 TABLET ORAL at 08:06

## 2023-06-28 RX ADMIN — SODIUM CHLORIDE, PRESERVATIVE FREE 5 ML: 5 INJECTION INTRAVENOUS at 08:06

## 2023-06-28 RX ADMIN — GUAIFENESIN 600 MG: 600 TABLET ORAL at 08:06

## 2023-06-28 RX ADMIN — IPRATROPIUM BROMIDE AND ALBUTEROL SULFATE 1 DOSE: 2.5; .5 SOLUTION RESPIRATORY (INHALATION) at 07:13

## 2023-06-28 NOTE — PROGRESS NOTES
CLINICAL PHARMACY NOTE: MEDS TO BEDS    Total # of Prescriptions Filled: 3   The following medications were delivered to the patient:  Doxycycline Hycla 100 mg Tab  Cefuroxime 500 mg Tab  Prednisone 20 mg Tab    Additional Documentation:

## 2023-06-28 NOTE — CARE COORDINATION
MET WITH PT AND SPOUSE AT BEDSIDE. [facility] W/WIFE. DENIES NEEDS. ON ROOM AIR.  AWAITING RX DELIVERY FROM [facility] TO BEDSIDE FOR DC.

## 2023-06-28 NOTE — PLAN OF CARE
Problem: Discharge Planning  Goal: Discharge to home or other facility with appropriate resources  Outcome: Completed  Flowsheets (Taken 6/28/2023 1047)  Discharge to home or other facility with appropriate resources:   Identify barriers to discharge with patient and caregiver   Arrange for needed discharge resources and transportation as appropriate     Problem: Safety - Adult  Goal: Free from fall injury  6/28/2023 1047 by Susan Lujan RN  Outcome: Completed  Flowsheets (Taken 6/28/2023 1047)  Free From Fall Injury: Instruct family/caregiver on patient safety    Problem: ABCDS Injury Assessment  Goal: Absence of physical injury  Outcome: Completed  Flowsheets (Taken 6/28/2023 1047)  Absence of Physical Injury: Implement safety measures based on patient assessment    Electronically signed by Susan Lujan RN on 6/28/2023 at 10:48 AM

## 2023-06-28 NOTE — PROGRESS NOTES
AVS printed, reviewed and given to patient. Education handouts provided on copd/pneumonia and newly prescribed medications. IV removed. Wife on way to take patient home, also awaiting meds to beds delivery- pharmacy notified. Patient aware of his follow up appointment scheduled with pcp for outpatient cxr.

## 2023-06-28 NOTE — PROGRESS NOTES
Shift assessments completed. A&OX 4. Medications given per MAR. Call light within reach. No other needs stated by pt at this time.

## 2023-06-28 NOTE — PLAN OF CARE
Problem: Safety - Adult  Goal: Free from fall injury  Flowsheets (Taken 6/28/2023 0127)  Free From Fall Injury: Instruct family/caregiver on patient safety

## 2023-06-28 NOTE — DISCHARGE SUMMARY
Discharge Summary    Date: 6/28/2023  Patient Name: Adrian Lamas. YOB: 1940     Age: 79 y.o. Admit Date: 6/27/2023  Discharge Date: 6/28/2023  Discharge Condition: [place]    Admission Diagnosis  Acute exacerbation of chronic obstructive pulmonary disease (COPD) ([address] [facility]) [J44.1];COPD exacerbation ([address] [facility] [address] [facility]) [J44.1]; Pneumonia of right lower lobe due to infectious organism [J18.9]      Discharge Diagnosis  Principal Problem:    Acute exacerbation of chronic obstructive pulmonary disease (COPD) ([address] [facility] [address] [facility])  Resolved Problems:    * No resolved hospital problems. [facility] Stay  Narrative of Hospital Course:  Patient comes with shortness of breath and hypoxia chest x-ray showed early development of pneumonia and consolidation. Needs repeat chest x-ray as outpatient in 2 weeks to see if pneumonia resolved. Patient has significant improvement of his symptoms with p.o. steroids and IV antibiotics. Adamant to be discharged home patient was discharged home. Needs to follow with PCP as outpatient. Consultants:  None    Surgeries/procedures Performed:      Treatments:            Discharge Plan/Disposition:  Home    Hospital/Incidental Findings Requiring Follow Up:    Patient Instructions:    Diet:    Activity:  For number of days (if applicable): Other Instructions:    Provider Follow-Up:   No follow-ups on file.      Significant Diagnostic Studies:    Recent Labs:  Admission on 06/27/2023  Sodium                                        Date: 06/27/2023  Value: 140         Ref range: 135 - 144 mEq/L    Status: Final  Potassium                                     Date: 06/27/2023  Value: 4.0         Ref range: 3.4 - 4.9 mEq/L    Status: Final  Chloride                                      Date: 06/27/2023  Value: 103         Ref range: 95 - 107 mEq/L     Status: Final  CO2                                           Date: 06/27/2023  Value: 28          Ref range: 20 - 31 mEq/L      Status: Final  Anion Status: Final  Epithelial Cells, UA                          Date: 06/27/2023  Value: 0-2         Ref range: 0 - 5 /HPF         Status: Final  ------------    Radiology last 7 days:  XR CHEST PORTABLE    Result Date: 6/27/2023  Superimposed on reactive airways disease/chronic bronchitis are reticular densities in the right mid lung and right lower lobe. Developing bronchopneumonia is possible.  RECOMMENDATION: Radiographic follow-up        Pending Labs     Order Current Status    Culture, Blood 1 In process    Culture, Blood 2 In process        Discharge Medications    Current Discharge Medication List    START taking these medications    predniSONE (DELTASONE) 20 MG tablet  Take 2 tablets by mouth daily for 5 days  Qty: 10 tablet Refills: 0    cefUROXime (CEFTIN) 500 MG tablet  Take 1 tablet by mouth 2 times daily for 7 days  Qty: 14 tablet Refills: 0    doxycycline hyclate (VIBRA-TABS) 100 MG tablet  Take 1 tablet by mouth 2 times daily for 7 days  Qty: 14 tablet Refills: 0    acetaminophen (TYLENOL) 500 MG tablet  Take 2 tablets by mouth every 6 hours as needed for Pain or Fever  Qty: 60 tablet Refills: 0    guaiFENesin-dextromethorphan (ROBITUSSIN DM) 100-10 MG/5ML syrup  Take 5 mLs by mouth 3 times daily as needed for Cough  Qty: 120 mL Refills: 0          Current Discharge Medication List        Current Discharge Medication List    CONTINUE these medications which have NOT CHANGED    aspirin 81 MG chewable tablet  Take 1 tablet by mouth daily    clopidogrel (PLAVIX) 75 MG tablet  Take 1 tablet by mouth daily    escitalopram (LEXAPRO) 10 MG tablet  TAKE 1 TABLET BY MOUTH EVERY DAY  Qty: 180 tablet Refills: 0  Associated Diagnoses:Depression, unspecified depression type    metoprolol succinate (TOPROL XL) 50 MG extended release tablet  TAKE 1 TABLET BY MOUTH DAILY    ipratropium-albuterol (DUONEB) 0.5-2.5 (3) MG/3ML SOLN nebulizer solution  Inhale 3 mLs into the lungs every 4 hours as needed for Shortness of

## 2023-07-02 LAB
BACTERIA BLD CULT ORG #2: NORMAL
BACTERIA BLD CULT: NORMAL

## 2023-07-14 ENCOUNTER — OFFICE VISIT (OUTPATIENT)
Dept: FAMILY MEDICINE CLINIC | Age: 83
End: 2023-07-14

## 2023-07-14 VITALS
SYSTOLIC BLOOD PRESSURE: 120 MMHG | TEMPERATURE: 98.4 F | HEART RATE: 53 BPM | WEIGHT: 181 LBS | OXYGEN SATURATION: 94 % | DIASTOLIC BLOOD PRESSURE: 74 MMHG | BODY MASS INDEX: 25.91 KG/M2 | HEIGHT: 70 IN

## 2023-07-14 DIAGNOSIS — C67.9 MALIGNANT NEOPLASM OF URINARY BLADDER, UNSPECIFIED SITE (HCC): ICD-10-CM

## 2023-07-14 DIAGNOSIS — Z09 HOSPITAL DISCHARGE FOLLOW-UP: ICD-10-CM

## 2023-07-14 DIAGNOSIS — I48.91 ATRIAL FIBRILLATION, UNSPECIFIED TYPE (HCC): ICD-10-CM

## 2023-07-14 DIAGNOSIS — J41.0 SIMPLE CHRONIC BRONCHITIS (HCC): ICD-10-CM

## 2023-07-14 DIAGNOSIS — F32.A DEPRESSION, UNSPECIFIED DEPRESSION TYPE: ICD-10-CM

## 2023-07-14 DIAGNOSIS — J44.1 COPD EXACERBATION (HCC): ICD-10-CM

## 2023-07-14 DIAGNOSIS — I73.9 PAD (PERIPHERAL ARTERY DISEASE) (HCC): ICD-10-CM

## 2023-07-14 DIAGNOSIS — J18.9 PNEUMONIA OF RIGHT LOWER LOBE DUE TO INFECTIOUS ORGANISM: Primary | ICD-10-CM

## 2023-07-14 NOTE — PROGRESS NOTES
Chief Complaint   Patient presents with    Follow-Up from [facility], pneumonia     Health Maintenance     Declined colon        HPI:  Bernadette Gomez is a 79 y.o. male    Hospital f/u, but more than 2 weeks home     Admit Date: 6/27/2023  Discharge Date: 6/28/2023  Discharge Condition: [place]     Admission Diagnosis  Acute exacerbation of chronic obstructive pulmonary disease (COPD) ([address]) [J44.1];COPD exacerbation ([address]) [J44.1]; Pneumonia of right lower lobe due to infectious organism [J18.9]       Discharge Diagnosis  Principal Problem:    Acute exacerbation of chronic obstructive pulmonary disease (COPD) ([address])  Resolved Problems:    * No resolved hospital problems. [facility] Stay  Narrative of Hospital Course:  Patient comes with shortness of breath and hypoxia chest x-ray showed early development of pneumonia and consolidation. Needs repeat chest x-ray as outpatient in 2 weeks to see if pneumonia resolved. Patient has significant improvement of his symptoms with p.o. steroids and IV antibiotics. Adamant to be discharged home patient was discharged home. Needs to follow with PCP as outpatient.          Hospitalized for pneumonia      Follows with [facility] for A-fib for quite some time    He is on anti-arrhythmics   Dr. Huey Berg and Dr. Courtney Delvalle device  No more DOAC  On plavix for 6 months     His HR always runs on the low side he states        Past Medical History:   Diagnosis Date    Atrial fibrillation ([address])     CAD (coronary artery disease)     COPD exacerbation ([address]) 07/31/2022    Hematuria     High cholesterol     History of bladder cancer     Hyperlipidemia     Hypertension     Malignant neoplasm of urinary bladder ([address]) 01/27/2020    Presence of Watchman left atrial appendage closure device     S/P AAA repair     Spondylosis of lumbar region without myelopathy or radiculopathy      Past Surgical History:   Procedure Laterality Date    ABDOMINAL AORTIC ANEURYSM REPAIR      ATRIAL

## 2023-09-08 ENCOUNTER — PROCEDURE VISIT (OUTPATIENT)
Dept: UROLOGY | Age: 83
End: 2023-09-08
Payer: MEDICARE

## 2023-09-08 VITALS
DIASTOLIC BLOOD PRESSURE: 88 MMHG | BODY MASS INDEX: 26.05 KG/M2 | HEART RATE: 96 BPM | HEIGHT: 70 IN | SYSTOLIC BLOOD PRESSURE: 138 MMHG | WEIGHT: 182 LBS

## 2023-09-08 DIAGNOSIS — C67.9 MALIGNANT NEOPLASM OF URINARY BLADDER, UNSPECIFIED SITE (HCC): Primary | ICD-10-CM

## 2023-09-08 LAB
BILIRUBIN, POC: ABNORMAL
BLOOD URINE, POC: ABNORMAL
CLARITY, POC: CLEAR
COLOR, POC: YELLOW
GLUCOSE URINE, POC: ABNORMAL
KETONES, POC: ABNORMAL
LEUKOCYTE EST, POC: ABNORMAL
NITRITE, POC: ABNORMAL
PH, POC: 6.5
PROTEIN, POC: ABNORMAL
SPECIFIC GRAVITY, POC: 1.02
UROBILINOGEN, POC: 2

## 2023-09-08 PROCEDURE — 81003 URINALYSIS AUTO W/O SCOPE: CPT | Performed by: UROLOGY

## 2023-09-08 PROCEDURE — G8427 DOCREV CUR MEDS BY ELIG CLIN: HCPCS | Performed by: UROLOGY

## 2023-09-08 PROCEDURE — 52000 CYSTOURETHROSCOPY: CPT | Performed by: UROLOGY

## 2023-09-08 PROCEDURE — 1123F ACP DISCUSS/DSCN MKR DOCD: CPT | Performed by: UROLOGY

## 2023-09-08 PROCEDURE — 3079F DIAST BP 80-89 MM HG: CPT | Performed by: UROLOGY

## 2023-09-08 PROCEDURE — 99213 OFFICE O/P EST LOW 20 MIN: CPT | Performed by: UROLOGY

## 2023-09-08 PROCEDURE — G8417 CALC BMI ABV UP PARAM F/U: HCPCS | Performed by: UROLOGY

## 2023-09-08 PROCEDURE — 4004F PT TOBACCO SCREEN RCVD TLK: CPT | Performed by: UROLOGY

## 2023-09-08 PROCEDURE — 3075F SYST BP GE 130 - 139MM HG: CPT | Performed by: UROLOGY

## 2023-09-08 RX ORDER — SULFAMETHOXAZOLE AND TRIMETHOPRIM 800; 160 MG/1; MG/1
1 TABLET ORAL ONCE
Qty: 1 TABLET | Refills: 0 | Status: SHIPPED | COMMUNITY
Start: 2023-09-08 | End: 2023-09-08

## 2023-09-08 ASSESSMENT — ENCOUNTER SYMPTOMS
ABDOMINAL PAIN: 0
ABDOMINAL DISTENTION: 0

## 2023-09-08 NOTE — PROGRESS NOTES
Subjective:      Patient ID: Sami Ordonez. is a 79 y.o. male    HPI  This is a 79 y.o. 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 Since last being seen on 9/2/22, he has no hematuria or dysuria or pain. He had a Watchman procedure in 5/23 and is on Plavix for 6 mo and can come off permanently at end of 10/23. He has no other new medical or surgical problems. He continues to smoke ciggs. He wants to proceed with cystoscopy today.  He elected against PSA testing in past.     Past Medical History:   Diagnosis Date    Atrial fibrillation (HCC)     CAD (coronary artery disease)     COPD exacerbation ([address] 07/31/2022    Hematuria     High cholesterol     History of bladder cancer     Hyperlipidemia     Hypertension     Malignant neoplasm of urinary bladder ([address] [facility]) 01/27/2020    Presence of Watchman left atrial appendage closure device     S/P AAA repair     Spondylosis of lumbar region without myelopathy or radiculopathy      Past Surgical History:   Procedure Laterality Date    ABDOMINAL AORTIC ANEURYSM REPAIR      ATRIAL ABLATION SURGERY      watchman device    CARDIAC SURGERY  05/01/2023    COLONOSCOPY N/A 05/26/2020    COLONOSCOPY DIAGNOSTIC [facility] Sy [facility] [address] [facility] 02/11/2020    LEFT LOWER EXTREMITY REVASCULARIZATION performed by Aakash Lozada MD at [address] [facility] SCRN NOT HI [address] IND N/A 04/24/2017    COLONOSCOPY performed by Jenae Miramontes MD at [address] [facility] ESOPHAGOGASTRODUODENOSCOPY TRANSORAL DIAGNOSTIC N/A 04/24/2017    EGD ESOPHAGOGASTRODUODENOSCOPY performed by Jenae Miramontes MD at [address] [facility] 05/26/2020    EGD DIAGNOSTIC ONLY performed by Stefania Sy MD at [address] [place] [address]

## 2023-09-16 ENCOUNTER — TRANSCRIBE ORDERS (OUTPATIENT)
Dept: CARDIOLOGY | Facility: CLINIC | Age: 83
End: 2023-09-16
Payer: MEDICARE

## 2023-09-16 DIAGNOSIS — I48.91 ATRIAL FIBRILLATION, UNSPECIFIED TYPE (MULTI): ICD-10-CM

## 2023-09-21 NOTE — PROGRESS NOTES
Pt. Arrived by themselves. Test explained and patient understands. Equipment connected and endocapsule activated. Live view seen in the stomach. Instructions for the day explained and given to patient.     [employer] Endocapsule Lot # [id number]  Exp: 07/22/2021 Educated and encouraged cessation  Patient expresses interest in rehab  Care per Primary Team

## 2023-09-26 ENCOUNTER — OFFICE VISIT (OUTPATIENT)
Dept: FAMILY MEDICINE CLINIC | Age: 83
End: 2023-09-26
Payer: MEDICARE

## 2023-09-26 ENCOUNTER — HOSPITAL ENCOUNTER (INPATIENT)
Age: 83
LOS: 3 days | Discharge: HOME | End: 2023-09-29
Attending: INTERNAL MEDICINE | Admitting: INTERNAL MEDICINE
Payer: MEDICARE

## 2023-09-26 ENCOUNTER — APPOINTMENT (OUTPATIENT)
Dept: GENERAL RADIOLOGY | Age: 83
End: 2023-09-26
Payer: MEDICARE

## 2023-09-26 VITALS
BODY MASS INDEX: 27.92 KG/M2 | WEIGHT: 184.2 LBS | RESPIRATION RATE: 20 BRPM | HEIGHT: 68 IN | OXYGEN SATURATION: 92 % | SYSTOLIC BLOOD PRESSURE: 140 MMHG | HEART RATE: 112 BPM | TEMPERATURE: 102 F | DIASTOLIC BLOOD PRESSURE: 64 MMHG

## 2023-09-26 DIAGNOSIS — R09.89 CHEST CONGESTION: ICD-10-CM

## 2023-09-26 DIAGNOSIS — R68.89 INFLUENZA-LIKE SYMPTOMS: ICD-10-CM

## 2023-09-26 DIAGNOSIS — I50.9 CONGESTIVE HEART FAILURE, UNSPECIFIED HF CHRONICITY, UNSPECIFIED HEART FAILURE TYPE (HCC): ICD-10-CM

## 2023-09-26 DIAGNOSIS — R06.02 SHORTNESS OF BREATH: ICD-10-CM

## 2023-09-26 DIAGNOSIS — R05.8 PRODUCTIVE COUGH: ICD-10-CM

## 2023-09-26 DIAGNOSIS — J18.9 COMMUNITY ACQUIRED PNEUMONIA OF BOTH LOWER LOBES: ICD-10-CM

## 2023-09-26 DIAGNOSIS — J96.00 ACUTE RESPIRATORY FAILURE, UNSPECIFIED WHETHER WITH HYPOXIA OR HYPERCAPNIA (HCC): Primary | ICD-10-CM

## 2023-09-26 DIAGNOSIS — J18.9 PNEUMONIA OF BOTH LOWER LOBES DUE TO INFECTIOUS ORGANISM: Primary | ICD-10-CM

## 2023-09-26 DIAGNOSIS — J44.1 CHRONIC OBSTRUCTIVE PULMONARY DISEASE WITH ACUTE EXACERBATION (HCC): ICD-10-CM

## 2023-09-26 DIAGNOSIS — R50.9 FEVER, UNSPECIFIED FEVER CAUSE: ICD-10-CM

## 2023-09-26 PROBLEM — J44.9 COPD (CHRONIC OBSTRUCTIVE PULMONARY DISEASE) (HCC): Status: ACTIVE | Noted: 2023-09-26

## 2023-09-26 PROBLEM — F17.200 CURRENT SMOKER: Status: ACTIVE | Noted: 2022-03-10

## 2023-09-26 PROBLEM — Z79.01 LONG TERM CURRENT USE OF ANTICOAGULANT THERAPY: Status: ACTIVE | Noted: 2023-05-05

## 2023-09-26 PROBLEM — Z95.828 HISTORY OF ENDOVASCULAR STENT GRAFT FOR ABDOMINAL AORTIC ANEURYSM (AAA): Status: ACTIVE | Noted: 2023-09-26

## 2023-09-26 PROBLEM — N18.31 STAGE 3A CHRONIC KIDNEY DISEASE (HCC): Status: ACTIVE | Noted: 2023-09-26

## 2023-09-26 PROBLEM — F32.A DEPRESSIVE DISORDER: Status: ACTIVE | Noted: 2022-03-10

## 2023-09-26 PROBLEM — I71.40 AAA (ABDOMINAL AORTIC ANEURYSM) (HCC): Status: ACTIVE | Noted: 2022-12-22

## 2023-09-26 LAB
ALBUMIN SERPL-MCNC: 3.8 G/DL (ref 3.5–4.6)
ALP SERPL-CCNC: 96 U/L (ref 35–104)
ALT SERPL-CCNC: 24 U/L (ref 0–41)
ANION GAP SERPL CALCULATED.3IONS-SCNC: 11 MEQ/L (ref 9–15)
AST SERPL-CCNC: 30 U/L (ref 0–40)
BASE EXCESS ARTERIAL: 2 (ref -3–3)
BASOPHILS # BLD: 0 K/UL (ref 0–0.2)
BASOPHILS NFR BLD: 0.2 %
BILIRUB SERPL-MCNC: 2.1 MG/DL (ref 0.2–0.7)
BNP BLD-MCNC: 3959 PG/ML
BUN SERPL-MCNC: 24 MG/DL (ref 8–23)
CALCIUM IONIZED: 1.3 MMOL/L (ref 1.12–1.32)
CALCIUM SERPL-MCNC: 9.4 MG/DL (ref 8.5–9.9)
CHLORIDE SERPL-SCNC: 100 MEQ/L (ref 95–107)
CO2 SERPL-SCNC: 28 MEQ/L (ref 20–31)
CREAT SERPL-MCNC: 1.17 MG/DL (ref 0.7–1.2)
EOSINOPHIL # BLD: 0.1 K/UL (ref 0–0.7)
EOSINOPHIL NFR BLD: 0.6 %
ERYTHROCYTE [DISTWIDTH] IN BLOOD BY AUTOMATED COUNT: 14.2 % (ref 11.5–14.5)
GLOBULIN SER CALC-MCNC: 3.2 G/DL (ref 2.3–3.5)
GLUCOSE BLD-MCNC: 154 MG/DL (ref 70–99)
GLUCOSE SERPL-MCNC: 173 MG/DL (ref 70–99)
HCO3 ARTERIAL: 26.8 MMOL/L (ref 21–29)
HCT VFR BLD AUTO: 42.5 % (ref 42–52)
HCT VFR BLD AUTO: 44 % (ref 41–53)
HGB BLD CALC-MCNC: 15 GM/DL (ref 13.5–17.5)
HGB BLD-MCNC: 13.6 G/DL (ref 14–18)
INFLUENZA A ANTIBODY: NORMAL
INFLUENZA B ANTIBODY: NORMAL
LACTATE: 1 MMOL/L (ref 0.4–2)
LACTIC ACID, SEPSIS: 1.2 MMOL/L (ref 0.5–1.9)
LACTIC ACID, SEPSIS: 1.7 MMOL/L (ref 0.5–1.9)
LYMPHOCYTES # BLD: 0.4 K/UL (ref 1–4.8)
LYMPHOCYTES NFR BLD: 3.5 %
MCH RBC QN AUTO: 30.8 PG (ref 27–31.3)
MCHC RBC AUTO-ENTMCNC: 32 % (ref 33–37)
MCV RBC AUTO: 96.2 FL (ref 79–92.2)
MONOCYTES # BLD: 0.9 K/UL (ref 0.2–0.8)
MONOCYTES NFR BLD: 7.4 %
NEUTROPHILS # BLD: 10.9 K/UL (ref 1.4–6.5)
NEUTS SEG NFR BLD: 87.5 %
O2 SAT, ARTERIAL: 95 % (ref 93–100)
PCO2 ARTERIAL: 41 MM HG (ref 35–45)
PERFORMED ON: ABNORMAL
PH ARTERIAL: 7.42 (ref 7.35–7.45)
PLATELET # BLD AUTO: 117 K/UL (ref 130–400)
PO2 ARTERIAL: 74 MM HG (ref 75–108)
POC CHLORIDE: 103 MEQ/L (ref 99–110)
POC CREATININE: 1 MG/DL (ref 0.8–1.3)
POC FIO2: 4
POC SAMPLE TYPE: ABNORMAL
POTASSIUM SERPL-SCNC: 3 MEQ/L (ref 3.5–5.1)
POTASSIUM SERPL-SCNC: 3.4 MEQ/L (ref 3.4–4.9)
PROCALCITONIN SERPL IA-MCNC: 0.55 NG/ML (ref 0–0.15)
PROT SERPL-MCNC: 7 G/DL (ref 6.3–8)
RBC # BLD AUTO: 4.42 M/UL (ref 4.7–6.1)
SODIUM BLD-SCNC: 141 MEQ/L (ref 136–145)
SODIUM SERPL-SCNC: 139 MEQ/L (ref 135–144)
TCO2 ARTERIAL: 28 MMOL/L (ref 21–32)
TROPONIN T SERPL-MCNC: 0.01 NG/ML (ref 0–0.01)
WBC # BLD AUTO: 12.4 K/UL (ref 4.8–10.8)

## 2023-09-26 PROCEDURE — G8427 DOCREV CUR MEDS BY ELIG CLIN: HCPCS | Performed by: FAMILY MEDICINE

## 2023-09-26 PROCEDURE — 82330 ASSAY OF CALCIUM: CPT

## 2023-09-26 PROCEDURE — 6360000002 HC RX W HCPCS

## 2023-09-26 PROCEDURE — 82565 ASSAY OF CREATININE: CPT

## 2023-09-26 PROCEDURE — 83605 ASSAY OF LACTIC ACID: CPT

## 2023-09-26 PROCEDURE — 84484 ASSAY OF TROPONIN QUANT: CPT

## 2023-09-26 PROCEDURE — 1123F ACP DISCUSS/DSCN MKR DOCD: CPT | Performed by: FAMILY MEDICINE

## 2023-09-26 PROCEDURE — 84295 ASSAY OF SERUM SODIUM: CPT

## 2023-09-26 PROCEDURE — 84145 PROCALCITONIN (PCT): CPT

## 2023-09-26 PROCEDURE — 87804 INFLUENZA ASSAY W/OPTIC: CPT | Performed by: FAMILY MEDICINE

## 2023-09-26 PROCEDURE — 1210000000 HC MED SURG R&B

## 2023-09-26 PROCEDURE — 84132 ASSAY OF SERUM POTASSIUM: CPT

## 2023-09-26 PROCEDURE — 94640 AIRWAY INHALATION TREATMENT: CPT | Performed by: FAMILY MEDICINE

## 2023-09-26 PROCEDURE — 3077F SYST BP >= 140 MM HG: CPT | Performed by: FAMILY MEDICINE

## 2023-09-26 PROCEDURE — 96365 THER/PROPH/DIAG IV INF INIT: CPT

## 2023-09-26 PROCEDURE — 82803 BLOOD GASES ANY COMBINATION: CPT

## 2023-09-26 PROCEDURE — 85025 COMPLETE CBC W/AUTO DIFF WBC: CPT

## 2023-09-26 PROCEDURE — 2580000003 HC RX 258

## 2023-09-26 PROCEDURE — 3078F DIAST BP <80 MM HG: CPT | Performed by: FAMILY MEDICINE

## 2023-09-26 PROCEDURE — 3023F SPIROM DOC REV: CPT | Performed by: FAMILY MEDICINE

## 2023-09-26 PROCEDURE — 82435 ASSAY OF BLOOD CHLORIDE: CPT

## 2023-09-26 PROCEDURE — 2500000003 HC RX 250 WO HCPCS

## 2023-09-26 PROCEDURE — G8417 CALC BMI ABV UP PARAM F/U: HCPCS | Performed by: FAMILY MEDICINE

## 2023-09-26 PROCEDURE — 36600 WITHDRAWAL OF ARTERIAL BLOOD: CPT

## 2023-09-26 PROCEDURE — 80053 COMPREHEN METABOLIC PANEL: CPT

## 2023-09-26 PROCEDURE — 96367 TX/PROPH/DG ADDL SEQ IV INF: CPT

## 2023-09-26 PROCEDURE — 94761 N-INVAS EAR/PLS OXIMETRY MLT: CPT

## 2023-09-26 PROCEDURE — 85014 HEMATOCRIT: CPT

## 2023-09-26 PROCEDURE — 93005 ELECTROCARDIOGRAM TRACING: CPT

## 2023-09-26 PROCEDURE — 99215 OFFICE O/P EST HI 40 MIN: CPT | Performed by: FAMILY MEDICINE

## 2023-09-26 PROCEDURE — 83880 ASSAY OF NATRIURETIC PEPTIDE: CPT

## 2023-09-26 PROCEDURE — 99285 EMERGENCY DEPT VISIT HI MDM: CPT

## 2023-09-26 PROCEDURE — 36415 COLL VENOUS BLD VENIPUNCTURE: CPT

## 2023-09-26 PROCEDURE — 96375 TX/PRO/DX INJ NEW DRUG ADDON: CPT

## 2023-09-26 PROCEDURE — 2700000000 HC OXYGEN THERAPY PER DAY

## 2023-09-26 PROCEDURE — 87040 BLOOD CULTURE FOR BACTERIA: CPT

## 2023-09-26 PROCEDURE — 4004F PT TOBACCO SCREEN RCVD TLK: CPT | Performed by: FAMILY MEDICINE

## 2023-09-26 RX ORDER — IPRATROPIUM BROMIDE AND ALBUTEROL SULFATE 2.5; .5 MG/3ML; MG/3ML
1 SOLUTION RESPIRATORY (INHALATION) ONCE
Status: COMPLETED | OUTPATIENT
Start: 2023-09-26 | End: 2023-09-26

## 2023-09-26 RX ORDER — 0.9 % SODIUM CHLORIDE 0.9 %
1000 INTRAVENOUS SOLUTION INTRAVENOUS ONCE
Status: COMPLETED | OUTPATIENT
Start: 2023-09-26 | End: 2023-09-26

## 2023-09-26 RX ORDER — IPRATROPIUM BROMIDE AND ALBUTEROL SULFATE 2.5; .5 MG/3ML; MG/3ML
1 SOLUTION RESPIRATORY (INHALATION)
Status: DISCONTINUED | OUTPATIENT
Start: 2023-09-27 | End: 2023-09-27

## 2023-09-26 RX ORDER — MAGNESIUM SULFATE IN WATER 40 MG/ML
2000 INJECTION, SOLUTION INTRAVENOUS ONCE
Status: COMPLETED | OUTPATIENT
Start: 2023-09-26 | End: 2023-09-26

## 2023-09-26 RX ORDER — FUROSEMIDE 10 MG/ML
20 INJECTION INTRAMUSCULAR; INTRAVENOUS 2 TIMES DAILY
Status: DISCONTINUED | OUTPATIENT
Start: 2023-09-27 | End: 2023-09-28

## 2023-09-26 RX ORDER — ACETAMINOPHEN 650 MG/1
650 SUPPOSITORY RECTAL EVERY 6 HOURS PRN
Status: DISCONTINUED | OUTPATIENT
Start: 2023-09-26 | End: 2023-09-29 | Stop reason: HOSPADM

## 2023-09-26 RX ORDER — ACETAMINOPHEN 325 MG/1
650 TABLET ORAL EVERY 6 HOURS PRN
Status: DISCONTINUED | OUTPATIENT
Start: 2023-09-26 | End: 2023-09-29 | Stop reason: HOSPADM

## 2023-09-26 RX ORDER — ONDANSETRON 2 MG/ML
4 INJECTION INTRAMUSCULAR; INTRAVENOUS EVERY 6 HOURS PRN
Status: DISCONTINUED | OUTPATIENT
Start: 2023-09-26 | End: 2023-09-29 | Stop reason: HOSPADM

## 2023-09-26 RX ORDER — ONDANSETRON 4 MG/1
4 TABLET, ORALLY DISINTEGRATING ORAL EVERY 8 HOURS PRN
Status: DISCONTINUED | OUTPATIENT
Start: 2023-09-26 | End: 2023-09-29 | Stop reason: HOSPADM

## 2023-09-26 RX ORDER — POLYETHYLENE GLYCOL 3350 17 G/17G
17 POWDER, FOR SOLUTION ORAL DAILY PRN
Status: DISCONTINUED | OUTPATIENT
Start: 2023-09-26 | End: 2023-09-29 | Stop reason: HOSPADM

## 2023-09-26 RX ORDER — PREDNISONE 10 MG/1
40 TABLET ORAL DAILY
Status: DISCONTINUED | OUTPATIENT
Start: 2023-09-29 | End: 2023-09-27

## 2023-09-26 RX ADMIN — SODIUM CHLORIDE 1000 ML: 9 INJECTION, SOLUTION INTRAVENOUS at 19:24

## 2023-09-26 RX ADMIN — CEFTRIAXONE SODIUM 1000 MG: 1 INJECTION, POWDER, FOR SOLUTION INTRAMUSCULAR; INTRAVENOUS at 19:26

## 2023-09-26 RX ADMIN — MAGNESIUM SULFATE HEPTAHYDRATE 2000 MG: 40 INJECTION, SOLUTION INTRAVENOUS at 18:51

## 2023-09-26 RX ADMIN — METHYLPREDNISOLONE SODIUM SUCCINATE 40 MG: 40 INJECTION, POWDER, FOR SOLUTION INTRAMUSCULAR; INTRAVENOUS at 23:23

## 2023-09-26 RX ADMIN — IPRATROPIUM BROMIDE AND ALBUTEROL SULFATE 1 DOSE: 2.5; .5 SOLUTION RESPIRATORY (INHALATION) at 14:30

## 2023-09-26 RX ADMIN — DOXYCYCLINE 100 MG: 100 INJECTION, POWDER, LYOPHILIZED, FOR SOLUTION INTRAVENOUS at 20:05

## 2023-09-26 RX ADMIN — METHYLPREDNISOLONE SODIUM SUCCINATE 125 MG: 125 INJECTION, POWDER, FOR SOLUTION INTRAMUSCULAR; INTRAVENOUS at 18:48

## 2023-09-26 ASSESSMENT — ENCOUNTER SYMPTOMS
WHEEZING: 1
VOMITING: 0
DIARRHEA: 0
SORE THROAT: 0
COUGH: 1
RHINORRHEA: 0
SHORTNESS OF BREATH: 1
STRIDOR: 0
NAUSEA: 0
DIARRHEA: 0
EYE DISCHARGE: 0
ABDOMINAL PAIN: 0
WHEEZING: 0
ABDOMINAL PAIN: 0
PHOTOPHOBIA: 0
EYE REDNESS: 0
COUGH: 1
CHEST TIGHTNESS: 0
VOMITING: 0
NAUSEA: 0
SHORTNESS OF BREATH: 1

## 2023-09-26 ASSESSMENT — LIFESTYLE VARIABLES: HOW OFTEN DO YOU HAVE A DRINK CONTAINING ALCOHOL: MONTHLY OR LESS

## 2023-09-26 ASSESSMENT — PAIN - FUNCTIONAL ASSESSMENT: PAIN_FUNCTIONAL_ASSESSMENT: NONE - DENIES PAIN

## 2023-09-26 NOTE — ED NOTES
Pt and wife at bedside deny any additional needs at this time. Call light remains within reach.       Segundo Ding RN  09/26/23 1223

## 2023-09-26 NOTE — PROGRESS NOTES
[address]  Dept: [phone]  Dept Fax: [phone]: [phone]     9/26/2023    Visit type: Follow up    Reason for Visit: Chest Congestion (Pt states he was coughing up with green phlegm overnight that started last 3 days ago. States his chest feels sore from coughing and has trouble of breathing. Denies any chest pain. He had covid test done at home, came out negative. )         Patient: Mega Morejon is a 79 y.o. male     HPI: 79-year-old male with past medical history of COPD presents with fever, fatigue, productive cough, chest congestion. Patient states he has had trouble breathing/shortness of breath and denies any chest pain. Patient completed a COVID test at home which was negative. Patient was admitted approximately 9 to 10 weeks ago for pneumonia of the right lower lobe. Patient states over the last 3 days this has been worsening and causing significant discomfort especially at night when he is attempting to lay down. Patient has tried acetaminophen sporadically. Patient states he does have a home nebulizer though does not use regularly. All questions were answered. ASSESSMENT/PLAN   1. Pneumonia of both lower lobes due to infectious organism  -Patient received DuoNeb during the visit, oxygen during the visit, chest x-ray was reviewed by me and discussed with the patient which did show bilateral lower lobe pneumonia. -PSI/port score was risk class IV  -Lengthy discussion about home treatment option versus going to the hospital, patient did not have significant improvement after administration of DuoNeb or oxygen. With the PSI score being elevated and patient having fever in addition to COPD exacerbation and bilateral lower lobe pneumonia I advised patient to go to the emergency department and patient decided that it was best decision.   -O2 treatment given in

## 2023-09-27 LAB
ANION GAP SERPL CALCULATED.3IONS-SCNC: 9 MEQ/L (ref 9–15)
B PARAP IS1001 DNA NPH QL NAA+NON-PROBE: NOT DETECTED
B PERT.PT PRMT NPH QL NAA+NON-PROBE: NOT DETECTED
BASOPHILS # BLD: 0 K/UL (ref 0–0.2)
BASOPHILS NFR BLD: 0.1 %
BUN SERPL-MCNC: 25 MG/DL (ref 8–23)
C PNEUM DNA NPH QL NAA+NON-PROBE: NOT DETECTED
CALCIUM SERPL-MCNC: 8.9 MG/DL (ref 8.5–9.9)
CHLORIDE SERPL-SCNC: 103 MEQ/L (ref 95–107)
CO2 SERPL-SCNC: 27 MEQ/L (ref 20–31)
CREAT SERPL-MCNC: 1.02 MG/DL (ref 0.7–1.2)
EKG ATRIAL RATE: 100 BPM
EKG Q-T INTERVAL: 350 MS
EKG QRS DURATION: 110 MS
EKG QTC CALCULATION (BAZETT): 430 MS
EKG R AXIS: 44 DEGREES
EKG T AXIS: 250 DEGREES
EKG VENTRICULAR RATE: 91 BPM
EOSINOPHIL # BLD: 0 K/UL (ref 0–0.7)
EOSINOPHIL NFR BLD: 0 %
ERYTHROCYTE [DISTWIDTH] IN BLOOD BY AUTOMATED COUNT: 13.8 % (ref 11.5–14.5)
FLUAV RNA NPH QL NAA+NON-PROBE: NOT DETECTED
FLUBV RNA NPH QL NAA+NON-PROBE: NOT DETECTED
GLUCOSE SERPL-MCNC: 184 MG/DL (ref 70–99)
HADV DNA NPH QL NAA+NON-PROBE: NOT DETECTED
HCOV 229E RNA NPH QL NAA+NON-PROBE: NOT DETECTED
HCOV HKU1 RNA NPH QL NAA+NON-PROBE: NOT DETECTED
HCOV NL63 RNA NPH QL NAA+NON-PROBE: NOT DETECTED
HCOV OC43 RNA NPH QL NAA+NON-PROBE: NOT DETECTED
HCT VFR BLD AUTO: 39.1 % (ref 42–52)
HGB BLD-MCNC: 12.7 G/DL (ref 14–18)
HMPV RNA NPH QL NAA+NON-PROBE: NOT DETECTED
HPIV1 RNA NPH QL NAA+NON-PROBE: NOT DETECTED
HPIV2 RNA NPH QL NAA+NON-PROBE: NOT DETECTED
HPIV3 RNA NPH QL NAA+NON-PROBE: NOT DETECTED
HPIV4 RNA NPH QL NAA+NON-PROBE: NOT DETECTED
LYMPHOCYTES # BLD: 0.3 K/UL (ref 1–4.8)
LYMPHOCYTES NFR BLD: 3.8 %
M PNEUMO DNA NPH QL NAA+NON-PROBE: NOT DETECTED
MCH RBC QN AUTO: 30.9 PG (ref 27–31.3)
MCHC RBC AUTO-ENTMCNC: 32.5 % (ref 33–37)
MCV RBC AUTO: 95.1 FL (ref 79–92.2)
MONOCYTES # BLD: 0.2 K/UL (ref 0.2–0.8)
MONOCYTES NFR BLD: 2.3 %
NEUTROPHILS # BLD: 7.3 K/UL (ref 1.4–6.5)
NEUTS SEG NFR BLD: 92.8 %
PLATELET # BLD AUTO: 118 K/UL (ref 130–400)
POTASSIUM SERPL-SCNC: 3.8 MEQ/L (ref 3.4–4.9)
PROCALCITONIN SERPL IA-MCNC: 0.37 NG/ML (ref 0–0.15)
RBC # BLD AUTO: 4.11 M/UL (ref 4.7–6.1)
RSV RNA NPH QL NAA+NON-PROBE: NOT DETECTED
RV+EV RNA NPH QL NAA+NON-PROBE: NOT DETECTED
SARS-COV-2 RNA NPH QL NAA+NON-PROBE: NOT DETECTED
SODIUM SERPL-SCNC: 139 MEQ/L (ref 135–144)
TROPONIN T SERPL-MCNC: <0.01 NG/ML (ref 0–0.01)
TROPONIN T SERPL-MCNC: <0.01 NG/ML (ref 0–0.01)
WBC # BLD AUTO: 7.8 K/UL (ref 4.8–10.8)

## 2023-09-27 PROCEDURE — 36415 COLL VENOUS BLD VENIPUNCTURE: CPT

## 2023-09-27 PROCEDURE — 99223 1ST HOSP IP/OBS HIGH 75: CPT | Performed by: INTERNAL MEDICINE

## 2023-09-27 PROCEDURE — 6370000000 HC RX 637 (ALT 250 FOR IP): Performed by: INTERNAL MEDICINE

## 2023-09-27 PROCEDURE — 84145 PROCALCITONIN (PCT): CPT

## 2023-09-27 PROCEDURE — 85025 COMPLETE CBC W/AUTO DIFF WBC: CPT

## 2023-09-27 PROCEDURE — 6360000002 HC RX W HCPCS

## 2023-09-27 PROCEDURE — 94667 MNPJ CHEST WALL 1ST: CPT

## 2023-09-27 PROCEDURE — 6370000000 HC RX 637 (ALT 250 FOR IP)

## 2023-09-27 PROCEDURE — 0202U NFCT DS 22 TRGT SARS-COV-2: CPT

## 2023-09-27 PROCEDURE — 2500000003 HC RX 250 WO HCPCS

## 2023-09-27 PROCEDURE — 2700000000 HC OXYGEN THERAPY PER DAY

## 2023-09-27 PROCEDURE — 80048 BASIC METABOLIC PNL TOTAL CA: CPT

## 2023-09-27 PROCEDURE — 84484 ASSAY OF TROPONIN QUANT: CPT

## 2023-09-27 PROCEDURE — 94640 AIRWAY INHALATION TREATMENT: CPT

## 2023-09-27 PROCEDURE — 2580000003 HC RX 258

## 2023-09-27 PROCEDURE — 1210000000 HC MED SURG R&B

## 2023-09-27 PROCEDURE — 94761 N-INVAS EAR/PLS OXIMETRY MLT: CPT

## 2023-09-27 PROCEDURE — 93010 ELECTROCARDIOGRAM REPORT: CPT | Performed by: INTERNAL MEDICINE

## 2023-09-27 RX ORDER — IPRATROPIUM BROMIDE AND ALBUTEROL SULFATE 2.5; .5 MG/3ML; MG/3ML
1 SOLUTION RESPIRATORY (INHALATION)
Status: DISCONTINUED | OUTPATIENT
Start: 2023-09-28 | End: 2023-09-29 | Stop reason: HOSPADM

## 2023-09-27 RX ORDER — ROSUVASTATIN CALCIUM 10 MG/1
10 TABLET, COATED ORAL DAILY
Status: DISCONTINUED | OUTPATIENT
Start: 2023-09-27 | End: 2023-09-27

## 2023-09-27 RX ORDER — GUAIFENESIN 600 MG/1
1200 TABLET, EXTENDED RELEASE ORAL 2 TIMES DAILY
Status: DISCONTINUED | OUTPATIENT
Start: 2023-09-27 | End: 2023-09-29 | Stop reason: HOSPADM

## 2023-09-27 RX ORDER — BENZONATATE 100 MG/1
100 CAPSULE ORAL 3 TIMES DAILY PRN
Status: DISCONTINUED | OUTPATIENT
Start: 2023-09-27 | End: 2023-09-29 | Stop reason: HOSPADM

## 2023-09-27 RX ORDER — ROSUVASTATIN CALCIUM 10 MG/1
10 TABLET, COATED ORAL DAILY
Status: DISCONTINUED | OUTPATIENT
Start: 2023-09-27 | End: 2023-09-29 | Stop reason: HOSPADM

## 2023-09-27 RX ORDER — SODIUM CHLORIDE AND POTASSIUM CHLORIDE 150; 450 MG/100ML; MG/100ML
INJECTION, SOLUTION INTRAVENOUS CONTINUOUS
Status: DISCONTINUED | OUTPATIENT
Start: 2023-09-27 | End: 2023-09-27

## 2023-09-27 RX ORDER — METOPROLOL SUCCINATE 50 MG/1
50 TABLET, EXTENDED RELEASE ORAL DAILY
Status: DISCONTINUED | OUTPATIENT
Start: 2023-09-27 | End: 2023-09-28

## 2023-09-27 RX ORDER — IPRATROPIUM BROMIDE AND ALBUTEROL SULFATE 2.5; .5 MG/3ML; MG/3ML
1 SOLUTION RESPIRATORY (INHALATION)
Status: DISCONTINUED | OUTPATIENT
Start: 2023-09-27 | End: 2023-09-27

## 2023-09-27 RX ORDER — MECOBALAMIN 5000 MCG
5 TABLET,DISINTEGRATING ORAL NIGHTLY
Status: DISCONTINUED | OUTPATIENT
Start: 2023-09-27 | End: 2023-09-29 | Stop reason: HOSPADM

## 2023-09-27 RX ORDER — IPRATROPIUM BROMIDE AND ALBUTEROL SULFATE 2.5; .5 MG/3ML; MG/3ML
1 SOLUTION RESPIRATORY (INHALATION) EVERY 4 HOURS PRN
Status: DISCONTINUED | OUTPATIENT
Start: 2023-09-27 | End: 2023-09-29 | Stop reason: HOSPADM

## 2023-09-27 RX ORDER — ESCITALOPRAM OXALATE 10 MG/1
10 TABLET ORAL DAILY
Status: DISCONTINUED | OUTPATIENT
Start: 2023-09-27 | End: 2023-09-29 | Stop reason: HOSPADM

## 2023-09-27 RX ORDER — ASPIRIN 81 MG/1
81 TABLET, CHEWABLE ORAL DAILY
Status: DISCONTINUED | OUTPATIENT
Start: 2023-09-27 | End: 2023-09-29 | Stop reason: HOSPADM

## 2023-09-27 RX ORDER — DILTIAZEM HYDROCHLORIDE 180 MG/1
360 CAPSULE, COATED, EXTENDED RELEASE ORAL DAILY
Status: DISCONTINUED | OUTPATIENT
Start: 2023-09-27 | End: 2023-09-29 | Stop reason: HOSPADM

## 2023-09-27 RX ORDER — PREDNISONE 10 MG/1
40 TABLET ORAL DAILY
Status: DISCONTINUED | OUTPATIENT
Start: 2023-09-28 | End: 2023-09-29 | Stop reason: HOSPADM

## 2023-09-27 RX ORDER — CLOPIDOGREL BISULFATE 75 MG/1
75 TABLET ORAL DAILY
Status: DISCONTINUED | OUTPATIENT
Start: 2023-09-27 | End: 2023-09-29 | Stop reason: HOSPADM

## 2023-09-27 RX ADMIN — ESCITALOPRAM OXALATE 10 MG: 10 TABLET ORAL at 10:30

## 2023-09-27 RX ADMIN — METOPROLOL SUCCINATE 50 MG: 50 TABLET, EXTENDED RELEASE ORAL at 10:29

## 2023-09-27 RX ADMIN — FUROSEMIDE 20 MG: 10 INJECTION, SOLUTION INTRAMUSCULAR; INTRAVENOUS at 18:18

## 2023-09-27 RX ADMIN — GUAIFENESIN 1200 MG: 600 TABLET ORAL at 10:30

## 2023-09-27 RX ADMIN — DOXYCYCLINE 100 MG: 100 INJECTION, POWDER, LYOPHILIZED, FOR SOLUTION INTRAVENOUS at 10:43

## 2023-09-27 RX ADMIN — GUAIFENESIN 1200 MG: 600 TABLET ORAL at 05:45

## 2023-09-27 RX ADMIN — IPRATROPIUM BROMIDE AND ALBUTEROL SULFATE 1 DOSE: 2.5; .5 SOLUTION RESPIRATORY (INHALATION) at 13:54

## 2023-09-27 RX ADMIN — GUAIFENESIN 1200 MG: 600 TABLET ORAL at 21:06

## 2023-09-27 RX ADMIN — ROSUVASTATIN CALCIUM 10 MG: 10 TABLET, FILM COATED ORAL at 21:06

## 2023-09-27 RX ADMIN — DILTIAZEM HYDROCHLORIDE 360 MG: 180 CAPSULE, COATED, EXTENDED RELEASE ORAL at 10:29

## 2023-09-27 RX ADMIN — METHYLPREDNISOLONE SODIUM SUCCINATE 40 MG: 40 INJECTION, POWDER, FOR SOLUTION INTRAMUSCULAR; INTRAVENOUS at 05:46

## 2023-09-27 RX ADMIN — Medication 5 MG: at 23:30

## 2023-09-27 RX ADMIN — IPRATROPIUM BROMIDE AND ALBUTEROL SULFATE 1 DOSE: 2.5; .5 SOLUTION RESPIRATORY (INHALATION) at 20:11

## 2023-09-27 RX ADMIN — CEFTRIAXONE SODIUM 1000 MG: 1 INJECTION, POWDER, FOR SOLUTION INTRAMUSCULAR; INTRAVENOUS at 21:07

## 2023-09-27 RX ADMIN — CLOPIDOGREL BISULFATE 75 MG: 75 TABLET ORAL at 10:29

## 2023-09-27 RX ADMIN — BENZONATATE 100 MG: 100 CAPSULE ORAL at 22:16

## 2023-09-27 RX ADMIN — IPRATROPIUM BROMIDE AND ALBUTEROL SULFATE 1 DOSE: 2.5; .5 SOLUTION RESPIRATORY (INHALATION) at 17:57

## 2023-09-27 RX ADMIN — FUROSEMIDE 20 MG: 10 INJECTION, SOLUTION INTRAMUSCULAR; INTRAVENOUS at 10:30

## 2023-09-27 RX ADMIN — ASPIRIN 81 MG 81 MG: 81 TABLET ORAL at 10:29

## 2023-09-27 RX ADMIN — DOXYCYCLINE 100 MG: 100 INJECTION, POWDER, LYOPHILIZED, FOR SOLUTION INTRAVENOUS at 21:40

## 2023-09-27 RX ADMIN — IPRATROPIUM BROMIDE AND ALBUTEROL SULFATE 1 DOSE: 2.5; .5 SOLUTION RESPIRATORY (INHALATION) at 07:38

## 2023-09-27 ASSESSMENT — ENCOUNTER SYMPTOMS
BACK PAIN: 0
SORE THROAT: 0
ABDOMINAL PAIN: 0
COUGH: 1
VOMITING: 0
TROUBLE SWALLOWING: 0
NAUSEA: 0
CHEST TIGHTNESS: 0
ABDOMINAL DISTENTION: 0
CONSTIPATION: 0
PHOTOPHOBIA: 0
DIARRHEA: 0
RHINORRHEA: 0
SHORTNESS OF BREATH: 1
COLOR CHANGE: 0

## 2023-09-27 NOTE — CONSULTS
Inpatient consult to Pulmonology  Consult performed by: Lorena Elliott MD  Consult ordered by: ANNETTA Chaidez - YUNIOR            Admit Date: 9/26/2023    PCP:  Kim Dorsey MD    CHIEF COMPLAINT: SOB    HPI:  The patient is a 79 y.o. male with significant past medical history of COPD, CAD, HTN and A-fib who presents with shortness of breath and cough for few days. He was sent by his PCP due to presumed pneumonia. He was hypoxic in PCPs office. He was placed on oxygen and sent to ER. He has been c noticing increased sputum for few days. Denies any fever or chills. Had a fever 102.4 at PCPs office. Has been using nebulizers more often. Given steroids, antibiotics and bronchodilators in the ER and admitted to the hospital.    Chest x-ray in ER which I reviewed personally and results interpreted  independently. There is dense infiltrates more prominent on the right lower lobe.     Past Medical History:      Diagnosis Date    Atrial fibrillation (HCC)     CAD (coronary artery disease)     COPD exacerbation ([address]) 07/31/2022    Depressive disorder 3/10/2022    Hematuria     High cholesterol     History of bladder cancer     Hyperlipidemia     Hypertension     Malignant neoplasm of urinary bladder ([address]) 01/27/2020    Presence of Watchman left atrial appendage closure device     S/P AAA repair     Spondylosis of lumbar region without myelopathy or radiculopathy     Stage 3a chronic kidney disease ([address]) 9/26/2023        Past Surgical History:        Procedure Laterality Date    ABDOMINAL AORTIC ANEURYSM REPAIR      ATRIAL ABLATION SURGERY      watchman device    CARDIAC SURGERY  05/01/2023    COLONOSCOPY N/A 05/26/2020    COLONOSCOPY DIAGNOSTIC [facility] Welch [facility] [address] [facility] 02/11/2020    LEFT LOWER EXTREMITY REVASCULARIZATION performed by Nallely Mittal MD at [address] [facility] [place] SCRN NOT HI [address] IND N/A 04/24/2017    COLONOSCOPY performed by

## 2023-09-27 NOTE — CARE COORDINATION
Advance Care Planning   Healthcare Decision Maker:    Primary Decision Maker: Veronica Ji - [phone]    Secondary Decision Maker: Anuj Ramon - Maurice - [phone]    Click here to complete Healthcare Decision Makers including selection of the Healthcare Decision Maker Relationship (ie \"Primary\"). Today we documented Decision Maker(s) consistent with ACP documents on file.    Patient confirms wife remains the HCDM as per ACP documents

## 2023-09-27 NOTE — CARE COORDINATION
Case Management Assessment  Initial Evaluation    Date/Time of Evaluation: 9/27/2023 11:18 AM  Assessment Completed by: Jn Cobb    If patient is discharged prior to next notation, then this note serves as note for discharge by case management. Patient Name: Shahrzad Beaver. YOB: 1940  Diagnosis: COPD exacerbation (HCC) [J44.1]  Acute respiratory failure, unspecified whether with hypoxia or hypercapnia ([address]) [J96.00]  Community acquired pneumonia of both lower lobes [J18.9]                   Date / Time: 9/26/2023  6:17 PM    Patient Admission Status: Inpatient   Readmission Risk (Low < 19, Mod (19-27), High > 27): Readmission Risk Score: 11.4    Current PCP: Lenin Dale MD  PCP verified by CM? Yes    Chart Reviewed: Yes      History Provided by: Patient  Patient Orientation: Alert and Oriented, Person, Place, Situation, Self    Patient Cognition: Alert    Hospitalization in the last 30 days (Readmission):  No    If yes, Readmission Assessment in CM Navigator will be completed. Advance Directives:      Code Status: Full Code   Patient's Primary Decision Maker is: Named in Scanned ACP Document    Primary Decision Maker: Paty Banegas - Spouse - [phone]    Secondary Decision Maker: BatesCorinne - Child - [phone]    Discharge Planning:    Patient lives with: Spouse/Significant Other Type of Home: House  Primary Care Giver: Self  Patient Support Systems include: Spouse/Significant Other   Current Financial resources:    Current community resources:    Current services prior to admission: C-pap            Current DME:              Type of Home Care services:  None    ADLS  Prior functional level: Independent in ADLs/IADLs  Current functional level: Independent in ADLs/IADLs    PT AM-PAC:   /24  OT AM-PAC:   /24    Family can provide assistance at DC:  Yes  Would you like Case Management to discuss the discharge plan with any other family members/significant others, and if so, who? No  Plans to Return to Present Housing: Yes  Other Identified Issues/Barriers to RETURNING to current housing: none  Potential Assistance needed at discharge: N/A            Potential DME:    Patient expects to discharge to: [address] [place] [address] for transportation at discharge:      Financial    Payor: [address] [place] [address] / Plan: Jeremy Garcia / Product Type: *No Product type* /     Does insurance require precert for SNF: Yes    Potential assistance Purchasing Medications: No  Meds-to-Beds request: Yes      [facility] ([place]) [address], [address] [phone] - F [phone]  [facility] [place] [address]  Phone: [phone] Fax: [phone] - [place], [address] [facility] [address] [facility] [phone] - f [phone]  [address] [address]  Phone: [phone] Fax: [phone]      Notes:    Factors facilitating achievement of predicted outcomes: Family support, Motivated, Cooperative, and Pleasant    Barriers to discharge: medical complications    Additional Case Management Notes: Patient from home with spouse, independent with ADLS, denies use of assistive devices. Patient uses a nebulizer at home PRN. Patient denies [facility]. or other current services. PCP confirmed, patient denies problems obtaining medications, takes as prescribed. Patient still drives. No discharge needs identified at this time. The Plan for Transition of Care is related to the following treatment goals of COPD exacerbation ([address]) [J44.1]  Acute respiratory failure, unspecified whether with hypoxia or hypercapnia ([address]) [J96.00]  Community acquired pneumonia of both lower lobes [M67.3]    IF APPLICABLE: The Patient and/or patient representative Richi Sanabria and his family were provided with a choice of provider and agrees with the discharge plan.  Freedom of choice list with basic dialogue that supports the patient's individualized plan

## 2023-09-27 NOTE — PROGRESS NOTES
09/27/23 0118   RT Protocol   History Pulmonary Disease 2   Respiratory pattern 0   Breath sounds 6   Cough 0   Indications for Bronchodilator Therapy Wheezing associated with pulm disorder   Bronchodilator Assessment Score 8

## 2023-09-27 NOTE — CARE COORDINATION
Definition of COPD discussed. Lung function explained. Types of COPD differentiated for patient. Symptoms discussed including: difficulty breathing, SOB, coughing, excess mucous, weakness and fatigue, or wheezing. Causes discussed. Pt smoking, and has been exposed to air pollution or dust.   Different testing for COPD and most common treatments reviewed. Importance of preventing infection including hand hygiene, keeping inhaler mouthpieces clean, and getting the flu and pneumonia vaccinations. Care Map of what to expect while hospitalized reviewed with patient. Ways to ease SOB explained including pursed lip breathing and diaphragmatic breathing. Energy conservation discussed. Possible medications that may be ordered were reviewed. I stressed that their physician would make that decision and explained the importance of taking the medication as directed. Good nutrition choices discussed. COPD booklet and zone pamphlet left for patient review. Definition of pneumonia discussed. Causes of different types of pneumonia reviewed. Symptoms discussed and pt does understand that they may have some or all of these symptoms. Testing to diagnose pneumonia reviewed as well as possible treatments. Importance of avoiding infections discussed including: taking medication as directed, washing hands, disposing of used tissues, getting the pneumonia and flu vaccines, and avoiding others who are ill. Importance of not smoking and to avoid others who may be smoking around patient stressed. Pneumonia Zones also reviewed. \"Green\" zone is the goal, \"Yellow\" zone means to call the doctor, and \"Red\" zone means to call the doctor ASAP or call 911. Copies of Pneumonia booklet and Zone Pamphlet given to patient. Offer for patient to express any concerns or questions. Pt denies having further questions at this time.     Electronically signed by Nori Self RN on 9/27/2023 at 3:50 PM

## 2023-09-27 NOTE — ED NOTES
Pt and wife at bedside deny any needs at this time. Updated on possibility of having to be an ER hold. Pt and wife deny any additional questions at this time.       Chasity Estes RN  09/26/23 [id number]

## 2023-09-27 NOTE — H&P
DEPARTMENT OF HOSPITAL MEDICINE    HISTORY AND PHYSICAL EXAM    PATIENT NAME:  Moreno Maher Sr. MRN:  [id number]  SERVICE DATE:  9/26/2023   SERVICE TIME:  10:06 PM    Primary Care Physician: Ina Castillo MD     SUBJECTIVE  CHIEF COMPLAINT:  Shortness of Breath (Sent here from primary care for pneumonia )       SHIRLEY Elizonod is a 79 y.o., White (non-) male, who  presents to the emergency department with chief complaint of Shortness of Breath (Sent here from primary care for pneumonia )    Patient was sent in from PCP today for presumed pneumonia. Patient is 90% on room air and was placed on 4L in ED. He has had increased phlegm x 3 days, green in color. He also reports SOB. Previous admission for pneumonia in right lower lobe 10 weeks ago. He does have home nebulizers but they are not helping at this time. While at PCP she had a temp of 102.4. Orthopnea and occasional chest tightness since symptoms began. He states he has worked in [employer] breathing in hazardous chemicals. He is not on home oxygen. Saturday he states he was having chills and then hot flashes. While in ED she had 125 solumedrol, 100 mg doxycycline, 1GM Rocephin, 3GM magnesium and 1 L ivf bolus. The primary encounter diagnosis was Acute respiratory failure, unspecified whether with hypoxia or hypercapnia ([address]). A diagnosis of Community acquired pneumonia of both lower lobes was also pertinent to this visit.       PAST MEDICAL HISTORY:    Past Medical History:   Diagnosis Date    Atrial fibrillation ([address])     CAD (coronary artery disease)     COPD exacerbation ([address]) 07/31/2022    Depressive disorder 3/10/2022    Hematuria     High cholesterol     History of bladder cancer     Hyperlipidemia     Hypertension     Malignant neoplasm of urinary bladder ([address]) 01/27/2020    Presence of Watchman left atrial appendage closure device     S/P AAA repair     Spondylosis of lumbar region without myelopathy or No focal deficit present. Mental Status: He is alert and oriented to person, place, and time. Mental status is at baseline. Sensory: Sensation is intact. Motor: Motor function is intact. No weakness. Psychiatric:         Attention and Perception: Attention and perception normal.         Mood and Affect: Mood and affect normal.         Speech: Speech normal.         Behavior: Behavior normal. Behavior is cooperative. Thought Content: Thought content normal.         Cognition and Memory: Cognition and memory normal.         Judgment: Judgment normal.       Neurologic Exam     Mental Status   Oriented to person, place, and time. Speech: speech is normal        DATA:     Diagnostic tests reviewed for today's visit:    Most recent labs and imaging results reviewed.      LABS:    Abnormal Labs Reviewed   CBC WITH AUTO DIFFERENTIAL - Abnormal; Notable for the following components:       Result Value    WBC 12.4 (*)     RBC 4.42 (*)     Hemoglobin 13.6 (*)     MCV 96.2 (*)     MCHC 32.0 (*)     Platelets 348 (*)     Neutrophils Absolute 10.9 (*)     Lymphocytes Absolute 0.4 (*)     Monocytes Absolute 0.9 (*)     All other components within normal limits   PROCALCITONIN - Abnormal; Notable for the following components:    Procalcitonin 0.55 (*)     All other components within normal limits    Narrative:     Saulo Patterson tel. [phone],  Chemistry results called to and read back by Jeovany jon, 09/26/2023 19:42, by  [address] [place] [address] - Abnormal; Notable for the following components:    Glucose 173 (*)     BUN 24 (*)     Total Bilirubin 2.1 (*)     All other components within normal limits    Narrative:     CALL  [facility] tel. [phone],  Chemistry results called to and read back by Jeovany jon, 09/26/2023 19:42, by  [facility]   TROPONIN - Abnormal; Notable for the following components:    Troponin 0.011 (*)     All other components within normal limits    Narrative:     Saulo Patterson

## 2023-09-27 NOTE — CONSULTS
Anion Gap 9 9 - 15 mEq/L    Glucose 184 (H) 70 - 99 mg/dL    BUN 25 (H) 8 - 23 mg/dL    Creatinine 1.02 0.70 - 1.20 mg/dL    Est Gloconsuelo Filt Rate >60.0 >60    Calcium 8.9 8.5 - 9.9 mg/dL       EC2023   Atrial fibrillation  ST & T wave abnormality, consider lateral ischemia     ECHO:   2023  LVEF 55-60 %   Dilated left atrium.      ===================    [facility] Office Note 2023 Dr. Thi Anderson   History of Present Illness  Patient with multiple comorbidities as listed below. Blood pressure is at target. He underwent implantation of Watchman device and presents for follow-up. Continues to complain of fatigue. Has a moist cough, without fever or chills, patient believes this is related to allergies. Patient is on 2 AV mariela blocking agents for rate control of atrial fibrillation. Post watchman echo report showed LVEF of 55 to 60%, and no pericardial effusion. Patient is currently off anticoagulation. He is on dual antiplatelet therapy. He continues to smoke. History so far:    1. Hypertension  2. Permanent atrial fibrillation on rate control strategy, with some concern for sinus node dysfunction. No indication for permanent pacemaker as of now, except that patient does have exertional shortness of breath. In this patient with COPD, exertional shortness of breath could be multifactorial. A component of it could be related to chronotropic blunting. Patient is not interested in pursuing EP evaluation or consideration for pacemaker at this time, and there are no absolute indications. He stopped his inhaler because it was prohibitively expensive, and he says the inhalers did not make any impact on his breathing. 3. History of abdominal aortic aneurysm status post endograft vascular stent graft  4. Hyperlipidemia  5. High risk medication-Xarelto  6. Current smoker-we discuss this at every visit.   7. BMI puts him in the overweight category-he does not need any aggressive dietary modification at this Oxalate 10 MG Oral Tablet TAKE 1 TABLET BY MOUTH EVERY DAY   Metoprolol Succinate ER 50 MG Oral Tablet Extended Release 24 Hour TAKE 1 TABLET DAILY. Mucinex DM  MG Oral Tablet Extended Release 12 Hour TAKE 1 TO 2 TABLETS EVERY 12 HOURS AS NEEDED.    PreserVision AREDS 2 Oral Capsule TAKE 1 CAPSULE Daily   Rosuvastatin Calcium 10 MG Oral Tablet take 1 tablet by mouth daily at bedtime       Bety Farris MD  [id number] [address] [facility]      Electronically signed on 9/27/23 at 8:48 AM EDT      -----

## 2023-09-28 LAB
ANION GAP SERPL CALCULATED.3IONS-SCNC: 13 MEQ/L (ref 9–15)
BACTERIA BLD CULT ORG #2: NORMAL
BACTERIA BLD CULT: NORMAL
BASOPHILS # BLD: 0 K/UL (ref 0–0.2)
BASOPHILS NFR BLD: 0.2 %
BUN SERPL-MCNC: 24 MG/DL (ref 8–23)
C-REACTIVE PROTEIN, HIGH SENSITIVITY: 151.6 MG/L (ref 0–5)
CALCIUM SERPL-MCNC: 8.8 MG/DL (ref 8.5–9.9)
CHLORIDE SERPL-SCNC: 101 MEQ/L (ref 95–107)
CO2 SERPL-SCNC: 27 MEQ/L (ref 20–31)
CREAT SERPL-MCNC: 0.93 MG/DL (ref 0.7–1.2)
EKG ATRIAL RATE: 122 BPM
EKG Q-T INTERVAL: 298 MS
EKG QRS DURATION: 112 MS
EKG QTC CALCULATION (BAZETT): 395 MS
EKG R AXIS: 8 DEGREES
EKG T AXIS: -8 DEGREES
EKG VENTRICULAR RATE: 106 BPM
EOSINOPHIL # BLD: 0 K/UL (ref 0–0.7)
EOSINOPHIL NFR BLD: 0 %
ERYTHROCYTE [DISTWIDTH] IN BLOOD BY AUTOMATED COUNT: 14 % (ref 11.5–14.5)
ERYTHROCYTE [SEDIMENTATION RATE] IN BLOOD BY WESTERGREN METHOD: 32 MM (ref 0–20)
GLUCOSE SERPL-MCNC: 154 MG/DL (ref 70–99)
HCT VFR BLD AUTO: 40.7 % (ref 42–52)
HGB BLD-MCNC: 13.1 G/DL (ref 14–18)
LYMPHOCYTES # BLD: 0.5 K/UL (ref 1–4.8)
LYMPHOCYTES NFR BLD: 2.8 %
MAGNESIUM SERPL-MCNC: 1.9 MG/DL (ref 1.7–2.4)
MCH RBC QN AUTO: 30.3 PG (ref 27–31.3)
MCHC RBC AUTO-ENTMCNC: 32.2 % (ref 33–37)
MCV RBC AUTO: 94.2 FL (ref 79–92.2)
MONOCYTES # BLD: 0.8 K/UL (ref 0.2–0.8)
MONOCYTES NFR BLD: 5.1 %
NEUTROPHILS # BLD: 15 K/UL (ref 1.4–6.5)
NEUTS SEG NFR BLD: 91.1 %
PLATELET # BLD AUTO: 144 K/UL (ref 130–400)
POTASSIUM SERPL-SCNC: 3.3 MEQ/L (ref 3.4–4.9)
PROCALCITONIN SERPL IA-MCNC: 0.25 NG/ML (ref 0–0.15)
RBC # BLD AUTO: 4.32 M/UL (ref 4.7–6.1)
SODIUM SERPL-SCNC: 141 MEQ/L (ref 135–144)
WBC # BLD AUTO: 16.5 K/UL (ref 4.8–10.8)

## 2023-09-28 PROCEDURE — 36415 COLL VENOUS BLD VENIPUNCTURE: CPT

## 2023-09-28 PROCEDURE — 2580000003 HC RX 258

## 2023-09-28 PROCEDURE — 99233 SBSQ HOSP IP/OBS HIGH 50: CPT | Performed by: INTERNAL MEDICINE

## 2023-09-28 PROCEDURE — 2500000003 HC RX 250 WO HCPCS

## 2023-09-28 PROCEDURE — 1210000000 HC MED SURG R&B

## 2023-09-28 PROCEDURE — 2700000000 HC OXYGEN THERAPY PER DAY

## 2023-09-28 PROCEDURE — 80048 BASIC METABOLIC PNL TOTAL CA: CPT

## 2023-09-28 PROCEDURE — 94761 N-INVAS EAR/PLS OXIMETRY MLT: CPT

## 2023-09-28 PROCEDURE — 6370000000 HC RX 637 (ALT 250 FOR IP): Performed by: INTERNAL MEDICINE

## 2023-09-28 PROCEDURE — 6370000000 HC RX 637 (ALT 250 FOR IP): Performed by: NURSE PRACTITIONER

## 2023-09-28 PROCEDURE — 6370000000 HC RX 637 (ALT 250 FOR IP)

## 2023-09-28 PROCEDURE — 86141 C-REACTIVE PROTEIN HS: CPT

## 2023-09-28 PROCEDURE — 83735 ASSAY OF MAGNESIUM: CPT

## 2023-09-28 PROCEDURE — 94640 AIRWAY INHALATION TREATMENT: CPT

## 2023-09-28 PROCEDURE — 84145 PROCALCITONIN (PCT): CPT

## 2023-09-28 PROCEDURE — 85652 RBC SED RATE AUTOMATED: CPT

## 2023-09-28 PROCEDURE — 6360000002 HC RX W HCPCS

## 2023-09-28 PROCEDURE — 85025 COMPLETE CBC W/AUTO DIFF WBC: CPT

## 2023-09-28 RX ORDER — POTASSIUM CHLORIDE 20 MEQ/1
40 TABLET, EXTENDED RELEASE ORAL ONCE
Status: COMPLETED | OUTPATIENT
Start: 2023-09-28 | End: 2023-09-28

## 2023-09-28 RX ORDER — METOPROLOL TARTRATE 5 MG/5ML
5 INJECTION INTRAVENOUS ONCE
Status: COMPLETED | OUTPATIENT
Start: 2023-09-28 | End: 2023-09-28

## 2023-09-28 RX ADMIN — ASPIRIN 81 MG 81 MG: 81 TABLET ORAL at 10:15

## 2023-09-28 RX ADMIN — ESCITALOPRAM OXALATE 10 MG: 10 TABLET ORAL at 10:16

## 2023-09-28 RX ADMIN — DOXYCYCLINE 100 MG: 100 INJECTION, POWDER, LYOPHILIZED, FOR SOLUTION INTRAVENOUS at 21:51

## 2023-09-28 RX ADMIN — DILTIAZEM HYDROCHLORIDE 360 MG: 180 CAPSULE, COATED, EXTENDED RELEASE ORAL at 10:16

## 2023-09-28 RX ADMIN — IPRATROPIUM BROMIDE AND ALBUTEROL SULFATE 1 DOSE: 2.5; .5 SOLUTION RESPIRATORY (INHALATION) at 07:21

## 2023-09-28 RX ADMIN — CLOPIDOGREL BISULFATE 75 MG: 75 TABLET ORAL at 10:16

## 2023-09-28 RX ADMIN — METOPROLOL SUCCINATE 75 MG: 25 TABLET, FILM COATED, EXTENDED RELEASE ORAL at 10:34

## 2023-09-28 RX ADMIN — PREDNISONE 40 MG: 10 TABLET ORAL at 10:15

## 2023-09-28 RX ADMIN — ROSUVASTATIN CALCIUM 10 MG: 10 TABLET, FILM COATED ORAL at 20:39

## 2023-09-28 RX ADMIN — IPRATROPIUM BROMIDE AND ALBUTEROL SULFATE 1 DOSE: 2.5; .5 SOLUTION RESPIRATORY (INHALATION) at 20:29

## 2023-09-28 RX ADMIN — POTASSIUM CHLORIDE 40 MEQ: 1500 TABLET, EXTENDED RELEASE ORAL at 10:34

## 2023-09-28 RX ADMIN — DOXYCYCLINE 100 MG: 100 INJECTION, POWDER, LYOPHILIZED, FOR SOLUTION INTRAVENOUS at 10:46

## 2023-09-28 RX ADMIN — METOPROLOL TARTRATE 5 MG: 5 INJECTION, SOLUTION INTRAVENOUS at 22:48

## 2023-09-28 RX ADMIN — GUAIFENESIN 1200 MG: 600 TABLET ORAL at 10:15

## 2023-09-28 RX ADMIN — GUAIFENESIN 1200 MG: 600 TABLET ORAL at 20:39

## 2023-09-28 RX ADMIN — Medication 5 MG: at 20:39

## 2023-09-28 RX ADMIN — CEFTRIAXONE SODIUM 1000 MG: 1 INJECTION, POWDER, FOR SOLUTION INTRAMUSCULAR; INTRAVENOUS at 20:38

## 2023-09-28 NOTE — PROGRESS NOTES
Physician Progress Note      PATIENT:               Dudley Jolly  CSN #:                  [id number]  :                       1940  ADMIT DATE:       2023 6:17 PM  [address] [facility] DATE:  RESPONDING  PROVIDER #:        Leighton COOK DO          QUERY TEXT:    Pt admitted with Pneumonia and acute on chronic respiraotry failure with   hypoxia. Pt noted on admission to have WBC 12.4-16.5, Procalcitonin . 55, HR   95-99, RR 21-30, SpO2 88% RA. If possible, please document in the progress   notes and discharge summary if you are evaluating and /or treating any of the   following: The medical record reflects the following:  Risk Factors: pneumonia  Clinical Indicators: WBC 12.4-16.5, Procalcitonin . 55, HR 95-99, RR 21-30,   SpO2 88% RA; CXR: bilateral lower lobe pneumonia  Treatment: Pulmonology consult, IV Doxycycline, IV Rocphin, BC, resp panel,   CXR, O2 support, labs and monitoring    Thank you,  Rosalie Blakely   Clinical Documentation Improvement Specialist  W: [phone]  Options provided:  -- Sepsis, present on admission  -- Pneumonia without Sepsis  -- Other - I will add my own diagnosis  -- Disagree - Not applicable / Not valid  -- Disagree - Clinically unable to determine / Unknown  -- Refer to Clinical Documentation Reviewer    PROVIDER RESPONSE TEXT:    This patient has pneumonia without Sepsis.     Query created by: Rosalie Blakely on 2023 1:47 PM      Electronically signed by:  Marry Roberson DO 2023 2:34 PM

## 2023-09-28 NOTE — PROGRESS NOTES
09/27/23 2300   RT Protocol   History Pulmonary Disease 2   Respiratory pattern 0   Breath sounds 2   Cough 0   Indications for Bronchodilator Therapy On home bronchodilators   Bronchodilator Assessment Score 4

## 2023-09-28 NOTE — PROGRESS NOTES
09/28/23 0700   RT Protocol   History Pulmonary Disease 2   Respiratory pattern 0   Breath sounds 2   Cough 0   Indications for Bronchodilator Therapy On home bronchodilators   Bronchodilator Assessment Score 4

## 2023-09-28 NOTE — CARE COORDINATION
PT ON IV ABX, PO PREDNISONE.  ON 3L NC.  DC PLAN REMAINS HOME ONCE CLEARED BY DRS. PT MAY NEED HOME O2 EVAL.

## 2023-09-28 NOTE — PROGRESS NOTES
History/Testing  Watchman closure device 27 mm Watchman FL X 5/5/2023 [facility]. PVD, Hx AAA post endovascular stent graft for abdominal aortic aneurysm      [facility] Medication List   Medication Name Instruction   Albuterol Sulfate (2.5 MG/3ML) 0.083% Inhalation Nebulization Solution USE 1 UNIT DOSE EVERY 4-6 HOURS AS NEEDED FOR WHEEZING . Aspirin Low Dose 81 MG Oral Tablet Chewable CHEW AND SWALLOW 1 (ONE) TABLET BY MOUTH once DAILY   Clopidogrel Bisulfate 75 MG Oral Tablet TAKE 1 TABLET DAILY. dilTIAZem HCl ER Beads 360 MG Oral Capsule Extended Release 24 Hour TAKE 1 CAPSULE Daily   Escitalopram Oxalate 10 MG Oral Tablet TAKE 1 TABLET BY MOUTH EVERY DAY   Metoprolol Succinate ER 50 MG Oral Tablet Extended Release 24 Hour TAKE 1 TABLET DAILY. Mucinex DM  MG Oral Tablet Extended Release 12 Hour TAKE 1 TO 2 TABLETS EVERY 12 HOURS AS NEEDED. PreserVision AREDS 2 Oral Capsule TAKE 1 CAPSULE Daily   Rosuvastatin Calcium 10 MG Oral Tablet take 1 tablet by mouth daily at bedtime       Allergies:  Avelox [Moxifloxacin Hcl In Nacl]  Mobic [Meloxicam]  Moxifloxacin  Z-Sean [Azithromycin]      Medications:  Reviewed  Home Medications    Infusion Medications:   Scheduled Medications:    metoprolol succinate  50 mg Oral Daily    guaiFENesin  1,200 mg Oral BID    escitalopram  10 mg Oral Daily    dilTIAZem  360 mg Oral Daily    clopidogrel  75 mg Oral Daily    aspirin  81 mg Oral Daily    predniSONE  40 mg Oral Daily    rosuvastatin  10 mg Oral Daily    melatonin  5 mg Oral Nightly    ipratropium 0.5 mg-albuterol 2.5 mg  1 Dose Inhalation BID RT    doxycycline (VIBRAMYCIN) IV  100 mg IntraVENous Q12H    cefTRIAXone (ROCEPHIN) IV  1,000 mg IntraVENous Q24H    furosemide  20 mg IntraVENous BID     PRN Meds: ipratropium 0.5 mg-albuterol 2.5 mg, benzonatate, ondansetron **OR** ondansetron, polyethylene glycol, acetaminophen **OR** acetaminophen    Vitals  Vitals:    09/28/23 0335   BP: (!) 155/75   Pulse: 97   Resp:    Temp: Additional work up or/and treatment plan may be added today or then after based on clinical progression. I am managing a portion of pt care. Some medical issues are handled by other specialists. Additional work up and treatment should be done in out pt setting by pt PCP and other out pt providers. In addition to examining and evaluating pt, I spent additional time explaining care, normaland abnormal findings, and treatment plan. All of pt questions were answered. Counseling, diet and education were provided. Case will be discussed with nursing staff when appropriate. Family will be updated if and whenappropriate.       Electronically signed by ANNETTA Avalos CNP on 9/28/2023 at 7:28 AM

## 2023-09-29 VITALS
HEART RATE: 54 BPM | RESPIRATION RATE: 20 BRPM | TEMPERATURE: 97.9 F | DIASTOLIC BLOOD PRESSURE: 95 MMHG | BODY MASS INDEX: 27.74 KG/M2 | HEIGHT: 68 IN | WEIGHT: 183.06 LBS | SYSTOLIC BLOOD PRESSURE: 156 MMHG | OXYGEN SATURATION: 98 %

## 2023-09-29 LAB
ANION GAP SERPL CALCULATED.3IONS-SCNC: 9 MEQ/L (ref 9–15)
BASOPHILS # BLD: 0 K/UL (ref 0–0.2)
BASOPHILS NFR BLD: 0.2 %
BUN SERPL-MCNC: 26 MG/DL (ref 8–23)
CALCIUM SERPL-MCNC: 8.8 MG/DL (ref 8.5–9.9)
CHLORIDE SERPL-SCNC: 101 MEQ/L (ref 95–107)
CO2 SERPL-SCNC: 28 MEQ/L (ref 20–31)
CREAT SERPL-MCNC: 0.97 MG/DL (ref 0.7–1.2)
EOSINOPHIL # BLD: 0 K/UL (ref 0–0.7)
EOSINOPHIL NFR BLD: 0 %
ERYTHROCYTE [DISTWIDTH] IN BLOOD BY AUTOMATED COUNT: 14.2 % (ref 11.5–14.5)
GLUCOSE SERPL-MCNC: 128 MG/DL (ref 70–99)
HCT VFR BLD AUTO: 39.6 % (ref 42–52)
HGB BLD-MCNC: 12.7 G/DL (ref 14–18)
LYMPHOCYTES # BLD: 0.6 K/UL (ref 1–4.8)
LYMPHOCYTES NFR BLD: 3.9 %
MAGNESIUM SERPL-MCNC: 2 MG/DL (ref 1.7–2.4)
MCH RBC QN AUTO: 30.4 PG (ref 27–31.3)
MCHC RBC AUTO-ENTMCNC: 32.1 % (ref 33–37)
MCV RBC AUTO: 94.7 FL (ref 79–92.2)
MONOCYTES # BLD: 0.9 K/UL (ref 0.2–0.8)
MONOCYTES NFR BLD: 6.6 %
NEUTROPHILS # BLD: 12.4 K/UL (ref 1.4–6.5)
NEUTS SEG NFR BLD: 87.8 %
PLATELET # BLD AUTO: 148 K/UL (ref 130–400)
POTASSIUM SERPL-SCNC: 3.4 MEQ/L (ref 3.4–4.9)
PROCALCITONIN SERPL IA-MCNC: 0.16 NG/ML (ref 0–0.15)
RBC # BLD AUTO: 4.18 M/UL (ref 4.7–6.1)
SODIUM SERPL-SCNC: 138 MEQ/L (ref 135–144)
WBC # BLD AUTO: 14.1 K/UL (ref 4.8–10.8)

## 2023-09-29 PROCEDURE — 6370000000 HC RX 637 (ALT 250 FOR IP)

## 2023-09-29 PROCEDURE — 83735 ASSAY OF MAGNESIUM: CPT

## 2023-09-29 PROCEDURE — 36415 COLL VENOUS BLD VENIPUNCTURE: CPT

## 2023-09-29 PROCEDURE — 2700000000 HC OXYGEN THERAPY PER DAY

## 2023-09-29 PROCEDURE — 6370000000 HC RX 637 (ALT 250 FOR IP): Performed by: INTERNAL MEDICINE

## 2023-09-29 PROCEDURE — 84145 PROCALCITONIN (PCT): CPT

## 2023-09-29 PROCEDURE — 94761 N-INVAS EAR/PLS OXIMETRY MLT: CPT

## 2023-09-29 PROCEDURE — 80048 BASIC METABOLIC PNL TOTAL CA: CPT

## 2023-09-29 PROCEDURE — 99232 SBSQ HOSP IP/OBS MODERATE 35: CPT | Performed by: INTERNAL MEDICINE

## 2023-09-29 PROCEDURE — 85025 COMPLETE CBC W/AUTO DIFF WBC: CPT

## 2023-09-29 PROCEDURE — 6370000000 HC RX 637 (ALT 250 FOR IP): Performed by: NURSE PRACTITIONER

## 2023-09-29 PROCEDURE — 94640 AIRWAY INHALATION TREATMENT: CPT

## 2023-09-29 RX ORDER — POTASSIUM CHLORIDE 20 MEQ/1
40 TABLET, EXTENDED RELEASE ORAL ONCE
Status: COMPLETED | OUTPATIENT
Start: 2023-09-29 | End: 2023-09-29

## 2023-09-29 RX ORDER — CEFUROXIME AXETIL 500 MG/1
500 TABLET ORAL EVERY 12 HOURS SCHEDULED
Status: DISCONTINUED | OUTPATIENT
Start: 2023-09-29 | End: 2023-09-29 | Stop reason: HOSPADM

## 2023-09-29 RX ORDER — DOXYCYCLINE HYCLATE 100 MG/1
100 CAPSULE ORAL EVERY 12 HOURS SCHEDULED
Status: DISCONTINUED | OUTPATIENT
Start: 2023-09-29 | End: 2023-09-29 | Stop reason: HOSPADM

## 2023-09-29 RX ORDER — CEFUROXIME AXETIL 500 MG/1
500 TABLET ORAL EVERY 12 HOURS SCHEDULED
Qty: 9 TABLET | Refills: 0 | Status: SHIPPED | OUTPATIENT
Start: 2023-09-29 | End: 2023-10-04

## 2023-09-29 RX ORDER — DOXYCYCLINE HYCLATE 100 MG/1
100 CAPSULE ORAL EVERY 12 HOURS SCHEDULED
Qty: 9 CAPSULE | Refills: 0 | Status: SHIPPED | OUTPATIENT
Start: 2023-09-29 | End: 2023-10-04

## 2023-09-29 RX ORDER — PREDNISONE 20 MG/1
40 TABLET ORAL DAILY
Qty: 6 TABLET | Refills: 0 | Status: SHIPPED | OUTPATIENT
Start: 2023-09-30 | End: 2023-10-03

## 2023-09-29 RX ORDER — METOPROLOL SUCCINATE 25 MG/1
75 TABLET, EXTENDED RELEASE ORAL DAILY
Qty: 30 TABLET | Refills: 3 | Status: SHIPPED | OUTPATIENT
Start: 2023-09-30

## 2023-09-29 RX ADMIN — IPRATROPIUM BROMIDE AND ALBUTEROL SULFATE 1 DOSE: 2.5; .5 SOLUTION RESPIRATORY (INHALATION) at 08:02

## 2023-09-29 RX ADMIN — DILTIAZEM HYDROCHLORIDE 360 MG: 180 CAPSULE, COATED, EXTENDED RELEASE ORAL at 09:56

## 2023-09-29 RX ADMIN — PREDNISONE 40 MG: 10 TABLET ORAL at 09:57

## 2023-09-29 RX ADMIN — ASPIRIN 81 MG 81 MG: 81 TABLET ORAL at 09:57

## 2023-09-29 RX ADMIN — POTASSIUM CHLORIDE 40 MEQ: 1500 TABLET, EXTENDED RELEASE ORAL at 09:56

## 2023-09-29 RX ADMIN — CLOPIDOGREL BISULFATE 75 MG: 75 TABLET ORAL at 09:57

## 2023-09-29 RX ADMIN — DOXYCYCLINE HYCLATE 100 MG: 100 CAPSULE ORAL at 09:57

## 2023-09-29 RX ADMIN — GUAIFENESIN 1200 MG: 600 TABLET ORAL at 09:57

## 2023-09-29 RX ADMIN — CEFUROXIME AXETIL 500 MG: 500 TABLET, FILM COATED ORAL at 09:56

## 2023-09-29 RX ADMIN — METOPROLOL SUCCINATE 75 MG: 25 TABLET, FILM COATED, EXTENDED RELEASE ORAL at 09:57

## 2023-09-29 RX ADMIN — ESCITALOPRAM OXALATE 10 MG: 10 TABLET ORAL at 09:56

## 2023-09-29 NOTE — PROGRESS NOTES
[facility] Daily Progress Note  Name: Meme Vargas Sr.  Age: 79 y.o. Gender: male  CodeStatus: Full Code    Primary Cardiology : Dr. Thomas Parks MD  [address] Cardiology : Dr. Domingo Buoy MD Merlinda Penta APRN-CNP  Primary Care Provider: Kirti Hutton MD  Admission Date: 9/26/2023      Chief complaint/Associated symptoms:   Ethan Sterling was seen and examined at bedside    He is currently sitting up in bed resting comfortably    States that he feels much better    Denies shortness of breath, chest pain, or palpitations. Assessment:     Shortness of breath: Resolved. Cough  Hemoptysis  Bilateral lower lobe pneumonia: Noted on chest x-ray 9/26/2023. Per medicine  Elevated procalcitonin 4.90  Diastolic  CHF:  BNP 9614. LVEF 55-60% Echo 5/5/2023   Elevated troponin: 0.011. Trivial elevation in setting of CHF. Doubt plaque rupture. COPD  Permanent atrial fibrillation: Rate control strategy. Beta blocker increased yesterday. I believe his increased HR is secondary to respiratory infection. Watchman closure device 27 mm Watchman FL X 5/5/2023 [facility]. Hypertension Sub optimal control   Hyperlipidemia   Hypokalemia: K + 3.4  Will replace. Plan:  Monitor on telemetry  Antibiotics per medicine and pulmonary  Potassium 40 meq PO x 1 today  Further recommendations per Dr. Tiago Tan         Physical Exam  Constitutional:  Well developed, awake/alert/oriented x3, no distress, alert and cooperative. Respiratory/Thorax: Diminished   Cardiovascular: Irregular, rate and rhythm,  normal S1 and S2, PMI non displaced. Gastrointestinal:  Non distended, soft, non-tender, no rebound tenderness or guarding,   Genitourinary:  deferred  Musculoskeletal:  No apparent injury. Extremities:  No cyanosis, edema, contusions or wounds, no clubbing. Neurological:  Alert and oriented x3. Moves extremities spontaneous with purpose  Psychological:  Appropriate mood and behavior  Skin:  Warm and dry,  no lesions or rashes. PreserVision AREDS 2 Oral Capsule TAKE 1 CAPSULE Daily   Rosuvastatin Calcium 10 MG Oral Tablet take 1 tablet by mouth daily at bedtime       Allergies: Avelox [Moxifloxacin Hcl In Nacl]  Mobic [Meloxicam]  Moxifloxacin  Z-Sean [Azithromycin]      Medications:  Reviewed  Home Medications    Infusion Medications:   Scheduled Medications:    cefUROXime  500 mg Oral 2 times per day    doxycycline  100 mg Oral 2 times per day    metoprolol succinate  75 mg Oral Daily    guaiFENesin  1,200 mg Oral BID    escitalopram  10 mg Oral Daily    dilTIAZem  360 mg Oral Daily    clopidogrel  75 mg Oral Daily    aspirin  81 mg Oral Daily    predniSONE  40 mg Oral Daily    rosuvastatin  10 mg Oral Daily    melatonin  5 mg Oral Nightly    ipratropium 0.5 mg-albuterol 2.5 mg  1 Dose Inhalation BID RT     PRN Meds: ipratropium 0.5 mg-albuterol 2.5 mg, benzonatate, ondansetron **OR** ondansetron, polyethylene glycol, acetaminophen **OR** acetaminophen    Vitals  Vitals:    09/29/23 0803   BP:    Pulse: (!) 116   Resp: 18   Temp:    SpO2: 98%       I&O      Telemetry:  Atrial fibrillation     Labs:   Recent Labs     09/27/23  0551 09/28/23  0600 09/29/23  0512   WBC 7.8 16.5* 14.1*   HGB 12.7* 13.1* 12.7*   HCT 39.1* 40.7* 39.6*   * 144 148       Recent Labs     09/27/23  0551 09/28/23  0600 09/29/23  0512    141 138   K 3.8 3.3* 3.4    101 101   CO2 27 27 28   BUN 25* 24* 26*   CREATININE 1.02 0.93 0.97   CALCIUM 8.9 8.8 8.8       Recent Labs     09/26/23  1845   AST 30   ALT 24   BILITOT 2.1*   ALKPHOS 96       No results for input(s): \"INR\" in the last 72 hours.   Recent Labs     09/26/23  1845 09/27/23  0930 09/27/23  1525   TROPONINI 0.011* <0.010 <0.010         Urinalysis:   Lab Results   Component Value Date/Time    NITRU Negative 06/27/2023 11:00 AM    WBCUA 0-2 06/27/2023 11:00 AM    BACTERIA Negative 06/27/2023 11:00 AM    RBCUA 3-5 06/27/2023 11:00 AM    BLOODU trace 09/08/2023 10:04 AM    BLOODU Negative

## 2023-09-29 NOTE — PROGRESS NOTES
09/29/23 0000   RT Protocol   History Pulmonary Disease 2   Respiratory pattern 0   Breath sounds 2   Cough 0   Bronchodilator Assessment Score 4

## 2023-09-29 NOTE — PROGRESS NOTES
09/29/23 0000   RT Protocol   History Pulmonary Disease 2   Respiratory pattern 0   Breath sounds 2   Cough 0   Indications for Bronchodilator Therapy On home bronchodilators   Bronchodilator Assessment Score 4

## 2023-09-29 NOTE — PROGRESS NOTES
CLINICAL PHARMACY NOTE: MEDS TO BEDS    Total # of Prescriptions Filled: 3   The following medications were delivered to the patient:  Cefuroxime 500 mg tab  Prednisone 20 mg tab  Doxycycline Hyclate 100 mg cap    Additional Documentation:

## 2023-09-29 NOTE — DISCHARGE INSTRUCTIONS
Follow up with primary care physician in the next 7 days or sooner if needed. If you do not have a Primary care physician, please schedule an appointment with one. Please ask prior to discharge about a list of local providers. Please return to ER or call 911 if you develop any significant signs or symptoms. I may not have addressed all of your medical illnesses or the abnormal blood work or imaging therefore please ask your PCP to obtain [facility] record to follow up on all of the abnormal labs, imaging and findings that I have and have not addressed during your hospitalization. Discharging you from the hospital does not mean that your medical care ends here and now. You may still need additional work up, investigation, monitoring, and treatment to be handled from this point on by outside providers including your PCP, Specialists and other healthcare providers. For medication questions, contact your retail pharmacy and your PCP. Your medical team at [facility] ([facility]) appreciates the opportunity to work with you to get well!     Martha Mata, DO  12:37 PM

## 2023-09-30 NOTE — PROGRESS NOTES
Physician Progress Note      PATIENT:               Kalie Ty  CSN #:                  [id number]  :                       1940  ADMIT DATE:       2023 6:17 PM  [address] [facility] DATE:        2023 5:38 PM  RESPONDING  PROVIDER #:        Vania Ruiz MD          QUERY TEXT:    Pt admitted with Pneumonia. Pt noted to have been treated with IV Rocephin   and IV Vibramycin then switched to oral Ceftin. If possible, please document   in the progress notes and discharge summary if you are evaluating and/or   treating any of the following:    Note: CAP and HCAP indicate where the pneumonia was acquired, not a specific   type. The medical record reflects the following:  Risk Factors: bilateral lower lobe pneumonia, COPD  Clinical Indicators: RR 21-30, 88% RA, CXR: bilater lower lobe pneumonia,   Procalcitonin . 55, WBC 12.4-16.5, .6, Resp panel negative, BC x2 No   growth, No Resp cx done  Treatment: IV Rocephin and IV Vibramycin then switched to oral Ceftin    Thank you,  Suresh Thurman   Clinical Documentation Improvement Specialist  W: [phone]  Options provided:  -- Gram negative pneumonia  -- Other - I will add my own diagnosis  -- Disagree - Not applicable / Not valid  -- Disagree - Clinically unable to determine / Unknown  -- Refer to Clinical Documentation Reviewer    PROVIDER RESPONSE TEXT:    Provider is clinically unable to determine a response to this query.     Query created by: Suresh Thurman on 2023 2:37 PM      Electronically signed by:  Vania Ruiz MD 2023 7:34 AM

## 2023-10-02 ENCOUNTER — TELEPHONE (OUTPATIENT)
Dept: FAMILY MEDICINE CLINIC | Age: 83
End: 2023-10-02

## 2023-10-02 LAB
BACTERIA BLD CULT ORG #2: NORMAL
BACTERIA BLD CULT: NORMAL

## 2023-10-02 NOTE — TELEPHONE ENCOUNTER
Pt wife is calling stating that the pt's bp reading today was 160/113 pulse was 77. Pt is now sleeping, please advise.  [phone]

## 2023-10-02 NOTE — TELEPHONE ENCOUNTER
Called patient, spoke with wife. Blood pressure was rechecked and was 133/91. Patient is on prednisone, this has known side effect of increasing blood pressure so possible culprit. They will continue monitoring.

## 2023-10-03 LAB
EKG ATRIAL RATE: 122 BPM
EKG Q-T INTERVAL: 298 MS
EKG QRS DURATION: 112 MS
EKG QTC CALCULATION (BAZETT): 395 MS
EKG R AXIS: 8 DEGREES
EKG T AXIS: -8 DEGREES
EKG VENTRICULAR RATE: 106 BPM

## 2023-10-05 ENCOUNTER — OFFICE VISIT (OUTPATIENT)
Dept: PULMONOLOGY | Age: 83
End: 2023-10-05
Payer: MEDICARE

## 2023-10-05 VITALS
TEMPERATURE: 97.1 F | BODY MASS INDEX: 26.76 KG/M2 | HEART RATE: 73 BPM | OXYGEN SATURATION: 96 % | WEIGHT: 176 LBS | DIASTOLIC BLOOD PRESSURE: 90 MMHG | SYSTOLIC BLOOD PRESSURE: 138 MMHG

## 2023-10-05 DIAGNOSIS — J18.9 COMMUNITY ACQUIRED PNEUMONIA OF RIGHT LOWER LOBE OF LUNG: ICD-10-CM

## 2023-10-05 DIAGNOSIS — J44.9 CHRONIC OBSTRUCTIVE PULMONARY DISEASE, UNSPECIFIED COPD TYPE (HCC): Primary | ICD-10-CM

## 2023-10-05 DIAGNOSIS — Z72.0 TOBACCO ABUSE: ICD-10-CM

## 2023-10-05 PROCEDURE — G8417 CALC BMI ABV UP PARAM F/U: HCPCS | Performed by: INTERNAL MEDICINE

## 2023-10-05 PROCEDURE — G8427 DOCREV CUR MEDS BY ELIG CLIN: HCPCS | Performed by: INTERNAL MEDICINE

## 2023-10-05 PROCEDURE — 1123F ACP DISCUSS/DSCN MKR DOCD: CPT | Performed by: INTERNAL MEDICINE

## 2023-10-05 PROCEDURE — G8484 FLU IMMUNIZE NO ADMIN: HCPCS | Performed by: INTERNAL MEDICINE

## 2023-10-05 PROCEDURE — 3023F SPIROM DOC REV: CPT | Performed by: INTERNAL MEDICINE

## 2023-10-05 PROCEDURE — 1111F DSCHRG MED/CURRENT MED MERGE: CPT | Performed by: INTERNAL MEDICINE

## 2023-10-05 PROCEDURE — 3080F DIAST BP >= 90 MM HG: CPT | Performed by: INTERNAL MEDICINE

## 2023-10-05 PROCEDURE — 3075F SYST BP GE 130 - 139MM HG: CPT | Performed by: INTERNAL MEDICINE

## 2023-10-05 PROCEDURE — 99214 OFFICE O/P EST MOD 30 MIN: CPT | Performed by: INTERNAL MEDICINE

## 2023-10-05 PROCEDURE — 4004F PT TOBACCO SCREEN RCVD TLK: CPT | Performed by: INTERNAL MEDICINE

## 2023-10-05 RX ORDER — ALBUTEROL SULFATE 2.5 MG/3ML
2.5 SOLUTION RESPIRATORY (INHALATION) 4 TIMES DAILY PRN
Qty: 120 EACH | Refills: 3 | Status: SHIPPED | OUTPATIENT
Start: 2023-10-05

## 2023-10-05 ASSESSMENT — ENCOUNTER SYMPTOMS
ABDOMINAL PAIN: 0
SHORTNESS OF BREATH: 0
VOMITING: 0
RHINORRHEA: 0
COUGH: 0
DIARRHEA: 0
WHEEZING: 0
SORE THROAT: 0
VOICE CHANGE: 0
CHEST TIGHTNESS: 0
NAUSEA: 0
EYE ITCHING: 0

## 2023-10-05 NOTE — PROGRESS NOTES
Subjective:     Татьяна Landeros is a 79 y.o. male who complains today of:     Chief Complaint   Patient presents with    Follow-Up from Hospital     F/u on acute respiratory failure with hypoxia        HPI  He went home from hospital 9/29/23 after treated for COPD exacerbation and CHF and also Possible pneumonia. He finished antibiotics and prednisone . He is on neb with albuterol QID prn. He does not have O2 at home. He is following with dr. Les Camilo. No C/o shortness of breath with exertion. No  Wheezing. No Cough since last 2 days  No Hemoptysis. No Chest tightness. No Chest pain with radiation  or pleuritic pain. No  leg edema. No orthopnea. No Fever or chills. No Rhinorrhea and postnasal drip. 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    he had PFT in 4 yrs ago and he has severe COPD  On  08/26/201 Severe obstructive pulmonary disease. There is no response to  bronchodilator. Static lung volume study suggests hyperinflation. Diffusion capacity is severely impaired. Airway resistance is  increased. Allergies:   Avelox [moxifloxacin hcl in nacl], Mobic [meloxicam], Moxifloxacin, and Z-eugene [azithromycin]  Past Medical History:   Diagnosis Date    Atrial fibrillation ([address])     CAD (coronary artery disease)     COPD exacerbation ([address]) 07/31/2022    Depressive disorder 3/10/2022    Hematuria     High cholesterol     History of bladder cancer     Hyperlipidemia     Hypertension     Malignant neoplasm of urinary bladder ([address]) 01/27/2020    Presence of Watchman left atrial appendage closure device     S/P AAA repair     Spondylosis of lumbar region without myelopathy or radiculopathy     Stage 3a chronic kidney disease ([address]) 9/26/2023     Past Surgical History:   Procedure Laterality Date    ABDOMINAL AORTIC ANEURYSM REPAIR      ATRIAL ABLATION SURGERY      watchman device    CARDIAC SURGERY  05/01/2023

## 2023-10-14 PROBLEM — Z91.199 NONCOMPLIANCE: Status: ACTIVE | Noted: 2023-10-14

## 2023-10-14 PROBLEM — E66.3 OVERWEIGHT WITH BODY MASS INDEX (BMI) OF 26 TO 26.9 IN ADULT: Status: ACTIVE | Noted: 2023-10-14

## 2023-10-14 PROBLEM — H91.90 HARD OF HEARING: Status: ACTIVE | Noted: 2023-10-14

## 2023-10-14 PROBLEM — I71.40 AAA (ABDOMINAL AORTIC ANEURYSM) (CMS-HCC): Status: ACTIVE | Noted: 2023-10-14

## 2023-10-14 PROBLEM — I10 HYPERTENSION: Status: ACTIVE | Noted: 2023-10-14

## 2023-10-14 PROBLEM — E87.6 HYPOKALEMIA: Status: ACTIVE | Noted: 2023-10-14

## 2023-10-14 PROBLEM — Z95.828 HISTORY OF ENDOVASCULAR STENT GRAFT FOR ABDOMINAL AORTIC ANEURYSM (AAA): Status: ACTIVE | Noted: 2023-10-14

## 2023-10-14 PROBLEM — I73.9 PVD (PERIPHERAL VASCULAR DISEASE) (CMS-HCC): Status: ACTIVE | Noted: 2023-10-14

## 2023-10-14 PROBLEM — D64.9 ANEMIA: Status: ACTIVE | Noted: 2023-10-14

## 2023-10-14 PROBLEM — R94.31 ABNORMAL ELECTROCARDIOGRAM: Status: ACTIVE | Noted: 2023-10-14

## 2023-10-14 PROBLEM — J44.9 COPD (CHRONIC OBSTRUCTIVE PULMONARY DISEASE) (MULTI): Status: ACTIVE | Noted: 2023-10-14

## 2023-10-14 PROBLEM — N18.31 STAGE 3A CHRONIC KIDNEY DISEASE (CKD) (MULTI): Status: ACTIVE | Noted: 2023-10-14

## 2023-10-14 PROBLEM — N52.9 ED (ERECTILE DYSFUNCTION): Status: ACTIVE | Noted: 2023-10-14

## 2023-10-14 PROBLEM — F32.A DEPRESSION: Status: ACTIVE | Noted: 2023-10-14

## 2023-10-14 PROBLEM — F17.200 CURRENT SMOKER: Status: ACTIVE | Noted: 2023-10-14

## 2023-10-14 PROBLEM — E78.5 HYPERLIPIDEMIA: Status: ACTIVE | Noted: 2023-10-14

## 2023-10-14 PROBLEM — I48.91 ATRIAL FIBRILLATION (MULTI): Status: ACTIVE | Noted: 2023-10-14

## 2023-10-14 PROBLEM — R42 DIZZINESS: Status: ACTIVE | Noted: 2023-10-14

## 2023-10-14 RX ORDER — GUAIFENESIN 600 MG/1
2 TABLET, EXTENDED RELEASE ORAL 2 TIMES DAILY
COMMUNITY
End: 2023-10-17 | Stop reason: SDUPTHER

## 2023-10-14 RX ORDER — LISINOPRIL 10 MG/1
1 TABLET ORAL DAILY
COMMUNITY
End: 2023-10-17 | Stop reason: WASHOUT

## 2023-10-14 RX ORDER — ALBUTEROL SULFATE 0.83 MG/ML
SOLUTION RESPIRATORY (INHALATION)
COMMUNITY

## 2023-10-14 RX ORDER — IPRATROPIUM BROMIDE AND ALBUTEROL SULFATE 2.5; .5 MG/3ML; MG/3ML
SOLUTION RESPIRATORY (INHALATION) EVERY 4 HOURS PRN
COMMUNITY

## 2023-10-14 RX ORDER — ESCITALOPRAM OXALATE 10 MG/1
1 TABLET ORAL DAILY
COMMUNITY
Start: 2021-03-01

## 2023-10-14 RX ORDER — METOPROLOL SUCCINATE 50 MG/1
50 TABLET, EXTENDED RELEASE ORAL DAILY
COMMUNITY
End: 2023-10-17 | Stop reason: SDUPTHER

## 2023-10-14 RX ORDER — ASPIRIN 81 MG/1
81 TABLET ORAL DAILY
COMMUNITY

## 2023-10-14 RX ORDER — ROSUVASTATIN CALCIUM 10 MG/1
1 TABLET, COATED ORAL NIGHTLY
COMMUNITY
Start: 2021-03-25 | End: 2023-10-17 | Stop reason: SDUPTHER

## 2023-10-14 RX ORDER — CLOPIDOGREL BISULFATE 75 MG/1
1 TABLET ORAL DAILY
COMMUNITY
End: 2023-11-22 | Stop reason: ALTCHOICE

## 2023-10-14 RX ORDER — DILTIAZEM HYDROCHLORIDE 360 MG/1
1 CAPSULE, EXTENDED RELEASE ORAL DAILY
COMMUNITY
End: 2023-10-17 | Stop reason: SDUPTHER

## 2023-10-17 ENCOUNTER — OFFICE VISIT (OUTPATIENT)
Dept: CARDIOLOGY | Facility: CLINIC | Age: 83
End: 2023-10-17
Payer: MEDICARE

## 2023-10-17 VITALS
HEART RATE: 88 BPM | WEIGHT: 180 LBS | DIASTOLIC BLOOD PRESSURE: 76 MMHG | SYSTOLIC BLOOD PRESSURE: 132 MMHG | BODY MASS INDEX: 25.83 KG/M2

## 2023-10-17 DIAGNOSIS — Z09 HOSPITAL DISCHARGE FOLLOW-UP: ICD-10-CM

## 2023-10-17 DIAGNOSIS — N18.31 STAGE 3A CHRONIC KIDNEY DISEASE (CKD) (MULTI): ICD-10-CM

## 2023-10-17 DIAGNOSIS — I48.21 PERMANENT ATRIAL FIBRILLATION (MULTI): ICD-10-CM

## 2023-10-17 DIAGNOSIS — E78.2 MIXED HYPERLIPIDEMIA: ICD-10-CM

## 2023-10-17 DIAGNOSIS — J43.9 PULMONARY EMPHYSEMA, UNSPECIFIED EMPHYSEMA TYPE (MULTI): ICD-10-CM

## 2023-10-17 DIAGNOSIS — F17.200 CURRENT SMOKER: ICD-10-CM

## 2023-10-17 DIAGNOSIS — E66.3 OVERWEIGHT WITH BODY MASS INDEX (BMI) OF 25 TO 25.9 IN ADULT: ICD-10-CM

## 2023-10-17 DIAGNOSIS — I10 PRIMARY HYPERTENSION: Primary | ICD-10-CM

## 2023-10-17 PROCEDURE — 3075F SYST BP GE 130 - 139MM HG: CPT | Performed by: INTERNAL MEDICINE

## 2023-10-17 PROCEDURE — 1159F MED LIST DOCD IN RCRD: CPT | Performed by: INTERNAL MEDICINE

## 2023-10-17 PROCEDURE — 99214 OFFICE O/P EST MOD 30 MIN: CPT | Performed by: INTERNAL MEDICINE

## 2023-10-17 PROCEDURE — 1160F RVW MEDS BY RX/DR IN RCRD: CPT | Performed by: INTERNAL MEDICINE

## 2023-10-17 PROCEDURE — 3078F DIAST BP <80 MM HG: CPT | Performed by: INTERNAL MEDICINE

## 2023-10-17 RX ORDER — DILTIAZEM HYDROCHLORIDE 360 MG/1
360 CAPSULE, EXTENDED RELEASE ORAL DAILY
Qty: 90 CAPSULE | Refills: 3 | Status: SHIPPED | OUTPATIENT
Start: 2023-10-17 | End: 2023-11-22 | Stop reason: SDUPTHER

## 2023-10-17 RX ORDER — METOPROLOL SUCCINATE 25 MG/1
25 TABLET, EXTENDED RELEASE ORAL DAILY
COMMUNITY
Start: 2023-09-29 | End: 2023-10-17 | Stop reason: SDUPTHER

## 2023-10-17 RX ORDER — METOPROLOL SUCCINATE 50 MG/1
50 TABLET, EXTENDED RELEASE ORAL DAILY
Qty: 90 TABLET | Refills: 3 | Status: SHIPPED | OUTPATIENT
Start: 2023-10-17 | End: 2023-11-22 | Stop reason: SDUPTHER

## 2023-10-17 RX ORDER — ROSUVASTATIN CALCIUM 10 MG/1
10 TABLET, COATED ORAL NIGHTLY
Qty: 90 TABLET | Refills: 3 | Status: SHIPPED | OUTPATIENT
Start: 2023-10-17 | End: 2023-11-22 | Stop reason: SDUPTHER

## 2023-10-17 RX ORDER — DILTIAZEM HYDROCHLORIDE 360 MG/1
360 CAPSULE, EXTENDED RELEASE ORAL DAILY
COMMUNITY
Start: 2022-10-28 | End: 2023-10-17 | Stop reason: SDUPTHER

## 2023-10-17 RX ORDER — METOPROLOL SUCCINATE 25 MG/1
25 TABLET, EXTENDED RELEASE ORAL DAILY
Qty: 90 TABLET | Refills: 3 | Status: SHIPPED | OUTPATIENT
Start: 2023-10-17 | End: 2023-11-22 | Stop reason: SDUPTHER

## 2023-10-17 NOTE — ASSESSMENT & PLAN NOTE
Blood pressure is at target on current medical regimen, med review suggest that patient is on diltiazem and metoprolol, we will need to review medications and dosages, patient will have to call them.  No untoward effects reported.  Given severe COPD, may be beneficial to down titrate or avoid metoprolol if possible.    Has hypertensive kidney disease, stage IIIa, creatinine 1.25 potassium 4.1 GFR 50 7 April 2023.

## 2023-10-17 NOTE — ASSESSMENT & PLAN NOTE
CTA December12 2022-aortic annulus 3.4 x 2.3 cm sinuses 3.9 cm sinotubular 3.1 cm upper ascending 3.6 cm descending aorta at the PA level 2.8 cm no evidence of thoracic aortic dissection or coarctation descending aorta of the diaphragm level 2.7 cm descending aorta infrarenal 5.6 cm patent infrarenal abdominal aorta and bilateral common iliac artery endovascular stent graft no evidence of endoleak native infrarenal abdominal aorta aneurysm sac 5.6 cm 60% celiac artery stenosis no significant renal artery stenosis patent stent right common iliac artery distal right common iliac artery aneurysm 2.4 cm patent stent left common iliac artery distal left common iliac artery ectatic 1.9 cm  14. Ultrasound of the abdominal aorta and iliac arteries November 2022-maximum diameter of native aorta 5.6 x 4.6 cm suboptimal imaging and angulation was reported by the .    Followed by .    Does not express any symptoms pertaining to his abdominal aortic aneurysm

## 2023-10-17 NOTE — ASSESSMENT & PLAN NOTE
On rate control strategy.    Underwent left atrial appendage occlusion May 2023, 27 mm watchman closure device due to massive GI bleeding from AV malformations.  Echocardiogram May 23-LVEF 55 to 60% enlarged left atrium no pericardial effusion valves not assessed  [id number] score of 4

## 2023-10-17 NOTE — PROGRESS NOTES
Hospital discharge FU.  Subjective :   Reports feeling well.  Multiple comorbidities, hospital admission recently for what sounds like respiratory failure hypoxic, currently feels at baseline.  Reviewed Dr. Gutierres  recent office note.    At the time of discharge metoprolol dose was increased from 50 mg to 75 mg.  Remains also on diltiazem CD3 60 mg.  The up titration of metoprolol succinate does not seem to have caused any worsening of his respiratory status.  Denies palpitations lightheadedness presyncope or syncope.  Off anticoagulation and on aspirin and clopidogrel now that patient is status post left atrial appendage closure.      History so Far :  1. Hypertension  2. Permanent atrial fibrillation on rate control strategy, with some concern for sinus node dysfunction. No indication for permanent pacemaker as of now, except that patient does have exertional shortness of breath. In this patient with COPD, exertional shortness of breath could be multifactorial. A component of it could be related to chronotropic blunting. Patient is not interested in pursuing EP evaluation or consideration for pacemaker at this time, and there are no absolute indications. He stopped his inhaler because it was prohibitively expensive, and he says the inhalers did not make any impact on his breathing.  3. History of abdominal aortic aneurysm status post endograft vascular stent graft  4. Hyperlipidemia  5. High risk medication-Xarelto  6. Current smoker-we discuss this at every visit.  7. BMI puts him in the overweight category-he does not need any aggressive dietary modification at this time.  8. Patient has history of endovascular stent graft for abdominal aortic aneurysm  9. Peripheral vascular disease without claudication  10. Mild carotid disease  11 Abdominal aorta .duplex showed 5.6cm AAA with no obvious endoleak but questionable growth from 5.2cm based on a prior report.   12. Iron deficiency anemia-followed by hematology  oncology, most recent visit November 2022. Hemoglobin was as low as 7.8 in March 2020. EGD and colonoscopy June 2020 showed nonbleeding gastric AVM capsule endoscopy was negative in June 2020 Dr. Pavon suggested that cardiology assess the risk-benefit of anticoagulation.  This patient has a high [id number] score of 4, and hence he has prohibitive risk of thromboembolic events without anticoagulation. The alternative would be a watchman device, and we talked about it patient does not want to think about it now but says he will make an appointment for a consultation, we will refer him to Dr. Alarcon.  13. CTA December12 2022-aortic annulus 3.4 x 2.3 cm sinuses 3.9 cm sinotubular 3.1 cm upper ascending 3.6 cm descending aorta at the PA level 2.8 cm no evidence of thoracic aortic dissection or coarctation descending aorta of the diaphragm level 2.7 cm descending aorta infrarenal 5.6 cm patent infrarenal abdominal aorta and bilateral common iliac artery endovascular stent graft no evidence of endoleak native infrarenal abdominal aorta aneurysm sac 5.6 cm 60% celiac artery stenosis no significant renal artery stenosis patent stent right common iliac artery distal right common iliac artery aneurysm 2.4 cm patent stent left common iliac artery distal left common iliac artery ectatic 1.9 cm  14. Ultrasound of the abdominal aorta and iliac arteries November 2022-maximum diameter of native aorta 5.6 x 4.6 cm suboptimal imaging and angulation was reported by the .  15. Arterial duplex lower extremities November 2022-no evidence of hemodynamically significant stenosis in the right lower extremity, moderate calcified nonobstructive plaque throughout no evidence of hemodynamically significant stenosis left lower extremity, mild to moderate calcified nonobstructive plaque throughout left SFA stent patent  16. PVR with exercise November 2022-no evidence of arterial occlusive disease in the right or left lower  "extremity left pressures of greater than 220 mmHg so suggested no compressibility of vessels and make absolute segmental limb pressures unreliable  17. Chronic kidney disease stage IIIa  18. CTA prior to Watchman device-dilated left atrium, no evidence of left atrial or left atrial appendage thrombus, left atrial appendage orifice oval in shape measuring 2.7 x 1.8 cm, left atrial appendage area 3. 6 4 cm² mild sentry lobular emphysema  19. Patient underwent left atrial appendage closure with 27 mm Watchman FL X in May 2023.    Objective   Visit Vitals  /76 (BP Location: Left arm, Patient Position: Sitting)   Pulse 88              GENERAL APPEARANCE: Well developed,  in no acute distress.  CHEST: Symmetric and non-tender.  INTEGUMENT: Skin warm and dry, without gross excoriationis or lesions.  HEENT: No gross abnormalities identified.No pallor or scleral icterus.  NECK: Supple, no JVD, no bruit.   NEURO/PSHCY: Alert and oriented x3; appropriate behavior and responses and responses, with normal balance and coordination Telida  LUNGS: Clear to auscultation bilaterally; normal respiratory effort.dISTANT BREATH SOUNDS  HEART: Rate and rhythm IRREGULAR,heart sounds are distant,with no evident murmur; no gallop appreciated. There are no rubs, clicks or heaves.     MUSCULOSKELETAL: No gross deformities.  EXTREMITIES: Warm  There is no edema noted.    LABS:  No results found for: \"HGBA1C\"    CBC: No results for input(s): \"WBC\", \"HGB\", \"PLT\" in the last 72 hours. .lastcbc   BMP:  No results for input(s): \"NA\", \"K\", \"CL\", \"CO2\", \"BUN\", \"CREATININE\", \"GLUCOSE\" in the last 72 hours.           BNP: No results for input(s): \"PROBNP\" in the last 72 hours.   Mg: @LABRCNT (my)@               No results found for: \"CHOL\"             No results found for: \"HDL\"             No results found for: \"LDLCALC\"             No results found for: \"TRIG\"             No components found for: \"CHOLHDL\"                No results found for " this or any previous visit (from the past 4464 hour(s)).              No results found for this or any previous visit.              The ASCVD Risk score (Obed TEJADA, et al., 2019) failed to calculate for the following reasons:    The 2019 ASCVD risk score is only valid for ages 40 to 79                   Assessment/Plan:    Atrial fibrillation   On rate control strategy.   Mikayla  Underwent left atrial appendage occlusion May 2023, 27 mm watchman closure device due to massive GI bleeding from AV malformations.  Echocardiogram May 23-LVEF 55 to 60% enlarged left atrium no pericardial effusion valves not assessed  [id number] score of 4  Now on metoprolol and diltiazem  Now on aspirin and clopidogrel  After November clopidogrel may be discontinued      Hypertension  Blood pressure is at target on current medical regimen, med review suggest that patient is on diltiazem and metoprolol, we will need to review medications and dosages, patient will have to call them.  No untoward effects reported.  Given severe COPD, may be beneficial to down titrate or avoid metoprolol if possible.     Has hypertensive kidney disease, stage IIIa, creatinine 1.25 potassium 4.1 GFR 50 7 April 2023.    Hospital discharge FU :    Admitted primarily for respiratory issues, reviewed post discharge notes by pulmonologist, now back to baseline with respect to respiratory status, says that his lung issues were all related to work-related exposure, and not due to smoking.        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     Per Pulmonology notes :  He had PFT in 4 yrs ago and he has severe COPD  On 08/26/2021  Severe obstructive pulmonary disease. There is no response to  bronchodilator. Static lung volume study suggests hyperinflation.   Diffusion capacity is severely impaired. Airway resistance is  increased.   Tolerating  beta-blockers without worsening of his COPD.    AAA:     Ultrasound of the abdominal aorta and iliac arteries November 2022-maximum diameter of native aorta 5.6 x 4.6 cm suboptimal imaging and angulation was reported by the .,  Aggressive blood pressure management  Vascular surgery follow-up as recommended        Follow up : 1 year  Comprehensive profile and lipid profile prior to next visit    Freda Thrasher MD

## 2023-10-17 NOTE — ASSESSMENT & PLAN NOTE
Admitted primarily for respiratory issues, reviewed post discharge notes by pulmonologist, now back to baseline with respect to respiratory status, says that his lung issues were all related to work-related exposure, and not due to smoking.      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    Per Pulmonology notes :  He had PFT in 4 yrs ago and he has severe COPD  On 08/26/2021  Severe obstructive pulmonary disease. There is no response to  bronchodilator. Static lung volume study suggests hyperinflation.   Diffusion capacity is severely impaired. Airway resistance is  increased.   Tolerating beta-blockers without worsening of his COPD.

## 2023-10-20 ENCOUNTER — HOSPITAL ENCOUNTER (OUTPATIENT)
Dept: PULMONOLOGY | Age: 83
Discharge: HOME | End: 2023-10-20
Payer: MEDICARE

## 2023-10-20 ENCOUNTER — HOSPITAL ENCOUNTER (OUTPATIENT)
Dept: GENERAL RADIOLOGY | Age: 83
End: 2023-10-20
Attending: INTERNAL MEDICINE
Payer: MEDICARE

## 2023-10-20 DIAGNOSIS — J44.9 CHRONIC OBSTRUCTIVE PULMONARY DISEASE, UNSPECIFIED COPD TYPE (HCC): ICD-10-CM

## 2023-10-20 DIAGNOSIS — J18.9 COMMUNITY ACQUIRED PNEUMONIA OF RIGHT LOWER LOBE OF LUNG: ICD-10-CM

## 2023-10-20 PROCEDURE — 71046 X-RAY EXAM CHEST 2 VIEWS: CPT

## 2023-10-20 PROCEDURE — 94726 PLETHYSMOGRAPHY LUNG VOLUMES: CPT

## 2023-10-20 PROCEDURE — 94729 DIFFUSING CAPACITY: CPT

## 2023-10-20 PROCEDURE — 94060 EVALUATION OF WHEEZING: CPT

## 2023-10-20 PROCEDURE — 6360000002 HC RX W HCPCS

## 2023-10-20 RX ORDER — ALBUTEROL SULFATE 2.5 MG/3ML
SOLUTION RESPIRATORY (INHALATION)
Status: COMPLETED
Start: 2023-10-20 | End: 2023-10-20

## 2023-10-20 RX ADMIN — ALBUTEROL SULFATE 2.5 MG: 2.5 SOLUTION RESPIRATORY (INHALATION) at 12:51

## 2023-10-21 NOTE — PROCEDURES
[facility], [address] [facility]                               PULMONARY FUNCTION    PATIENT NAME: Sendy Man                    :        1940  MED REC NO:   [id number]                            ROOM:  ACCOUNT NO:   [de-identified]                           ADMIT DATE: 10/20/2023  PROVIDER:     Lyla Copeland MD    DATE OF PROCEDURE:  10/20/2023    REQUESTING PROVIDER:  Angelia Baker. Natacha Muse MD    INTERPRETING PROVIDER:  Angelia Baker. Natacha Muse MD    REASON FOR STUDY:  Shortness of breath, wheezing. INTERPRETATION:  FVC is 2.68, 69% of predicted. FEV1 is 1.74, 64% of  predicted. FEV1/FVC is 65%. FEF 25-75% is 0.90, 60% of predicted. Postbronchodilator study shows FVC is 3.05, 13% improvement; FEV1 is  2.02, 16% improvement. Lung volume study shows residual volume is 4.32,  157% of predicted. TLC is 7.09, 100% of predicted. RV to TLC ratio is  61.01, 152% of predicted. Diffusion capacity is 15.46, 88% of  predicted. Airway resistance is _____ of predicted. SUMMARY:  Moderate obstructive pulmonary disease. There is significant  response to bronchodilator. Static lung volume study suggests air  trapping and hyperinflation. Diffusion capacity is within normal range. Airway resistance is normal.  Clinical correlation is requested.         Lyla Copeland MD    D: 10/21/2023 12:00:12       T: 10/21/2023 16:06:49     [email or web address]  Job#: [id number]     Doc#: [id number]    CC:

## 2023-10-23 ENCOUNTER — OFFICE VISIT (OUTPATIENT)
Dept: UROLOGY | Age: 83
End: 2023-10-23
Payer: MEDICARE

## 2023-10-23 VITALS
SYSTOLIC BLOOD PRESSURE: 138 MMHG | WEIGHT: 184 LBS | HEIGHT: 68 IN | HEART RATE: 85 BPM | BODY MASS INDEX: 27.89 KG/M2 | DIASTOLIC BLOOD PRESSURE: 84 MMHG

## 2023-10-23 DIAGNOSIS — C67.9 MALIGNANT NEOPLASM OF URINARY BLADDER, UNSPECIFIED SITE (HCC): Primary | ICD-10-CM

## 2023-10-23 LAB
BILIRUBIN, POC: NORMAL
BLOOD URINE, POC: NORMAL
CLARITY, POC: NORMAL
COLOR, POC: YELLOW
GLUCOSE URINE, POC: NORMAL
KETONES, POC: NORMAL
LEUKOCYTE EST, POC: NORMAL
NITRITE, POC: NORMAL
PH, POC: 6.5
PROTEIN, POC: NORMAL
SPECIFIC GRAVITY, POC: 1.02
UROBILINOGEN, POC: 1

## 2023-10-23 PROCEDURE — 99214 OFFICE O/P EST MOD 30 MIN: CPT | Performed by: UROLOGY

## 2023-10-23 PROCEDURE — 1123F ACP DISCUSS/DSCN MKR DOCD: CPT | Performed by: UROLOGY

## 2023-10-23 PROCEDURE — G8484 FLU IMMUNIZE NO ADMIN: HCPCS | Performed by: UROLOGY

## 2023-10-23 PROCEDURE — 3075F SYST BP GE 130 - 139MM HG: CPT | Performed by: UROLOGY

## 2023-10-23 PROCEDURE — 81003 URINALYSIS AUTO W/O SCOPE: CPT | Performed by: UROLOGY

## 2023-10-23 PROCEDURE — 3079F DIAST BP 80-89 MM HG: CPT | Performed by: UROLOGY

## 2023-10-23 PROCEDURE — G8427 DOCREV CUR MEDS BY ELIG CLIN: HCPCS | Performed by: UROLOGY

## 2023-10-23 PROCEDURE — 1111F DSCHRG MED/CURRENT MED MERGE: CPT | Performed by: UROLOGY

## 2023-10-23 PROCEDURE — 4004F PT TOBACCO SCREEN RCVD TLK: CPT | Performed by: UROLOGY

## 2023-10-23 PROCEDURE — G8417 CALC BMI ABV UP PARAM F/U: HCPCS | Performed by: UROLOGY

## 2023-10-23 RX ORDER — METOPROLOL SUCCINATE 50 MG/1
50 TABLET, EXTENDED RELEASE ORAL DAILY
COMMUNITY
Start: 2023-10-12

## 2023-10-23 ASSESSMENT — ENCOUNTER SYMPTOMS
ABDOMINAL PAIN: 0
ABDOMINAL DISTENTION: 0

## 2023-10-23 NOTE — PROGRESS NOTES
Subjective:      Patient ID: Dane Ponce is a 79 y.o. male    HPI This is a 79 y.o. 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 He has a small superficial and papillary appearing bladder tumor recurrence in Rt posterior bladder wall that needs biopsy and fulguration and is back to arrange the procedure. He has no interval complaints.  Dr Sudarshan Yao is his Cardiologist.         Past Medical History:   Diagnosis Date    Atrial fibrillation [facility])     CAD (coronary artery disease)     COPD exacerbation ([address]) 07/31/2022    Depressive disorder 3/10/2022    Hematuria     High cholesterol     History of bladder cancer     Hyperlipidemia     Hypertension     Malignant neoplasm of urinary bladder ([address]) 01/27/2020    Presence of Watchman left atrial appendage closure device     S/P AAA repair     Spondylosis of lumbar region without myelopathy or radiculopathy     Stage 3a chronic kidney disease ([address]) 9/26/2023     Past Surgical History:   Procedure Laterality Date    ABDOMINAL AORTIC ANEURYSM REPAIR      ATRIAL ABLATION SURGERY      watchman device    CARDIAC SURGERY  05/01/2023    COLONOSCOPY N/A 05/26/2020    COLONOSCOPY DIAGNOSTIC performed by Renford Ormond, MD at [address] [facility] 02/11/2020    LEFT LOWER EXTREMITY REVASCULARIZATION performed by Jun Iraheta MD at [address] [facility] SCRN NOT HI [address] IND N/A 04/24/2017    COLONOSCOPY performed by Michaela Gupta MD at [address] [place] [address] ESOPHAGOGASTRODUODENOSCOPY TRANSORAL DIAGNOSTIC N/A 04/24/2017    EGD ESOPHAGOGASTRODUODENOSCOPY performed by Michaela Gupta MD at [address] [place] [address] 05/26/2020    EGD DIAGNOSTIC ONLY performed by Renford Ormond, MD at [address] [place] [address]

## 2023-11-02 ENCOUNTER — OFFICE VISIT (OUTPATIENT)
Dept: PULMONOLOGY | Age: 83
End: 2023-11-02
Payer: MEDICARE

## 2023-11-02 VITALS
OXYGEN SATURATION: 97 % | HEART RATE: 84 BPM | TEMPERATURE: 96.9 F | BODY MASS INDEX: 28.28 KG/M2 | WEIGHT: 186 LBS | DIASTOLIC BLOOD PRESSURE: 78 MMHG | SYSTOLIC BLOOD PRESSURE: 120 MMHG

## 2023-11-02 DIAGNOSIS — J44.9 CHRONIC OBSTRUCTIVE PULMONARY DISEASE, UNSPECIFIED COPD TYPE (HCC): Primary | ICD-10-CM

## 2023-11-02 DIAGNOSIS — J18.9 PNEUMONIA OF RIGHT LOWER LOBE DUE TO INFECTIOUS ORGANISM: ICD-10-CM

## 2023-11-02 DIAGNOSIS — Z72.0 TOBACCO ABUSE: ICD-10-CM

## 2023-11-02 PROCEDURE — 3023F SPIROM DOC REV: CPT | Performed by: INTERNAL MEDICINE

## 2023-11-02 PROCEDURE — 99214 OFFICE O/P EST MOD 30 MIN: CPT | Performed by: INTERNAL MEDICINE

## 2023-11-02 PROCEDURE — G8417 CALC BMI ABV UP PARAM F/U: HCPCS | Performed by: INTERNAL MEDICINE

## 2023-11-02 PROCEDURE — 3078F DIAST BP <80 MM HG: CPT | Performed by: INTERNAL MEDICINE

## 2023-11-02 PROCEDURE — 3074F SYST BP LT 130 MM HG: CPT | Performed by: INTERNAL MEDICINE

## 2023-11-02 PROCEDURE — G8427 DOCREV CUR MEDS BY ELIG CLIN: HCPCS | Performed by: INTERNAL MEDICINE

## 2023-11-02 PROCEDURE — 4004F PT TOBACCO SCREEN RCVD TLK: CPT | Performed by: INTERNAL MEDICINE

## 2023-11-02 PROCEDURE — G8484 FLU IMMUNIZE NO ADMIN: HCPCS | Performed by: INTERNAL MEDICINE

## 2023-11-02 PROCEDURE — 1123F ACP DISCUSS/DSCN MKR DOCD: CPT | Performed by: INTERNAL MEDICINE

## 2023-11-02 ASSESSMENT — ENCOUNTER SYMPTOMS
NAUSEA: 0
WHEEZING: 0
VOICE CHANGE: 0
SHORTNESS OF BREATH: 1
RHINORRHEA: 0
DIARRHEA: 0
CHEST TIGHTNESS: 0
SORE THROAT: 0
ABDOMINAL PAIN: 0
COUGH: 0
EYE ITCHING: 0
VOMITING: 0

## 2023-11-02 NOTE — PROGRESS NOTES
kg (186 lb)     Wt Readings from Last 3 Encounters:   11/07/23 82.9 kg (182 lb 12.8 oz)   11/02/23 84.4 kg (186 lb)   10/23/23 83.5 kg (184 lb)         Physical Exam  Constitutional:       Appearance: He is well-developed. HENT:      Head: Normocephalic and atraumatic. Nose: Nose normal.   Eyes:      Conjunctiva/sclera: Conjunctivae normal.      Pupils: Pupils are equal, round, and reactive to light. Neck:      Thyroid: No thyromegaly. Vascular: No JVD. Trachea: No tracheal deviation. Cardiovascular:      Rate and Rhythm: Normal rate and regular rhythm. Heart sounds: No murmur heard. No friction rub. No gallop. Pulmonary:      Effort: Pulmonary effort is normal. No respiratory distress. Breath sounds: Normal breath sounds. No wheezing or rales. Comments: diminished Breath sound bilaterally. Chest:      Chest wall: No tenderness. Abdominal:      General: There is no distension. Musculoskeletal:         General: Normal range of motion. Lymphadenopathy:      Cervical: No cervical adenopathy. Skin:     General: Skin is warm and dry. Findings: No rash. Neurological:      Mental Status: He is alert and oriented to person, place, and time. Cranial Nerves: No cranial nerve deficit.    Psychiatric:         Behavior: Behavior normal.         Current Outpatient Medications   Medication Sig Dispense Refill    Multiple Vitamins-Minerals (PRESERVISION AREDS 2+MULTI VIT PO) Take by mouth      metoprolol succinate (TOPROL XL) 50 MG extended release tablet Take 1 tablet by mouth daily      albuterol (PROVENTIL) (2.5 MG/3ML) 0.083% nebulizer solution Take 3 mLs by nebulization 4 times daily as needed for Wheezing 120 each 3    metoprolol succinate (TOPROL XL) 25 MG extended release tablet Take 3 tablets by mouth daily 30 tablet 3    aspirin 81 MG chewable tablet Take 1 tablet by mouth daily      clopidogrel (PLAVIX) 75 MG tablet Take 1 tablet by mouth daily

## 2023-11-06 ENCOUNTER — APPOINTMENT (OUTPATIENT)
Dept: CARDIOLOGY | Facility: CLINIC | Age: 83
End: 2023-11-06
Payer: MEDICARE

## 2023-11-07 ENCOUNTER — HOSPITAL ENCOUNTER (OUTPATIENT)
Dept: PREADMISSION TESTING | Age: 83
Discharge: HOME | End: 2023-11-11
Payer: MEDICARE

## 2023-11-07 ENCOUNTER — HOSPITAL ENCOUNTER (OUTPATIENT)
Dept: CT IMAGING | Age: 83
Discharge: HOME | End: 2023-11-09
Attending: INTERNAL MEDICINE
Payer: MEDICARE

## 2023-11-07 VITALS
HEIGHT: 68 IN | TEMPERATURE: 96.9 F | BODY MASS INDEX: 27.71 KG/M2 | HEART RATE: 80 BPM | SYSTOLIC BLOOD PRESSURE: 155 MMHG | OXYGEN SATURATION: 96 % | RESPIRATION RATE: 14 BRPM | DIASTOLIC BLOOD PRESSURE: 82 MMHG | WEIGHT: 182.8 LBS

## 2023-11-07 DIAGNOSIS — J18.9 PNEUMONIA OF RIGHT LOWER LOBE DUE TO INFECTIOUS ORGANISM: ICD-10-CM

## 2023-11-07 LAB
ABO + RH BLD: NORMAL
ALBUMIN SERPL-MCNC: 4.2 G/DL (ref 3.5–4.6)
ALP SERPL-CCNC: 73 U/L (ref 35–104)
ALT SERPL-CCNC: 12 U/L (ref 0–41)
ANION GAP SERPL CALCULATED.3IONS-SCNC: 10 MEQ/L (ref 9–15)
APTT PPP: 32.2 SEC (ref 24.4–36.8)
AST SERPL-CCNC: 18 U/L (ref 0–40)
BILIRUB SERPL-MCNC: 0.9 MG/DL (ref 0.2–0.7)
BLD GP AB SCN SERPL QL: NORMAL
BUN SERPL-MCNC: 15 MG/DL (ref 8–23)
CALCIUM SERPL-MCNC: 9 MG/DL (ref 8.5–9.9)
CHLORIDE SERPL-SCNC: 107 MEQ/L (ref 95–107)
CO2 SERPL-SCNC: 29 MEQ/L (ref 20–31)
CREAT SERPL-MCNC: 1.06 MG/DL (ref 0.7–1.2)
ERYTHROCYTE [DISTWIDTH] IN BLOOD BY AUTOMATED COUNT: 14.5 % (ref 11.5–14.5)
GLOBULIN SER CALC-MCNC: 3.2 G/DL (ref 2.3–3.5)
GLUCOSE SERPL-MCNC: 86 MG/DL (ref 70–99)
HCT VFR BLD AUTO: 46.4 % (ref 42–52)
HGB BLD-MCNC: 14.4 G/DL (ref 14–18)
INR PPP: 1.1
MCH RBC QN AUTO: 30 PG (ref 27–31.3)
MCHC RBC AUTO-ENTMCNC: 31 % (ref 33–37)
MCV RBC AUTO: 96.7 FL (ref 79–92.2)
PLATELET # BLD AUTO: 143 K/UL (ref 130–400)
POTASSIUM SERPL-SCNC: 3.6 MEQ/L (ref 3.4–4.9)
PROT SERPL-MCNC: 7.4 G/DL (ref 6.3–8)
PROTHROMBIN TIME: 14.6 SEC (ref 12.3–14.9)
RBC # BLD AUTO: 4.8 M/UL (ref 4.7–6.1)
SODIUM SERPL-SCNC: 146 MEQ/L (ref 135–144)
WBC # BLD AUTO: 7.2 K/UL (ref 4.8–10.8)

## 2023-11-07 PROCEDURE — 80053 COMPREHEN METABOLIC PANEL: CPT

## 2023-11-07 PROCEDURE — 86901 BLOOD TYPING SEROLOGIC RH(D): CPT

## 2023-11-07 PROCEDURE — 85610 PROTHROMBIN TIME: CPT

## 2023-11-07 PROCEDURE — 71250 CT THORAX DX C-: CPT

## 2023-11-07 PROCEDURE — 86850 RBC ANTIBODY SCREEN: CPT

## 2023-11-07 PROCEDURE — 85730 THROMBOPLASTIN TIME PARTIAL: CPT

## 2023-11-07 PROCEDURE — 86900 BLOOD TYPING SEROLOGIC ABO: CPT

## 2023-11-07 PROCEDURE — 85027 COMPLETE CBC AUTOMATED: CPT

## 2023-11-07 ASSESSMENT — ENCOUNTER SYMPTOMS
CONSTIPATION: 0
ABDOMINAL PAIN: 0
PHOTOPHOBIA: 0
ALLERGIC/IMMUNOLOGIC NEGATIVE: 1
CHEST TIGHTNESS: 0
NAUSEA: 0
EYE ITCHING: 0
WHEEZING: 0
SORE THROAT: 0
DIARRHEA: 0
SHORTNESS OF BREATH: 0
COUGH: 0
SINUS PAIN: 0
ABDOMINAL DISTENTION: 0
RHINORRHEA: 0
TROUBLE SWALLOWING: 0
BLOOD IN STOOL: 0
SINUS PRESSURE: 0
VOMITING: 0
EYE REDNESS: 0
EYE PAIN: 0
EYE DISCHARGE: 0
BACK PAIN: 0

## 2023-11-07 NOTE — H&P
(AAA)    Stage 3a chronic kidney disease ([address])    COPD (chronic obstructive pulmonary disease) (HCC)    Shortness of breath    Bladder cancer ([address])      with planned surgery as above. Plan:  Preoperative workup as follows: PAT, CBC, CMP, PT/INR, PTT, T&S,   EKG 9/26/23 in epic   2.    Scheduled for: cystoscopy bladder biopsy fulguration on 11/13/23     ANNETTA Dunn - CNP  11/7/2023  2:01 PM

## 2023-11-08 ENCOUNTER — TELEPHONE (OUTPATIENT)
Dept: UROLOGY | Age: 83
End: 2023-11-08

## 2023-11-08 NOTE — TELEPHONE ENCOUNTER
was scheduled for outpatient surgery with  on 11-13-23. Pt takes Plavix and ASA, we sent Sylvester Bolaños a cardiac clearance note asking for patient to be off these blood thinners before his surgery. Sylvester Bolaños sent us a clearance note back that the patient may stop these blood thinners for 7 days before the surgery and resume them right after the surgery. Emilie Carias had actually wanted the patient to be off these blood thinners for 10-14 days (preferably 14 days) before the surgery. Surgery has been cancelled. I sent a new note to Sylvester Bolaños asking for patient to be cleared to be off Plavix and ASA 10-14 days (preferably 14 days) before surgery when rescheduled. Waiting for  response  Patient has been notified and he understands.   I will reschedule surgery once cleared again

## 2023-11-09 ENCOUNTER — ANCILLARY PROCEDURE (OUTPATIENT)
Dept: CARDIOLOGY | Facility: CLINIC | Age: 83
End: 2023-11-09
Payer: MEDICARE

## 2023-11-09 DIAGNOSIS — I48.91 ATRIAL FIBRILLATION, UNSPECIFIED TYPE (MULTI): ICD-10-CM

## 2023-11-09 PROCEDURE — 93225 XTRNL ECG REC<48 HRS REC: CPT | Performed by: INTERNAL MEDICINE

## 2023-11-09 PROCEDURE — 93227 XTRNL ECG REC<48 HR R&I: CPT | Performed by: INTERNAL MEDICINE

## 2023-11-15 ENCOUNTER — TELEPHONE (OUTPATIENT)
Dept: UROLOGY | Age: 83
End: 2023-11-15

## 2023-11-15 ENCOUNTER — PREP FOR PROCEDURE (OUTPATIENT)
Dept: UROLOGY | Age: 83
End: 2023-11-15

## 2023-11-15 RX ORDER — SODIUM CHLORIDE 0.9 % (FLUSH) 0.9 %
5-40 SYRINGE (ML) INJECTION PRN
Status: CANCELLED | OUTPATIENT
Start: 2023-11-15

## 2023-11-15 RX ORDER — SODIUM CHLORIDE 0.9 % (FLUSH) 0.9 %
5-40 SYRINGE (ML) INJECTION EVERY 12 HOURS SCHEDULED
Status: CANCELLED | OUTPATIENT
Start: 2023-11-15

## 2023-11-15 RX ORDER — SODIUM CHLORIDE 9 MG/ML
INJECTION, SOLUTION INTRAVENOUS PRN
Status: CANCELLED | OUTPATIENT
Start: 2023-11-15

## 2023-11-15 NOTE — TELEPHONE ENCOUNTER
Discussed anticoagulation with the patient's cardiologist Dr Marty Sheppard. Since Watchman was on 5/5/23, he is ok coming off Plavix and ASA for 10 days prior and 3 days after the planned cystoscopy with bladder biopsy. Will call to arrange with the patient.

## 2023-11-16 ENCOUNTER — OFFICE VISIT (OUTPATIENT)
Dept: PULMONOLOGY | Age: 83
End: 2023-11-16
Payer: MEDICARE

## 2023-11-16 VITALS
DIASTOLIC BLOOD PRESSURE: 70 MMHG | TEMPERATURE: 97.1 F | WEIGHT: 183 LBS | HEART RATE: 60 BPM | BODY MASS INDEX: 27.83 KG/M2 | OXYGEN SATURATION: 95 % | SYSTOLIC BLOOD PRESSURE: 128 MMHG

## 2023-11-16 DIAGNOSIS — J44.9 CHRONIC OBSTRUCTIVE PULMONARY DISEASE, UNSPECIFIED COPD TYPE (HCC): Primary | ICD-10-CM

## 2023-11-16 DIAGNOSIS — R93.89 ABNORMAL CT OF THE CHEST: ICD-10-CM

## 2023-11-16 DIAGNOSIS — Z72.0 TOBACCO ABUSE: ICD-10-CM

## 2023-11-16 PROCEDURE — G8427 DOCREV CUR MEDS BY ELIG CLIN: HCPCS | Performed by: INTERNAL MEDICINE

## 2023-11-16 PROCEDURE — 3078F DIAST BP <80 MM HG: CPT | Performed by: INTERNAL MEDICINE

## 2023-11-16 PROCEDURE — G8484 FLU IMMUNIZE NO ADMIN: HCPCS | Performed by: INTERNAL MEDICINE

## 2023-11-16 PROCEDURE — G8417 CALC BMI ABV UP PARAM F/U: HCPCS | Performed by: INTERNAL MEDICINE

## 2023-11-16 PROCEDURE — 3074F SYST BP LT 130 MM HG: CPT | Performed by: INTERNAL MEDICINE

## 2023-11-16 PROCEDURE — 3023F SPIROM DOC REV: CPT | Performed by: INTERNAL MEDICINE

## 2023-11-16 PROCEDURE — 1123F ACP DISCUSS/DSCN MKR DOCD: CPT | Performed by: INTERNAL MEDICINE

## 2023-11-16 PROCEDURE — 4004F PT TOBACCO SCREEN RCVD TLK: CPT | Performed by: INTERNAL MEDICINE

## 2023-11-16 PROCEDURE — 99214 OFFICE O/P EST MOD 30 MIN: CPT | Performed by: INTERNAL MEDICINE

## 2023-11-16 ASSESSMENT — ENCOUNTER SYMPTOMS
ABDOMINAL PAIN: 0
VOMITING: 0
COUGH: 0
RHINORRHEA: 0
NAUSEA: 0
EYE ITCHING: 0
SORE THROAT: 0
SHORTNESS OF BREATH: 0
DIARRHEA: 0
CHEST TIGHTNESS: 0
WHEEZING: 0
VOICE CHANGE: 0

## 2023-11-16 NOTE — PROGRESS NOTES
reasonably  achievable. FINDINGS    Lungs: Persistent triangle shaped consolidation abutting the right heart border that is narrower at the hilum with the apex pointing laterally. 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 The  possibility of a chronic pneumonia is not excluded but there would not be this much volume loss in that situation. There is no obvious underlying mass lesion. No definite endobronchial mass demonstrated. Mediastinum : Small lymph nodes consistent with reactive adenopathy. Pleura:Normal. No effusion or thickening    Vessels:Thoracic aorta is intact. Bones:Normal    Upper abdomen:No significant abnormality of the visualized structures of the upper abdomen    Impression  PERSISTENT RIGHT MIDDLE LOBE COLLAPSE.  ]  Results for orders placed during the hospital encounter of 06/04/19    CT CHEST W CONTRAST    Narrative  CT of the Chest with intravenous contrast medium    History:  Abnormal chest radiograph. Technical Factors:    CT imaging of the chest was obtained and formatted as 5 mm contiguous axial images from the thoracic inlet through the adrenal glands. Sagittal and coronal reconstruction obtained during postprocessing. Intravenous contrast medium:  Isovue-370, 75 mL    Comparison:  Chest radiographs, May 28, 2019, May 20, 2019. Findings:    Right lung shows emphysematous change. Consolidation lateral segment right middle lobe identified (series 2, image 35. No mass identified. No nodules, masses, pleural effusion visualized. Left lung shows emphysematous change. No nodules, masses, foci of consolidation. No hilar, mediastinal, or axillary lymph node enlargement. Thoracic aorta normal in course and caliber. Limited imaging upper abdomen shows cephalad extent of abdominal aortic stent graft. No osteoblastic, and no osteolytic lesions.  Disc space narrowing with anterior osteophytes mid to lower

## 2023-11-21 NOTE — PROGRESS NOTES
Most recently seen by me in October 2023.    Subjective :   Interval review of systems is negative for chest discomfort pressure tightness heaviness palpitations lightheadedness orthopnea paroxysmal nocturnal dyspnea dependent edema or claudication TIA or CVA type symptoms or bleeding diathesis    Brought in all medications for review.      History so Far :    1. Hypertension  2. Permanent atrial fibrillation on rate control strategy, with some concern for sinus node dysfunction. No indication for permanent pacemaker as of now, except that patient does have exertional shortness of breath. In this patient with COPD, exertional shortness of breath could be multifactorial. A component of it could be related to chronotropic blunting. Patient is not interested in pursuing EP evaluation or consideration for pacemaker at this time, and there are no absolute indications. He stopped his inhaler because it was prohibitively expensive, and he says the inhalers did not make any impact on his breathing.  3. History of abdominal aortic aneurysm status post endograft vascular stent graft  4. Hyperlipidemia  5.  Off anticoagulation once left atrial appendage closure device was placed, anticoagulation had to be stopped because of recurrent GI bleeding from AV malformations.  6. Current smoker-we discuss this at every visit.,  He has cut back significantly.  7. BMI puts him in the overweight category-he does not need any aggressive dietary modification at this time.  8. Patient has history of endovascular stent graft for abdominal aortic aneurysm  9. Peripheral vascular disease without claudication  10. Mild carotid disease  11 Abdominal aorta .duplex showed 5.6cm AAA with no obvious endoleak but questionable growth from 5.2cm based on a prior report.     13. CTA December12 2022-aortic annulus 3.4 x 2.3 cm sinuses 3.9 cm sinotubular 3.1 cm upper ascending 3.6 cm descending aorta at the PA level 2.8 cm no evidence of thoracic aortic  dissection or coarctation descending aorta of the diaphragm level 2.7 cm descending aorta infrarenal 5.6 cm patent infrarenal abdominal aorta and bilateral common iliac artery endovascular stent graft no evidence of endoleak native infrarenal abdominal aorta aneurysm sac 5.6 cm 60% celiac artery stenosis no significant renal artery stenosis patent stent right common iliac artery distal right common iliac artery aneurysm 2.4 cm patent stent left common iliac artery distal left common iliac artery ectatic 1.9 cm    14. Ultrasound of the abdominal aorta and iliac arteries November 2022-maximum diameter of native aorta 5.6 x 4.6 cm suboptimal imaging and angulation was reported by the .    15. Arterial duplex lower extremities November 2022-no evidence of hemodynamically significant stenosis in the right lower extremity, moderate calcified nonobstructive plaque throughout no evidence of hemodynamically significant stenosis left lower extremity, mild to moderate calcified nonobstructive plaque throughout left SFA stent patent    16. PVR with exercise November 2022-no evidence of arterial occlusive disease in the right or left lower extremity left pressures of greater than 220 mmHg so suggested no compressibility of vessels and make absolute segmental limb pressures unreliable    17.history of  Chronic kidney disease stage IIIa, this has resolved, GFR greater than 60 November 2023.    18. CTA prior to Watchman device-dilated left atrium, no evidence of left atrial or left atrial appendage thrombus, left atrial appendage orifice oval in shape measuring 2.7 x 1.8 cm, left atrial appendage area 3. 6 4 cm² mild sentry lobular emphysema    19. Patient underwent left atrial appendage closure with 27 mm Watchman FL X in May 2023.    20.BNP level was markedly elevated at 2737 in June 2023, 3959 in September 2023, no clinical volume overload.  Objective      Wt Readings from Last 3 Encounters:   11/22/23 84.4 kg (186  lb)   10/17/23 81.6 kg (180 lb)   05/25/23 83.9 kg (185 lb)            Physical Exam:  [place]  Patient is awake alert oriented x3, no acute distress  Lungs are clear  Heart sounds are irregular  without murmur rub or gallop  Extremities show no edema  Ambulates without assistance.    Meds:  Current Outpatient Medications   Medication Instructions    albuterol 2.5 mg /3 mL (0.083 %) nebulizer solution Use  1 unit dose every 4-6 hours as needed for wheezing    aspirin 81 mg, oral, Daily, Chew and swallow 1 tablet by mouth once daily    dextromethorphan-guaifenesin (Mucinex DM)  mg 12 hr tablet 1-2 tablets, Every 12 hours PRN, Do not crush, chew, or split.    dilTIAZem CD (CARDIZEM CD) 360 mg, oral, Daily    escitalopram (Lexapro) 10 mg tablet 1 tablet, oral, Daily    ipratropium-albuteroL (Duo-Neb) 0.5-2.5 mg/3 mL nebulizer solution inhalation, Every 4 hours PRN    metoprolol succinate XL (TOPROL-XL) 25 mg, oral, Daily, Along with 50mg tablet (75mg total)    metoprolol succinate XL (TOPROL-XL) 50 mg, oral, Daily, Along with 25mg tablet(75mg total)    rosuvastatin (CRESTOR) 10 mg, oral, Nightly    vit C/E/Zn/coppr/lutein/zeaxan (PRESERVISION AREDS-2 ORAL) 1 capsule, oral, Daily          Allergies   Allergen Reactions    Azithromycin Unknown and Other     Thrush    Meloxicam Other    Moxifloxacin Unknown     thrush     LABS:    Reviewed comprehensive profile CBC PT/INR from November 2023.  GFR is now greater than 60, previously had stage IIIa kidney disease hemoglobin and hematocrit 14 and 46 respectively    Problem List:    Patient Active Problem List    Diagnosis Date Noted    Presence of Watchman left atrial appendage closure device 11/22/2023    Preop cardiovascular exam 11/22/2023    Hospital discharge follow-up 10/17/2023    Abnormal electrocardiogram 10/14/2023    Anemia 10/14/2023    COPD (chronic obstructive pulmonary disease) (CMS/HCC) 10/14/2023    Current smoker 10/14/2023    Depression 10/14/2023     Dizziness 10/14/2023    ED (erectile dysfunction) 10/14/2023    Hard of hearing 10/14/2023    History of endovascular stent graft for abdominal aortic aneurysm (AAA) 10/14/2023    Hyperlipidemia 10/14/2023    Hypokalemia 10/14/2023    Hypertension 10/14/2023    Noncompliance 10/14/2023    Atrial fibrillation (CMS/[facility]) 10/14/2023    AAA (abdominal aortic aneurysm) (CMS/[facility]) 10/14/2023    PVD (peripheral vascular disease) (CMS/[facility]) 10/14/2023    Stage 3a chronic kidney disease (CKD) (CMS/[facility]) 10/14/2023    Overweight with body mass index (BMI) of 25 to 25.9 in adult 10/14/2023                 Assessment:    1.  Permanent atrial fibrillation on rate control strategy  2.  Recurrent GI bleed necessitating interruption of anticoagulation, and placement of left atrial appendage closure device May 2023   3.  Abdominal aortic aneurysm-followed by vascular surgery  4.  Peripheral vascular disease without lifestyle limiting claudication, no open sores or ulcers  5.  Ongoing nicotine dependence, has cut back  6.  BNP level was markedly elevated at 2737 in June 2023, 3959 in September 2023  7.  POC :Having prostate issues, have had discussions with urology, Dr. Johnston regarding interruption of clopidogrel, now that patient is 6 months post left atrial appendage device implantation we can stop clopidogrel, he will have to continue aspirin lifetime, but reasonable to interrupt aspirin for a week prior to procedure and resume as soon as feasible after that  8.  I do not see any guideline recommendations regarding bacterial endocarditis prophylaxis for Watchman device.    Recommendations:  Orders Placed This Encounter   Procedures    Comprehensive Metabolic Panel    CBC    Lipid Panel     Patient can stop clopidogrel at this time, continue aspirin indefinitely.  Follow up : 1 year      Freda Thrasher MD

## 2023-11-22 ENCOUNTER — OFFICE VISIT (OUTPATIENT)
Dept: CARDIOLOGY | Facility: CLINIC | Age: 83
End: 2023-11-22
Payer: MEDICARE

## 2023-11-22 VITALS
BODY MASS INDEX: 26.69 KG/M2 | HEART RATE: 83 BPM | DIASTOLIC BLOOD PRESSURE: 89 MMHG | SYSTOLIC BLOOD PRESSURE: 139 MMHG | WEIGHT: 186 LBS

## 2023-11-22 DIAGNOSIS — Z01.810 PREOP CARDIOVASCULAR EXAM: ICD-10-CM

## 2023-11-22 DIAGNOSIS — Z95.818 PRESENCE OF WATCHMAN LEFT ATRIAL APPENDAGE CLOSURE DEVICE: ICD-10-CM

## 2023-11-22 DIAGNOSIS — E78.2 MIXED HYPERLIPIDEMIA: ICD-10-CM

## 2023-11-22 DIAGNOSIS — I10 PRIMARY HYPERTENSION: ICD-10-CM

## 2023-11-22 DIAGNOSIS — I48.21 PERMANENT ATRIAL FIBRILLATION (MULTI): ICD-10-CM

## 2023-11-22 PROCEDURE — 1160F RVW MEDS BY RX/DR IN RCRD: CPT | Performed by: INTERNAL MEDICINE

## 2023-11-22 PROCEDURE — 3079F DIAST BP 80-89 MM HG: CPT | Performed by: INTERNAL MEDICINE

## 2023-11-22 PROCEDURE — 1159F MED LIST DOCD IN RCRD: CPT | Performed by: INTERNAL MEDICINE

## 2023-11-22 PROCEDURE — 3075F SYST BP GE 130 - 139MM HG: CPT | Performed by: INTERNAL MEDICINE

## 2023-11-22 PROCEDURE — 99214 OFFICE O/P EST MOD 30 MIN: CPT | Performed by: INTERNAL MEDICINE

## 2023-11-22 RX ORDER — METOPROLOL SUCCINATE 50 MG/1
50 TABLET, EXTENDED RELEASE ORAL DAILY
Qty: 90 TABLET | Refills: 3 | Status: SHIPPED | OUTPATIENT
Start: 2023-11-22 | End: 2024-11-21

## 2023-11-22 RX ORDER — DILTIAZEM HYDROCHLORIDE 360 MG/1
360 CAPSULE, EXTENDED RELEASE ORAL DAILY
Qty: 90 CAPSULE | Refills: 3 | Status: SHIPPED | OUTPATIENT
Start: 2023-11-22 | End: 2024-11-21

## 2023-11-22 RX ORDER — ROSUVASTATIN CALCIUM 10 MG/1
10 TABLET, COATED ORAL NIGHTLY
Qty: 90 TABLET | Refills: 3 | Status: SHIPPED | OUTPATIENT
Start: 2023-11-22 | End: 2024-11-21

## 2023-11-22 RX ORDER — METOPROLOL SUCCINATE 25 MG/1
25 TABLET, EXTENDED RELEASE ORAL DAILY
Qty: 90 TABLET | Refills: 3 | Status: SHIPPED | OUTPATIENT
Start: 2023-11-22 | End: 2024-11-21

## 2023-12-05 ENCOUNTER — HOSPITAL ENCOUNTER (OUTPATIENT)
Dept: PREADMISSION TESTING | Age: 83
Discharge: HOME | End: 2023-12-09
Payer: MEDICARE

## 2023-12-05 VITALS
TEMPERATURE: 98.6 F | HEART RATE: 54 BPM | SYSTOLIC BLOOD PRESSURE: 158 MMHG | RESPIRATION RATE: 20 BRPM | BODY MASS INDEX: 28.16 KG/M2 | HEIGHT: 68 IN | WEIGHT: 185.8 LBS | OXYGEN SATURATION: 97 % | DIASTOLIC BLOOD PRESSURE: 81 MMHG

## 2023-12-05 PROBLEM — H91.90 HARD OF HEARING: Status: ACTIVE | Noted: 2023-10-14

## 2023-12-05 LAB
ABO + RH BLD: NORMAL
ANION GAP SERPL CALCULATED.3IONS-SCNC: 9 MEQ/L (ref 9–15)
APTT PPP: 31.5 SEC (ref 24.4–36.8)
BLD GP AB SCN SERPL QL: NORMAL
BUN SERPL-MCNC: 15 MG/DL (ref 8–23)
CALCIUM SERPL-MCNC: 9.2 MG/DL (ref 8.5–9.9)
CHLORIDE SERPL-SCNC: 104 MEQ/L (ref 95–107)
CO2 SERPL-SCNC: 29 MEQ/L (ref 20–31)
CREAT SERPL-MCNC: 1.12 MG/DL (ref 0.7–1.2)
GLUCOSE SERPL-MCNC: 92 MG/DL (ref 70–99)
INR PPP: 1
POTASSIUM SERPL-SCNC: 3.3 MEQ/L (ref 3.4–4.9)
PROTHROMBIN TIME: 14.2 SEC (ref 12.3–14.9)
SODIUM SERPL-SCNC: 142 MEQ/L (ref 135–144)

## 2023-12-05 PROCEDURE — 85610 PROTHROMBIN TIME: CPT

## 2023-12-05 PROCEDURE — 86901 BLOOD TYPING SEROLOGIC RH(D): CPT

## 2023-12-05 PROCEDURE — 86900 BLOOD TYPING SEROLOGIC ABO: CPT

## 2023-12-05 PROCEDURE — 86850 RBC ANTIBODY SCREEN: CPT

## 2023-12-05 PROCEDURE — 80048 BASIC METABOLIC PNL TOTAL CA: CPT

## 2023-12-05 PROCEDURE — 85730 THROMBOPLASTIN TIME PARTIAL: CPT

## 2023-12-05 ASSESSMENT — ENCOUNTER SYMPTOMS
CONSTIPATION: 0
NAUSEA: 0
SINUS PAIN: 0
RHINORRHEA: 0
SORE THROAT: 0
ABDOMINAL DISTENTION: 0
SHORTNESS OF BREATH: 0
ABDOMINAL PAIN: 0
BACK PAIN: 0
EYE DISCHARGE: 0
EYE PAIN: 0
DIARRHEA: 0
VOMITING: 0
EYE ITCHING: 0
SINUS PRESSURE: 0
EYE REDNESS: 0
WHEEZING: 0
COUGH: 1
CHEST TIGHTNESS: 0
BLOOD IN STOOL: 0
PHOTOPHOBIA: 0
TROUBLE SWALLOWING: 0

## 2023-12-05 NOTE — H&P
obstructive pulmonary disease) (HCC)    Shortness of breath    Bladder cancer ([address])    Hard of hearing      with planned surgery as above. Plan:  Preoperative workup as follows: PAT, T&S, PTT, PT/INR, BMP, CBC 11/7/23 in epic, EKG 9/26/23 in epic  2.    Scheduled for: cystoscopy with bladder biopsy with fulguration on 12/11/23    ANNETTA Valdivia - CNP  12/5/2023  12:36 PM

## 2023-12-07 ENCOUNTER — TELEPHONE (OUTPATIENT)
Dept: CARDIOLOGY | Facility: CLINIC | Age: 83
End: 2023-12-07
Payer: MEDICARE

## 2023-12-08 ENCOUNTER — ANESTHESIA EVENT (OUTPATIENT)
Dept: OPERATING ROOM | Age: 83
End: 2023-12-08
Payer: MEDICARE

## 2023-12-11 ENCOUNTER — ANESTHESIA (OUTPATIENT)
Dept: OPERATING ROOM | Age: 83
End: 2023-12-11
Payer: MEDICARE

## 2023-12-11 ENCOUNTER — HOSPITAL ENCOUNTER (OUTPATIENT)
Age: 83
Setting detail: OUTPATIENT SURGERY
Discharge: HOME | End: 2023-12-11
Attending: UROLOGY | Admitting: UROLOGY
Payer: MEDICARE

## 2023-12-11 VITALS
TEMPERATURE: 96.8 F | HEIGHT: 68 IN | OXYGEN SATURATION: 98 % | HEART RATE: 98 BPM | DIASTOLIC BLOOD PRESSURE: 86 MMHG | BODY MASS INDEX: 27.89 KG/M2 | SYSTOLIC BLOOD PRESSURE: 153 MMHG | RESPIRATION RATE: 16 BRPM | WEIGHT: 184 LBS

## 2023-12-11 DIAGNOSIS — C67.9 BLADDER CANCER (HCC): ICD-10-CM

## 2023-12-11 PROBLEM — D49.4 BLADDER TUMOR: Status: ACTIVE | Noted: 2023-10-23

## 2023-12-11 PROCEDURE — 7100000001 HC PACU RECOVERY - ADDTL 15 MIN: Performed by: UROLOGY

## 2023-12-11 PROCEDURE — 7100000000 HC PACU RECOVERY - FIRST 15 MIN: Performed by: UROLOGY

## 2023-12-11 PROCEDURE — 3600000003 HC SURGERY LEVEL 3 BASE: Performed by: UROLOGY

## 2023-12-11 PROCEDURE — 52234 CYSTOSCOPY AND TREATMENT: CPT | Performed by: UROLOGY

## 2023-12-11 PROCEDURE — 2500000003 HC RX 250 WO HCPCS: Performed by: NURSE ANESTHETIST, CERTIFIED REGISTERED

## 2023-12-11 PROCEDURE — 3600000013 HC SURGERY LEVEL 3 ADDTL 15MIN: Performed by: UROLOGY

## 2023-12-11 PROCEDURE — 3700000000 HC ANESTHESIA ATTENDED CARE: Performed by: UROLOGY

## 2023-12-11 PROCEDURE — 7100000010 HC PHASE II RECOVERY - FIRST 15 MIN: Performed by: UROLOGY

## 2023-12-11 PROCEDURE — 2709999900 HC NON-CHARGEABLE SUPPLY: Performed by: UROLOGY

## 2023-12-11 PROCEDURE — 6360000002 HC RX W HCPCS: Performed by: UROLOGY

## 2023-12-11 PROCEDURE — 7100000011 HC PHASE II RECOVERY - ADDTL 15 MIN: Performed by: UROLOGY

## 2023-12-11 PROCEDURE — 6360000002 HC RX W HCPCS

## 2023-12-11 PROCEDURE — 3700000001 HC ADD 15 MINUTES (ANESTHESIA): Performed by: UROLOGY

## 2023-12-11 PROCEDURE — 88305 TISSUE EXAM BY PATHOLOGIST: CPT

## 2023-12-11 PROCEDURE — 2580000003 HC RX 258: Performed by: UROLOGY

## 2023-12-11 PROCEDURE — 6370000000 HC RX 637 (ALT 250 FOR IP): Performed by: UROLOGY

## 2023-12-11 PROCEDURE — 6360000002 HC RX W HCPCS: Performed by: NURSE ANESTHETIST, CERTIFIED REGISTERED

## 2023-12-11 RX ORDER — SODIUM CHLORIDE 0.9 % (FLUSH) 0.9 %
5-40 SYRINGE (ML) INJECTION EVERY 12 HOURS SCHEDULED
Status: CANCELLED | OUTPATIENT
Start: 2023-12-11

## 2023-12-11 RX ORDER — FENTANYL CITRATE 0.05 MG/ML
50 INJECTION, SOLUTION INTRAMUSCULAR; INTRAVENOUS EVERY 10 MIN PRN
Status: DISCONTINUED | OUTPATIENT
Start: 2023-12-11 | End: 2023-12-11 | Stop reason: HOSPADM

## 2023-12-11 RX ORDER — SODIUM CHLORIDE 0.9 % (FLUSH) 0.9 %
5-40 SYRINGE (ML) INJECTION EVERY 12 HOURS SCHEDULED
Status: DISCONTINUED | OUTPATIENT
Start: 2023-12-11 | End: 2023-12-11 | Stop reason: HOSPADM

## 2023-12-11 RX ORDER — LIDOCAINE HYDROCHLORIDE 20 MG/ML
INJECTION, SOLUTION EPIDURAL; INFILTRATION; INTRACAUDAL; PERINEURAL PRN
Status: DISCONTINUED | OUTPATIENT
Start: 2023-12-11 | End: 2023-12-11 | Stop reason: SDUPTHER

## 2023-12-11 RX ORDER — ONDANSETRON 2 MG/ML
INJECTION INTRAMUSCULAR; INTRAVENOUS PRN
Status: DISCONTINUED | OUTPATIENT
Start: 2023-12-11 | End: 2023-12-11 | Stop reason: SDUPTHER

## 2023-12-11 RX ORDER — SODIUM CHLORIDE 0.9 % (FLUSH) 0.9 %
5-40 SYRINGE (ML) INJECTION PRN
Status: DISCONTINUED | OUTPATIENT
Start: 2023-12-11 | End: 2023-12-11 | Stop reason: HOSPADM

## 2023-12-11 RX ORDER — SODIUM CHLORIDE 9 MG/ML
INJECTION, SOLUTION INTRAVENOUS PRN
Status: DISCONTINUED | OUTPATIENT
Start: 2023-12-11 | End: 2023-12-11 | Stop reason: HOSPADM

## 2023-12-11 RX ORDER — SODIUM CHLORIDE 9 MG/ML
INJECTION, SOLUTION INTRAVENOUS PRN
Status: CANCELLED | OUTPATIENT
Start: 2023-12-11

## 2023-12-11 RX ORDER — MAGNESIUM HYDROXIDE 1200 MG/15ML
LIQUID ORAL PRN
Status: DISCONTINUED | OUTPATIENT
Start: 2023-12-11 | End: 2023-12-11 | Stop reason: ALTCHOICE

## 2023-12-11 RX ORDER — DIPHENHYDRAMINE HYDROCHLORIDE 50 MG/ML
12.5 INJECTION INTRAMUSCULAR; INTRAVENOUS
Status: DISCONTINUED | OUTPATIENT
Start: 2023-12-11 | End: 2023-12-11 | Stop reason: HOSPADM

## 2023-12-11 RX ORDER — LIDOCAINE HYDROCHLORIDE 20 MG/ML
JELLY TOPICAL PRN
Status: DISCONTINUED | OUTPATIENT
Start: 2023-12-11 | End: 2023-12-11 | Stop reason: ALTCHOICE

## 2023-12-11 RX ORDER — FENTANYL CITRATE 50 UG/ML
INJECTION, SOLUTION INTRAMUSCULAR; INTRAVENOUS PRN
Status: DISCONTINUED | OUTPATIENT
Start: 2023-12-11 | End: 2023-12-11 | Stop reason: SDUPTHER

## 2023-12-11 RX ORDER — OXYCODONE HYDROCHLORIDE 5 MG/1
5 TABLET ORAL
Status: DISCONTINUED | OUTPATIENT
Start: 2023-12-11 | End: 2023-12-11 | Stop reason: HOSPADM

## 2023-12-11 RX ORDER — HYDRALAZINE HYDROCHLORIDE 20 MG/ML
10 INJECTION INTRAMUSCULAR; INTRAVENOUS ONCE
Status: DISCONTINUED | OUTPATIENT
Start: 2023-12-11 | End: 2023-12-11 | Stop reason: HOSPADM

## 2023-12-11 RX ORDER — METOCLOPRAMIDE HYDROCHLORIDE 5 MG/ML
10 INJECTION INTRAMUSCULAR; INTRAVENOUS
Status: DISCONTINUED | OUTPATIENT
Start: 2023-12-11 | End: 2023-12-11 | Stop reason: HOSPADM

## 2023-12-11 RX ORDER — ONDANSETRON 2 MG/ML
4 INJECTION INTRAMUSCULAR; INTRAVENOUS
Status: DISCONTINUED | OUTPATIENT
Start: 2023-12-11 | End: 2023-12-11 | Stop reason: HOSPADM

## 2023-12-11 RX ORDER — PROPOFOL 10 MG/ML
INJECTION, EMULSION INTRAVENOUS PRN
Status: DISCONTINUED | OUTPATIENT
Start: 2023-12-11 | End: 2023-12-11 | Stop reason: SDUPTHER

## 2023-12-11 RX ORDER — LIDOCAINE HYDROCHLORIDE 10 MG/ML
1 INJECTION, SOLUTION EPIDURAL; INFILTRATION; INTRACAUDAL; PERINEURAL
Status: DISCONTINUED | OUTPATIENT
Start: 2023-12-11 | End: 2023-12-11 | Stop reason: HOSPADM

## 2023-12-11 RX ORDER — HYDRALAZINE HYDROCHLORIDE 20 MG/ML
INJECTION INTRAMUSCULAR; INTRAVENOUS
Status: COMPLETED
Start: 2023-12-11 | End: 2023-12-11

## 2023-12-11 RX ORDER — SODIUM CHLORIDE 0.9 % (FLUSH) 0.9 %
5-40 SYRINGE (ML) INJECTION PRN
Status: CANCELLED | OUTPATIENT
Start: 2023-12-11

## 2023-12-11 RX ADMIN — FENTANYL CITRATE 50 MCG: 50 INJECTION, SOLUTION INTRAMUSCULAR; INTRAVENOUS at 09:29

## 2023-12-11 RX ADMIN — SODIUM CHLORIDE: 9 INJECTION, SOLUTION INTRAVENOUS at 07:47

## 2023-12-11 RX ADMIN — LIDOCAINE HYDROCHLORIDE 50 MG: 20 INJECTION, SOLUTION EPIDURAL; INFILTRATION; INTRACAUDAL; PERINEURAL at 09:13

## 2023-12-11 RX ADMIN — PROPOFOL 150 MG: 10 INJECTION, EMULSION INTRAVENOUS at 09:13

## 2023-12-11 RX ADMIN — HYDRALAZINE HYDROCHLORIDE 10 MG: 20 INJECTION INTRAMUSCULAR; INTRAVENOUS at 10:29

## 2023-12-11 RX ADMIN — FENTANYL CITRATE 50 MCG: 50 INJECTION, SOLUTION INTRAMUSCULAR; INTRAVENOUS at 09:13

## 2023-12-11 RX ADMIN — CEFTRIAXONE SODIUM 1000 MG: 1 INJECTION, POWDER, FOR SOLUTION INTRAMUSCULAR; INTRAVENOUS at 09:16

## 2023-12-11 RX ADMIN — ONDANSETRON 4 MG: 2 INJECTION INTRAMUSCULAR; INTRAVENOUS at 09:13

## 2023-12-11 ASSESSMENT — PAIN SCALES - GENERAL
PAINLEVEL_OUTOF10: 0

## 2023-12-11 ASSESSMENT — PAIN DESCRIPTION - LOCATION
LOCATION: ABDOMEN
LOCATION: ABDOMEN

## 2023-12-11 ASSESSMENT — ENCOUNTER SYMPTOMS: SHORTNESS OF BREATH: 1

## 2023-12-11 NOTE — BRIEF OP NOTE
Brief Postoperative Note      Patient: Gabi Shukla YOB: 1940  MRN: [id number]    Date of Procedure: 12/11/2023    Pre-Op Diagnosis Codes: * Bladder cancer ([address]) [C67.9]    Post-Op Diagnosis: same       Procedure(s):  CYSTOSCOPY with BLADDER BIOPSY with Shaun Claudia / GEN /  PT WILL ARRIVE AT 7:15 AM. Size . 75-1cm tumor    Surgeon(s):  Virgen Shepard MD    Anesthesia: General/local    Estimated Blood Loss (mL): Minimal    Complications: None    Specimens:   ID Type Source Tests Collected by Time Destination   A : LEFT LATERAL WALL BLADDER TUMOR Tissue Bladder SURGICAL PATHOLOGY Virgen Shepard MD 12/11/2023 7023        Implants:  none      Drains: none    Findings: 0.75 to 1 cm Lt lateral bladder tumor noted and resected and fulgurated      Electronically signed by Virgen Shepard MD on 12/11/2023 at 9:44 AM

## 2023-12-11 NOTE — OP NOTE
[facility] [place], [address] [facility]                                OPERATIVE REPORT    PATIENT NAME: Last Pineda                 :        1940  MED REC NO:   [id number]                            ROOM:  ACCOUNT NO:   [de-identified]                           ADMIT DATE: 2023  PROVIDER:     Moris Clarke MD    DATE OF PROCEDURE:  2023    PREOPERATIVE DIAGNOSIS:  History of bladder cancer with recurrent  bladder tumor of 0.75 to 1 cm in size. POSTOPERATIVE DIAGNOSIS:  History of bladder cancer with recurrent  bladder tumor of 0.75 to 1 cm in size. PROCEDURES PERFORMED:  Cystoscopy with bladder tumor biopsy, resection,  fulguration of 0.75 to 1 cm in size. SURGEON:  Candido Monique MD    ANESTHESIA:  General with a 2% lidocaine local per urethra. ESTIMATED BLOOD LOSS:  Minimal    COMPLICATIONS:  None. CONDITION:  Stable to recovery room. 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 He is back recently for  yearly surveillance cystoscopy. He was found to have a recurrent  left-sided bladder wall tumor above the left ureteral orifice. He has  had Watchman procedure done on 2023. We chose to wait performance  procedure to we could safely come off his anticoagulants, which was okay  to do per his conversation I had with his cardiologist, Dr. Marty Sheppard since  he has been 6 months since the Watchman procedure. He had been off  Plavix and aspirin for a couple weeks. He had taken no other  anticoagulation type medications. He understood the risks and benefits  of the planned procedure as outlined preoperatively and was willing to  proceed as planned. He had sequential compression device placed on his  lower extremities prior induction of general anesthesia.   He

## 2023-12-11 NOTE — DISCHARGE INSTRUCTIONS
Drink plenty of liquids and no heavy activity. No ASA, Plavix, Vit or herbals until urine clear for 2-3 days.  F/U as scheduled in 1 week with Dr Deya Lyon   Call office with any questions or concerns

## 2023-12-11 NOTE — ANESTHESIA POSTPROCEDURE EVALUATION
Department of Anesthesiology  Postprocedure Note    Patient: Yareli Potter MRN: [id number]  YOB: 1940  Date of evaluation: 12/11/2023      Procedure Summary     Date: 12/11/23 Room / Location: [facility]    Anesthesia Start: 3022 Anesthesia Stop: 1583    Procedure: CYSTOSCOPY with BLADDER BIOPSY with Iraida Castorena / Roberth Ba /  Alexa Nascimento AM Diagnosis:       Bladder cancer ([address])      (Bladder cancer ([address]) [C67.9])    Surgeons: Meghan Lowe MD Responsible Provider: Pietro Hull DO    Anesthesia Type: general ASA Status: 3          Anesthesia Type: No value filed.     Shay Phase I: Shay Score: 10    Shay Phase II:        Anesthesia Post Evaluation    Patient location during evaluation: bedside  Patient participation: complete - patient participated  Level of consciousness: awake  Airway patency: patent  Nausea & Vomiting: no vomiting  Complications: no  Cardiovascular status: blood pressure returned to baseline  Respiratory status: acceptable  Hydration status: euvolemic  Pain management: adequate

## 2023-12-15 ENCOUNTER — TELEPHONE (OUTPATIENT)
Dept: CARDIOLOGY | Facility: CLINIC | Age: 83
End: 2023-12-15
Payer: MEDICARE

## 2023-12-15 NOTE — TELEPHONE ENCOUNTER
SCHEDULED CT ABDOMEN/PELVIS AT [facility] ON 1/8/24 AT 12:15PM ARRIVAL TIME, FAXED ORDER & GAVE AUTH TO , I SPOKE WITH PT AND GAVE HIM APPT INFO

## 2023-12-29 DIAGNOSIS — F32.A DEPRESSION, UNSPECIFIED DEPRESSION TYPE: ICD-10-CM

## 2023-12-29 RX ORDER — ESCITALOPRAM OXALATE 10 MG/1
TABLET ORAL
Qty: 180 TABLET | Refills: 0 | Status: SHIPPED | OUTPATIENT
Start: 2023-12-29

## 2023-12-29 NOTE — TELEPHONE ENCOUNTER
Comments:     Last Office Visit (last PCP visit):   7/14/2023    Next Visit Date:  Future Appointments   Date Time Provider [address]   [facility]   1/8/2024  2:00 PM [place] ROOM 1 [facility] CT [facility] RAD   2/16/2024 11:00 AM Marlen Pike MD [facility]   3/26/2024  9:30 AM SCHEDULE, ARACELI [facility]OLOG[facility] PROCEDURE [address]   5/16/2024  1:00 PM Royal Malena MD [facility]       **If hasn't been seen in over a year OR hasn't followed up according to last diabetes/ADHD visit, make appointment for patient before sending refill to provider.     Rx requested:  Requested Prescriptions     Pending Prescriptions Disp Refills    escitalopram (LEXAPRO) 10 MG tablet [Pharmacy Med Name: escitalopram 10 mg tablet] 180 tablet 0     Sig: TAKE 1 TABLET BY MOUTH EVERY DAY

## 2024-01-08 ENCOUNTER — HOSPITAL ENCOUNTER (OUTPATIENT)
Dept: CT IMAGING | Age: 84
Discharge: HOME | End: 2024-01-10
Payer: MEDICARE

## 2024-01-08 DIAGNOSIS — I71.40 ABDOMINAL AORTIC ANEURYSM (AAA) WITHOUT RUPTURE, UNSPECIFIED PART (HCC): ICD-10-CM

## 2024-01-08 PROCEDURE — 74174 CTA ABD&PLVS W/CONTRAST: CPT

## 2024-01-08 PROCEDURE — 71275 CT ANGIOGRAPHY CHEST: CPT

## 2024-01-08 PROCEDURE — 6360000004 HC RX CONTRAST MEDICATION: Performed by: SURGERY

## 2024-01-08 RX ADMIN — IOPAMIDOL 100 ML: 612 INJECTION, SOLUTION INTRAVENOUS at 12:46

## 2024-01-17 LAB
BASOPHILS # BLD: 0 K/UL (ref 0–0.2)
BASOPHILS NFR BLD: 0.4 %
EOSINOPHIL # BLD: 0.1 K/UL (ref 0–0.7)
EOSINOPHIL NFR BLD: 1.3 %
ERYTHROCYTE [DISTWIDTH] IN BLOOD BY AUTOMATED COUNT: 13.8 % (ref 11.5–14.5)
HCT VFR BLD AUTO: 49.7 % (ref 42–52)
HGB BLD-MCNC: 15.5 G/DL (ref 14–18)
LYMPHOCYTES # BLD: 0.7 K/UL (ref 1–4.8)
LYMPHOCYTES NFR BLD: 6.8 %
MCH RBC QN AUTO: 29.4 PG (ref 27–31.3)
MCHC RBC AUTO-ENTMCNC: 31.2 % (ref 33–37)
MCV RBC AUTO: 94.3 FL (ref 79–92.2)
MONOCYTES # BLD: 1.1 K/UL (ref 0.2–0.8)
MONOCYTES NFR BLD: 11.3 %
NEUTROPHILS # BLD: 7.8 K/UL (ref 1.4–6.5)
NEUTS SEG NFR BLD: 79.9 %
PLATELET # BLD AUTO: 160 K/UL (ref 130–400)
RBC # BLD AUTO: 5.27 M/UL (ref 4.7–6.1)
WBC # BLD AUTO: 9.8 K/UL (ref 4.8–10.8)

## 2024-01-19 ENCOUNTER — APPOINTMENT (OUTPATIENT)
Dept: VASCULAR SURGERY | Facility: CLINIC | Age: 84
End: 2024-01-19
Payer: MEDICARE

## 2024-02-16 ENCOUNTER — OFFICE VISIT (OUTPATIENT)
Dept: FAMILY MEDICINE CLINIC | Age: 84
End: 2024-02-16

## 2024-02-16 VITALS
HEART RATE: 67 BPM | WEIGHT: 178 LBS | SYSTOLIC BLOOD PRESSURE: 124 MMHG | OXYGEN SATURATION: 97 % | HEIGHT: 68 IN | BODY MASS INDEX: 26.98 KG/M2 | TEMPERATURE: 97.3 F | DIASTOLIC BLOOD PRESSURE: 72 MMHG

## 2024-02-16 DIAGNOSIS — C67.2 MALIGNANT NEOPLASM OF LATERAL WALL OF URINARY BLADDER (HCC): ICD-10-CM

## 2024-02-16 DIAGNOSIS — I50.9 CONGESTIVE HEART FAILURE, UNSPECIFIED HF CHRONICITY, UNSPECIFIED HEART FAILURE TYPE (HCC): Primary | ICD-10-CM

## 2024-02-16 DIAGNOSIS — E78.5 HYPERLIPIDEMIA, UNSPECIFIED HYPERLIPIDEMIA TYPE: ICD-10-CM

## 2024-02-16 DIAGNOSIS — Z00.00 MEDICARE ANNUAL WELLNESS VISIT, SUBSEQUENT: ICD-10-CM

## 2024-02-16 DIAGNOSIS — I48.91 ATRIAL FIBRILLATION, UNSPECIFIED TYPE (HCC): ICD-10-CM

## 2024-02-16 DIAGNOSIS — J44.1 CHRONIC OBSTRUCTIVE PULMONARY DISEASE WITH ACUTE EXACERBATION (HCC): ICD-10-CM

## 2024-02-16 DIAGNOSIS — N18.31 STAGE 3A CHRONIC KIDNEY DISEASE (HCC): ICD-10-CM

## 2024-02-16 DIAGNOSIS — I73.9 PAD (PERIPHERAL ARTERY DISEASE) (HCC): ICD-10-CM

## 2024-02-16 DIAGNOSIS — J44.9 CHRONIC OBSTRUCTIVE PULMONARY DISEASE, UNSPECIFIED COPD TYPE (HCC): ICD-10-CM

## 2024-02-16 RX ORDER — ASPIRIN 81 MG/1
TABLET, CHEWABLE ORAL
COMMUNITY
Start: 2023-07-01

## 2024-02-16 NOTE — PROGRESS NOTES
Medicare Annual Wellness Visit    Darío Moscoso  is here for Medicare AWV    Assessment & Plan   Congestive heart failure, unspecified HF chronicity, unspecified heart failure type (HCC)  Chronic obstructive pulmonary disease, unspecified COPD type (HCC)  Malignant neoplasm of lateral wall of urinary bladder (HCC)  PAD (peripheral artery disease) (HCC)  Atrial fibrillation, unspecified type (HCC)  Stage 3a chronic kidney disease (HCC)  Medicare annual wellness visit, subsequent    Recommendations for Preventive Services Due: see orders and patient instructions/AVS.  Recommended screening schedule for the next 5-10 years is provided to the patient in written form: see Patient Instructions/AVS.     No follow-ups on file.     Subjective   Chief Complaint   Patient presents with    Medicare AWV       Patient's complete Health Risk Assessment and screening values have been reviewed and are found in Flowsheets. The following problems were reviewed today and where indicated follow up appointments were made and/or referrals ordered.    Positive Risk Factor Screenings with Interventions:                  Hearing Screen:  Do you or your family notice any trouble with your hearing that hasn't been managed with hearing aids?: (!) Yes    Interventions:  See AVS for additional education material  See A/P for any pertinent orders     Safety:  Do you have non-slip mats or non-slip surfaces or shower bars or grab bars in your shower or bathtub?: (!) No  Interventions:  See AVS for additional education material  See A/P for plan and any pertinent orders      Tobacco Use:  Tobacco Use: High Risk (2/16/2024)    Patient History     Smoking Tobacco Use: Light Smoker     Smokeless Tobacco Use: Never     Passive Exposure: Current     E-cigarette/Vaping       Questions Responses    E-cigarette/Vaping Use Never User    Start Date     Passive Exposure No    Quit Date     Counseling Given Yes    Comments           Interventions:  See AVS

## 2024-02-16 NOTE — PATIENT INSTRUCTIONS
your use of this information.      Personalized Preventive Plan for Darío Moscoso Sr. - 2/16/2024  Medicare offers a range of preventive health benefits. Some of the tests and screenings are paid in full while other may be subject to a deductible, co-insurance, and/or copay.    Some of these benefits include a comprehensive review of your medical history including lifestyle, illnesses that may run in your family, and various assessments and screenings as appropriate.    After reviewing your medical record and screening and assessments performed today your provider may have ordered immunizations, labs, imaging, and/or referrals for you.  A list of these orders (if applicable) as well as your Preventive Care list are included within your After Visit Summary for your review.    Other Preventive Recommendations:    A preventive eye exam performed by an eye specialist is recommended every 1-2 years to screen for glaucoma; cataracts, macular degeneration, and other eye disorders.  A preventive dental visit is recommended every 6 months.  Try to get at least 150 minutes of exercise per week or 10,000 steps per day on a pedometer .  Order or download the FREE \"Exercise & Physical Activity: Your Everyday Guide\" from [employer]. Call 1-406.472.5603 or search [employer] [place] [employer] online.  You need 3428-8454 mg of calcium and 8579-0401 IU of vitamin D per day. It is possible to meet your calcium requirement with diet alone, but a vitamin D supplement is usually necessary to meet this goal.  When exposed to the sun, use a sunscreen that protects against both UVA and UVB radiation with an SPF of 30 or greater. Reapply every 2 to 3 hours or after sweating, drying off with a towel, or swimming.  Always wear a seat belt when traveling in a car. Always wear a helmet when riding a bicycle or motorcycle.

## 2024-02-19 DIAGNOSIS — E78.5 HYPERLIPIDEMIA, UNSPECIFIED HYPERLIPIDEMIA TYPE: ICD-10-CM

## 2024-02-19 LAB
CHOLEST SERPL-MCNC: 142 MG/DL (ref 0–199)
HDLC SERPL-MCNC: 47 MG/DL (ref 40–59)
LDLC SERPL CALC-MCNC: 82 MG/DL (ref 0–129)
TRIGL SERPL-MCNC: 63 MG/DL (ref 0–150)

## 2024-03-06 ENCOUNTER — OFFICE VISIT (OUTPATIENT)
Dept: FAMILY MEDICINE CLINIC | Age: 84
End: 2024-03-06
Payer: MEDICARE

## 2024-03-06 VITALS
HEART RATE: 94 BPM | DIASTOLIC BLOOD PRESSURE: 80 MMHG | SYSTOLIC BLOOD PRESSURE: 160 MMHG | HEIGHT: 68 IN | WEIGHT: 188 LBS | OXYGEN SATURATION: 93 % | BODY MASS INDEX: 28.49 KG/M2 | TEMPERATURE: 100.4 F

## 2024-03-06 DIAGNOSIS — B96.89 BACTERIAL URI: ICD-10-CM

## 2024-03-06 DIAGNOSIS — Z11.52 ENCOUNTER FOR SCREENING FOR COVID-19: ICD-10-CM

## 2024-03-06 DIAGNOSIS — J44.9 CHRONIC OBSTRUCTIVE PULMONARY DISEASE, UNSPECIFIED COPD TYPE (HCC): ICD-10-CM

## 2024-03-06 DIAGNOSIS — J02.9 ACUTE PHARYNGITIS, UNSPECIFIED ETIOLOGY: ICD-10-CM

## 2024-03-06 DIAGNOSIS — J06.9 BACTERIAL URI: ICD-10-CM

## 2024-03-06 DIAGNOSIS — J06.9 BACTERIAL URI: Primary | ICD-10-CM

## 2024-03-06 DIAGNOSIS — B96.89 BACTERIAL URI: Primary | ICD-10-CM

## 2024-03-06 PROCEDURE — 3077F SYST BP >= 140 MM HG: CPT | Performed by: FAMILY MEDICINE

## 2024-03-06 PROCEDURE — 87426 SARSCOV CORONAVIRUS AG IA: CPT | Performed by: FAMILY MEDICINE

## 2024-03-06 PROCEDURE — 1123F ACP DISCUSS/DSCN MKR DOCD: CPT | Performed by: FAMILY MEDICINE

## 2024-03-06 PROCEDURE — 4004F PT TOBACCO SCREEN RCVD TLK: CPT | Performed by: FAMILY MEDICINE

## 2024-03-06 PROCEDURE — 3079F DIAST BP 80-89 MM HG: CPT | Performed by: FAMILY MEDICINE

## 2024-03-06 PROCEDURE — G8427 DOCREV CUR MEDS BY ELIG CLIN: HCPCS | Performed by: FAMILY MEDICINE

## 2024-03-06 PROCEDURE — 99214 OFFICE O/P EST MOD 30 MIN: CPT | Performed by: FAMILY MEDICINE

## 2024-03-06 PROCEDURE — 3023F SPIROM DOC REV: CPT | Performed by: FAMILY MEDICINE

## 2024-03-06 PROCEDURE — G8484 FLU IMMUNIZE NO ADMIN: HCPCS | Performed by: FAMILY MEDICINE

## 2024-03-06 PROCEDURE — G8417 CALC BMI ABV UP PARAM F/U: HCPCS | Performed by: FAMILY MEDICINE

## 2024-03-06 PROCEDURE — 87880 STREP A ASSAY W/OPTIC: CPT | Performed by: FAMILY MEDICINE

## 2024-03-06 RX ORDER — ECHINACEA PURPUREA EXTRACT 125 MG
2 TABLET ORAL PRN
Qty: 1 EACH | Refills: 0 | Status: SHIPPED | OUTPATIENT
Start: 2024-03-06

## 2024-03-06 RX ORDER — BENZONATATE 200 MG/1
200 CAPSULE ORAL 3 TIMES DAILY PRN
Qty: 30 CAPSULE | Refills: 0 | Status: SHIPPED | OUTPATIENT
Start: 2024-03-06

## 2024-03-06 RX ORDER — GUAIFENESIN 600 MG/1
1200 TABLET, EXTENDED RELEASE ORAL 2 TIMES DAILY
Qty: 40 TABLET | Refills: 0 | Status: SHIPPED | OUTPATIENT
Start: 2024-03-06 | End: 2024-03-16

## 2024-03-06 RX ORDER — DOXYCYCLINE HYCLATE 100 MG
100 TABLET ORAL 2 TIMES DAILY
Qty: 20 TABLET | Refills: 0 | Status: SHIPPED | OUTPATIENT
Start: 2024-03-06 | End: 2024-03-16

## 2024-03-06 ASSESSMENT — ENCOUNTER SYMPTOMS
EYE DISCHARGE: 0
SHORTNESS OF BREATH: 1
COUGH: 1
EYE REDNESS: 0
SORE THROAT: 1
NAUSEA: 0
ABDOMINAL PAIN: 0
VOMITING: 0
RHINORRHEA: 1
DIARRHEA: 0

## 2024-03-06 NOTE — PROGRESS NOTES
Moxifloxacin     Z-Sean [Azithromycin] Other (See Comments)     Thrush       Current Outpatient Medications:     doxycycline hyclate (VIBRA-TABS) 100 MG tablet, Take 1 tablet by mouth 2 times daily for 10 days, Disp: 20 tablet, Rfl: 0    sodium chloride (OCEAN) 0.65 % nasal spray, 2 sprays by Nasal route as needed for Congestion One to Two sprays per nostril 2-4 times/day, Disp: 1 each, Rfl: 0    guaiFENesin (MUCINEX) 600 MG extended release tablet, Take 2 tablets by mouth 2 times daily for 10 days, Disp: 40 tablet, Rfl: 0    benzonatate (TESSALON) 200 MG capsule, Take 1 capsule by mouth 3 times daily as needed for Cough, Disp: 30 capsule, Rfl: 0    aspirin 81 MG chewable tablet, , Disp: , Rfl:     Multiple Vitamins-Minerals (PRESERVISION AREDS PO), Take by mouth, Disp: , Rfl:     escitalopram (LEXAPRO) 10 MG tablet, TAKE 1 TABLET BY MOUTH EVERY DAY, Disp: 180 tablet, Rfl: 0    metoprolol succinate (TOPROL XL) 50 MG extended release tablet, Take 1 tablet by mouth daily, Disp: , Rfl:     albuterol (PROVENTIL) (2.5 MG/3ML) 0.083% nebulizer solution, Take 3 mLs by nebulization 4 times daily as needed for Wheezing, Disp: 120 each, Rfl: 3    metoprolol succinate (TOPROL XL) 25 MG extended release tablet, Take 3 tablets by mouth daily, Disp: 30 tablet, Rfl: 3    dilTIAZem (CARDIZEM CD) 360 MG extended release capsule, Take 1 capsule by mouth in the morning., Disp: 30 capsule, Rfl: 3    Handicap Placard MISC, by Does not apply route Exp 5 years, Disp: 1 each, Rfl: 0    rosuvastatin (CRESTOR) 10 MG tablet, TAKE ONE TABLET BY MOUTH DAILY , Disp: 30 tablet, Rfl: 0       Objective     Vitals:    03/06/24 1131 03/06/24 1136   BP: (!) 158/82 (!) 160/80   Site: Left Upper Arm Left Upper Arm   Position: Sitting Sitting   Cuff Size: Large Adult Large Adult   Pulse: 94    Temp: 100.4 °F (38 °C)    SpO2: 93%    Weight: 85.3 kg (188 lb)    Height: 1.727 m (5' 8\")         Physical Exam  Constitutional:       General: He is not in

## 2024-03-07 RX ORDER — DOXYCYCLINE HYCLATE 100 MG
100 TABLET ORAL 2 TIMES DAILY
Qty: 20 TABLET | Refills: 0 | OUTPATIENT
Start: 2024-03-07 | End: 2024-03-17

## 2024-03-26 ENCOUNTER — PROCEDURE VISIT (OUTPATIENT)
Dept: UROLOGY | Age: 84
End: 2024-03-26
Payer: MEDICARE

## 2024-03-26 VITALS
HEIGHT: 68 IN | BODY MASS INDEX: 28.04 KG/M2 | HEART RATE: 56 BPM | DIASTOLIC BLOOD PRESSURE: 84 MMHG | SYSTOLIC BLOOD PRESSURE: 136 MMHG | WEIGHT: 185 LBS

## 2024-03-26 DIAGNOSIS — C67.2 MALIGNANT NEOPLASM OF LATERAL WALL OF URINARY BLADDER (HCC): Primary | ICD-10-CM

## 2024-03-26 LAB
BILIRUBIN, POC: NORMAL
BLOOD URINE, POC: NORMAL
CLARITY, POC: CLEAR
COLOR, POC: YELLOW
GLUCOSE URINE, POC: NORMAL
KETONES, POC: NORMAL
LEUKOCYTE EST, POC: NORMAL
NITRITE, POC: NORMAL
PH, POC: 7
PROTEIN, POC: NORMAL
SPECIFIC GRAVITY, POC: 1.02
UROBILINOGEN, POC: 1

## 2024-03-26 PROCEDURE — 81003 URINALYSIS AUTO W/O SCOPE: CPT | Performed by: UROLOGY

## 2024-03-26 PROCEDURE — 52000 CYSTOURETHROSCOPY: CPT | Performed by: UROLOGY

## 2024-03-26 RX ORDER — SULFAMETHOXAZOLE AND TRIMETHOPRIM 800; 160 MG/1; MG/1
1 TABLET ORAL ONCE
Qty: 1 TABLET | Refills: 0 | Status: SHIPPED | COMMUNITY
Start: 2024-03-26 | End: 2024-03-26

## 2024-03-26 ASSESSMENT — ENCOUNTER SYMPTOMS: ABDOMINAL PAIN: 0

## 2024-03-26 NOTE — PROGRESS NOTES
Subjective:      Patient ID: Darío Moscoso Sr. is a 83 y.o. male    HPI  This is a 83 y.o. 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 Since last seen in 12/23, he has no interval  complaints. He has no hematuria or dysuria or pain. The risks and benefits of cystoscopy including but not limited to infection, pain, bleeding, stricture, retention, perforation, need for multiple procedures and he wants to proceed as planned.       Past Medical History:   Diagnosis Date    Atrial fibrillation (HCC)     CAD (coronary artery disease)     Congestive heart failure, unspecified HF chronicity, unspecified heart failure type (HCC) 2/16/2024    COPD exacerbation (HCC) 07/31/2022    Depressive disorder 03/10/2022    Hematuria     High cholesterol     History of bladder cancer     Hyperlipidemia     Hypertension     Malignant neoplasm of urinary bladder (HCC) 01/27/2020    Presence of Watchman left atrial appendage closure device     S/P AAA repair     Spondylosis of lumbar region without myelopathy or radiculopathy     Stage 3a chronic kidney disease (HCC) 09/26/2023     Past Surgical History:   Procedure Laterality Date    ABDOMINAL AORTIC ANEURYSM REPAIR      ATRIAL ABLATION SURGERY      watchman device    CARDIAC SURGERY  05/01/2023    COLONOSCOPY N/A 05/26/2020    COLONOSCOPY DIAGNOSTIC performed by Lyla Velez MD at [facility]    CYSTOSCOPY N/A 12/11/2023    CYSTOSCOPY with BLADDER BIOPSY with FULGERATION / GEN /  PT WILL ARRIVE AT 7:15 AM performed by Candido Epps MD at [facility]    EYE SURGERY      bilateral cataract surgery    FEMORAL BYPASS Left 02/11/2020    LEFT LOWER EXTREMITY REVASCULARIZATION performed by Long Cardenas MD at [facility]    HERNIA REPAIR  1998    CT COLON

## 2024-05-16 ENCOUNTER — OFFICE VISIT (OUTPATIENT)
Dept: PULMONOLOGY | Age: 84
End: 2024-05-16
Payer: MEDICARE

## 2024-05-16 VITALS
DIASTOLIC BLOOD PRESSURE: 70 MMHG | WEIGHT: 187 LBS | SYSTOLIC BLOOD PRESSURE: 112 MMHG | HEART RATE: 90 BPM | OXYGEN SATURATION: 94 % | TEMPERATURE: 97.9 F | BODY MASS INDEX: 28.43 KG/M2

## 2024-05-16 DIAGNOSIS — Z72.0 TOBACCO ABUSE: ICD-10-CM

## 2024-05-16 DIAGNOSIS — J44.9 CHRONIC OBSTRUCTIVE PULMONARY DISEASE, UNSPECIFIED COPD TYPE (HCC): Primary | ICD-10-CM

## 2024-05-16 PROCEDURE — 3078F DIAST BP <80 MM HG: CPT | Performed by: INTERNAL MEDICINE

## 2024-05-16 PROCEDURE — 3023F SPIROM DOC REV: CPT | Performed by: INTERNAL MEDICINE

## 2024-05-16 PROCEDURE — 4004F PT TOBACCO SCREEN RCVD TLK: CPT | Performed by: INTERNAL MEDICINE

## 2024-05-16 PROCEDURE — 1123F ACP DISCUSS/DSCN MKR DOCD: CPT | Performed by: INTERNAL MEDICINE

## 2024-05-16 PROCEDURE — G8417 CALC BMI ABV UP PARAM F/U: HCPCS | Performed by: INTERNAL MEDICINE

## 2024-05-16 PROCEDURE — 99214 OFFICE O/P EST MOD 30 MIN: CPT | Performed by: INTERNAL MEDICINE

## 2024-05-16 PROCEDURE — 3074F SYST BP LT 130 MM HG: CPT | Performed by: INTERNAL MEDICINE

## 2024-05-16 PROCEDURE — G8427 DOCREV CUR MEDS BY ELIG CLIN: HCPCS | Performed by: INTERNAL MEDICINE

## 2024-05-16 ASSESSMENT — ENCOUNTER SYMPTOMS
EYE ITCHING: 0
NAUSEA: 0
VOICE CHANGE: 0
SORE THROAT: 0
CHEST TIGHTNESS: 0
SHORTNESS OF BREATH: 1
RHINORRHEA: 0
WHEEZING: 0
ABDOMINAL PAIN: 0
COUGH: 1
VOMITING: 0

## 2024-05-16 NOTE — PROGRESS NOTES
Hyperlipidemia     Hypertension     Malignant neoplasm of urinary bladder (HCC) 01/27/2020    Presence of Watchman left atrial appendage closure device     S/P AAA repair     Spondylosis of lumbar region without myelopathy or radiculopathy     Stage 3a chronic kidney disease (HCC) 09/26/2023     Past Surgical History:   Procedure Laterality Date    ABDOMINAL AORTIC ANEURYSM REPAIR      ATRIAL ABLATION SURGERY      watchman device    CARDIAC SURGERY  05/01/2023    COLONOSCOPY N/A 05/26/2020    COLONOSCOPY DIAGNOSTIC performed by Lyla Velez MD at [facility]    CYSTOSCOPY N/A 12/11/2023    CYSTOSCOPY with BLADDER BIOPSY with FULGERATION / GEN /  PT WILL ARRIVE AT 7:15 AM performed by Candido Epps MD at [facility]    EYE SURGERY      bilateral cataract surgery    FEMORAL BYPASS Left 02/11/2020    LEFT LOWER EXTREMITY REVASCULARIZATION performed by Long Cardenas MD at [facility]    HERNIA REPAIR  1998    DC COLON CA SCRN NOT HI RSK IND N/A 04/24/2017    COLONOSCOPY performed by Jesús Mercado MD at [facility]    DC ESOPHAGOGASTRODUODENOSCOPY TRANSORAL DIAGNOSTIC N/A 04/24/2017    EGD ESOPHAGOGASTRODUODENOSCOPY performed by Jesús Mercado MD at [facility]    UPPER GASTROINTESTINAL ENDOSCOPY N/A 05/26/2020    EGD DIAGNOSTIC ONLY performed by Lyla Velez MD at [facility]     Family History   Problem Relation Age of Onset    Heart Disease Mother     Stroke Mother     Heart Disease Father     Cancer Sister         pancreatic    Dementia Sister     Stroke Brother     Cancer Brother         pancreatic cancer    High Blood Pressure Son     Thyroid Disease Daughter      Social History     Socioeconomic History    Marital status:      Spouse name: Not on file    Number of children: Not on file    Years of education: Not on file    Highest education level: Not on file   Occupational History    Not on file   Tobacco Use    Smoking status: Light Smoker     Current

## 2024-06-10 ENCOUNTER — OFFICE VISIT (OUTPATIENT)
Dept: FAMILY MEDICINE CLINIC | Age: 84
End: 2024-06-10
Payer: MEDICARE

## 2024-06-10 VITALS
DIASTOLIC BLOOD PRESSURE: 76 MMHG | OXYGEN SATURATION: 93 % | HEIGHT: 68 IN | BODY MASS INDEX: 28.64 KG/M2 | TEMPERATURE: 98.4 F | HEART RATE: 75 BPM | SYSTOLIC BLOOD PRESSURE: 116 MMHG | WEIGHT: 189 LBS

## 2024-06-10 DIAGNOSIS — J20.9 ACUTE BRONCHITIS, UNSPECIFIED ORGANISM: Primary | ICD-10-CM

## 2024-06-10 DIAGNOSIS — F17.200 SMOKER: ICD-10-CM

## 2024-06-10 DIAGNOSIS — J44.1 COPD EXACERBATION (HCC): ICD-10-CM

## 2024-06-10 PROCEDURE — 1123F ACP DISCUSS/DSCN MKR DOCD: CPT | Performed by: FAMILY MEDICINE

## 2024-06-10 PROCEDURE — G8427 DOCREV CUR MEDS BY ELIG CLIN: HCPCS | Performed by: FAMILY MEDICINE

## 2024-06-10 PROCEDURE — 4004F PT TOBACCO SCREEN RCVD TLK: CPT | Performed by: FAMILY MEDICINE

## 2024-06-10 PROCEDURE — 99214 OFFICE O/P EST MOD 30 MIN: CPT | Performed by: FAMILY MEDICINE

## 2024-06-10 PROCEDURE — G8417 CALC BMI ABV UP PARAM F/U: HCPCS | Performed by: FAMILY MEDICINE

## 2024-06-10 PROCEDURE — 3078F DIAST BP <80 MM HG: CPT | Performed by: FAMILY MEDICINE

## 2024-06-10 PROCEDURE — 3023F SPIROM DOC REV: CPT | Performed by: FAMILY MEDICINE

## 2024-06-10 PROCEDURE — 3074F SYST BP LT 130 MM HG: CPT | Performed by: FAMILY MEDICINE

## 2024-06-10 RX ORDER — CEFDINIR 300 MG/1
300 CAPSULE ORAL 2 TIMES DAILY
Qty: 14 CAPSULE | Refills: 0 | Status: SHIPPED | OUTPATIENT
Start: 2024-06-10 | End: 2024-06-17

## 2024-06-10 RX ORDER — PREDNISONE 20 MG/1
20 TABLET ORAL DAILY
Qty: 5 TABLET | Refills: 0 | Status: SHIPPED | OUTPATIENT
Start: 2024-06-10 | End: 2024-06-15

## 2024-06-10 RX ORDER — BENZONATATE 200 MG/1
200 CAPSULE ORAL 3 TIMES DAILY PRN
Qty: 30 CAPSULE | Refills: 0 | Status: SHIPPED | OUTPATIENT
Start: 2024-06-10

## 2024-06-10 ASSESSMENT — ENCOUNTER SYMPTOMS
RHINORRHEA: 0
VOMITING: 0
SORE THROAT: 0
COUGH: 1
ABDOMINAL PAIN: 0
WHEEZING: 1
EYE DISCHARGE: 0
NAUSEA: 0
EYE REDNESS: 0
SHORTNESS OF BREATH: 1
DIARRHEA: 0

## 2024-06-10 NOTE — PROGRESS NOTES
[facility] SPECIALTY/PRIMARY CARE  [address], [address]  [facility] [address]  Dept: [phone]  Dept Fax: [phone]  Loc: [phone]     6/10/2024    Visit type: Follow up    Reason for Visit: Cough (Pt. States that he he has had a cough for about 4 weeks, has thick green mucous, OTC mucinex, the mucinex helped a little bit but not much. Notices the cough more towards the evening time.) and Health Maintenance (Declined Colonoscopy)         Patient: Darío Moscoso Sr. is a 83 y.o. male     HPI: 83-year-old male presents with cough that has been ongoing for the last 4 weeks.  Patient states he has used Mucinex which has not helped.  Patient reports the cough is more significant in the evening.    ASSESSMENT/PLAN   Acute bronchitis, unspecified organism  -     benzonatate (TESSALON) 200 MG capsule; Take 1 capsule by mouth 3 times daily as needed for Cough, Disp-30 capsule, R-0Normal  -     predniSONE (DELTASONE) 20 MG tablet; Take 1 tablet by mouth daily for 5 days, Disp-5 tablet, R-0Normal  -     cefdinir (OMNICEF) 300 MG capsule; Take 1 capsule by mouth 2 times daily for 7 days, Disp-14 capsule, R-0Normal  COPD exacerbation (HCC)  -     predniSONE (DELTASONE) 20 MG tablet; Take 1 tablet by mouth daily for 5 days, Disp-5 tablet, R-0Normal  -     cefdinir (OMNICEF) 300 MG capsule; Take 1 capsule by mouth 2 times daily for 7 days, Disp-14 capsule, R-0Normal  Smoker   -working on quitting, has not smoked this week    -Adverse effects of medications prescribed were discussed, patient acknowledged understanding.  -Patient was advised to let me know if there is no improvement in the next 7 to 10 days. Discussed red flags warranting decision to go to the emergency department and patient understood.         Orders Placed This Encounter   Medications    benzonatate (TESSALON) 200 MG capsule     Sig: Take 1 capsule by mouth 3 times daily as needed for Cough     Dispense:

## 2024-07-08 ENCOUNTER — PROCEDURE VISIT (OUTPATIENT)
Dept: UROLOGY | Age: 84
End: 2024-07-08
Payer: MEDICARE

## 2024-07-08 VITALS
WEIGHT: 182 LBS | DIASTOLIC BLOOD PRESSURE: 82 MMHG | HEIGHT: 68 IN | SYSTOLIC BLOOD PRESSURE: 134 MMHG | HEART RATE: 89 BPM | BODY MASS INDEX: 27.58 KG/M2

## 2024-07-08 DIAGNOSIS — C67.2 MALIGNANT NEOPLASM OF LATERAL WALL OF URINARY BLADDER (HCC): Primary | ICD-10-CM

## 2024-07-08 LAB
BILIRUBIN, POC: ABNORMAL
BLOOD URINE, POC: ABNORMAL
CLARITY, POC: CLEAR
COLOR, POC: YELLOW
GLUCOSE URINE, POC: ABNORMAL
KETONES, POC: ABNORMAL
LEUKOCYTE EST, POC: ABNORMAL
NITRITE, POC: ABNORMAL
PH, POC: 7
PROTEIN, POC: ABNORMAL
SPECIFIC GRAVITY, POC: 1.02
UROBILINOGEN, POC: 1

## 2024-07-08 PROCEDURE — 81003 URINALYSIS AUTO W/O SCOPE: CPT | Performed by: UROLOGY

## 2024-07-08 PROCEDURE — 52000 CYSTOURETHROSCOPY: CPT | Performed by: UROLOGY

## 2024-07-08 RX ORDER — SULFAMETHOXAZOLE AND TRIMETHOPRIM 800; 160 MG/1; MG/1
1 TABLET ORAL ONCE
Qty: 1 TABLET | Refills: 0 | Status: SHIPPED | COMMUNITY
Start: 2024-07-08 | End: 2024-07-08

## 2024-07-08 ASSESSMENT — ENCOUNTER SYMPTOMS: ABDOMINAL PAIN: 0

## 2024-07-08 NOTE — PROGRESS NOTES
Subjective:      Patient ID: Darío Moscoso Sr. is a 83 y.o. male    HPI  This is a 83 y.o. 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 Since last seen in 3/26/24, he has no interval  complaints. He has no hematuria or dysuria or pain. The risks and benefits of cystoscopy including but not limited to infection, pain, bleeding, stricture, retention, perforation, need for multiple procedures and he wants to proceed as planned.     Past Medical History:   Diagnosis Date    Atrial fibrillation (HCC)     CAD (coronary artery disease)     Congestive heart failure, unspecified HF chronicity, unspecified heart failure type (HCC) 2/16/2024    COPD exacerbation (HCC) 07/31/2022    Depressive disorder 03/10/2022    Hematuria     High cholesterol     History of bladder cancer     Hyperlipidemia     Hypertension     Malignant neoplasm of urinary bladder (HCC) 01/27/2020    Presence of Watchman left atrial appendage closure device     S/P AAA repair     Spondylosis of lumbar region without myelopathy or radiculopathy     Stage 3a chronic kidney disease (HCC) 09/26/2023     Past Surgical History:   Procedure Laterality Date    ABDOMINAL AORTIC ANEURYSM REPAIR      ATRIAL ABLATION SURGERY      watchman device    CARDIAC SURGERY  05/01/2023    COLONOSCOPY N/A 05/26/2020    COLONOSCOPY DIAGNOSTIC performed by Lyla Velez MD at [facility] CENTER    CYSTOSCOPY N/A 12/11/2023    CYSTOSCOPY with BLADDER BIOPSY with FULGERATION / GEN /  PT WILL ARRIVE AT 7:15 AM performed by Candido Epps MD at [facility] OR    EYE SURGERY      bilateral cataract surgery    FEMORAL BYPASS Left 02/11/2020    LEFT LOWER EXTREMITY REVASCULARIZATION performed by Long Cardenas MD at [facility] OR    HERNIA REPAIR  1998    [facility] COLON

## 2024-09-20 DIAGNOSIS — Z95.818 PRESENCE OF WATCHMAN LEFT ATRIAL APPENDAGE CLOSURE DEVICE: ICD-10-CM

## 2024-09-20 DIAGNOSIS — Z01.810 PREOP CARDIOVASCULAR EXAM: ICD-10-CM

## 2024-09-20 DIAGNOSIS — E78.2 MIXED HYPERLIPIDEMIA: ICD-10-CM

## 2024-09-20 DIAGNOSIS — I48.21 PERMANENT ATRIAL FIBRILLATION (MULTI): ICD-10-CM

## 2024-09-20 DIAGNOSIS — I10 PRIMARY HYPERTENSION: ICD-10-CM

## 2024-09-20 RX ORDER — ROSUVASTATIN CALCIUM 10 MG/1
10 TABLET, COATED ORAL NIGHTLY
Qty: 90 TABLET | Refills: 0 | Status: SHIPPED | OUTPATIENT
Start: 2024-09-20 | End: 2024-12-19

## 2024-09-20 NOTE — TELEPHONE ENCOUNTER
Pt called for rx refill of Rosuvastatin 10 mg daily to [facility].  Short term script e-scribed as requested #90 no refills. Further refills to be given at time of next appt.     Pending appt 11/6/24

## 2024-09-23 RX ORDER — ROSUVASTATIN CALCIUM 10 MG/1
10 TABLET, COATED ORAL NIGHTLY
Qty: 90 TABLET | Refills: 3 | Status: SHIPPED | OUTPATIENT
Start: 2024-09-23 | End: 2025-09-23

## 2024-10-15 ENCOUNTER — APPOINTMENT (OUTPATIENT)
Dept: CARDIOLOGY | Facility: CLINIC | Age: 84
End: 2024-10-15
Payer: MEDICARE

## 2024-10-16 ENCOUNTER — PROCEDURE VISIT (OUTPATIENT)
Dept: UROLOGY | Age: 84
End: 2024-10-16
Payer: MEDICARE

## 2024-10-16 VITALS
DIASTOLIC BLOOD PRESSURE: 84 MMHG | BODY MASS INDEX: 27.58 KG/M2 | HEART RATE: 50 BPM | WEIGHT: 182 LBS | HEIGHT: 68 IN | SYSTOLIC BLOOD PRESSURE: 130 MMHG

## 2024-10-16 DIAGNOSIS — C67.2 MALIGNANT NEOPLASM OF LATERAL WALL OF URINARY BLADDER (HCC): Primary | ICD-10-CM

## 2024-10-16 LAB
BILIRUBIN, POC: ABNORMAL
BLOOD URINE, POC: ABNORMAL
CLARITY, POC: CLEAR
COLOR, POC: YELLOW
GLUCOSE URINE, POC: ABNORMAL MG/DL
KETONES, POC: ABNORMAL MG/DL
LEUKOCYTE EST, POC: ABNORMAL
NITRITE, POC: ABNORMAL
PH, POC: 6.5
PROTEIN, POC: ABNORMAL MG/DL
SPECIFIC GRAVITY, POC: >1.03
UROBILINOGEN, POC: 1 MG/DL

## 2024-10-16 PROCEDURE — 81003 URINALYSIS AUTO W/O SCOPE: CPT | Performed by: UROLOGY

## 2024-10-16 PROCEDURE — 52000 CYSTOURETHROSCOPY: CPT | Performed by: UROLOGY

## 2024-10-16 RX ORDER — SULFAMETHOXAZOLE/TRIMETHOPRIM 800-160 MG
1 TABLET ORAL ONCE
Qty: 1 TABLET | Refills: 0 | Status: SHIPPED | COMMUNITY
Start: 2024-10-16 | End: 2024-10-16

## 2024-10-16 ASSESSMENT — ENCOUNTER SYMPTOMS: ABDOMINAL PAIN: 0

## 2024-10-16 NOTE — PROGRESS NOTES
Subjective:      Patient ID: Darío Moscoso Sr. is a 84 y.o. male    HPI  This is a 84 y.o. 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 Since last seen in 7/8/24, he has no interval  complaints. He has no hematuria or dysuria or pain. The risks and benefits of cystoscopy including but not limited to infection, pain, bleeding, stricture, retention, perforation, need for multiple procedures and he wants to proceed as planned.     Past Medical History:   Diagnosis Date    Atrial fibrillation (HCC)     CAD (coronary artery disease)     Congestive heart failure, unspecified HF chronicity, unspecified heart failure type (HCC) 2/16/2024    COPD exacerbation (HCC) 07/31/2022    Depressive disorder 03/10/2022    Hematuria     High cholesterol     History of bladder cancer     Hyperlipidemia     Hypertension     Malignant neoplasm of urinary bladder (HCC) 01/27/2020    Presence of Watchman left atrial appendage closure device     S/P AAA repair     Spondylosis of lumbar region without myelopathy or radiculopathy     Stage 3a chronic kidney disease (HCC) 09/26/2023     Past Surgical History:   Procedure Laterality Date    ABDOMINAL AORTIC ANEURYSM REPAIR      ATRIAL ABLATION SURGERY      watchman device    CARDIAC SURGERY  05/01/2023    COLONOSCOPY N/A 05/26/2020    COLONOSCOPY DIAGNOSTIC performed by Lyla Velez MD at [facility] CENTER    CYSTOSCOPY N/A 12/11/2023    CYSTOSCOPY with BLADDER BIOPSY with FULGERATION / GEN /  PT WILL ARRIVE AT 7:15 AM performed by Candido Epps MD at [facility] OR    EYE SURGERY      bilateral cataract surgery    FEMORAL BYPASS Left 02/11/2020    LEFT LOWER EXTREMITY REVASCULARIZATION performed by Long Cardenas MD at [facility] OR    HERNIA REPAIR  1998    [facility] COLON [place]

## 2024-10-22 ENCOUNTER — APPOINTMENT (OUTPATIENT)
Dept: CARDIOLOGY | Facility: CLINIC | Age: 84
End: 2024-10-22
Payer: MEDICARE

## 2024-10-25 DIAGNOSIS — F32.A DEPRESSION, UNSPECIFIED DEPRESSION TYPE: ICD-10-CM

## 2024-10-25 RX ORDER — ESCITALOPRAM OXALATE 10 MG/1
TABLET ORAL
Qty: 180 TABLET | Refills: 1 | Status: SHIPPED | OUTPATIENT
Start: 2024-10-25

## 2024-10-25 NOTE — TELEPHONE ENCOUNTER
Comments:     Last Office Visit (last PCP visit):   2/16/2024    Next Visit Date:  Future Appointments   Date Time Provider Department Center   1/20/2025  1:00 PM SCHEDULE, ARAC[facility] CAL[facility]ON UROLOGY PROCEDURE [facility] Paulette Calderon   2/17/2025 11:00 AM Nav Ortiz MD [facility] ECC DEP       **If hasn't been seen in over a year OR hasn't followed up according to last diabetes/ADHD visit, make appointment for patient before sending refill to provider.    Rx requested:  Requested Prescriptions     Pending Prescriptions Disp Refills    escitalopram (LEXAPRO) 10 MG tablet [Pharmacy Med Name: escitalopram 10 mg tablet] 180 tablet 0     Sig: TAKE 1 TABLET BY MOUTH EVERY DAY

## 2024-11-06 ENCOUNTER — APPOINTMENT (OUTPATIENT)
Dept: CARDIOLOGY | Facility: CLINIC | Age: 84
End: 2024-11-06
Payer: MEDICARE

## 2024-11-06 VITALS
BODY MASS INDEX: 26.69 KG/M2 | WEIGHT: 186.4 LBS | DIASTOLIC BLOOD PRESSURE: 70 MMHG | HEIGHT: 70 IN | SYSTOLIC BLOOD PRESSURE: 112 MMHG | HEART RATE: 54 BPM

## 2024-11-06 DIAGNOSIS — H91.90 HARD OF HEARING: ICD-10-CM

## 2024-11-06 DIAGNOSIS — Z95.818 PRESENCE OF WATCHMAN LEFT ATRIAL APPENDAGE CLOSURE DEVICE: ICD-10-CM

## 2024-11-06 DIAGNOSIS — E78.2 MIXED HYPERLIPIDEMIA: ICD-10-CM

## 2024-11-06 DIAGNOSIS — F17.200 CURRENT SMOKER: ICD-10-CM

## 2024-11-06 DIAGNOSIS — I48.21 PERMANENT ATRIAL FIBRILLATION (MULTI): ICD-10-CM

## 2024-11-06 DIAGNOSIS — I10 PRIMARY HYPERTENSION: ICD-10-CM

## 2024-11-06 PROCEDURE — 1159F MED LIST DOCD IN RCRD: CPT | Performed by: INTERNAL MEDICINE

## 2024-11-06 PROCEDURE — 3078F DIAST BP <80 MM HG: CPT | Performed by: INTERNAL MEDICINE

## 2024-11-06 PROCEDURE — G2211 COMPLEX E/M VISIT ADD ON: HCPCS | Performed by: INTERNAL MEDICINE

## 2024-11-06 PROCEDURE — 3074F SYST BP LT 130 MM HG: CPT | Performed by: INTERNAL MEDICINE

## 2024-11-06 PROCEDURE — 99214 OFFICE O/P EST MOD 30 MIN: CPT | Performed by: INTERNAL MEDICINE

## 2024-11-06 RX ORDER — ROSUVASTATIN CALCIUM 10 MG/1
10 TABLET, COATED ORAL NIGHTLY
Qty: 90 TABLET | Refills: 3 | Status: SHIPPED | OUTPATIENT
Start: 2024-11-06 | End: 2025-11-06

## 2024-11-06 RX ORDER — DILTIAZEM HYDROCHLORIDE 360 MG/1
360 CAPSULE, EXTENDED RELEASE ORAL DAILY
Qty: 90 CAPSULE | Refills: 3 | Status: SHIPPED | OUTPATIENT
Start: 2024-11-06 | End: 2025-11-06

## 2024-11-06 RX ORDER — METOPROLOL SUCCINATE 50 MG/1
50 TABLET, EXTENDED RELEASE ORAL DAILY
Qty: 90 TABLET | Refills: 3 | Status: SHIPPED | OUTPATIENT
Start: 2024-11-06 | End: 2025-11-06

## 2024-11-06 RX ORDER — METOPROLOL SUCCINATE 25 MG/1
25 TABLET, EXTENDED RELEASE ORAL DAILY
Qty: 90 TABLET | Refills: 3 | Status: SHIPPED | OUTPATIENT
Start: 2024-11-06 | End: 2025-11-06

## 2024-11-06 NOTE — PROGRESS NOTES
1 year follow-up, past medical history as noted below    Subjective :     Interval review of systems is negative for chest discomfort pressure tightness heaviness palpitations lightheadedness orthopnea paroxysmal nocturnal dyspnea dependent edema or claudication TIA or CVA type symptoms or bleeding diathesis  Continues to smoke 2 packs a week.  Hard of hearing  History so Far :    1. Hypertension  2. Permanent atrial fibrillation on rate control strategy, with some concern for sinus node dysfunction. No indication for permanent pacemaker as of now, except that patient does have exertional shortness of breath. In this patient with COPD, exertional shortness of breath could be multifactorial. A component of it could be related to chronotropic blunting. Patient is not interested in pursuing EP evaluation or consideration for pacemaker at this time, and there are no absolute indications. He stopped his inhaler because it was prohibitively expensive, and he says the inhalers did not make any impact on his breathing.  3. History of abdominal aortic aneurysm status post endograft vascular stent graft  4. Hyperlipidemia  5.  Off anticoagulation once left atrial appendage closure device was placed, anticoagulation had to be stopped because of recurrent GI bleeding from AV malformations.  6. Current smoker-we discuss this at every visit.,  He has cut back significantly.  7. BMI puts him in the overweight category-he does not need any aggressive dietary modification at this time.  8. Patient has history of endovascular stent graft for abdominal aortic aneurysm  9. Peripheral vascular disease without claudication  10. Mild carotid disease  11 Abdominal aorta .duplex showed 5.6cm AAA with no obvious endoleak but questionable growth from 5.2cm based on a prior report.      13. CTA December12 2022-aortic annulus 3.4 x 2.3 cm sinuses 3.9 cm sinotubular 3.1 cm upper ascending 3.6 cm descending aorta at the PA level 2.8 cm no  evidence of thoracic aortic dissection or coarctation descending aorta of the diaphragm level 2.7 cm descending aorta infrarenal 5.6 cm patent infrarenal abdominal aorta and bilateral common iliac artery endovascular stent graft no evidence of endoleak native infrarenal abdominal aorta aneurysm sac 5.6 cm 60% celiac artery stenosis no significant renal artery stenosis patent stent right common iliac artery distal right common iliac artery aneurysm 2.4 cm patent stent left common iliac artery distal left common iliac artery ectatic 1.9 cm     14. Ultrasound of the abdominal aorta and iliac arteries November 2022-maximum diameter of native aorta 5.6 x 4.6 cm suboptimal imaging and angulation was reported by the .     15. Arterial duplex lower extremities November 2022-no evidence of hemodynamically significant stenosis in the right lower extremity, moderate calcified nonobstructive plaque throughout no evidence of hemodynamically significant stenosis left lower extremity, mild to moderate calcified nonobstructive plaque throughout left SFA stent patent     16. PVR with exercise November 2022-no evidence of arterial occlusive disease in the right or left lower extremity left pressures of greater than 220 mmHg so suggested no compressibility of vessels and make absolute segmental limb pressures unreliable     17.history of  Chronic kidney disease stage IIIa, this has resolved, GFR greater than 60 November 2023.     18. CTA prior to Watchman device-dilated left atrium, no evidence of left atrial or left atrial appendage thrombus, left atrial appendage orifice oval in shape measuring 2.7 x 1.8 cm, left atrial appendage area 3. 6 4 cm² mild sentry lobular emphysema     19. Patient underwent left atrial appendage closure with 27 mm Watchman FL X in May 2023.     20.BNP level was markedly elevated at 2737 in June 2023, 3959 in September 2023, no clinical volume overload.  Objective   Wt Readings from Last 3  "Encounters:   11/06/24 84.6 kg (186 lb 6.4 oz)   11/22/23 84.4 kg (186 lb)   10/17/23 81.6 kg (180 lb)            Vitals:    11/06/24 1400   BP: 112/70   BP Location: Left arm   Patient Position: Sitting   Pulse: 54   Weight: 84.6 kg (186 lb 6.4 oz)   Height: 1.778 m (5' 10\")                Physical Exam:    GENERAL APPEARANCE: in no acute distress.  CHEST: Symmetric and non-tender.  Breath sounds are very distant without crepitations or rhonchi  INTEGUMENT: Skin warm and dry  HEENT: No gross abnormalities identified.No pallor or scleral icterus.  NECK: Supple, no JVD, no bruit.   NEURO/PSHCY: Alert and oriented x3; appropriate behavior and responses and responses  LUNGS: Clear to auscultation bilaterally; normal respiratory effort.  HEART: Rate and rhythm irregular with no evident murmur; no gallop appreciated.   ABDOMEN: Soft, non tender.  MUSCULOSKELETAL: No gross deformities.  EXTREMITIES: Warm  There is no edema noted.    Meds:  Current Outpatient Medications   Medication Instructions    albuterol 2.5 mg /3 mL (0.083 %) nebulizer solution Use  1 unit dose every 4-6 hours as needed for wheezing    aspirin 81 mg, Daily    dextromethorphan-guaifenesin (Mucinex DM)  mg 12 hr tablet 1-2 tablets, Every 12 hours PRN    dilTIAZem CD (CARDIZEM CD) 360 mg, oral, Daily    escitalopram (Lexapro) 10 mg tablet 1 tablet, Daily    ipratropium-albuteroL (Duo-Neb) 0.5-2.5 mg/3 mL nebulizer solution Every 4 hours PRN    metoprolol succinate XL (TOPROL-XL) 50 mg, oral, Daily, Along with 25mg tablet(75mg total)    metoprolol succinate XL (TOPROL-XL) 25 mg, oral, Daily, Along with 50mg tablet (75mg total)    rosuvastatin (CRESTOR) 10 mg, oral, Nightly    vit C/E/Zn/coppr/lutein/zeaxan (PRESERVISION AREDS-2 ORAL) 1 capsule, Daily          Allergies   Allergen Reactions    Azithromycin Unknown and Other     Thrush    Meloxicam Other    Moxifloxacin Unknown     thrush             LABS:    Reviewed CBC comprehensive profile from " July 2024 and lipid profile from February 2024       Lipid profile is at target hemoglobin 16.4 hematocrit 50  Ferritin level is normal    Patient Active Problem List    Diagnosis Date Noted    Presence of Watchman left atrial appendage closure device 11/22/2023    Preop cardiovascular exam 11/22/2023    Hospital discharge follow-up 10/17/2023    Abnormal electrocardiogram 10/14/2023    Anemia 10/14/2023    COPD (chronic obstructive pulmonary disease) (Multi) 10/14/2023    Current smoker 10/14/2023    Depression 10/14/2023    Dizziness 10/14/2023    ED (erectile dysfunction) 10/14/2023    Hard of hearing 10/14/2023    History of endovascular stent graft for abdominal aortic aneurysm (AAA) 10/14/2023    Hyperlipidemia 10/14/2023    Hypokalemia 10/14/2023    Hypertension 10/14/2023    Noncompliance 10/14/2023    Atrial fibrillation (Multi) 10/14/2023    AAA (abdominal aortic aneurysm) (CMS-HCC) 10/14/2023    PVD (peripheral vascular disease) (CMS-HCC) 10/14/2023    Stage 3a chronic kidney disease (CKD) (Multi) 10/14/2023    Overweight with body mass index (BMI) of 25 to 25.9 in adult 10/14/2023                 Assessment:    1. Primary hypertension  Follow Up In Cardiology    Follow Up In Cardiology    metoprolol succinate XL (Toprol-XL) 50 mg 24 hr tablet    metoprolol succinate XL (Toprol-XL) 25 mg 24 hr tablet    dilTIAZem CD (Cardizem CD) 360 mg 24 hr capsule      2. Permanent atrial fibrillation (Multi)  Follow Up In Cardiology    Follow Up In Cardiology    metoprolol succinate XL (Toprol-XL) 50 mg 24 hr tablet    metoprolol succinate XL (Toprol-XL) 25 mg 24 hr tablet    dilTIAZem CD (Cardizem CD) 360 mg 24 hr capsule      3. Presence of Watchman left atrial appendage closure device  Follow Up In Cardiology    Follow Up In Cardiology    metoprolol succinate XL (Toprol-XL) 50 mg 24 hr tablet    metoprolol succinate XL (Toprol-XL) 25 mg 24 hr tablet    dilTIAZem CD (Cardizem CD) 360 mg 24 hr capsule      4. Mixed  hyperlipidemia  Follow Up In Cardiology    Follow Up In Cardiology    metoprolol succinate XL (Toprol-XL) 50 mg 24 hr tablet    metoprolol succinate XL (Toprol-XL) 25 mg 24 hr tablet    dilTIAZem CD (Cardizem CD) 360 mg 24 hr capsule    rosuvastatin (Crestor) 10 mg tablet      5. Current smoker  Follow Up In Cardiology      6. Hard of hearing  Follow Up In Cardiology      Nicotine cessation was encouraged at every visit patient is not motivated to quit.  No change in cardiac medications.    Follow up : 1 year        Provider Attestation - Scribe documentation    All medical record entries made by the Scribe were at my direction and personally dictated by me. I have reviewed the chart and agree that the record accurately reflects my personal performance of the history, physical exam, discussion and plan.

## 2024-11-06 NOTE — PATIENT INSTRUCTIONS
Continue same medications and treatments.     Please bring any lab results from other providers / physicians to your next appointment.     Please bring all medicines, vitamins, and herbal supplements with you when you come to the office.     Prescriptions will not be filled unless you are compliant with your follow up appointments or have a follow up appointment scheduled as per instruction of your physician. Refills should be requested at the time of your visit.      DID YOU KNOW:  We have a pharmacy here in the [facility].  They can fill all prescriptions, not just cardiac medications.  Prescriptions from other pharmacies can easily be transferred to the  pharmacy by the  pharmacist on site.   pharmacies offer FREE HOME DELIVERY on medications to anywhere in [place]. They can sync your medications. Typically prescriptions can be ready in 10 - 15 minutes. If pharmacy is unable to fill your  prescription or if cost is more than your paying now the Pharmacist can easily transfer back to your Pharmacy of choice. Pharmacy phone # [phone].     Scribe Attestation  By signing my name below, IMaria Guadalupe LPN , Marg[place]   attest that this documentation has been prepared under the direction and in the presence of Freda Thrasher MD.

## 2024-11-08 DIAGNOSIS — E78.2 MIXED HYPERLIPIDEMIA: ICD-10-CM

## 2024-11-08 DIAGNOSIS — I10 PRIMARY HYPERTENSION: ICD-10-CM

## 2024-11-08 DIAGNOSIS — Z95.818 PRESENCE OF WATCHMAN LEFT ATRIAL APPENDAGE CLOSURE DEVICE: ICD-10-CM

## 2024-11-08 DIAGNOSIS — Z01.810 PREOP CARDIOVASCULAR EXAM: ICD-10-CM

## 2024-11-08 DIAGNOSIS — I48.21 PERMANENT ATRIAL FIBRILLATION (MULTI): ICD-10-CM

## 2024-11-08 RX ORDER — DILTIAZEM HYDROCHLORIDE 360 MG/1
360 CAPSULE, EXTENDED RELEASE ORAL DAILY
Qty: 90 CAPSULE | Refills: 3 | OUTPATIENT
Start: 2024-11-08 | End: 2025-11-08

## 2024-11-08 RX ORDER — METOPROLOL SUCCINATE 25 MG/1
25 TABLET, EXTENDED RELEASE ORAL DAILY
Qty: 90 TABLET | Refills: 3 | OUTPATIENT
Start: 2024-11-08 | End: 2025-11-08

## 2024-11-08 RX ORDER — ROSUVASTATIN CALCIUM 10 MG/1
10 TABLET, COATED ORAL NIGHTLY
Qty: 90 TABLET | Refills: 3 | OUTPATIENT
Start: 2024-11-08 | End: 2025-11-08

## 2024-11-08 RX ORDER — METOPROLOL SUCCINATE 50 MG/1
50 TABLET, EXTENDED RELEASE ORAL DAILY
Qty: 90 TABLET | Refills: 3 | OUTPATIENT
Start: 2024-11-08 | End: 2025-11-08

## 2024-11-08 NOTE — TELEPHONE ENCOUNTER
Pt called the office stating he was in to see Dr. Thrasher Wednesday and scripts were sent to [facility]. Pt DOES NOT us mail order.    Requesting scripts be sent to [facility]. E-scribed as requested.

## 2024-11-12 RX ORDER — ROSUVASTATIN CALCIUM 10 MG/1
10 TABLET, COATED ORAL NIGHTLY
Qty: 90 TABLET | Refills: 3 | Status: SHIPPED | OUTPATIENT
Start: 2024-11-12 | End: 2025-11-12

## 2024-11-12 RX ORDER — METOPROLOL SUCCINATE 25 MG/1
25 TABLET, EXTENDED RELEASE ORAL DAILY
Qty: 90 TABLET | Refills: 3 | Status: SHIPPED | OUTPATIENT
Start: 2024-11-12 | End: 2025-11-12

## 2024-11-12 RX ORDER — METOPROLOL SUCCINATE 50 MG/1
50 TABLET, EXTENDED RELEASE ORAL DAILY
Qty: 90 TABLET | Refills: 3 | Status: SHIPPED | OUTPATIENT
Start: 2024-11-12 | End: 2025-11-12

## 2024-11-12 RX ORDER — DILTIAZEM HYDROCHLORIDE 360 MG/1
360 CAPSULE, EXTENDED RELEASE ORAL DAILY
Qty: 90 CAPSULE | Refills: 3 | Status: SHIPPED | OUTPATIENT
Start: 2024-11-12 | End: 2025-11-12

## 2024-11-12 NOTE — TELEPHONE ENCOUNTER
All meds were refused by Dr. Thrasher.Pt request meds go to [facility] NOT [facility].   Message routed to Twyla Avila to auth.

## 2025-02-11 ENCOUNTER — OFFICE VISIT (OUTPATIENT)
Dept: FAMILY MEDICINE CLINIC | Age: 85
End: 2025-02-11

## 2025-02-11 VITALS
SYSTOLIC BLOOD PRESSURE: 120 MMHG | HEART RATE: 68 BPM | DIASTOLIC BLOOD PRESSURE: 72 MMHG | TEMPERATURE: 98 F | OXYGEN SATURATION: 95 % | HEIGHT: 68 IN | WEIGHT: 183 LBS | BODY MASS INDEX: 27.74 KG/M2

## 2025-02-11 DIAGNOSIS — R05.1 ACUTE COUGH: ICD-10-CM

## 2025-02-11 DIAGNOSIS — J06.9 ACUTE UPPER RESPIRATORY INFECTION: Primary | ICD-10-CM

## 2025-02-11 LAB
INFLUENZA A ANTIBODY: NORMAL
INFLUENZA B ANTIBODY: NORMAL

## 2025-02-11 RX ORDER — DOXYCYCLINE HYCLATE 100 MG
100 TABLET ORAL 2 TIMES DAILY
Qty: 14 TABLET | Refills: 0 | Status: SHIPPED | OUTPATIENT
Start: 2025-02-11 | End: 2025-02-18

## 2025-02-11 RX ORDER — CHOLECALCIFEROL (VITAMIN D3) 50 MCG
2000 TABLET ORAL DAILY
Qty: 7 TABLET | Refills: 0 | Status: SHIPPED | OUTPATIENT
Start: 2025-02-11 | End: 2025-02-18

## 2025-02-11 RX ORDER — BENZONATATE 200 MG/1
200 CAPSULE ORAL 3 TIMES DAILY
Qty: 21 CAPSULE | Refills: 0 | Status: SHIPPED | OUTPATIENT
Start: 2025-02-11 | End: 2025-02-18

## 2025-02-11 SDOH — ECONOMIC STABILITY: FOOD INSECURITY: WITHIN THE PAST 12 MONTHS, YOU WORRIED THAT YOUR FOOD WOULD RUN OUT BEFORE YOU GOT MONEY TO BUY MORE.: NEVER TRUE

## 2025-02-11 SDOH — ECONOMIC STABILITY: FOOD INSECURITY: WITHIN THE PAST 12 MONTHS, THE FOOD YOU BOUGHT JUST DIDN'T LAST AND YOU DIDN'T HAVE MONEY TO GET MORE.: NEVER TRUE

## 2025-02-11 ASSESSMENT — PATIENT HEALTH QUESTIONNAIRE - PHQ9
6. FEELING BAD ABOUT YOURSELF - OR THAT YOU ARE A FAILURE OR HAVE LET YOURSELF OR YOUR FAMILY DOWN: NOT AT ALL
9. THOUGHTS THAT YOU WOULD BE BETTER OFF DEAD, OR OF HURTING YOURSELF: NOT AT ALL
SUM OF ALL RESPONSES TO PHQ9 QUESTIONS 1 & 2: 0
8. MOVING OR SPEAKING SO SLOWLY THAT OTHER PEOPLE COULD HAVE NOTICED. OR THE OPPOSITE, BEING SO FIGETY OR RESTLESS THAT YOU HAVE BEEN MOVING AROUND A LOT MORE THAN USUAL: NOT AT ALL
5. POOR APPETITE OR OVEREATING: NOT AT ALL
SUM OF ALL RESPONSES TO PHQ QUESTIONS 1-9: 3
4. FEELING TIRED OR HAVING LITTLE ENERGY: MORE THAN HALF THE DAYS
SUM OF ALL RESPONSES TO PHQ QUESTIONS 1-9: 3
10. IF YOU CHECKED OFF ANY PROBLEMS, HOW DIFFICULT HAVE THESE PROBLEMS MADE IT FOR YOU TO DO YOUR WORK, TAKE CARE OF THINGS AT HOME, OR GET ALONG WITH OTHER PEOPLE: SOMEWHAT DIFFICULT
SUM OF ALL RESPONSES TO PHQ QUESTIONS 1-9: 3
2. FEELING DOWN, DEPRESSED OR HOPELESS: NOT AT ALL
3. TROUBLE FALLING OR STAYING ASLEEP: SEVERAL DAYS
SUM OF ALL RESPONSES TO PHQ QUESTIONS 1-9: 3
1. LITTLE INTEREST OR PLEASURE IN DOING THINGS: NOT AT ALL
7. TROUBLE CONCENTRATING ON THINGS, SUCH AS READING THE NEWSPAPER OR WATCHING TELEVISION: NOT AT ALL

## 2025-02-11 ASSESSMENT — COLUMBIA-SUICIDE SEVERITY RATING SCALE - C-SSRS
1. WITHIN THE PAST MONTH, HAVE YOU WISHED YOU WERE DEAD OR WISHED YOU COULD GO TO SLEEP AND NOT WAKE UP?: NO
2. HAVE YOU ACTUALLY HAD ANY THOUGHTS OF KILLING YOURSELF?: NO
6. HAVE YOU EVER DONE ANYTHING, STARTED TO DO ANYTHING, OR PREPARED TO DO ANYTHING TO END YOUR LIFE?: NO

## 2025-02-11 ASSESSMENT — ENCOUNTER SYMPTOMS
RESPIRATORY NEGATIVE: 1
RHINORRHEA: 1
GASTROINTESTINAL NEGATIVE: 1
EYES NEGATIVE: 1

## 2025-02-11 NOTE — PROGRESS NOTES
[facility] [place] SPECIALTY CARE, [facility] CARE  [address], [address], [address]  [facility] [address]  Dept: [phone]  Loc: [phone]     Pt Name: Darío Moscoso Sr.  MRN: [id number]  Birthdate 1940      HISTORY OF PRESENT ILLNESS    Darío Moscoso Sr. is a 84 y.o. male who presents to the [facility] with chief complaint of cough and chills x 5 days. He is very sore from coughing. He says he is able to sleep despite his cough.  He kept clearing his nose and has felt congested.  He also had an episode of diarrhea. He says he thinks he had a low grade fever over the weekend.  He did his home covid test on Monday and that was negative. He was around his sick daughter and granddaugher on Wednesday last night and developed symptoms 2 days later.  She was sick for several days and recently diagnosed with bronchitis.      REVIEW OF SYSTEMS       Review of Systems   Constitutional:  Positive for chills and fatigue.   HENT:  Positive for congestion and rhinorrhea.    Eyes: Negative.    Respiratory: Negative.     Cardiovascular: Negative.    Gastrointestinal: Negative.    Endocrine: Negative.    Genitourinary: Negative.    Musculoskeletal: Negative.    Skin: Negative.    Neurological: Negative.    Psychiatric/Behavioral: Negative.           PAST MEDICAL HISTORY     Past Medical History:   Diagnosis Date    Atrial fibrillation (HCC)     CAD (coronary artery disease)     Congestive heart failure, unspecified HF chronicity, unspecified heart failure type ([facility]) 2/16/2024    COPD exacerbation ([facility]) 07/31/2022    Depressive disorder 03/10/2022    Hematuria     High cholesterol     History of bladder cancer     Hyperlipidemia     Hypertension     Malignant neoplasm of urinary bladder (HCC) 01/27/2020    Presence of Watchman left atrial appendage closure device     S/P AAA repair     Spondylosis of lumbar region without myelopathy or radiculopathy     Stage 3a chronic kidney

## 2025-02-17 ENCOUNTER — OFFICE VISIT (OUTPATIENT)
Dept: FAMILY MEDICINE CLINIC | Age: 85
End: 2025-02-17
Payer: MEDICARE

## 2025-02-17 VITALS
BODY MASS INDEX: 27.43 KG/M2 | SYSTOLIC BLOOD PRESSURE: 138 MMHG | WEIGHT: 181 LBS | DIASTOLIC BLOOD PRESSURE: 86 MMHG | TEMPERATURE: 98.1 F | OXYGEN SATURATION: 97 % | HEIGHT: 68 IN | HEART RATE: 85 BPM

## 2025-02-17 DIAGNOSIS — I48.91 ATRIAL FIBRILLATION, UNSPECIFIED TYPE (HCC): ICD-10-CM

## 2025-02-17 DIAGNOSIS — Z00.00 MEDICARE ANNUAL WELLNESS VISIT, SUBSEQUENT: Primary | ICD-10-CM

## 2025-02-17 DIAGNOSIS — C67.2 MALIGNANT NEOPLASM OF LATERAL WALL OF URINARY BLADDER (HCC): ICD-10-CM

## 2025-02-17 DIAGNOSIS — R73.9 HYPERGLYCEMIA: ICD-10-CM

## 2025-02-17 DIAGNOSIS — J44.1 COPD EXACERBATION (HCC): ICD-10-CM

## 2025-02-17 DIAGNOSIS — E78.5 HYPERLIPIDEMIA, UNSPECIFIED HYPERLIPIDEMIA TYPE: ICD-10-CM

## 2025-02-17 DIAGNOSIS — N18.31 STAGE 3A CHRONIC KIDNEY DISEASE (HCC): ICD-10-CM

## 2025-02-17 DIAGNOSIS — I50.9 CONGESTIVE HEART FAILURE, UNSPECIFIED HF CHRONICITY, UNSPECIFIED HEART FAILURE TYPE (HCC): ICD-10-CM

## 2025-02-17 DIAGNOSIS — J96.00 ACUTE RESPIRATORY FAILURE, UNSPECIFIED WHETHER WITH HYPOXIA OR HYPERCAPNIA (HCC): ICD-10-CM

## 2025-02-17 PROCEDURE — 1123F ACP DISCUSS/DSCN MKR DOCD: CPT | Performed by: FAMILY MEDICINE

## 2025-02-17 PROCEDURE — G0439 PPPS, SUBSEQ VISIT: HCPCS | Performed by: FAMILY MEDICINE

## 2025-02-17 PROCEDURE — 1159F MED LIST DOCD IN RCRD: CPT | Performed by: FAMILY MEDICINE

## 2025-02-17 PROCEDURE — 1160F RVW MEDS BY RX/DR IN RCRD: CPT | Performed by: FAMILY MEDICINE

## 2025-02-17 PROCEDURE — 3079F DIAST BP 80-89 MM HG: CPT | Performed by: FAMILY MEDICINE

## 2025-02-17 PROCEDURE — 3075F SYST BP GE 130 - 139MM HG: CPT | Performed by: FAMILY MEDICINE

## 2025-02-17 PROCEDURE — 1126F AMNT PAIN NOTED NONE PRSNT: CPT | Performed by: FAMILY MEDICINE

## 2025-02-17 SDOH — HEALTH STABILITY: PHYSICAL HEALTH: ON AVERAGE, HOW MANY DAYS PER WEEK DO YOU ENGAGE IN MODERATE TO STRENUOUS EXERCISE (LIKE A BRISK WALK)?: 1 DAY

## 2025-02-17 SDOH — HEALTH STABILITY: PHYSICAL HEALTH: ON AVERAGE, HOW MANY MINUTES DO YOU ENGAGE IN EXERCISE AT THIS LEVEL?: 0 MIN

## 2025-02-17 ASSESSMENT — LIFESTYLE VARIABLES
HOW OFTEN DO YOU HAVE A DRINK CONTAINING ALCOHOL: 2-4 TIMES A MONTH
HOW OFTEN DO YOU HAVE A DRINK CONTAINING ALCOHOL: 3
HOW MANY STANDARD DRINKS CONTAINING ALCOHOL DO YOU HAVE ON A TYPICAL DAY: 0
HOW MANY STANDARD DRINKS CONTAINING ALCOHOL DO YOU HAVE ON A TYPICAL DAY: PATIENT DOES NOT DRINK
HOW OFTEN DO YOU HAVE SIX OR MORE DRINKS ON ONE OCCASION: 1

## 2025-02-17 ASSESSMENT — PATIENT HEALTH QUESTIONNAIRE - PHQ9
1. LITTLE INTEREST OR PLEASURE IN DOING THINGS: NOT AT ALL
SUM OF ALL RESPONSES TO PHQ9 QUESTIONS 1 & 2: 0
SUM OF ALL RESPONSES TO PHQ QUESTIONS 1-9: 0
SUM OF ALL RESPONSES TO PHQ QUESTIONS 1-9: 0
2. FEELING DOWN, DEPRESSED OR HOPELESS: NOT AT ALL
SUM OF ALL RESPONSES TO PHQ QUESTIONS 1-9: 0
SUM OF ALL RESPONSES TO PHQ QUESTIONS 1-9: 0

## 2025-02-17 NOTE — PROGRESS NOTES
Medicare Annual Wellness Visit    Darío Moscoso  is here for Medicare AWV    Assessment & Plan   Medicare annual wellness visit, subsequent  Acute respiratory failure, unspecified whether with hypoxia or hypercapnia  Malignant neoplasm of lateral wall of urinary bladder (HCC)  Congestive heart failure, unspecified HF chronicity, unspecified heart failure type (HCC)  COPD exacerbation (HCC)  Atrial fibrillation, unspecified type (HCC)  Stage 3a chronic kidney disease (HCC)  -     Comprehensive Metabolic Panel; Future  -     CBC; Future  Hyperlipidemia, unspecified hyperlipidemia type  -     Lipid Panel; Future  Hyperglycemia  -     Hemoglobin A1C; Future     No follow-ups on file.     Subjective   Chief Complaint   Patient presents with    Medicare AWV       Patient's complete Health Risk Assessment and screening values have been reviewed and are found in Flowsheets. The following problems were reviewed today and where indicated follow up appointments were made and/or referrals ordered.    Positive Risk Factor Screenings with Interventions:              Inactivity:  On average, how many days per week do you engage in moderate to strenuous exercise (like a brisk walk)?: 1 day (!) Abnormal  On average, how many minutes do you engage in exercise at this level?: 0 min  Interventions:  See AVS for additional education material  See A/P for plan and any pertinent orders       Hearing Screen:  Do you or your family notice any trouble with your hearing that hasn't been managed with hearing aids?: (!) Yes    Interventions:  See AVS for additional education material  See A/P for any pertinent orders        Tobacco Use:    Tobacco Use      Smoking status: Light Smoker        Packs/day: 0.33        Years: 0.3 packs/day for 45.4 years (15.0 ttl pk-yrs)        Types: Cigarettes        Start date: 10/1/1979        Passive exposure: Current      Smokeless tobacco: Never      Tobacco comments: 2 packs per week

## 2025-02-17 NOTE — PATIENT INSTRUCTIONS
Learning About Being Active as an Older Adult  Why is being active important as you get older?     Being active is one of the best things you can do for your health. And it's never too late to start. Being active--or getting active, if you aren't already--has definite benefits. It can:  Give you more energy,  Keep your mind sharp.  Improve balance to reduce your risk of falls.  Help you manage chronic illness with fewer medicines.  No matter how old you are, how fit you are, or what health problems you have, there is a form of activity that will work for you. And the more physical activity you can do, the better your overall health will be.  What kinds of activity can help you stay healthy?  Being more active will make your daily activities easier. Physical activity includes planned exercise and things you do in daily life. There are four types of activity:  Aerobic.  Doing aerobic activity makes your heart and lungs strong.  Includes walking, dancing, and gardening.  Aim for at least 2½ hours spread throughout the week.  It improves your energy and can help you sleep better.  Muscle-strengthening.  This type of activity can help maintain muscle and strengthen bones.  Includes climbing stairs, using resistance bands, and lifting or carrying heavy loads.  Aim for at least twice a week.  It can help protect the knees and other joints.  Stretching.  Stretching gives you better range of motion in joints and muscles.  Includes upper arm stretches, calf stretches, and gentle yoga.  Aim for at least twice a week, preferably after your muscles are warmed up from other activities.  It can help you function better in daily life.  Balancing.  This helps you stay coordinated and have good posture.  Includes heel-to-toe walking, alissa chi, and certain types of yoga.  Aim for at least 3 days a week.  It can reduce your risk of falling.  Even if you have a hard time meeting the recommendations, it's better to be more active

## 2025-02-18 DIAGNOSIS — R73.9 HYPERGLYCEMIA: ICD-10-CM

## 2025-02-18 DIAGNOSIS — E78.5 HYPERLIPIDEMIA, UNSPECIFIED HYPERLIPIDEMIA TYPE: ICD-10-CM

## 2025-02-18 DIAGNOSIS — N18.31 STAGE 3A CHRONIC KIDNEY DISEASE (HCC): ICD-10-CM

## 2025-02-18 LAB
ALBUMIN SERPL-MCNC: 4.1 G/DL (ref 3.5–4.6)
ALP SERPL-CCNC: 71 U/L (ref 35–104)
ALT SERPL-CCNC: 13 U/L (ref 0–41)
ANION GAP SERPL CALCULATED.3IONS-SCNC: 9 MEQ/L (ref 9–15)
AST SERPL-CCNC: 19 U/L (ref 0–40)
BILIRUB SERPL-MCNC: 1.4 MG/DL (ref 0.2–0.7)
BUN SERPL-MCNC: 16 MG/DL (ref 8–23)
CALCIUM SERPL-MCNC: 9.2 MG/DL (ref 8.5–9.9)
CHLORIDE SERPL-SCNC: 102 MEQ/L (ref 95–107)
CHOLEST SERPL-MCNC: 142 MG/DL (ref 0–199)
CO2 SERPL-SCNC: 30 MEQ/L (ref 20–31)
CREAT SERPL-MCNC: 1.09 MG/DL (ref 0.7–1.2)
ERYTHROCYTE [DISTWIDTH] IN BLOOD BY AUTOMATED COUNT: 12.7 % (ref 11.5–14.5)
GLOBULIN SER CALC-MCNC: 3.8 G/DL (ref 2.3–3.5)
GLUCOSE SERPL-MCNC: 87 MG/DL (ref 70–99)
HCT VFR BLD AUTO: 49.7 % (ref 42–52)
HDLC SERPL-MCNC: 39 MG/DL (ref 40–59)
HGB BLD-MCNC: 16.6 G/DL (ref 14–18)
LDLC SERPL CALC-MCNC: 82 MG/DL (ref 0–129)
MCH RBC QN AUTO: 31.3 PG (ref 27–31.3)
MCHC RBC AUTO-ENTMCNC: 33.4 % (ref 33–37)
MCV RBC AUTO: 93.8 FL (ref 79–92.2)
PLATELET # BLD AUTO: 184 K/UL (ref 130–400)
POTASSIUM SERPL-SCNC: 3.7 MEQ/L (ref 3.4–4.9)
PROT SERPL-MCNC: 7.9 G/DL (ref 6.3–8)
RBC # BLD AUTO: 5.3 M/UL (ref 4.7–6.1)
SODIUM SERPL-SCNC: 141 MEQ/L (ref 135–144)
TRIGL SERPL-MCNC: 106 MG/DL (ref 0–150)
WBC # BLD AUTO: 8 K/UL (ref 4.8–10.8)

## 2025-02-19 LAB
ESTIMATED AVERAGE GLUCOSE: 126 MG/DL
HBA1C MFR BLD: 6 % (ref 4–6)

## 2025-02-24 ENCOUNTER — PROCEDURE VISIT (OUTPATIENT)
Dept: UROLOGY | Age: 85
End: 2025-02-24
Payer: MEDICARE

## 2025-02-24 VITALS
SYSTOLIC BLOOD PRESSURE: 126 MMHG | DIASTOLIC BLOOD PRESSURE: 70 MMHG | BODY MASS INDEX: 27.74 KG/M2 | HEIGHT: 68 IN | WEIGHT: 183 LBS | HEART RATE: 61 BPM

## 2025-02-24 DIAGNOSIS — C67.2 MALIGNANT NEOPLASM OF LATERAL WALL OF URINARY BLADDER (HCC): Primary | ICD-10-CM

## 2025-02-24 LAB
BILIRUBIN, POC: NORMAL
BLOOD URINE, POC: NORMAL
CLARITY, POC: CLEAR
COLOR, POC: YELLOW
GLUCOSE URINE, POC: NORMAL MG/DL
KETONES, POC: NORMAL MG/DL
LEUKOCYTE EST, POC: NORMAL
NITRITE, POC: NORMAL
PH, POC: 6
PROTEIN, POC: NORMAL MG/DL
SPECIFIC GRAVITY, POC: >1.03
UROBILINOGEN, POC: 4 MG/DL

## 2025-02-24 PROCEDURE — 52000 CYSTOURETHROSCOPY: CPT | Performed by: UROLOGY

## 2025-02-24 PROCEDURE — 81003 URINALYSIS AUTO W/O SCOPE: CPT | Performed by: UROLOGY

## 2025-02-24 RX ORDER — DOXYCYCLINE 100 MG/1
100 CAPSULE ORAL 2 TIMES DAILY
COMMUNITY

## 2025-02-24 ASSESSMENT — ENCOUNTER SYMPTOMS
ABDOMINAL DISTENTION: 0
ABDOMINAL PAIN: 0

## 2025-02-24 NOTE — PROGRESS NOTES
Subjective:      Patient ID: Darío Moscoso Sr. is a 84 y.o. male    HPI   This is a 84 y.o. 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 Since last seen in 10/16/24, he has no interval  complaints. He has no hematuria or dysuria or pain. The risks and benefits of cystoscopy including but not limited to infection, pain, bleeding, stricture, retention, perforation, need for multiple procedures and he wants to proceed as planned.     Past Medical History:   Diagnosis Date    Atrial fibrillation (HCC)     CAD (coronary artery disease)     Congestive heart failure, unspecified HF chronicity, unspecified heart failure type (HCC) 2/16/2024    COPD exacerbation (HCC) 07/31/2022    Depressive disorder 03/10/2022    Hematuria     High cholesterol     History of bladder cancer     Hyperlipidemia     Hypertension     Malignant neoplasm of urinary bladder (HCC) 01/27/2020    Presence of Watchman left atrial appendage closure device     S/P AAA repair     Spondylosis of lumbar region without myelopathy or radiculopathy     Stage 3a chronic kidney disease (HCC) 09/26/2023     Past Surgical History:   Procedure Laterality Date    ABDOMINAL AORTIC ANEURYSM REPAIR      ATRIAL ABLATION SURGERY      watchman device    CARDIAC SURGERY  05/01/2023    COLONOSCOPY N/A 05/26/2020    COLONOSCOPY DIAGNOSTIC performed by Lyla Velez MD at [facility]    CYSTOSCOPY N/A 12/11/2023    CYSTOSCOPY with BLADDER BIOPSY with FULGERATION / GEN /  PT WILL ARRIVE AT 7:15 AM performed by Candido Epps MD at [facility] OR    EYE SURGERY      bilateral cataract surgery    FEMORAL BYPASS Left 02/11/2020    LEFT LOWER EXTREMITY REVASCULARIZATION performed by Long Cardenas MD at [facility] OR    HERNIA REPAIR  1998    SC COLON

## 2025-04-22 ENCOUNTER — OFFICE VISIT (OUTPATIENT)
Dept: FAMILY MEDICINE CLINIC | Age: 85
End: 2025-04-22
Payer: MEDICARE

## 2025-04-22 VITALS
WEIGHT: 190.2 LBS | TEMPERATURE: 98 F | SYSTOLIC BLOOD PRESSURE: 136 MMHG | BODY MASS INDEX: 28.82 KG/M2 | HEIGHT: 68 IN | OXYGEN SATURATION: 94 % | HEART RATE: 70 BPM | DIASTOLIC BLOOD PRESSURE: 78 MMHG

## 2025-04-22 DIAGNOSIS — L03.119 CELLULITIS OF FOOT: Primary | ICD-10-CM

## 2025-04-22 PROCEDURE — 3078F DIAST BP <80 MM HG: CPT | Performed by: NURSE PRACTITIONER

## 2025-04-22 PROCEDURE — 99213 OFFICE O/P EST LOW 20 MIN: CPT | Performed by: NURSE PRACTITIONER

## 2025-04-22 PROCEDURE — 3075F SYST BP GE 130 - 139MM HG: CPT | Performed by: NURSE PRACTITIONER

## 2025-04-22 PROCEDURE — 1123F ACP DISCUSS/DSCN MKR DOCD: CPT | Performed by: NURSE PRACTITIONER

## 2025-04-22 PROCEDURE — G8417 CALC BMI ABV UP PARAM F/U: HCPCS | Performed by: NURSE PRACTITIONER

## 2025-04-22 PROCEDURE — G8427 DOCREV CUR MEDS BY ELIG CLIN: HCPCS | Performed by: NURSE PRACTITIONER

## 2025-04-22 PROCEDURE — 1159F MED LIST DOCD IN RCRD: CPT | Performed by: NURSE PRACTITIONER

## 2025-04-22 PROCEDURE — 4004F PT TOBACCO SCREEN RCVD TLK: CPT | Performed by: NURSE PRACTITIONER

## 2025-04-22 RX ORDER — CEPHALEXIN 500 MG/1
500 CAPSULE ORAL 3 TIMES DAILY
Qty: 21 CAPSULE | Refills: 0 | Status: SHIPPED | OUTPATIENT
Start: 2025-04-22 | End: 2025-04-29

## 2025-04-22 RX ORDER — MUPIROCIN 20 MG/G
OINTMENT TOPICAL
Qty: 22 G | Refills: 0 | Status: SHIPPED | OUTPATIENT
Start: 2025-04-22

## 2025-04-22 ASSESSMENT — ENCOUNTER SYMPTOMS
RHINORRHEA: 0
SHORTNESS OF BREATH: 0
CHEST TIGHTNESS: 0
EYE PAIN: 0
TROUBLE SWALLOWING: 0
NAUSEA: 0
WHEEZING: 0
DIARRHEA: 0
SORE THROAT: 0
FACIAL SWELLING: 0
ABDOMINAL PAIN: 0
COUGH: 0
VOMITING: 0
COLOR CHANGE: 1

## 2025-04-22 NOTE — PROGRESS NOTES
Date of Visit:  2025  Patient Name: Darío Moscoso Sr.   Patient :  1940     CHIEF COMPLAINT:     Darío Moscoso Sr. is a 84 y.o. male who presents today for an general visit to be evaluated for the following condition(s):  Chief Complaint   Patient presents with    Insect Bite     Onset- weeks ago   Location-  RT foot   Burning sensation   Treatments- He states wife used peroxide         HISTORY OF PRESENT ILLNESS     I reviewed staff HPI/chief complaint and do agree with above    Subjective:  - Patient presents with concerns of a possible insect bite to the right foot. The bite occurred approximately two weeks ago. Patient reports being bitten while in bed at night   - Patient experienced a burning sensation in the right foot at the time of the bite. He initially thought it might be related to an electric blanket, but then realized the affected side of his foot was on the mattress.  - Two days ago, the patient's wife examined the bite and poked it, resulting in a small amount of pus-like drainage.  - The site remains tender to the touch.  - Patient denies fever, chills, body aches, shortness of breath, chest pain, nausea, vomiting, diarrhea, or abdominal pain.                  REVIEW OF SYSTEM      Review of Systems   Constitutional:  Negative for chills, diaphoresis, fatigue and fever.   HENT:  Negative for congestion, facial swelling, rhinorrhea, sore throat and trouble swallowing.    Eyes:  Negative for pain and visual disturbance.   Respiratory:  Negative for cough, chest tightness, shortness of breath and wheezing.    Cardiovascular:  Negative for chest pain and palpitations.   Gastrointestinal:  Negative for abdominal pain, diarrhea, nausea and vomiting.   Musculoskeletal:  Negative for arthralgias and myalgias.   Skin:  Positive for color change and rash. Negative for wound.   Neurological:  Negative for dizziness, weakness and headaches.       REVIEWED INFORMATION      Allergies

## 2025-05-02 ENCOUNTER — OFFICE VISIT (OUTPATIENT)
Dept: FAMILY MEDICINE CLINIC | Age: 85
End: 2025-05-02
Payer: MEDICARE

## 2025-05-02 VITALS
BODY MASS INDEX: 28.34 KG/M2 | TEMPERATURE: 98.1 F | HEIGHT: 68 IN | HEART RATE: 64 BPM | OXYGEN SATURATION: 98 % | DIASTOLIC BLOOD PRESSURE: 82 MMHG | SYSTOLIC BLOOD PRESSURE: 156 MMHG | WEIGHT: 187 LBS

## 2025-05-02 DIAGNOSIS — L08.9 RIGHT FOOT INFECTION: Primary | ICD-10-CM

## 2025-05-02 DIAGNOSIS — Z51.89 VISIT FOR WOUND CHECK: ICD-10-CM

## 2025-05-02 PROCEDURE — 3077F SYST BP >= 140 MM HG: CPT | Performed by: NURSE PRACTITIONER

## 2025-05-02 PROCEDURE — G8427 DOCREV CUR MEDS BY ELIG CLIN: HCPCS | Performed by: NURSE PRACTITIONER

## 2025-05-02 PROCEDURE — G8417 CALC BMI ABV UP PARAM F/U: HCPCS | Performed by: NURSE PRACTITIONER

## 2025-05-02 PROCEDURE — 4004F PT TOBACCO SCREEN RCVD TLK: CPT | Performed by: NURSE PRACTITIONER

## 2025-05-02 PROCEDURE — 1159F MED LIST DOCD IN RCRD: CPT | Performed by: NURSE PRACTITIONER

## 2025-05-02 PROCEDURE — 3079F DIAST BP 80-89 MM HG: CPT | Performed by: NURSE PRACTITIONER

## 2025-05-02 PROCEDURE — 1126F AMNT PAIN NOTED NONE PRSNT: CPT | Performed by: NURSE PRACTITIONER

## 2025-05-02 PROCEDURE — 99212 OFFICE O/P EST SF 10 MIN: CPT | Performed by: NURSE PRACTITIONER

## 2025-05-02 PROCEDURE — 1123F ACP DISCUSS/DSCN MKR DOCD: CPT | Performed by: NURSE PRACTITIONER

## 2025-05-02 ASSESSMENT — ENCOUNTER SYMPTOMS: COLOR CHANGE: 1

## 2025-05-02 NOTE — PROGRESS NOTES
rosuvastatin (CRESTOR) 10 MG tablet TAKE ONE TABLET BY MOUTH DAILY  30 tablet 0    Cholecalciferol (VITAMIN D3) 50 MCG (2000 UT) TABS Take 1 tablet by mouth daily for 7 days 7 tablet 0     No current facility-administered medications for this visit.     PMH, Surgical Hx, Family Hx, and Social Hx reviewed and updated.  Health Maintenance reviewed.    Objective  Vitals:    05/02/25 1152 05/02/25 1201 05/02/25 1207   BP: (!) 156/82 (!) 156/82    BP Site: Left Upper Arm Left Upper Arm    Patient Position: Sitting Sitting    BP Cuff Size: Medium Adult Medium Adult    Pulse: 50  64   Temp: 98.1 °F (36.7 °C)     SpO2: 98%     Weight: 84.8 kg (187 lb)     Height: 1.727 m (5' 8\")       BP Readings from Last 3 Encounters:   05/02/25 (!) 156/82   04/22/25 136/78   02/24/25 126/70     Wt Readings from Last 3 Encounters:   05/02/25 84.8 kg (187 lb)   04/22/25 86.3 kg (190 lb 3.2 oz)   02/24/25 83 kg (183 lb)     Physical Exam  Constitutional:       Appearance: Normal appearance.   Cardiovascular:      Rate and Rhythm: Normal rate.   Pulmonary:      Effort: Pulmonary effort is normal.   Musculoskeletal:      Cervical back: Normal range of motion.      Right lower leg: No edema.      Left lower leg: No edema.   Skin:     General: Skin is warm and dry.      Findings: Erythema present.      Comments: There is a small firm area to the right lateral foot near the base of the small toe.  There is erythema, slightly tender with palpation. No active drainage/open areas        Neurological:      Mental Status: He is alert and oriented to person, place, and time.       Assessment & Plan    Diagnosis Orders   1. Right foot infection  [facility] - García Burton DPM, Podiatry, [place]      2. Visit for wound check        Patient with continued area of concern of right lateral foot near small toe  Area has not changed in size per patient after completing keflex TIDx 7 days and bactroban ointment  He is considered there is a FB, he declined an xray

## 2025-05-02 NOTE — PATIENT INSTRUCTIONS
Call podiatry for appointment    If symptoms do not improve return for re-evaluation or see primary care provider. Go to the ER for worsening symptoms

## 2025-06-02 ENCOUNTER — PROCEDURE VISIT (OUTPATIENT)
Age: 85
End: 2025-06-02
Payer: MEDICARE

## 2025-06-02 VITALS
SYSTOLIC BLOOD PRESSURE: 132 MMHG | DIASTOLIC BLOOD PRESSURE: 74 MMHG | WEIGHT: 185 LBS | HEART RATE: 64 BPM | BODY MASS INDEX: 28.04 KG/M2 | HEIGHT: 68 IN

## 2025-06-02 DIAGNOSIS — C67.2 MALIGNANT NEOPLASM OF LATERAL WALL OF URINARY BLADDER (HCC): Primary | ICD-10-CM

## 2025-06-02 LAB
BILIRUBIN, POC: ABNORMAL
BLOOD URINE, POC: ABNORMAL
CLARITY, POC: CLEAR
COLOR, POC: YELLOW
GLUCOSE URINE, POC: ABNORMAL MG/DL
KETONES, POC: ABNORMAL MG/DL
LEUKOCYTE EST, POC: ABNORMAL
NITRITE, POC: ABNORMAL
PH, POC: 6.5
PROTEIN, POC: >300 MG/DL
SPECIFIC GRAVITY, POC: >1.03
UROBILINOGEN, POC: 2 MG/DL

## 2025-06-02 PROCEDURE — 52000 CYSTOURETHROSCOPY: CPT | Performed by: UROLOGY

## 2025-06-02 PROCEDURE — 81003 URINALYSIS AUTO W/O SCOPE: CPT | Performed by: UROLOGY

## 2025-06-02 RX ORDER — DOXYCYCLINE HYCLATE 100 MG
100 TABLET ORAL ONCE
Status: COMPLETED | OUTPATIENT
Start: 2025-06-02 | End: 2025-06-02

## 2025-06-02 RX ADMIN — Medication 100 MG: at 15:07

## 2025-06-02 ASSESSMENT — ENCOUNTER SYMPTOMS: ABDOMINAL PAIN: 0

## 2025-06-02 NOTE — PROGRESS NOTES
Subjective:      Patient ID: Darío Moscoso Sr. is a 84 y.o. male    HPI  This is a 84 y.o. 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 Since last seen on 2/24/25 for negative cystoscopy, he has no interval  complaints. He has no hematuria or dysuria or pain. The risks and benefits of cystoscopy including but not limited to infection, pain, bleeding, stricture, retention, perforation, need for multiple procedures and he wants to proceed as planned.        Past Medical History:   Diagnosis Date    Atrial fibrillation (HCC)     CAD (coronary artery disease)     Congestive heart failure, unspecified HF chronicity, unspecified heart failure type (HCC) 2/16/2024    COPD exacerbation (HCC) 07/31/2022    Depressive disorder 03/10/2022    Hematuria     High cholesterol     History of bladder cancer     Hyperlipidemia     Hypertension     Malignant neoplasm of urinary bladder (HCC) 01/27/2020    Presence of Watchman left atrial appendage closure device     S/P AAA repair     Spondylosis of lumbar region without myelopathy or radiculopathy     Stage 3a chronic kidney disease (HCC) 09/26/2023     Past Surgical History:   Procedure Laterality Date    ABDOMINAL AORTIC ANEURYSM REPAIR      ATRIAL ABLATION SURGERY      watchman device    CARDIAC SURGERY  05/01/2023    COLONOSCOPY N/A 05/26/2020    COLONOSCOPY DIAGNOSTIC performed by Lyla Velez MD at [facility]    CYSTOSCOPY N/A 12/11/2023    CYSTOSCOPY with BLADDER BIOPSY with FULGERATION / GEN /  PT WILL ARRIVE AT 7:15 AM performed by Candido Epps MD at [facility] [place] OR    EYE SURGERY      bilateral cataract surgery    FEMORAL BYPASS Left 02/11/2020    LEFT LOWER EXTREMITY REVASCULARIZATION performed by Long Cardenas MD at [facility] OR    HERNIA

## 2025-06-10 ENCOUNTER — OFFICE VISIT (OUTPATIENT)
Dept: FAMILY MEDICINE CLINIC | Age: 85
End: 2025-06-10
Payer: MEDICARE

## 2025-06-10 VITALS
BODY MASS INDEX: 28.43 KG/M2 | DIASTOLIC BLOOD PRESSURE: 80 MMHG | WEIGHT: 187.6 LBS | OXYGEN SATURATION: 93 % | HEIGHT: 68 IN | SYSTOLIC BLOOD PRESSURE: 124 MMHG | TEMPERATURE: 97.4 F | HEART RATE: 67 BPM

## 2025-06-10 DIAGNOSIS — M79.605 PAIN IN BOTH LOWER EXTREMITIES: ICD-10-CM

## 2025-06-10 DIAGNOSIS — M79.604 PAIN IN BOTH LOWER EXTREMITIES: ICD-10-CM

## 2025-06-10 DIAGNOSIS — I73.9 PAD (PERIPHERAL ARTERY DISEASE): Primary | ICD-10-CM

## 2025-06-10 PROCEDURE — G8427 DOCREV CUR MEDS BY ELIG CLIN: HCPCS | Performed by: FAMILY MEDICINE

## 2025-06-10 PROCEDURE — 1160F RVW MEDS BY RX/DR IN RCRD: CPT | Performed by: FAMILY MEDICINE

## 2025-06-10 PROCEDURE — 1159F MED LIST DOCD IN RCRD: CPT | Performed by: FAMILY MEDICINE

## 2025-06-10 PROCEDURE — 1123F ACP DISCUSS/DSCN MKR DOCD: CPT | Performed by: FAMILY MEDICINE

## 2025-06-10 PROCEDURE — 99213 OFFICE O/P EST LOW 20 MIN: CPT | Performed by: FAMILY MEDICINE

## 2025-06-10 PROCEDURE — 4004F PT TOBACCO SCREEN RCVD TLK: CPT | Performed by: FAMILY MEDICINE

## 2025-06-10 PROCEDURE — G8417 CALC BMI ABV UP PARAM F/U: HCPCS | Performed by: FAMILY MEDICINE

## 2025-06-10 PROCEDURE — 1125F AMNT PAIN NOTED PAIN PRSNT: CPT | Performed by: FAMILY MEDICINE

## 2025-06-10 PROCEDURE — 3079F DIAST BP 80-89 MM HG: CPT | Performed by: FAMILY MEDICINE

## 2025-06-10 PROCEDURE — 3074F SYST BP LT 130 MM HG: CPT | Performed by: FAMILY MEDICINE

## 2025-06-10 NOTE — PROGRESS NOTES
Subjective  Chief Complaint   Patient presents with    Back Pain     Pain in legs and lower back, effects both legs, comes and goes 7/10       Darío Moscoso Sr. is a 84 y.o. male    Back Pain  Patient presents for evaluation of low back problems and leg pain    Not necessarily muscular pain    He has hx of PAD, s/p stents in legs    He is not necessarily having classic claudication     Patient's medications, allergies, past medical, surgical, social and family histories were reviewed and updated as appropriate.        ROS  Constitutional: negative for chills, fevers, malaise and sweats  Respiratory: negative for cough, dyspnea on exertion and shortness of breath  Cardiovascular: negative for chest pain and palpitations  Musculoskeletal:positive for back pain and  negative for muscle weakness  Neurological: negative for coordination problems, gait problems, tremors, no bowel or bladder incontinence, no saddle anesthesia    Objective  /80 (BP Site: Right Upper Arm)   Pulse 67   Temp 97.4 °F (36.3 °C)   Ht 1.727 m (5' 8\")   Wt 85.1 kg (187 lb 9.6 oz)   SpO2 93%   BMI 28.52 kg/m²     He sits comfortably in the chair   Able to rise and sit without significant pain   Heart s1s2 RRR  Lungs CTAB  Ambulates normally       Assessment   Diagnosis Orders   1. PAD (peripheral artery disease)  Vascular duplex lower extremity arteries bilateral with exercise JULIANNE      2. Pain in both lower extremities  Vascular duplex lower extremity arteries bilateral with exercise JULIANNE              Plan    R/o vascular issue  Arrange testing    Consider PT if vascular testing normal     GAYATRI DRAKE MD

## 2025-06-13 ENCOUNTER — TELEPHONE (OUTPATIENT)
Dept: FAMILY MEDICINE CLINIC | Age: 85
End: 2025-06-13

## 2025-06-13 DIAGNOSIS — I73.9 PAD (PERIPHERAL ARTERY DISEASE): Primary | ICD-10-CM

## 2025-06-13 NOTE — TELEPHONE ENCOUNTER
Diagnosis Orders   1. PAD (peripheral artery disease)  Vascular ankle brachial index (JULIANNE) with exercise

## 2025-06-13 NOTE — TELEPHONE ENCOUNTER
Pt called and stated that he was told by [facility] that the Vascular US order is incorrect and needs changed. The number to call is [phone].

## 2025-06-25 ENCOUNTER — HOSPITAL ENCOUNTER (OUTPATIENT)
Dept: ULTRASOUND IMAGING | Age: 85
Discharge: HOME | End: 2025-06-27
Payer: MEDICARE

## 2025-06-25 DIAGNOSIS — I73.9 PAD (PERIPHERAL ARTERY DISEASE): ICD-10-CM

## 2025-06-25 PROCEDURE — 93924 LWR XTR VASC STDY BILAT: CPT

## 2025-06-30 ENCOUNTER — RESULTS FOLLOW-UP (OUTPATIENT)
Dept: FAMILY MEDICINE CLINIC | Age: 85
End: 2025-06-30

## 2025-08-19 ENCOUNTER — OFFICE VISIT (OUTPATIENT)
Dept: FAMILY MEDICINE CLINIC | Age: 85
End: 2025-08-19

## 2025-08-19 VITALS — WEIGHT: 185 LBS | HEIGHT: 68 IN | OXYGEN SATURATION: 99 % | BODY MASS INDEX: 28.04 KG/M2 | HEART RATE: 72 BPM

## 2025-08-19 DIAGNOSIS — L57.0 ACTINIC KERATOSES: Primary | ICD-10-CM

## 2025-08-19 DIAGNOSIS — L82.1 VERRUCOUS KERATOSIS: ICD-10-CM

## 2025-08-19 ASSESSMENT — ENCOUNTER SYMPTOMS: COLOR CHANGE: 1

## 2025-09-03 ENCOUNTER — PROCEDURE VISIT (OUTPATIENT)
Age: 85
End: 2025-09-03
Payer: MEDICARE

## 2025-09-03 VITALS
HEART RATE: 63 BPM | WEIGHT: 184 LBS | BODY MASS INDEX: 27.89 KG/M2 | DIASTOLIC BLOOD PRESSURE: 90 MMHG | HEIGHT: 68 IN | SYSTOLIC BLOOD PRESSURE: 138 MMHG

## 2025-09-03 DIAGNOSIS — C67.2 MALIGNANT NEOPLASM OF LATERAL WALL OF URINARY BLADDER (HCC): Primary | ICD-10-CM

## 2025-09-03 LAB
BILIRUBIN, POC: ABNORMAL
BLOOD URINE, POC: ABNORMAL
CLARITY, POC: CLEAR
COLOR, POC: YELLOW
GLUCOSE URINE, POC: ABNORMAL MG/DL
KETONES, POC: ABNORMAL MG/DL
LEUKOCYTE EST, POC: ABNORMAL
NITRITE, POC: ABNORMAL
PH, POC: 6.5
PROTEIN, POC: >300 MG/DL
SPECIFIC GRAVITY, POC: 1.02
UROBILINOGEN, POC: 4 MG/DL

## 2025-09-03 PROCEDURE — 52000 CYSTOURETHROSCOPY: CPT | Performed by: UROLOGY

## 2025-09-03 PROCEDURE — 81003 URINALYSIS AUTO W/O SCOPE: CPT | Performed by: UROLOGY

## 2025-09-03 RX ORDER — DOXYCYCLINE HYCLATE 100 MG
100 TABLET ORAL ONCE
Status: COMPLETED | OUTPATIENT
Start: 2025-09-03 | End: 2025-09-03

## 2025-09-03 RX ADMIN — Medication 100 MG: at 15:43

## 2025-09-03 ASSESSMENT — ENCOUNTER SYMPTOMS: ABDOMINAL PAIN: 0

## 2025-10-15 ENCOUNTER — APPOINTMENT (OUTPATIENT)
Dept: CARDIOLOGY | Facility: CLINIC | Age: 85
End: 2025-10-15
Payer: MEDICARE

## (undated) DEVICE — ELECTRODE PT RET AD L9FT HI MOIST COND ADH HYDRGEL CORDED

## (undated) DEVICE — CANNULA PERF L2IN BLNT TIP 3MM VES CLR RADPQ BODY FEM LUER D

## (undated) DEVICE — FOGARTY - HYDRAGRIP SURGICAL - CLAMP INSERTS: Brand: FOGARTY SAFEJAW

## (undated) DEVICE — PTA BALLOON DILATATION CATHETER: Brand: MUSTANG™

## (undated) DEVICE — PACK,CYSTOSCOPY,PK I,AURORA: Brand: MEDLINE

## (undated) DEVICE — Z DISCONTINUED APPLICATOR SURG PREP 0.35OZ 2% CHG 70% ISO ALC W/ HI LT

## (undated) DEVICE — Device: Brand: ENDO SMARTCAP

## (undated) DEVICE — DRAPE,TOP,102X53,STERILE: Brand: MEDLINE

## (undated) DEVICE — SUTURE PERMAHAND SZ 2-0 L12X18IN NONABSORBABLE BLK SILK A185H

## (undated) DEVICE — SKIN PREP TRAY 4 COMPARTM TRAY: Brand: MEDLINE INDUSTRIES, INC.

## (undated) DEVICE — 12" (30.5 CM) ARTERIAL PRESSURE TUBING: Brand: ICU MEDICAL

## (undated) DEVICE — SUCKER CARD L9IN 0.25IN CONN MINI RIG SFT TIP W/ SIDE PRT

## (undated) DEVICE — SUTURE VCRL SZ 4-0 L18IN ABSRB UD L19MM PS-2 3/8 CIR PRIM J496H

## (undated) DEVICE — GLOVE ORANGE PI 7 1/2   MSG9075

## (undated) DEVICE — COVER FT SWCH W15XL17IN GRY ALL OEC SYS

## (undated) DEVICE — SUTURE PERMA-HAND SZ 2 L60IN NONABSORBABLE BLK SILK BRAID SA8H

## (undated) DEVICE — ADAPTER FLSH PMP FLD MGMT GI IRRIG OFP 2 DISPOSABLE

## (undated) DEVICE — CHLORAPREP 26ML ORANGE

## (undated) DEVICE — 3M™ STERI-DRAPE™ INCISE DRAPE 1050 (60CM X 45CM): Brand: STERI-DRAPE™

## (undated) DEVICE — RADIFOCUS GLIDEWIRE: Brand: GLIDEWIRE

## (undated) DEVICE — APPLIER CLP L9.38IN M LIG TI DISP STR RNG HNDL LIGACLP

## (undated) DEVICE — INTENDED FOR TISSUE SEPARATION, AND OTHER PROCEDURES THAT REQUIRE A SHARP SURGICAL BLADE TO PUNCTURE OR CUT.: Brand: BARD-PARKER ® CARBON RIB-BACK BLADES

## (undated) DEVICE — TOWEL,OR,DSP,ST,BLUE,STD,4/PK,20PK/CS: Brand: MEDLINE

## (undated) DEVICE — DRAPE EQUIP TRNSPRT CONTAINMENT FOR BK TAB

## (undated) DEVICE — GOWN,AURORA,NONREINFORCED,LARGE: Brand: MEDLINE

## (undated) DEVICE — GLOVE SURG SZ 85 STD WHT LTX SYN POLYMER BEAD REINF ANTI RL

## (undated) DEVICE — LABEL MED MINI W/ MARKER

## (undated) DEVICE — SECTO® DISSECTOR, KITTNER, 5/16 IN DIAMETER, (5 EA/POUCH, 24 POUCHES/PK, 4 PK/BX): Brand: SYMMETRY SURGICAL

## (undated) DEVICE — TOTAL TRAY, 16FR 10ML SIL FOLEY, URN: Brand: MEDLINE

## (undated) DEVICE — GLOVE SURG SZ 85 L12IN FNGR THK94MIL TRNSLUC YEL LTX

## (undated) DEVICE — SHEATH INTRO 6FR L7CM HEMSTAT VLV DIL SIDE PRT RADPQ W/O

## (undated) DEVICE — 3M™ STERI-DRAPE™ INSTRUMENT POUCH 1018: Brand: STERI-DRAPE™

## (undated) DEVICE — YANKAUER,BULB TIP,W/O VENT,RIGID,STERILE: Brand: MEDLINE

## (undated) DEVICE — CATHETER ANGIO 5FR L65CM 0.038IN BERN SEL SFT RADPQ TIP HI

## (undated) DEVICE — COVER LT HNDL BLU PLAS

## (undated) DEVICE — GOWN,SIRUS,POLYRNF,BRTHSLV,XLN/XL,20/CS: Brand: MEDLINE

## (undated) DEVICE — PACK ORTHOPEDIC 1 TBL CVR 50X90 REINF 1 MAYO STD CVR 24X53 REINF 4 UTIL

## (undated) DEVICE — Device: Brand: MEDEX

## (undated) DEVICE — APPLIER CLP L9.375IN APER 2.1MM CLS L3.8MM 20 SM TI CLP

## (undated) DEVICE — Z DISCONTINUED PER MEDLINE USE 2741942 DRESSING AQUACEL 6 IN ALG W9XL15CM SIL CVR WTRPRF VIR BACT BARR ANTIMIC

## (undated) DEVICE — SPONGE,LAP,18"X18",DLX,XR,ST,5/PK,40/PK: Brand: MEDLINE

## (undated) DEVICE — LOOP VES L12IN DIA0.066IN WHT SIL THN WALL AIR CUSH

## (undated) DEVICE — COUNTER NDL 40 COUNT HLD 70 FOAM BLK ADH W/ MAG

## (undated) DEVICE — MARKER SURG SKIN GENTIAN VLT REG TIP W/ 6IN RUL

## (undated) DEVICE — 3M™ STERI-DRAPE™ ISOLATION BAG, 10 PER CARTON / 4 CARTONS PER CASE, 1003: Brand: 3M™ STERI-DRAPE™

## (undated) DEVICE — SPONGE GZ W4XL4IN COT 12 PLY TYP VII WVN C FLD DSGN STERILE

## (undated) DEVICE — SET,IRRIGATION,CYSTO/TUR: Brand: MEDLINE

## (undated) DEVICE — SET BLD COLL 21GA TB L12IN NDL L0.75IN WNG GRN SIMP EZ TO

## (undated) DEVICE — 4-PORT MANIFOLD: Brand: NEPTUNE 2

## (undated) DEVICE — VIPERTRACK,  20 PACK: Brand: VIPERTRACK

## (undated) DEVICE — TUBE SET 96 MM 64 MM H2O PERISTALTIC STD AUX CHANNEL

## (undated) DEVICE — BAG DRAINAGE URIN LIGEMAN W/ ADPT SUCTION HOSE CYSTO URO

## (undated) DEVICE — SUTURE NABSORBABLE PRONOVA L24IN SZ 5-0 BLU C-1 L13MM 3/8 CIR REV PN3725H

## (undated) DEVICE — INTENDED USED TO PROTECT, TAG AND HELP LOCATED SUTURES DURING SURGERY: Brand: STERION®SUTURE AID BOOTIES

## (undated) DEVICE — ENDO CARRY-ON PROCEDURE KIT: Brand: ENDO CARRY-ON PROCEDURE KIT

## (undated) DEVICE — SYRINGE MED 10ML LUERLOCK TIP W/O SFTY DISP

## (undated) DEVICE — RADIFOCUS GLIDECATH: Brand: GLIDECATH

## (undated) DEVICE — 3 ML SYRINGE LUER-LOCK TIP: Brand: MONOJECT

## (undated) DEVICE — Device: Brand: ASTATO 30

## (undated) DEVICE — DECANTER BAG 9": Brand: MEDLINE INDUSTRIES, INC.

## (undated) DEVICE — STOCKINETTE ORTH W6XL48IN OFF WHT SGL PLY UNBLEACHED COT RIB

## (undated) DEVICE — SUTURE VCRL SZ 2-0 L36IN ABSRB UD L36MM CT-1 1/2 CIR J945H

## (undated) DEVICE — JELLY,LUBE,STERILE,FLIP TOP,TUBE,2-OZ: Brand: MEDLINE

## (undated) DEVICE — TUBING, SUCTION, 1/4" X 10', STRAIGHT: Brand: MEDLINE

## (undated) DEVICE — DEVICE INFL ENCORE MEDI 26

## (undated) DEVICE — SNARE ENDOSCP AD L240CM LOOP W10MM SHTH DIA2.4MM RND INSUL

## (undated) DEVICE — CATHETER ANGIO AD PED 5FR L65CM GWIRE 0.035IN PERIPH STR

## (undated) DEVICE — INTENDED TO BE USED TO OCCLUDE, RETRACT AND IDENTIFY ARTERIES, VEINS, TENDONS AND NERVES IN SURGICAL PROCEDURES: Brand: STERION®  VESSEL LOOP

## (undated) DEVICE — SYRINGE IRRIG 60ML SFT PLIABLE BLB EZ TO GRP 1 HND USE W/

## (undated) DEVICE — SUTURE PERMAHAND SZ 3-0 L18IN NONABSORBABLE BLK SILK BRAID A184H

## (undated) DEVICE — CONMED SCOPE SAVER BITE BLOCK, 20X27 MM: Brand: SCOPE SAVER

## (undated) DEVICE — TUR/ENDOSCOPIC CABLE, 10' (3.05 M): Brand: CONMED

## (undated) DEVICE — PENCIL SMOKE EVAC PUSH BUTTON COATED

## (undated) DEVICE — COVER,MAYO STAND,STERILE: Brand: MEDLINE

## (undated) DEVICE — BRUSH ENDO CLN L90.5IN SHTH DIA1.7MM BRIST DIA5-7MM 2-6MM

## (undated) DEVICE — GAUZE,SPONGE,4"X4",16PLY,XRAY,STRL,LF: Brand: MEDLINE

## (undated) DEVICE — DRAPE SURG W41XL74IN CLR FULL SZ C ARM 3 ADH POLY STRP E

## (undated) DEVICE — SYRINGE MED 30ML STD CLR PLAS LUERLOCK TIP N CTRL DISP